# Patient Record
Sex: MALE | Race: WHITE | NOT HISPANIC OR LATINO | Employment: OTHER | URBAN - METROPOLITAN AREA
[De-identification: names, ages, dates, MRNs, and addresses within clinical notes are randomized per-mention and may not be internally consistent; named-entity substitution may affect disease eponyms.]

---

## 2018-03-20 LAB
ANION GAP SERPL CALCULATED.3IONS-SCNC: 11.2 MM/L
BUN SERPL-MCNC: 63 MG/DL (ref 7–25)
CALCIUM SERPL-MCNC: 9.2 MG/DL (ref 8.6–10.5)
CHLORIDE SERPL-SCNC: 97 MM/L (ref 98–107)
CO2 SERPL-SCNC: 32 MM/L (ref 21–31)
CREAT SERPL-MCNC: 2.51 MG/DL (ref 0.7–1.3)
EGFR (HISTORICAL): 25 GFR
EGFR AFRICAN AMERICAN (HISTORICAL): 30 GFR
GLUCOSE (HISTORICAL): 138 MG/DL (ref 65–99)
OSMOLALITY, SERUM (HISTORICAL): 292 MOSM (ref 262–291)
POTASSIUM SERPL-SCNC: 4.2 MM/L (ref 3.5–5.5)
SODIUM SERPL-SCNC: 136 MM/L (ref 134–143)

## 2018-03-21 LAB
ANION GAP SERPL CALCULATED.3IONS-SCNC: 13 MM/L
BUN SERPL-MCNC: 62 MG/DL (ref 7–25)
CALCIUM SERPL-MCNC: 8.6 MG/DL (ref 8.6–10.5)
CHLORIDE SERPL-SCNC: 102 MM/L (ref 98–107)
CO2 SERPL-SCNC: 27 MM/L (ref 21–31)
CREAT SERPL-MCNC: 2.17 MG/DL (ref 0.7–1.3)
EGFR (HISTORICAL): 29 GFR
EGFR AFRICAN AMERICAN (HISTORICAL): 35 GFR
GLUCOSE (HISTORICAL): 117 MG/DL (ref 65–99)
OSMOLALITY, SERUM (HISTORICAL): 294 MOSM (ref 262–291)
POTASSIUM SERPL-SCNC: 4 MM/L (ref 3.5–5.5)
SODIUM SERPL-SCNC: 138 MM/L (ref 134–143)

## 2018-03-22 LAB
ANION GAP SERPL CALCULATED.3IONS-SCNC: 10.2 MM/L
BASOPHILS # BLD AUTO: 0.1 X3/UL (ref 0–0.3)
BASOPHILS # BLD AUTO: 1.5 % (ref 0–2)
BUN SERPL-MCNC: 62 MG/DL (ref 7–25)
CALCIUM SERPL-MCNC: 8.6 MG/DL (ref 8.6–10.5)
CHLORIDE SERPL-SCNC: 103 MM/L (ref 98–107)
CO2 SERPL-SCNC: 29 MM/L (ref 21–31)
CREAT SERPL-MCNC: 2.06 MG/DL (ref 0.7–1.3)
DEPRECATED RDW RBC AUTO: 15.5 % (ref 11.5–14.5)
EGFR (HISTORICAL): 31 GFR
EGFR AFRICAN AMERICAN (HISTORICAL): 37 GFR
EOSINOPHIL # BLD AUTO: 0.1 X3/UL (ref 0–0.5)
EOSINOPHIL NFR BLD AUTO: 3 % (ref 0–5)
GLUCOSE (HISTORICAL): 100 MG/DL (ref 65–99)
HCT VFR BLD AUTO: 37.2 % (ref 42–52)
HGB BLD-MCNC: 12.6 G/DL (ref 14–18)
LYMPHOCYTES # BLD AUTO: 0.9 X3/UL (ref 1.2–4.2)
LYMPHOCYTES NFR BLD AUTO: 19.1 % (ref 20.5–51.1)
MCH RBC QN AUTO: 28.8 PG (ref 26–34)
MCHC RBC AUTO-ENTMCNC: 33.9 G/DL (ref 31–36)
MCV RBC AUTO: 84.9 FL (ref 81–99)
MONOCYTES # BLD AUTO: 0.5 X3/UL (ref 0–1)
MONOCYTES NFR BLD AUTO: 10.8 % (ref 1.7–12)
NEUTROPHILS # BLD AUTO: 3 X3/UL (ref 1.4–6.5)
NEUTS SEG NFR BLD AUTO: 65.6 % (ref 42.2–75.2)
OSMOLALITY, SERUM (HISTORICAL): 293 MOSM (ref 262–291)
PLATELET # BLD AUTO: 294 X3/UL (ref 130–400)
PMV BLD AUTO: 8.9 FL (ref 8.6–11.7)
POTASSIUM SERPL-SCNC: 4.2 MM/L (ref 3.5–5.5)
RBC # BLD AUTO: 4.38 X6/UL (ref 4.3–5.9)
SODIUM SERPL-SCNC: 138 MM/L (ref 134–143)
WBC # BLD AUTO: 4.5 X3/UL (ref 4.8–10.8)

## 2018-03-24 LAB
ANION GAP SERPL CALCULATED.3IONS-SCNC: 10.2 MM/L
BUN SERPL-MCNC: 60 MG/DL (ref 7–25)
CALCIUM SERPL-MCNC: 9 MG/DL (ref 8.6–10.5)
CHLORIDE SERPL-SCNC: 100 MM/L (ref 98–107)
CO2 SERPL-SCNC: 30 MM/L (ref 21–31)
CREAT SERPL-MCNC: 1.96 MG/DL (ref 0.7–1.3)
EGFR (HISTORICAL): 33 GFR
EGFR AFRICAN AMERICAN (HISTORICAL): 40 GFR
GLUCOSE (HISTORICAL): 77 MG/DL (ref 65–99)
OSMOLALITY, SERUM (HISTORICAL): 288 MOSM (ref 262–291)
POTASSIUM SERPL-SCNC: 4.2 MM/L (ref 3.5–5.5)
SODIUM SERPL-SCNC: 136 MM/L (ref 134–143)

## 2018-03-26 LAB
ANION GAP SERPL CALCULATED.3IONS-SCNC: 11.9 MM/L
BASOPHILS # BLD AUTO: 0.1 X3/UL (ref 0–0.3)
BASOPHILS # BLD AUTO: 1.2 % (ref 0–2)
BUN SERPL-MCNC: 52 MG/DL (ref 7–25)
CALCIUM SERPL-MCNC: 8.8 MG/DL (ref 8.6–10.5)
CHLORIDE SERPL-SCNC: 102 MM/L (ref 98–107)
CO2 SERPL-SCNC: 32 MM/L (ref 21–31)
CREAT SERPL-MCNC: 1.9 MG/DL (ref 0.7–1.3)
DEPRECATED RDW RBC AUTO: 15.8 % (ref 11.5–14.5)
EGFR (HISTORICAL): 34 GFR
EGFR AFRICAN AMERICAN (HISTORICAL): 41 GFR
EOSINOPHIL # BLD AUTO: 0.1 X3/UL (ref 0–0.5)
EOSINOPHIL NFR BLD AUTO: 3.5 % (ref 0–5)
GLUCOSE (HISTORICAL): 99 MG/DL (ref 65–99)
HCT VFR BLD AUTO: 38.7 % (ref 42–52)
HGB BLD-MCNC: 13 G/DL (ref 14–18)
LYMPHOCYTES # BLD AUTO: 1.1 X3/UL (ref 1.2–4.2)
LYMPHOCYTES NFR BLD AUTO: 25.8 % (ref 20.5–51.1)
MAGNESIUM SERPL-MCNC: 2 MG/DL (ref 1.9–2.7)
MCH RBC QN AUTO: 28.4 PG (ref 26–34)
MCHC RBC AUTO-ENTMCNC: 33.6 G/DL (ref 31–36)
MCV RBC AUTO: 84.6 FL (ref 81–99)
MONOCYTES # BLD AUTO: 0.5 X3/UL (ref 0–1)
MONOCYTES NFR BLD AUTO: 11.9 % (ref 1.7–12)
NEUTROPHILS # BLD AUTO: 2.4 X3/UL (ref 1.4–6.5)
NEUTS SEG NFR BLD AUTO: 57.6 % (ref 42.2–75.2)
OSMOLALITY, SERUM (HISTORICAL): 297 MOSM (ref 262–291)
PHOSPHATE SERPL-MCNC: 2.5 MG/DL (ref 3–5.5)
PLATELET # BLD AUTO: 257 X3/UL (ref 130–400)
PMV BLD AUTO: 9.1 FL (ref 8.6–11.7)
POTASSIUM SERPL-SCNC: 3.9 MM/L (ref 3.5–5.5)
RBC # BLD AUTO: 4.57 X6/UL (ref 4.3–5.9)
SODIUM SERPL-SCNC: 142 MM/L (ref 134–143)
WBC # BLD AUTO: 4.2 X3/UL (ref 4.8–10.8)

## 2018-03-27 LAB
ANION GAP SERPL CALCULATED.3IONS-SCNC: 10.9 MM/L
BASOPHILS # BLD AUTO: 0.1 X3/UL (ref 0–0.3)
BASOPHILS # BLD AUTO: 1.3 % (ref 0–2)
BUN SERPL-MCNC: 41 MG/DL (ref 7–25)
CALCIUM SERPL-MCNC: 8.6 MG/DL (ref 8.6–10.5)
CHLORIDE SERPL-SCNC: 104 MM/L (ref 98–107)
CO2 SERPL-SCNC: 30 MM/L (ref 21–31)
CREAT SERPL-MCNC: 1.77 MG/DL (ref 0.7–1.3)
DEPRECATED RDW RBC AUTO: 15.9 % (ref 11.5–14.5)
EGFR (HISTORICAL): 37 GFR
EGFR AFRICAN AMERICAN (HISTORICAL): 45 GFR
EOSINOPHIL # BLD AUTO: 0.2 X3/UL (ref 0–0.5)
EOSINOPHIL NFR BLD AUTO: 4.6 % (ref 0–5)
GLUCOSE (HISTORICAL): 98 MG/DL (ref 65–99)
HCT VFR BLD AUTO: 39.5 % (ref 42–52)
HGB BLD-MCNC: 12.8 G/DL (ref 14–18)
LYMPHOCYTES # BLD AUTO: 1.1 X3/UL (ref 1.2–4.2)
LYMPHOCYTES NFR BLD AUTO: 26.2 % (ref 20.5–51.1)
MCH RBC QN AUTO: 27.7 PG (ref 26–34)
MCHC RBC AUTO-ENTMCNC: 32.5 G/DL (ref 31–36)
MCV RBC AUTO: 85.1 FL (ref 81–99)
MONOCYTES # BLD AUTO: 0.5 X3/UL (ref 0–1)
MONOCYTES NFR BLD AUTO: 11.2 % (ref 1.7–12)
NEUTROPHILS # BLD AUTO: 2.5 X3/UL (ref 1.4–6.5)
NEUTS SEG NFR BLD AUTO: 56.7 % (ref 42.2–75.2)
OSMOLALITY, SERUM (HISTORICAL): 291 MOSM (ref 262–291)
PLATELET # BLD AUTO: 243 X3/UL (ref 130–400)
PMV BLD AUTO: 9.1 FL (ref 8.6–11.7)
POTASSIUM SERPL-SCNC: 3.9 MM/L (ref 3.5–5.5)
RBC # BLD AUTO: 4.64 X6/UL (ref 4.3–5.9)
SODIUM SERPL-SCNC: 141 MM/L (ref 134–143)
WBC # BLD AUTO: 4.4 X3/UL (ref 4.8–10.8)

## 2018-03-29 LAB
ALBUMIN SERPL BCP-MCNC: 2.9 G/DL (ref 3.5–5.7)
ALP SERPL-CCNC: 58 IU/L (ref 55–165)
ALT SERPL W P-5'-P-CCNC: 47 IU/L (ref 7–26)
ANION GAP SERPL CALCULATED.3IONS-SCNC: 12 MM/L
AST SERPL W P-5'-P-CCNC: 57 U/L (ref 8–27)
BASOPHILS # BLD AUTO: 0.1 X3/UL (ref 0–0.3)
BASOPHILS # BLD AUTO: 0.9 % (ref 0–2)
BILIRUB SERPL-MCNC: 0.8 MG/DL (ref 0.3–1)
BUN SERPL-MCNC: 40 MG/DL (ref 7–25)
CALCIUM SERPL-MCNC: 8.6 MG/DL (ref 8.6–10.5)
CHLORIDE SERPL-SCNC: 106 MM/L (ref 98–107)
CO2 SERPL-SCNC: 27 MM/L (ref 21–31)
CREAT SERPL-MCNC: 1.85 MG/DL (ref 0.7–1.3)
DEPRECATED RDW RBC AUTO: 16.3 % (ref 11.5–14.5)
EGFR (HISTORICAL): 35 GFR
EGFR AFRICAN AMERICAN (HISTORICAL): 42 GFR
EOSINOPHIL # BLD AUTO: 0.3 X3/UL (ref 0–0.5)
EOSINOPHIL NFR BLD AUTO: 4.8 % (ref 0–5)
GLUCOSE (HISTORICAL): 71 MG/DL (ref 65–99)
HCT VFR BLD AUTO: 39 % (ref 42–52)
HGB BLD-MCNC: 12.7 G/DL (ref 14–18)
LYMPHOCYTES # BLD AUTO: 1.5 X3/UL (ref 1.2–4.2)
LYMPHOCYTES NFR BLD AUTO: 25.7 % (ref 20.5–51.1)
MAGNESIUM SERPL-MCNC: 2 MG/DL (ref 1.9–2.7)
MCH RBC QN AUTO: 27.6 PG (ref 26–34)
MCHC RBC AUTO-ENTMCNC: 32.6 G/DL (ref 31–36)
MCV RBC AUTO: 84.7 FL (ref 81–99)
MONOCYTES # BLD AUTO: 0.6 X3/UL (ref 0–1)
MONOCYTES NFR BLD AUTO: 9.8 % (ref 1.7–12)
NEUTROPHILS # BLD AUTO: 3.3 X3/UL (ref 1.4–6.5)
NEUTS SEG NFR BLD AUTO: 58.8 % (ref 42.2–75.2)
OSMOLALITY, SERUM (HISTORICAL): 289 MOSM (ref 262–291)
PHOSPHATE SERPL-MCNC: 3 MG/DL (ref 3–5.5)
PLATELET # BLD AUTO: 217 X3/UL (ref 130–400)
PMV BLD AUTO: 9.2 FL (ref 8.6–11.7)
POTASSIUM SERPL-SCNC: 4 MM/L (ref 3.5–5.5)
RBC # BLD AUTO: 4.6 X6/UL (ref 4.3–5.9)
SODIUM SERPL-SCNC: 141 MM/L (ref 134–143)
TOTAL PROTEIN (HISTORICAL): 6 G/DL (ref 6.4–8.9)
WBC # BLD AUTO: 5.7 X3/UL (ref 4.8–10.8)

## 2018-04-01 LAB
ANION GAP SERPL CALCULATED.3IONS-SCNC: 14.6 MM/L
BASOPHILS # BLD AUTO: 0.1 X3/UL (ref 0–0.3)
BASOPHILS # BLD AUTO: 0.7 % (ref 0–2)
BNP SERPL-MCNC: 262 PG/ML (ref 1–100)
BUN SERPL-MCNC: 39 MG/DL (ref 7–25)
CALCIUM SERPL-MCNC: 8.6 MG/DL (ref 8.6–10.5)
CHLORIDE SERPL-SCNC: 109 MM/L (ref 98–107)
CO2 SERPL-SCNC: 23 MM/L (ref 21–31)
CREAT SERPL-MCNC: 1.61 MG/DL (ref 0.7–1.3)
DEPRECATED RDW RBC AUTO: 16.2 % (ref 11.5–14.5)
EGFR (HISTORICAL): 41 GFR
EGFR AFRICAN AMERICAN (HISTORICAL): 50 GFR
EOSINOPHIL # BLD AUTO: 0.1 X3/UL (ref 0–0.5)
EOSINOPHIL NFR BLD AUTO: 0.9 % (ref 0–5)
GLUCOSE (HISTORICAL): 87 MG/DL (ref 65–99)
HCT VFR BLD AUTO: 42.6 % (ref 42–52)
HGB BLD-MCNC: 13.8 G/DL (ref 14–18)
LYMPHOCYTES # BLD AUTO: 1.2 X3/UL (ref 1.2–4.2)
LYMPHOCYTES NFR BLD AUTO: 17.5 % (ref 20.5–51.1)
MCH RBC QN AUTO: 28.2 PG (ref 26–34)
MCHC RBC AUTO-ENTMCNC: 32.4 G/DL (ref 31–36)
MCV RBC AUTO: 87 FL (ref 81–99)
MONOCYTES # BLD AUTO: 0.4 X3/UL (ref 0–1)
MONOCYTES NFR BLD AUTO: 6.3 % (ref 1.7–12)
NEUTROPHILS # BLD AUTO: 5.1 X3/UL (ref 1.4–6.5)
NEUTS SEG NFR BLD AUTO: 74.6 % (ref 42.2–75.2)
OSMOLALITY, SERUM (HISTORICAL): 292 MOSM (ref 262–291)
PLATELET # BLD AUTO: 199 X3/UL (ref 130–400)
PMV BLD AUTO: 9.7 FL (ref 8.6–11.7)
POTASSIUM SERPL-SCNC: 4.6 MM/L (ref 3.5–5.5)
RBC # BLD AUTO: 4.89 X6/UL (ref 4.3–5.9)
SODIUM SERPL-SCNC: 142 MM/L (ref 134–143)
WBC # BLD AUTO: 6.9 X3/UL (ref 4.8–10.8)

## 2019-01-28 ENCOUNTER — APPOINTMENT (EMERGENCY)
Dept: RADIOLOGY | Facility: HOSPITAL | Age: 84
DRG: 689 | End: 2019-01-28
Payer: MEDICARE

## 2019-01-28 ENCOUNTER — HOSPITAL ENCOUNTER (INPATIENT)
Facility: HOSPITAL | Age: 84
LOS: 3 days | Discharge: NURSING, REHAB OR LONG-TERM CARE | DRG: 689 | End: 2019-01-31
Attending: EMERGENCY MEDICINE | Admitting: STUDENT IN AN ORGANIZED HEALTH CARE EDUCATION/TRAINING PROGRAM
Payer: MEDICARE

## 2019-01-28 ENCOUNTER — APPOINTMENT (EMERGENCY)
Dept: ULTRASOUND IMAGING | Facility: HOSPITAL | Age: 84
DRG: 689 | End: 2019-01-28
Payer: MEDICARE

## 2019-01-28 DIAGNOSIS — L03.019 PARONYCHIA OF FINGER: ICD-10-CM

## 2019-01-28 DIAGNOSIS — N48.89 PENILE SWELLING: ICD-10-CM

## 2019-01-28 DIAGNOSIS — L89.90 DECUBITUS ULCERS: ICD-10-CM

## 2019-01-28 DIAGNOSIS — I48.91 A-FIB (HCC): ICD-10-CM

## 2019-01-28 DIAGNOSIS — N18.9 CKD (CHRONIC KIDNEY DISEASE): ICD-10-CM

## 2019-01-28 DIAGNOSIS — L03.317 CELLULITIS OF BUTTOCK: ICD-10-CM

## 2019-01-28 DIAGNOSIS — E11.9 DM (DIABETES MELLITUS) (HCC): ICD-10-CM

## 2019-01-28 DIAGNOSIS — N39.0 UTI (URINARY TRACT INFECTION): Primary | ICD-10-CM

## 2019-01-28 DIAGNOSIS — J44.9 COPD WITHOUT EXACERBATION (HCC): ICD-10-CM

## 2019-01-28 DIAGNOSIS — E55.9 VITAMIN D DEFICIENCY: ICD-10-CM

## 2019-01-28 DIAGNOSIS — E78.5 HLD (HYPERLIPIDEMIA): ICD-10-CM

## 2019-01-28 PROBLEM — I50.9 CHF (CONGESTIVE HEART FAILURE) (HCC): Status: ACTIVE | Noted: 2019-01-28

## 2019-01-28 PROBLEM — L89.102 PRESSURE INJURY OF BACK, STAGE 2 (HCC): Status: ACTIVE | Noted: 2019-01-28

## 2019-01-28 PROBLEM — N43.3 HYDROCELE: Status: ACTIVE | Noted: 2019-01-28

## 2019-01-28 LAB
ALBUMIN SERPL BCP-MCNC: 2.8 G/DL (ref 3.5–5)
ALP SERPL-CCNC: 61 U/L (ref 46–116)
ALT SERPL W P-5'-P-CCNC: 24 U/L (ref 12–78)
ANION GAP SERPL CALCULATED.3IONS-SCNC: 8 MMOL/L (ref 4–13)
APTT PPP: 56 SECONDS (ref 26–38)
AST SERPL W P-5'-P-CCNC: 29 U/L (ref 5–45)
ATRIAL RATE: 67 BPM
ATRIAL RATE: 86 BPM
BACTERIA UR QL AUTO: ABNORMAL /HPF
BASOPHILS # BLD AUTO: 0.04 THOUSANDS/ΜL (ref 0–0.1)
BASOPHILS NFR BLD AUTO: 1 % (ref 0–1)
BILIRUB DIRECT SERPL-MCNC: 0.2 MG/DL (ref 0–0.2)
BILIRUB SERPL-MCNC: 0.4 MG/DL (ref 0.2–1)
BILIRUB UR QL STRIP: NEGATIVE
BUN SERPL-MCNC: 32 MG/DL (ref 5–25)
CALCIUM SERPL-MCNC: 8.6 MG/DL (ref 8.3–10.1)
CHLORIDE SERPL-SCNC: 109 MMOL/L (ref 100–108)
CLARITY UR: ABNORMAL
CO2 SERPL-SCNC: 28 MMOL/L (ref 21–32)
COLOR UR: YELLOW
CREAT SERPL-MCNC: 1.64 MG/DL (ref 0.6–1.3)
CRP SERPL HS-MCNC: 71.52 MG/L
EOSINOPHIL # BLD AUTO: 0.1 THOUSAND/ΜL (ref 0–0.61)
EOSINOPHIL NFR BLD AUTO: 2 % (ref 0–6)
ERYTHROCYTE [DISTWIDTH] IN BLOOD BY AUTOMATED COUNT: 14.7 % (ref 11.6–15.1)
ERYTHROCYTE [SEDIMENTATION RATE] IN BLOOD: 44 MM/HOUR (ref 0–10)
GFR SERPL CREATININE-BSD FRML MDRD: 38 ML/MIN/1.73SQ M
GLUCOSE SERPL-MCNC: 62 MG/DL (ref 65–140)
GLUCOSE SERPL-MCNC: 65 MG/DL (ref 65–140)
GLUCOSE UR STRIP-MCNC: ABNORMAL MG/DL
HCT VFR BLD AUTO: 41.6 % (ref 36.5–49.3)
HGB BLD-MCNC: 13.2 G/DL (ref 12–17)
HGB UR QL STRIP.AUTO: ABNORMAL
IMM GRANULOCYTES # BLD AUTO: 0.04 THOUSAND/UL (ref 0–0.2)
IMM GRANULOCYTES NFR BLD AUTO: 1 % (ref 0–2)
INR PPP: 2.13 (ref 0.86–1.17)
KETONES UR STRIP-MCNC: NEGATIVE MG/DL
LACTATE SERPL-SCNC: 1.2 MMOL/L (ref 0.5–2)
LEUKOCYTE ESTERASE UR QL STRIP: ABNORMAL
LYMPHOCYTES # BLD AUTO: 1.12 THOUSANDS/ΜL (ref 0.6–4.47)
LYMPHOCYTES NFR BLD AUTO: 18 % (ref 14–44)
MCH RBC QN AUTO: 29.1 PG (ref 26.8–34.3)
MCHC RBC AUTO-ENTMCNC: 31.7 G/DL (ref 31.4–37.4)
MCV RBC AUTO: 92 FL (ref 82–98)
MONOCYTES # BLD AUTO: 0.81 THOUSAND/ΜL (ref 0.17–1.22)
MONOCYTES NFR BLD AUTO: 13 % (ref 4–12)
NEUTROPHILS # BLD AUTO: 4.22 THOUSANDS/ΜL (ref 1.85–7.62)
NEUTS SEG NFR BLD AUTO: 65 % (ref 43–75)
NITRITE UR QL STRIP: POSITIVE
NON-SQ EPI CELLS URNS QL MICRO: ABNORMAL /HPF
NRBC BLD AUTO-RTO: 0 /100 WBCS
PH UR STRIP.AUTO: 7 [PH] (ref 4.5–8)
PLATELET # BLD AUTO: 273 THOUSANDS/UL (ref 149–390)
PMV BLD AUTO: 10.8 FL (ref 8.9–12.7)
POTASSIUM SERPL-SCNC: 4.9 MMOL/L (ref 3.5–5.3)
PR INTERVAL: 146 MS
PROT SERPL-MCNC: 6.9 G/DL (ref 6.4–8.2)
PROT UR STRIP-MCNC: ABNORMAL MG/DL
PROTHROMBIN TIME: 23.5 SECONDS (ref 11.8–14.2)
QRS AXIS: 65 DEGREES
QRS AXIS: 84 DEGREES
QRSD INTERVAL: 126 MS
QRSD INTERVAL: 86 MS
QT INTERVAL: 382 MS
QT INTERVAL: 400 MS
QTC INTERVAL: 457 MS
QTC INTERVAL: 470 MS
RBC # BLD AUTO: 4.54 MILLION/UL (ref 3.88–5.62)
RBC #/AREA URNS AUTO: ABNORMAL /HPF
SODIUM SERPL-SCNC: 145 MMOL/L (ref 136–145)
SP GR UR STRIP.AUTO: 1.02 (ref 1–1.03)
T WAVE AXIS: 102 DEGREES
T WAVE AXIS: 53 DEGREES
TROPONIN I SERPL-MCNC: <0.02 NG/ML
UROBILINOGEN UR QL STRIP.AUTO: 0.2 E.U./DL
VENTRICULAR RATE: 83 BPM
VENTRICULAR RATE: 86 BPM
WBC # BLD AUTO: 6.33 THOUSAND/UL (ref 4.31–10.16)
WBC #/AREA URNS AUTO: ABNORMAL /HPF

## 2019-01-28 PROCEDURE — 84484 ASSAY OF TROPONIN QUANT: CPT | Performed by: PHYSICIAN ASSISTANT

## 2019-01-28 PROCEDURE — 93005 ELECTROCARDIOGRAM TRACING: CPT

## 2019-01-28 PROCEDURE — 83605 ASSAY OF LACTIC ACID: CPT | Performed by: PHYSICIAN ASSISTANT

## 2019-01-28 PROCEDURE — 80076 HEPATIC FUNCTION PANEL: CPT | Performed by: PHYSICIAN ASSISTANT

## 2019-01-28 PROCEDURE — 96365 THER/PROPH/DIAG IV INF INIT: CPT

## 2019-01-28 PROCEDURE — 85610 PROTHROMBIN TIME: CPT | Performed by: PHYSICIAN ASSISTANT

## 2019-01-28 PROCEDURE — 87040 BLOOD CULTURE FOR BACTERIA: CPT | Performed by: PHYSICIAN ASSISTANT

## 2019-01-28 PROCEDURE — 80048 BASIC METABOLIC PNL TOTAL CA: CPT | Performed by: PHYSICIAN ASSISTANT

## 2019-01-28 PROCEDURE — 86141 C-REACTIVE PROTEIN HS: CPT | Performed by: PHYSICIAN ASSISTANT

## 2019-01-28 PROCEDURE — 81001 URINALYSIS AUTO W/SCOPE: CPT | Performed by: PHYSICIAN ASSISTANT

## 2019-01-28 PROCEDURE — 85652 RBC SED RATE AUTOMATED: CPT | Performed by: PHYSICIAN ASSISTANT

## 2019-01-28 PROCEDURE — 85730 THROMBOPLASTIN TIME PARTIAL: CPT | Performed by: PHYSICIAN ASSISTANT

## 2019-01-28 PROCEDURE — 82948 REAGENT STRIP/BLOOD GLUCOSE: CPT

## 2019-01-28 PROCEDURE — 1123F ACP DISCUSS/DSCN MKR DOCD: CPT | Performed by: STUDENT IN AN ORGANIZED HEALTH CARE EDUCATION/TRAINING PROGRAM

## 2019-01-28 PROCEDURE — 99285 EMERGENCY DEPT VISIT HI MDM: CPT

## 2019-01-28 PROCEDURE — 96367 TX/PROPH/DG ADDL SEQ IV INF: CPT

## 2019-01-28 PROCEDURE — 71046 X-RAY EXAM CHEST 2 VIEWS: CPT

## 2019-01-28 PROCEDURE — 85025 COMPLETE CBC W/AUTO DIFF WBC: CPT | Performed by: PHYSICIAN ASSISTANT

## 2019-01-28 PROCEDURE — 93010 ELECTROCARDIOGRAM REPORT: CPT | Performed by: INTERNAL MEDICINE

## 2019-01-28 PROCEDURE — 36415 COLL VENOUS BLD VENIPUNCTURE: CPT | Performed by: PHYSICIAN ASSISTANT

## 2019-01-28 PROCEDURE — 87086 URINE CULTURE/COLONY COUNT: CPT | Performed by: PHYSICIAN ASSISTANT

## 2019-01-28 PROCEDURE — 87186 SC STD MICRODIL/AGAR DIL: CPT | Performed by: PHYSICIAN ASSISTANT

## 2019-01-28 PROCEDURE — 76870 US EXAM SCROTUM: CPT

## 2019-01-28 RX ORDER — MIDODRINE HYDROCHLORIDE 5 MG/1
10 TABLET ORAL 3 TIMES DAILY
Status: DISCONTINUED | OUTPATIENT
Start: 2019-01-28 | End: 2019-01-28

## 2019-01-28 RX ORDER — ACETAMINOPHEN 325 MG/1
650 TABLET ORAL EVERY 6 HOURS PRN
Status: DISCONTINUED | OUTPATIENT
Start: 2019-01-28 | End: 2019-01-31 | Stop reason: HOSPADM

## 2019-01-28 RX ORDER — METHENAMINE HIPPURATE 1000 MG/1
1 TABLET ORAL 2 TIMES DAILY WITH MEALS
COMMUNITY
End: 2019-01-31 | Stop reason: HOSPADM

## 2019-01-28 RX ORDER — FENOFIBRATE 145 MG/1
145 TABLET, COATED ORAL DAILY
Status: DISCONTINUED | OUTPATIENT
Start: 2019-01-29 | End: 2019-01-29

## 2019-01-28 RX ORDER — MIDODRINE HYDROCHLORIDE 10 MG/1
10 TABLET ORAL 3 TIMES DAILY
COMMUNITY
End: 2019-01-31 | Stop reason: HOSPADM

## 2019-01-28 RX ADMIN — CEFTRIAXONE SODIUM 1000 MG: 10 INJECTION, POWDER, FOR SOLUTION INTRAVENOUS at 14:45

## 2019-01-28 RX ADMIN — VANCOMYCIN HYDROCHLORIDE 1500 MG: 1 INJECTION, POWDER, LYOPHILIZED, FOR SOLUTION INTRAVENOUS at 16:04

## 2019-01-28 NOTE — ED PROVIDER NOTES
History  Chief Complaint   Patient presents with    Wound Infection     multiple wounds, pt finished 2nd round of antibiotics last week     79 y/o male here for multiple complaints  Chief complaint is of multiple wounds  Daughter noticed swelling and redness of right index finger yesterday  Appears to be paronychia  He has pain in this area  Daughter also notes that he has multiple wounds on his lower extremities as well as his buttocks  Also with pain and swelling of his penis that has been present for about 3 days  He has been urinating per usual  He is being actively treated for UTI  Has been on 2 courses of abx including most recently cipro  No relief of dysuria and foul smelling urine  Denies fevers, chest pain, sob  He has fatigue  No vomiting  No abdominal pain and no back pain  Medical history of diabetes, COPD, HLD, CHF, afib           History provided by:  Patient   used: No    Penis Swelling   Presenting symptoms: penile pain and swelling    Presenting symptoms: no dysuria    Context: spontaneously    Context: not after injury, not after intercourse, not after urination, not during intercourse and not during urination    Relieved by:  Nothing  Worsened by:  Nothing  Ineffective treatments:  None tried  Associated symptoms: penile redness and penile swelling    Associated symptoms: no abdominal pain, no diarrhea, no fever, no flank pain, no genital itching, no genital lesions, no genital rash, no groin pain, no hematuria, no nausea, no priapism, no scrotal swelling, no urinary frequency, no urinary hesitation, no urinary incontinence, no urinary retention and no vomiting    Risk factors: no bladder surgery, no change in medication, no erectile dysfunction, no foreign body, no HIV, no kidney stones, does not have multiple sexual partners, no new sexual partner, no recent infection, not currently sexually active, no sickle cell disease, no STI exposure, no unprotected sex and no urinary catheter        Prior to Admission Medications   Prescriptions Last Dose Informant Patient Reported? Taking? Rivaroxaban (XARELTO PO)   Yes Yes   Sig: Take 15 mg by mouth   Sacubitril-Valsartan (ENTRESTO PO)   Yes Yes   Sig: Take 24-26 mg by mouth   choline fenofibrate (TRILIPIX) 135 MG capsule   Yes Yes   Sig: Take 135 mg by mouth daily   insulin aspart (NovoLOG) 100 units/mL injection   Yes Yes   Sig: Inject under the skin 3 (three) times a day before meals   insulin lispro (HumaLOG) 100 units/mL injection   Yes Yes   Sig: Inject 8-12 Units under the skin 3 (three) times a day before meals   methenamine hippurate (HIPREX) 1 g tablet   Yes Yes   Sig: Take 1 g by mouth 2 (two) times a day with meals   midodrine (PROAMATINE) 10 MG tablet   Yes Yes   Sig: Take 10 mg by mouth 3 (three) times a day      Facility-Administered Medications: None       Past Medical History:   Diagnosis Date    CHF [facility]congestive heart failure) ([facility])     COPD (chronic obstructive pulmonary disease) ([place] [address] )     Diabetes mellitus ([place] [address] )     Hyperlipidemia     Osteomyelitis ([place] [address] )        Past Surgical History:   Procedure Laterality Date    ABDOMINAL SURGERY      JOINT REPLACEMENT         History reviewed  No pertinent family history  I have reviewed and agree with the history as documented  Social History   Substance Use Topics    Smoking status: Current Every Day Smoker    Smokeless tobacco: Never Used    Alcohol use No        Review of Systems   Constitutional: Negative for activity change, appetite change, chills, diaphoresis, fatigue, fever and unexpected weight change  HENT: Negative for congestion, rhinorrhea, sinus pressure, sore throat and trouble swallowing  Eyes: Negative for photophobia and visual disturbance  Respiratory: Negative for apnea, cough, choking, chest tightness, shortness of breath, wheezing and stridor  Cardiovascular: Negative for chest pain, palpitations and leg swelling     Gastrointestinal: Negative for abdominal distention, abdominal pain, blood in stool, constipation, diarrhea, nausea and vomiting  Genitourinary: Positive for penile pain and penile swelling  Negative for bladder incontinence, decreased urine volume, difficulty urinating, dysuria, enuresis, flank pain, frequency, hematuria, hesitancy, scrotal swelling and urgency  Musculoskeletal: Negative for arthralgias, myalgias, neck pain and neck stiffness  Skin: Positive for rash and wound  Negative for color change and pallor  Allergic/Immunologic: Negative  Neurological: Negative for dizziness, tremors, syncope, weakness, light-headedness, numbness and headaches  Hematological: Negative  Psychiatric/Behavioral: Negative  All other systems reviewed and are negative  Physical Exam  Physical Exam   Constitutional: He is oriented to person, place, and time  He appears well-developed and well-nourished  Non-toxic appearance  He does not have a sickly appearance  He does not appear ill  No distress  HENT:   Head: Normocephalic and atraumatic  Eyes: Pupils are equal, round, and reactive to light  EOM and lids are normal    Neck: Normal range of motion  Neck supple  Cardiovascular: Normal rate, regular rhythm, S1 normal, S2 normal, normal heart sounds, intact distal pulses and normal pulses  Exam reveals no gallop, no distant heart sounds, no friction rub and no decreased pulses  No murmur heard  Pulses:       Radial pulses are 2+ on the right side, and 2+ on the left side  Pulmonary/Chest: Effort normal and breath sounds normal  No accessory muscle usage  No apnea, no tachypnea and no bradypnea  No respiratory distress  He has no decreased breath sounds  He has no wheezes  He has no rhonchi  He has no rales  Abdominal: Soft  Normal appearance  He exhibits no distension  There is no tenderness  There is no rigidity, no rebound and no guarding  Genitourinary: Testes normal  Penile erythema present   No phimosis, paraphimosis or penile tenderness  No discharge found  Musculoskeletal: Normal range of motion  He exhibits no edema, tenderness or deformity  Hands:  Neurological: He is alert and oriented to person, place, and time  No cranial nerve deficit  GCS eye subscore is 4  GCS verbal subscore is 5  GCS motor subscore is 6  Skin: Skin is warm, dry and intact  Rash noted  He is not diaphoretic  No erythema  No pallor  Psychiatric: His speech is normal    Nursing note and vitals reviewed  Vital Signs  ED Triage Vitals [01/28/19 1411]   Temperature Pulse Respirations Blood Pressure SpO2   98 °F (36 7 °C) 64 18 132/65 95 %      Temp src Heart Rate Source Patient Position - Orthostatic VS BP Location FiO2 (%)   -- -- -- -- --      Pain Score       8           Vitals:    01/28/19 1411   BP: 132/65   Pulse: 64       Visual Acuity      ED Medications  Medications   vancomycin (VANCOCIN) 1,500 mg in sodium chloride 0 9 % 250 mL IVPB (1,500 mg Intravenous New Bag 1/28/19 1604)   ceftriaxone (ROCEPHIN) 1 g/50 mL in dextrose IVPB (0 mg Intravenous Stopped 1/28/19 1515)       Diagnostic Studies  Results Reviewed     Procedure Component Value Units Date/Time    Urine Microscopic [[id number]]  (Abnormal) Collected:  01/28/19 1541    Lab Status:  Final result Specimen:  Urine from Urine, Clean Catch Updated:  01/28/19 1611     RBC, UA 4-10 (A) /hpf      WBC, UA 30-50 (A) /hpf      Epithelial Cells Occasional /hpf      Bacteria, UA Moderate (A) /hpf     Urine culture [[id number]] Collected:  01/28/19 1541    Lab Status:   In process Specimen:  Urine from Urine, Clean Catch Updated:  01/28/19 1611    Hepatic function panel [[id number]]  (Abnormal) Collected:  01/28/19 1437    Lab Status:  Final result Specimen:  Blood from Arm, Right Updated:  01/28/19 1608     Total Bilirubin 0 40 mg/dL      Bilirubin, Direct 0 20 mg/dL      Alkaline Phosphatase 61 U/L      AST 29 U/L      ALT 24 U/L      Total Protein 6 9 g/dL      Albumin 2 8 (L) g/dL     High sensitivity CRP [[id number]] Collected:  01/28/19 1437    Lab Status:  Final result Specimen:  Blood from Arm, Right Updated:  01/28/19 1608     CRP, High Sensitivity 71 52 mg/L     Narrative:               HsCRP Level       Relative Risk           <1 0 mg/L          Low           1 0 to 3 0 mg/L    Average           >3 0 mg/L          High      UA w Reflex to Microscopic w Reflex to Culture [[id number]]  (Abnormal) Collected:  01/28/19 1541    Lab Status:  Final result Specimen:  Urine from Urine, Clean Catch Updated:  01/28/19 1553     Color, UA Yellow     Clarity, UA Cloudy     Specific [place], UA 1 020     pH, UA 7 0     Leukocytes, UA Moderate (A)     Nitrite, UA Positive (A)     Protein, UA 30 (1+) (A) mg/dl      Glucose,  (1/4%) (A) mg/dl      Ketones, UA Negative mg/dl      Urobilinogen, UA 0 2 E U /dl      Bilirubin, UA Negative     Blood, UA Large (A)    Sedimentation rate, automated [[id number]]  (Abnormal) Collected:  01/28/19 1437    Lab Status:  Final result Specimen:  Blood from Arm, Right Updated:  01/28/19 1551     Sed Rate 44 (H) mm/hour     Protime-INR [[id number]]  (Abnormal) Collected:  01/28/19 1437    Lab Status:  Final result Specimen:  Blood from Arm, Right Updated:  01/28/19 1533     Protime 23 5 (H) seconds      INR 2 13 (H)    APTT [[id number]]  (Abnormal) Collected:  01/28/19 1437    Lab Status:  Final result Specimen:  Blood from Arm, Right Updated:  01/28/19 1533     PTT 56 (H) seconds     Lactic acid, plasma [[id number]]  (Normal) Collected:  01/28/19 1437    Lab Status:  Final result Specimen:  Blood from Arm, Right Updated:  01/28/19 1519     LACTIC ACID 1 2 mmol/L     Narrative:         Result may be elevated if tourniquet was used during collection      Troponin I [[id number]]  (Normal) Collected:  01/28/19 1437    Lab Status:  Final result Specimen:  Blood from Arm, Right Updated:  01/28/19 1515     Troponin I <0 02 ng/mL     Basic metabolic panel [[id number]]  (Abnormal) Collected:  01/28/19 1437    Lab Status:  Final result Specimen:  Blood from Arm, Right Updated:  01/28/19 1512     Sodium 145 mmol/L      Potassium 4 9 mmol/L      Chloride 109 (H) mmol/L      CO2 28 mmol/L      ANION GAP 8 mmol/L      BUN 32 (H) mg/dL      Creatinine 1 64 (H) mg/dL      Glucose 65 mg/dL      Calcium 8 6 mg/dL      eGFR 38 ml/min/1 73sq m     Narrative:         National Kidney Disease Education Program recommendations are as follows:  GFR calculation is accurate only with a steady state creatinine  Chronic Kidney disease less than 60 ml/min/1 73 sq  meters  Kidney failure less than 15 ml/min/1 73 sq  meters  CBC and differential [[id number]]  (Abnormal) Collected:  01/28/19 1437    Lab Status:  Final result Specimen:  Blood from Arm, Right Updated:  01/28/19 1446     WBC 6 33 Thousand/uL      RBC 4 54 Million/uL      Hemoglobin 13 2 g/dL      Hematocrit 41 6 %      MCV 92 fL      MCH 29 1 pg      MCHC 31 7 g/dL      RDW 14 7 %      MPV 10 8 fL      Platelets 556 Thousands/uL      nRBC 0 /100 WBCs      Neutrophils Relative 65 %      Immat GRANS % 1 %      Lymphocytes Relative 18 %      Monocytes Relative 13 (H) %      Eosinophils Relative 2 %      Basophils Relative 1 %      Neutrophils Absolute 4 22 Thousands/µL      Immature Grans Absolute 0 04 Thousand/uL      Lymphocytes Absolute 1 12 Thousands/µL      Monocytes Absolute 0 81 Thousand/µL      Eosinophils Absolute 0 10 Thousand/µL      Basophils Absolute 0 04 Thousands/µL     Blood culture #1 [[id number]] Collected:  01/28/19 1437    Lab Status: In process Specimen:  Blood from Hand, Right Updated:  01/28/19 1442    Blood culture #2 [[id number]] Collected:  01/28/19 1437    Lab Status:   In process Specimen:  Blood from Arm, Right Updated:  01/28/19 1442    Fingerstick Glucose (POCT) [[id number]]  (Abnormal) Collected:  01/28/19 1433    Lab Status:  Final result Updated:  01/28/19 1442     POC Glucose 62 (L) mg/dl US scrotum and testicles   Final Result by Barulio Banuelos MD (01/28 1570)       Small bilateral hydroceles  Workstation performed: [id number]         XR chest 2 views    (Results Pending)              Procedures  ECG 12 Lead Documentation  Date/Time: 1/28/2019 2:44 PM  Performed by: Akira Stevenson by: Neel Tello     Indications / Diagnosis:  Fatigue  ECG reviewed by me, the ED Provider: yes    Patient location:  ED  Previous ECG:     Previous ECG:  Unavailable    Comparison to cardiac monitor: Yes    Interpretation:     Interpretation: abnormal    Quality:     Tracing quality:  Limited by artifact  Rate:     ECG rate:  86    ECG rate assessment: normal    Rhythm:     Rhythm: atrial fibrillation    Ectopy:     Ectopy: none    QRS:     QRS axis:  Normal    QRS intervals:  Normal  Conduction:     Conduction: normal    ST segments:     ST segments:  Normal  T waves:     T waves: normal      Incision/Drainage  Date/Time: 1/28/2019 3:22 PM  Performed by: Akira Stevenson by: Neel Tello     Patient location:  ED  Other Assisting Provider: Yes (comment)    Consent:     Consent obtained:  Verbal    Consent given by:  Patient    Risks discussed:  Bleeding, damage to other organs, incomplete drainage, infection and pain  Universal protocol:     Patient identity confirmed:  Arm band, hospital-assigned identification number and verbally with patient  Location:     Indications for incision and drainage: paronychia  Size:  2cm    Location: right fourth finger  Anesthesia (see MAR for exact dosages): Anesthesia method:  None  Procedure details:     Complexity:  Simple    Incision types:  Stab incision    Scalpel blade:  11    Approach:  Open    Incision depth:  Skin    Drainage:  Purulent    Drainage amount:  Scant    Wound treatment:  Wound left open    Packing materials:  None  Post-procedure details:     Patient tolerance of procedure:   Tolerated well, no immediate complications           Phone Contacts  ED Phone Contact    ED Course  ED Course as of Jan 28 1646   Mon Jan 28, 2019   1522 I&D of paronychia  Scant discharge returned  HEART Risk Score      Most Recent Value   History  0 Filed at: 01/28/2019 1639   ECG  1 Filed at: 01/28/2019 1639   Age  2 Filed at: 01/28/2019 1639   Risk Factors  2 Filed at: 01/28/2019 1639   Troponin  0 Filed at: 01/28/2019 1639   Heart Score Risk Calculator   History  0 Filed at: 01/28/2019 1639   ECG  1 Filed at: 01/28/2019 1639   Age  2 Filed at: 01/28/2019 1639   Risk Factors  2 Filed at: 01/28/2019 1639   Troponin  0 Filed at: 01/28/2019 1639   HEART Score  5 Filed at: 01/28/2019 1639   HEART Score  5 Filed at: 01/28/2019 1639        Identification of Seniors at Risk      Most Recent Value   (ISAR) Identification of Seniors at Risk   Before the illness or injury that brought you to the Emergency, did you need someone to help you on a regular basis? 0 Filed at: 01/28/2019 1413   In the last 24 hours, have you needed more help than usual?     Have you been hospitalized for one or more nights during the past 6 months?    In general, do you see well?    In general, do you have serious problems with your memory?    Do you take more than three different medications every day?      ISAR Score  0 Filed at: 01/28/2019 1413                          MDM  Number of Diagnoses or Management Options  A-fib [facility]): new and requires workup  Cellulitis of buttock: new and requires workup  CKD (chronic kidney disease): new and requires workup  COPD without exacerbation ([facility] [address] ): new and requires workup  Decubitus ulcers: new and requires workup  HLD (hyperlipidemia): new and requires workup  Paronychia of finger: new and requires workup  Penile swelling: new and requires workup  UTI (urinary tract infection): new and requires workup  Diagnosis management comments: ddx includes but is not limited to abscess, cellulitis, phimosis, paraphimosis, UTI, pneumonia, metabolic abnormalities, decub ulcers, osteo, nec fasc  Plan: Labs  UA  XR chest  US scrotum/testicles r/o [place]'s gangrene although low suspicion for this  Amount and/or Complexity of Data Reviewed  Clinical lab tests: ordered and reviewed  Tests in the radiology section of CPT®: ordered and reviewed  Discuss the patient with other providers: yes ([facility]  )  Independent visualization of images, tracings, or specimens: yes    Risk of Complications, Morbidity, and/or Mortality  Presenting problems: moderate  Management options: low  General comments: 81 y/o male with multiple complaints  He was found to have nitrite positive urine with bacteria consistent with UTI  He has failed outpatient abx  Additionally he has paronychia on right fourth finger with surrounding cellulitis  This was drained at bedside without any immediate complications  Also found to have multiple stage I-II decub ulcers of buttocks with surrounding erythema and warmth consistent with cellulitis  Started on broad spectrum abx  Vanc for cellulitis  Rocephin for both cellulitis and UTI  Penile swelling could be cellulitic  US negative for acute abnormalities  Discussed with [facility] for admission and they are in agreement  Daughter informed of plan and is in agreement  Stable at time of admit       Patient Progress  Patient progress: stable    CritCare Time    Disposition  Final diagnoses:   UTI (urinary tract infection)   CKD (chronic kidney disease)   Decubitus ulcers - stage I-II   Paronychia of finger   Penile swelling   COPD without exacerbation (HCC)   HLD (hyperlipidemia)   Cellulitis of buttock   A-fib ([facility] [address] )     Time reflects when diagnosis was documented in both MDM as applicable and the Disposition within this note     Time User Action Codes Description Comment    1/28/2019  4:19 PM Michael PALOMO Add [N39 0] UTI (urinary tract infection)     1/28/2019  4:19 PM Michael PALOMO Add [N18 9] CKD (chronic kidney disease)     1/28/2019  4:19 PM Alonza Niharika L Add [L89 90] Decubitus ulcers     1/28/2019  4:19 PM Alonza Niharika L Modify [L89 90] Decubitus ulcers stage I-II    1/28/2019  4:19 PM Johann Mark L Add [L95 100] Paronychia of finger     1/28/2019  4:20 PM Jorge Alberto Rolon Add [N48 89] Penile swelling     1/28/2019  4:20 PM Alonza Niharika L Add [J44 9] COPD without exacerbation ([facility] [address] )     1/28/2019  4:20 PM Johann Mark L Add [E78 5] HLD (hyperlipidemia)     1/28/2019  4:21 PM Alsydney Niharika L Add [T04 130] Cellulitis of buttock     1/28/2019  4:25 PM Jorge Alberto Rolon Add [I48 91] [facility])       ED Disposition     ED Disposition Condition Comment    Admit  Case was discussed with Dr Roger Solorio and the patient's admission status was agreed to be Admission Status: inpatient status to the service of Dr Roger Solorio  Follow-up Information    None         Patient's Medications   Discharge Prescriptions    No medications on file     No discharge procedures on file      ED Provider  Electronically Signed by           Graciela Mota PA-C  01/28/19 9753

## 2019-01-29 ENCOUNTER — APPOINTMENT (INPATIENT)
Dept: ULTRASOUND IMAGING | Facility: HOSPITAL | Age: 84
DRG: 689 | End: 2019-01-29
Payer: MEDICARE

## 2019-01-29 LAB
25(OH)D3 SERPL-MCNC: 7.3 NG/ML (ref 30–100)
ALBUMIN SERPL BCP-MCNC: 2.3 G/DL (ref 3.5–5)
ALP SERPL-CCNC: 51 U/L (ref 46–116)
ALT SERPL W P-5'-P-CCNC: 16 U/L (ref 12–78)
ANION GAP SERPL CALCULATED.3IONS-SCNC: 7 MMOL/L (ref 4–13)
AST SERPL W P-5'-P-CCNC: 25 U/L (ref 5–45)
BASOPHILS # BLD AUTO: 0.03 THOUSANDS/ΜL (ref 0–0.1)
BASOPHILS NFR BLD AUTO: 1 % (ref 0–1)
BILIRUB SERPL-MCNC: 0.5 MG/DL (ref 0.2–1)
BUN SERPL-MCNC: 31 MG/DL (ref 5–25)
CALCIUM SERPL-MCNC: 8.2 MG/DL (ref 8.3–10.1)
CHLORIDE SERPL-SCNC: 109 MMOL/L (ref 100–108)
CO2 SERPL-SCNC: 28 MMOL/L (ref 21–32)
CREAT SERPL-MCNC: 1.61 MG/DL (ref 0.6–1.3)
CREAT UR-MCNC: 68.8 MG/DL
EOSINOPHIL # BLD AUTO: 0.09 THOUSAND/ΜL (ref 0–0.61)
EOSINOPHIL NFR BLD AUTO: 2 % (ref 0–6)
ERYTHROCYTE [DISTWIDTH] IN BLOOD BY AUTOMATED COUNT: 14.6 % (ref 11.6–15.1)
EST. AVERAGE GLUCOSE BLD GHB EST-MCNC: 186 MG/DL
GFR SERPL CREATININE-BSD FRML MDRD: 38 ML/MIN/1.73SQ M
GLUCOSE SERPL-MCNC: 200 MG/DL (ref 65–140)
GLUCOSE SERPL-MCNC: 203 MG/DL (ref 65–140)
GLUCOSE SERPL-MCNC: 208 MG/DL (ref 65–140)
GLUCOSE SERPL-MCNC: 262 MG/DL (ref 65–140)
GLUCOSE SERPL-MCNC: 306 MG/DL (ref 65–140)
HBA1C MFR BLD: 8.1 % (ref 4.2–6.3)
HCT VFR BLD AUTO: 38.1 % (ref 36.5–49.3)
HGB BLD-MCNC: 12.1 G/DL (ref 12–17)
IMM GRANULOCYTES # BLD AUTO: 0.04 THOUSAND/UL (ref 0–0.2)
IMM GRANULOCYTES NFR BLD AUTO: 1 % (ref 0–2)
LYMPHOCYTES # BLD AUTO: 0.96 THOUSANDS/ΜL (ref 0.6–4.47)
LYMPHOCYTES NFR BLD AUTO: 17 % (ref 14–44)
MCH RBC QN AUTO: 29.4 PG (ref 26.8–34.3)
MCHC RBC AUTO-ENTMCNC: 31.8 G/DL (ref 31.4–37.4)
MCV RBC AUTO: 93 FL (ref 82–98)
MONOCYTES # BLD AUTO: 0.64 THOUSAND/ΜL (ref 0.17–1.22)
MONOCYTES NFR BLD AUTO: 11 % (ref 4–12)
NEUTROPHILS # BLD AUTO: 3.96 THOUSANDS/ΜL (ref 1.85–7.62)
NEUTS SEG NFR BLD AUTO: 68 % (ref 43–75)
NRBC BLD AUTO-RTO: 0 /100 WBCS
PHOSPHATE SERPL-MCNC: 2.6 MG/DL (ref 2.3–4.1)
PLATELET # BLD AUTO: 260 THOUSANDS/UL (ref 149–390)
PMV BLD AUTO: 10.7 FL (ref 8.9–12.7)
POTASSIUM SERPL-SCNC: 4.9 MMOL/L (ref 3.5–5.3)
PROT SERPL-MCNC: 5.9 G/DL (ref 6.4–8.2)
PROT UR-MCNC: 83 MG/DL
PROT/CREAT UR: 1.21 MG/G{CREAT} (ref 0–0.1)
PTH-INTACT SERPL-MCNC: 77.3 PG/ML (ref 18.4–80.1)
RBC # BLD AUTO: 4.12 MILLION/UL (ref 3.88–5.62)
SODIUM SERPL-SCNC: 144 MMOL/L (ref 136–145)
WBC # BLD AUTO: 5.72 THOUSAND/UL (ref 4.31–10.16)

## 2019-01-29 PROCEDURE — 83036 HEMOGLOBIN GLYCOSYLATED A1C: CPT | Performed by: STUDENT IN AN ORGANIZED HEALTH CARE EDUCATION/TRAINING PROGRAM

## 2019-01-29 PROCEDURE — 82948 REAGENT STRIP/BLOOD GLUCOSE: CPT

## 2019-01-29 PROCEDURE — 80053 COMPREHEN METABOLIC PANEL: CPT | Performed by: STUDENT IN AN ORGANIZED HEALTH CARE EDUCATION/TRAINING PROGRAM

## 2019-01-29 PROCEDURE — 76770 US EXAM ABDO BACK WALL COMP: CPT

## 2019-01-29 PROCEDURE — 82306 VITAMIN D 25 HYDROXY: CPT | Performed by: INTERNAL MEDICINE

## 2019-01-29 PROCEDURE — G8978 MOBILITY CURRENT STATUS: HCPCS

## 2019-01-29 PROCEDURE — 82570 ASSAY OF URINE CREATININE: CPT | Performed by: INTERNAL MEDICINE

## 2019-01-29 PROCEDURE — G8979 MOBILITY GOAL STATUS: HCPCS

## 2019-01-29 PROCEDURE — 85025 COMPLETE CBC W/AUTO DIFF WBC: CPT | Performed by: STUDENT IN AN ORGANIZED HEALTH CARE EDUCATION/TRAINING PROGRAM

## 2019-01-29 PROCEDURE — 83970 ASSAY OF PARATHORMONE: CPT | Performed by: INTERNAL MEDICINE

## 2019-01-29 PROCEDURE — 97163 PT EVAL HIGH COMPLEX 45 MIN: CPT

## 2019-01-29 PROCEDURE — 99232 SBSQ HOSP IP/OBS MODERATE 35: CPT | Performed by: STUDENT IN AN ORGANIZED HEALTH CARE EDUCATION/TRAINING PROGRAM

## 2019-01-29 PROCEDURE — 84100 ASSAY OF PHOSPHORUS: CPT | Performed by: INTERNAL MEDICINE

## 2019-01-29 PROCEDURE — 99222 1ST HOSP IP/OBS MODERATE 55: CPT | Performed by: INTERNAL MEDICINE

## 2019-01-29 PROCEDURE — 84156 ASSAY OF PROTEIN URINE: CPT | Performed by: INTERNAL MEDICINE

## 2019-01-29 RX ORDER — FENOFIBRATE 48 MG/1
48 TABLET, COATED ORAL DAILY
Status: DISCONTINUED | OUTPATIENT
Start: 2019-01-30 | End: 2019-01-31 | Stop reason: HOSPADM

## 2019-01-29 RX ORDER — BACITRACIN, NEOMYCIN, POLYMYXIN B 400; 3.5; 5 [USP'U]/G; MG/G; [USP'U]/G
1 OINTMENT TOPICAL 2 TIMES DAILY
Status: DISCONTINUED | OUTPATIENT
Start: 2019-01-29 | End: 2019-01-31 | Stop reason: HOSPADM

## 2019-01-29 RX ORDER — INSULIN GLARGINE 100 [IU]/ML
7 INJECTION, SOLUTION SUBCUTANEOUS
Status: DISCONTINUED | OUTPATIENT
Start: 2019-01-29 | End: 2019-01-30

## 2019-01-29 RX ADMIN — BACITRACIN, NEOMYCIN, POLYMYXIN B 1 SMALL APPLICATION: 400; 3.5; 5 OINTMENT TOPICAL at 18:05

## 2019-01-29 RX ADMIN — INSULIN LISPRO 3 UNITS: 100 INJECTION, SOLUTION INTRAVENOUS; SUBCUTANEOUS at 12:09

## 2019-01-29 RX ADMIN — CEFTRIAXONE SODIUM 1000 MG: 10 INJECTION, POWDER, FOR SOLUTION INTRAVENOUS at 14:05

## 2019-01-29 RX ADMIN — RIVAROXABAN 15 MG: 15 TABLET, FILM COATED ORAL at 17:59

## 2019-01-29 RX ADMIN — INSULIN GLARGINE 7 UNITS: 100 INJECTION, SOLUTION SUBCUTANEOUS at 23:45

## 2019-01-29 RX ADMIN — FENOFIBRATE 145 MG: 145 TABLET, COATED ORAL at 09:55

## 2019-01-29 RX ADMIN — INSULIN LISPRO 2 UNITS: 100 INJECTION, SOLUTION INTRAVENOUS; SUBCUTANEOUS at 18:00

## 2019-01-29 RX ADMIN — INSULIN LISPRO 1 UNITS: 100 INJECTION, SOLUTION INTRAVENOUS; SUBCUTANEOUS at 09:55

## 2019-01-29 RX ADMIN — ACETAMINOPHEN 650 MG: 325 TABLET, FILM COATED ORAL at 00:06

## 2019-01-29 NOTE — PLAN OF CARE

## 2019-01-29 NOTE — CONSULTS
NEPHROLOGY CONSULTATION NOTE    Patient: Sierra Thornton               Sex: male          DOA: 1/28/2019  2:08 PM   YOB: 1933        Age:  80 y o         LOS:  LOS: 1 day     REFERRING PHYSICIAN: Dr Munoz Ser / CONSULTATION:  Chronic kidney disease stage 3    DATE OF CONSULTATION / SERVICE: 1/29/2019    ADMISSION DIAGNOSIS: UTI (urinary tract infection)     CHIEF COMPLAINT     Scrotal swelling, pressure wound in the back    HPI     This is a 55-year-old male with past medical history of DVT, CKD stage 3, hypertension, diabetes mellitus, impaired cognition who is brought in for the etiology listed above  Workup in the ER revealed presence of in tract infection which was likely causing acute on chronic worsening of mentation  Ultrasounds of scrotum revealed hydrocele  Currently patient has just finished his breakfast and offers no complaints  PAST MEDICAL HISTORY     Past Medical History:   Diagnosis Date    [facility]congest[facility] failure) [facility]     COPD (chronic obstructive pulmonary disease) ([facility] )     Diabetes mellitus ([facility] )     Hyperlipidemia     Osteomyelitis ([facility] )        PAST SURGICAL HISTORY     Past Surgical History:   Procedure Laterality Date    ABDOMINAL SURGERY      JOINT REPLACEMENT         ALLERGIES     Allergies   Allergen Reactions    Aspirin GI Intolerance       SOCIAL HISTORY     History   Alcohol Use No     History   Drug Use No     History   Smoking Status    Not on file   Smokeless Tobacco    Never Used       FAMILY HISTORY     History reviewed  No pertinent family history      CURRENT MEDICATIONS       Current Facility-Administered Medications:     acetaminophen (TYLENOL) tablet 650 mg, 650 mg, Oral, Q6H PRN, Luis Fernando Santacruz MD, 650 mg at 01/29/19 0006    ceftriaxone (ROCEPHIN) 1 g/50 mL in dextrose IVPB, 1,000 mg, Intravenous, Q24H, Janusz SANCHEZ MD    fenofibrate (TRICOR) tablet 145 mg, 145 mg, Oral, Daily, Mariela Rubio MD, 145 mg at 01/29/19 0955    insulin lispro (HumaLOG) 100 units/mL subcutaneous injection 1-5 Units, 1-5 Units, Subcutaneous, TID AC, 1 Units at 01/29/19 0955 **AND** Fingerstick Glucose (POCT), , , TID AC, Janusz SANCHEZ MD    rivaroxaban (XARELTO) tablet 15 mg, 15 mg, Oral, Daily With Dinner, Chula Veloz MD    REVIEW OF SYSTEMS     Review of Systems   Unable to perform ROS: Dementia         OBJECTIVE     Current Weight: Weight - Scale: 101 kg (223 lb)  Vitals:    01/29/19 0949   BP: 130/61   Pulse: 88   Resp:    Temp: 98 3 °F (36 8 °C)   SpO2: 97%     Body mass index is 35 99 kg/m²  Intake/Output Summary (Last 24 hours) at 01/29/19 1016  Last data filed at 01/29/19 0501   Gross per 24 hour   Intake           300 05 ml   Output              125 ml   Net           175 05 ml       PHYSICAL EXAMINATION     Physical Exam   Constitutional: He appears well-developed and well-nourished  HENT:   Head: Normocephalic and atraumatic  Eyes: Pupils are equal, round, and reactive to light  Neck: Neck supple  Cardiovascular: Normal rate, regular rhythm and normal heart sounds  Pulmonary/Chest: Effort normal    Abdominal: Soft  Bowel sounds are normal    Musculoskeletal: Normal range of motion  Neurological:   Alert and oriented to person and place only   Skin: Skin is warm and dry  Psychiatric: He has a normal mood and affect           LAB RESULTS          Results from last 7 days  Lab Units 01/29/19  0540 01/28/19  1437   WBC Thousand/uL 5 72 6 33   HEMOGLOBIN g/dL 12 1 13 2   HEMATOCRIT % 38 1 41 6   PLATELETS Thousands/uL 260 273   POTASSIUM mmol/L 4 9 4 9   CHLORIDE mmol/L 109* 109*   CO2 mmol/L 28 28   BUN mg/dL 31* 32*   CREATININE mg/dL 1 61* 1 64*   EGFR ml/min/1 73sq m 38 38   CALCIUM mg/dL 8 2* 8 6       I have personally reviewed the old medical records and patient's previously known baseline creatinine level is ~ 1 6    RADIOLOGY RESULTS     No results found for this or any previous visit  Results for orders placed during the hospital encounter of 01/28/19   XR chest 2 views    Narrative CHEST     INDICATION:   generalized weakness  COMPARISON:  None    EXAM PERFORMED/VIEWS:  XR CHEST PA & LATERAL      FINDINGS:    Mild cardiomegaly  Mild central pulmonary venous distention suggesting component of congestion  Limited inspiratory volume  The lungs are clear  No pneumothorax or pleural effusion  Osseous structures appear within normal limits for patient age  Impression No acute cardiopulmonary disease  Workstation performed: [id number]       No results found for this or any previous visit  No results found for this or any previous visit  No results found for this or any previous visit  No results found for this or any previous visit  PLAN / RECOMMENDATIONS      70-year-old male with past medical history of hypertension, CHF, chronic kidney disease stage 3, DVT, impaired cognitive dysfunction who presented to the ER with findings of a large testicles and pressure in the back  1  Urine tract infection:  Noted urinalysis has that was positive nitrite and leukocyte esterase  Patient was started on Vancomycin Ceftriaxone  Urine culture remains negative to date  Recommend using Vancomycin with caution in the patient with CKD  2  Chronic kidney disease stage 3:  Etiology likely age, CHF and diabetes mellitus  Current serum creatinine is 1 6 and at baseline  3  Bone mineral disorder:  Will check patient's parathyroid hormone intact, 25 hydroxy vitamin-D, calcium phosphorus levels  4  Proteinuria:  Urinalysis showed 1+ protein  Will check urine protein creatinine ratio  5  Anemia of chronic disease:  Hemoglobin is 12 and stable  Thank you for the consultation to participate in patient's care  I have personally discussed my plan with the referring physician       Riley Diaz MD    1/29/2019

## 2019-01-29 NOTE — ASSESSMENT & PLAN NOTE
Daughter also reports history of CHF  Currently not on any diuretics  Currently not in acute distress  Denies chest pain, dyspnea    Continue I&O  Salt and fluid restricted diet  Pending echo  Will consider Cardiology consult based on echo result

## 2019-01-29 NOTE — ASSESSMENT & PLAN NOTE
No fever, no leukocytosis noted  No signs of systemic infection present    Continue topical antibiotics  Wound care

## 2019-01-29 NOTE — PHYSICAL THERAPY NOTE
Physical Therapy Cancellation Note    PT order received  Chart review performed  At this time, PT evaluation cancelled secondary to patient unavailable - US at bedside  PT will follow and evaluate as appropriate      Miguel A Valenzuela, PT, DPT

## 2019-01-29 NOTE — SOCIAL WORK
Cm contacted patient daughter Chauncey Kraus also identifies as POA  Patient confused at this time unable to participate in interview  Cm informed POA and her  reside with the patient and assist with with care  Cm informed patient Uses a rolling walker to ambulate and has assistance wih ADL's  POA declined any current home physical therapy however reports a RN from [facility] provides wound care for his lower extremities  Patient PCP makes house visits and prescriptions are filled at [address] co payment barriers  Daughter denies patient has MH/SA and reports patient has str hx  Cm reviewed physical therapy recommendations for str  Cm informed she would like a referral sent to [facility] as choice as PVM as secondary  Referrals placed  Cm team will follow for needs  CM reviewed discharge planning process including the following: identifying help at home, patient preference for discharge planning needs, pharmacy preference, and availability of treatment team to discuss questions or concerns patient and/or family may have regarding understanding medications and recognizing signs and symptoms once discharged  CM also encouraged patient to follow up with all recommended appointments after discharge  Patient advised of importance for patient and family to participate in managing patients medical well being

## 2019-01-29 NOTE — ASSESSMENT & PLAN NOTE
Currently monitor shows AFib with rate controlled  On Pradaxa for anticoagulation  No signs of active bleeding noted  Continue Pradaxa

## 2019-01-29 NOTE — ASSESSMENT & PLAN NOTE
Daughter also reports history of CHF  Currently not on any diuretics    Continue I&O  Salt and fluid restricted diet  Follow-up echo  Will consider Cardiology consult based on echo result

## 2019-01-29 NOTE — ASSESSMENT & PLAN NOTE
Daughter reports that patient's mentation worsens every time he gets UTI  As per daughter patient does have difficulty with memory  at baseline  But able to identify and recognize family members  Currently oriented to himself only  Not in acute distress  Hemodynamically stable  UA indicative of UTI  No fever, no leukocytosis noted  Follow-up urine culture    Continue IV ceftriaxone for now

## 2019-01-29 NOTE — ASSESSMENT & PLAN NOTE
History of CKD stage 3  Baseline GFR around 35-40 and baseline creatinine around 1 6-1 9 range  Currently presents with creatinine 1 6  Patient has not seen nephrologist and last few years as per daughter    Follow-up Nephrology consult

## 2019-01-29 NOTE — ASSESSMENT & PLAN NOTE
Multiple stage I-II  sacral pressure wound noted  Daughter reports that patient gets visiting nurse to times a week  Appears that patient may need more help to take care of himself    Will order pressure mattress  Wound care consult  Continue supportive care  Follow-up PT james

## 2019-01-29 NOTE — ASSESSMENT & PLAN NOTE
No results found for: HGBA1C    Recent Labs      01/28/19   1433   POCGLU  62*       Blood Sugar Average: Last 72 hrs:  (P) 62     A1c noted 8 1  Daughter was not sure about insulin dose    Will start on Lantus 7 units tonight  Sliding scale coverage  Inpatient goal should be between 140-180  Continue to monitor

## 2019-01-29 NOTE — ASSESSMENT & PLAN NOTE
Currently rate controlled    On Pradaxa for anticoagulation  No signs of active bleeding noted  Continue Pradaxa

## 2019-01-29 NOTE — PLAN OF CARE
Problem: PHYSICAL THERAPY ADULT  Goal: Performs mobility at highest level of function for planned discharge setting  See evaluation for individualized goals  Treatment/Interventions: LE strengthening/ROM, Therapeutic exercise, Endurance training, Patient/family training, Equipment eval/education, Bed mobility, Continued evaluation, Spoke to nursing, Spoke to case management          See flowsheet documentation for full assessment, interventions and recommendations  Prognosis: Fair  Problem List: Decreased strength, Decreased endurance, Decreased mobility, Decreased cognition, Obesity, Pain, Decreased skin integrity, Impaired sensation  Assessment: Pt is 80 y o  male seen for PT evaluation s/p admit to [facility] on 1/28/2019 w/ UTI (urinary tract infection)  PT consulted to assess pt's functional mobility and d/c needs  Order placed for PT eval and tx, w/ up w/ A order  Performed at least 2 patient identifiers during session: Name and wristband  Comorbidities affecting pt's physical performance at time of assessment include: CHF, COPD, DM, hyperlipidemia, osteomyelitis  PTA, pt was independent w/ all functional mobility w/ RW, requiring A for ADLs and IADLs, ambulates household distances, has 6 MELIDA, lives w/ daughter in bi-level house and retired  Personal factors affecting pt at time of IE include: inaccessible home environment, lives in multi-level house, stairs to enter home, inability to ambulate household distances, inability to navigate level surfaces w/o external assistance, decreased cognition, positive fall history, limited insight into impairments, inability to perform IADLs, inability to perform ADLs and inability to live alone   Please find objective findings from PT assessment regarding body systems outlined above with impairments and limitations including weakness, decreased endurance, pain, decreased activity tolerance, impaired sensation, decreased functional mobility tolerance, fall risk, decreased skin integrity and decreased cognition  The following objective measures performed on IE also reveal limitations: Barthel Index: 20/100 and Modified [place]: 5 (severe disability)  Pt's clinical presentation is currently unstable/unpredictable seen in pt's presentation of unstable medical status requiring ongoing medical management/monitoring, pain impacting overall mobility status and need for input for mobility technique/safety w/ inconsistent retention of education received  Pt to benefit from continued PT tx to address deficits as defined above and maximize level of functional independent mobility and consistency  From PT/mobility standpoint, recommendation at time of d/c would be STR pending progress in order to facilitate return to PLOF  Barriers to Discharge: Inaccessible home environment  Barriers to Discharge Comments: 6 MELIDA; multi-level house  Recommendation: Short-term skilled PT     PT - OK to Discharge: Yes (when medically cleared; if to STR)    See flowsheet documentation for full assessment

## 2019-01-29 NOTE — ASSESSMENT & PLAN NOTE
Multiple stage I-II  sacral pressure wound noted    Will order pressure mattress  Wound care consult

## 2019-01-29 NOTE — ASSESSMENT & PLAN NOTE
Ultrasound shows small bilateral hydrocele  Patient denies fever, chills, pain in his scrotal area  No signs of systemic infection present  Nontender on exam   Recommended continuous outpatient monitoring

## 2019-01-29 NOTE — PROGRESS NOTES
Progress Note - Ryland Gallardo 1933, 80 y o  male MRN: [id number]    Unit/Bed#: -01 Encounter: [id number]    Primary Care Provider: Cy Lopez DO   Date and time admitted to hospital: 1/28/2019  2:08 PM        * UTI (urinary tract infection)   Assessment & Plan    Daughter reports that patient's mentation worsens every time he gets UTI  As per daughter patient does have difficulty with memory  at baseline  But able to identify and recognize family members  Currently oriented to himself only  Not in acute distress  Hemodynamically stable  UA indicative of UTI  No fever, no leukocytosis noted  Follow-up urine culture  Will continue IV ceftriaxone to finish 3 days of antibiotics since urine grossly positive for UTI  DM [facility]diabetes mellitus) ([facility] )   Assessment & Plan    No results found for: HGBA1C    Recent Labs      01/28/19   1433   POCGLU  62*       Blood Sugar Average: Last 72 hrs:  (P) 62     A1c noted 8 1  Daughter was not sure about insulin dose  Will start on Lantus 7 units tonight  Sliding scale coverage  Inpatient goal should be between 140-180  Continue to monitor       CHF (congestive heart failure) [facility])   Assessment & Plan    Daughter also reports history of CHF  Currently not on any diuretics  Currently not in acute distress  Denies chest pain, dyspnea  Continue I&O  Salt and fluid restricted diet  Pending echo  Will consider Cardiology consult based on echo result     A-fib [facility])   Assessment & Plan    Currently rate controlled  On Pradaxa for anticoagulation  No signs of active bleeding noted  Continue Pradaxa     HLD (hyperlipidemia)   Assessment & Plan    Continue home medication  Follow-up lipid profile     Pressure injury of back, stage 2   Assessment & Plan    Multiple stage I-II  sacral pressure wound noted  Daughter reports that patient gets visiting nurse to times a week  Appears that patient may need more help to take care of himself    Will order pressure mattress  Wound care consult  Continue supportive care  Follow-up PT eval       CKD (chronic kidney disease)   Assessment & Plan    History of CKD stage 3  Baseline GFR around 35-40 and baseline creatinine around 1 6-1 9 range  Currently presents with creatinine 1 6  Continue monitor renal function while inpatient  Patient has not seen nephrologist and last few years as per daughter  Nephrology consult appreciated  Paronychia of finger   Assessment & Plan    No fever, no leukocytosis noted  No signs of systemic infection present  Continue topical antibiotics  Wound care     Hydrocele   Assessment & Plan    Ultrasound shows small bilateral hydrocele  Patient denies fever, chills, pain in his scrotal area  No signs of systemic infection present  Nontender on exam   Recommended continuous outpatient monitoring  VTE Pharmacologic Prophylaxis:   Pharmacologic: Rivaroxaban (Xarelto)  Mechanical VTE Prophylaxis in Place: Yes    Patient Centered Rounds: I have performed bedside rounds with nursing staff today  Discussions with Specialists or Other Care Team Provider:  Nephrology recommendation noted    Education and Discussions with Family / Patient:  Discussed with family over the phone  Answered all questions appropriate  Time Spent for Care: 30 minutes  More than 50% of total time spent on counseling and coordination of care as described above  Current Length of Stay: 1 day(s)    Current Patient Status: Inpatient   Certification Statement: The patient will continue to require additional inpatient hospital stay due to Pending wound care consult, PT eval pending  Discharge Plan:  As per above    Code Status: Level 1 - Full Code      Subjective:   Not in acute distress  Awake, alert, oriented to person, and place  Cooperative during exam   Denies any new complaints  No other events reported      Objective:     Vitals:   Temp (24hrs), Av 3 °F (36 8 °C), Min:97 8 °F (36 6 °C), Max:99 °F (37 2 °C)    Temp:  [97 8 °F (36 6 °C)-99 °F (37 2 °C)] 97 8 °F (36 6 °C)  HR:  [79-89] 79  Resp:  [15-20] 18  BP: (122-141)/(56-63) 141/63  SpO2:  [90 %-97 %] 96 %  Body mass index is 35 99 kg/m²  Input and Output Summary (last 24 hours): Intake/Output Summary (Last 24 hours) at 01/29/19 1630  Last data filed at 01/29/19 1321   Gross per 24 hour   Intake           550 05 ml   Output              125 ml   Net           425 05 ml       Physical Exam:     Physical Exam   Constitutional: No distress  HENT:   Head: Normocephalic and atraumatic  Eyes: Pupils are equal, round, and reactive to light  Neck: Normal range of motion  No JVD present  Cardiovascular: Normal rate  Pulmonary/Chest: Effort normal  No respiratory distress  He has no wheezes  Abdominal: Soft  Bowel sounds are normal  He exhibits no distension  There is no tenderness  Musculoskeletal: Normal range of motion  He exhibits edema (Bilateral lower extremity edema noted  )  Neurological:   Awake, alert, oriented to place and person    Cooperative during exam        Additional Data:     Labs:      Results from last 7 days  Lab Units 01/29/19  0540   WBC Thousand/uL 5 72   HEMOGLOBIN g/dL 12 1   HEMATOCRIT % 38 1   PLATELETS Thousands/uL 260   NEUTROS PCT % 68   LYMPHS PCT % 17   MONOS PCT % 11   EOS PCT % 2       Results from last 7 days  Lab Units 01/29/19  0540   SODIUM mmol/L 144   POTASSIUM mmol/L 4 9   CHLORIDE mmol/L 109*   CO2 mmol/L 28   BUN mg/dL 31*   CREATININE mg/dL 1 61*   ANION GAP mmol/L 7   CALCIUM mg/dL 8 2*   ALBUMIN g/dL 2 3*   TOTAL BILIRUBIN mg/dL 0 50   ALK PHOS U/L 51   ALT U/L 16   AST U/L 25   GLUCOSE RANDOM mg/dL 203*       Results from last 7 days  Lab Units 01/28/19  1437   INR  2 13*       Results from last 7 days  Lab Units 01/29/19  1142 01/29/19  0535 01/28/19  1433   POC GLUCOSE mg/dl 306* 200* 62*       Results from last 7 days  Lab Units 01/29/19  0540   HEMOGLOBIN A1C % 8 1*       Results from last 7 days  Lab Units 01/28/19  1437   LACTIC ACID mmol/L 1 2           * I Have Reviewed All Lab Data Listed Above  * Additional Pertinent Lab Tests Reviewed: All Labs Within Last 24 Hours Reviewed      Recent Cultures (last 7 days):           Last 24 Hours Medication List:     Current Facility-Administered Medications:  acetaminophen 650 mg Oral Q6H PRN Al Santiago MD    cefTRIAXone 1,000 mg Intravenous Q24H Janusz SANCHEZ MD Last Rate: 1,000 mg (01/29/19 1405)   [START ON 1/30/2019] fenofibrate 48 mg Oral Daily Gonzalez Sahni MD    insulin glargine 7 Units Subcutaneous HS Janusz SANCHEZ MD    insulin lispro 1-5 Units Subcutaneous TID AC Janusz SANCHEZ MD    rivaroxaban 15 mg Oral Daily With Catie House MD         Today, Patient Was Seen By: Lazaro Houser MD    ** Please Note: Dictation voice to text software may have been used in the creation of this document   **

## 2019-01-29 NOTE — PHYSICIAN ADVISOR
Current patient class: Inpatient  The patient is currently on Hospital Day: 2 at [address]      The patient was admitted to the hospital at [phone] on 1/28/19 for the following diagnosis:  Paronychia of finger [G00 892]  Cellulitis of buttock [J64 625]  Penile swelling [N48 89]  UTI (urinary tract infection) [N39 0]  A-fib (HCC) [I48 91]  HLD (hyperlipidemia) [E78 5]  Decubitus ulcers [L89 90]  CKD (chronic kidney disease) [Z34 0]  Complicated wound infection [T14  8XXA, L08 9]  COPD without exacerbation ([facility] [address] ) [J44 9]       There is documentation in the medical record of an expected length of stay of at least 2 midnights  The patient is therefore expected to satisfy the 2 midnight benchmark and given the 2 midnight presumption is appropriate for INPATIENT ADMISSION  Given this expectation of a satisfying stay, CMS instructs us that the patient is most often appropriate for inpatient admission under part A provided medical necessity is documented in the chart  After review of the relevant documentation, labs, vital signs and test results, the patient is appropriate for INPATIENT ADMISSION  Admission to the hospital as an inpatient is a complex decision making process which requires the practitioner to consider the patients presenting complaint, history and physical examination and all relevant testing  With this in mind, in this case, the patient was deemed appropriate for INPATIENT ADMISSION  After review of the documentation and testing available at the time of the admission I concur with this clinical determination of medical necessity  Rationale is as follows: The patient is a 80 yrs old Male who presented to the ED at 1/28/2019  2:08 PM with a chief complaint of Wound Infection (multiple wounds, pt finished 2nd round of antibiotics last week)  Despite being on outpatient antibiotics, patient has continued dysuria and foul smelling urine    He is also noted by visiting home nursing to have change in his baseline mental status with confusion and was oriented only to self  In addition, several skin wounds were discovered over buttocks and active infection noted of finger with warmth and redness  Additional discovery of warmth and redness over penis and enlarged tender scrotum  Although, pt was afebrile and with normal wbc, he has failed 2 courses of outpatient antibiotics and has active infection of skin and +UA suggestive of infection  Altered mental status and concern for safety and ability to care for oneself has been discussed and is being addressed  Pt with 8 of 10 pain  Receiving IV vancomycin and ceftriaxone      The patients vitals on arrival were ED Triage Vitals   Temperature Pulse Respirations Blood Pressure SpO2   01/28/19 1411 01/28/19 1411 01/28/19 1411 01/28/19 1411 01/28/19 1411   98 °F (36 7 °C) 64 18 132/65 95 %      Temp Source Heart Rate Source Patient Position - Orthostatic VS BP Location FiO2 (%)   01/28/19 1832 01/28/19 1832 01/28/19 1832 01/28/19 1832 --   Oral Monitor Lying Left arm       Pain Score       01/28/19 1411       8           Past Medical History:   Diagnosis Date    [facility]congest[facility] failure) ([facility])     COPD (chronic obstructive pulmonary disease) ([facility])     Diabetes mellitus ([facility] [address] )     Hyperlipidemia     Osteomyelitis ([facility])      Past Surgical History:   Procedure Laterality Date    ABDOMINAL SURGERY      JOINT REPLACEMENT             Consults have been placed to:   IP CONSULT TO NEPHROLOGY  IP CONSULT TO WOUND CARE    Vitals:    01/29/19 0949 01/29/19 0950 01/29/19 0951 01/29/19 1500   BP: 130/61   141/63   BP Location:    Left arm   Pulse: 88   79   Resp:    18   Temp: 98 3 °F (36 8 °C)   97 8 °F (36 6 °C)   TempSrc:    Oral   SpO2: 97%   96%   Weight:  101 kg (223 lb)     Height:  5' 6" (1 676 m) 5' 6" (1 676 m)        Most recent labs:    Recent Labs      01/28/19   1437  01/29/19   0540   WBC  6 33  5 72   HGB  13 2  12 1   HCT  41 6 38 1   PLT  273  260   K  4 9  4 9   CALCIUM  8 6  8 2*   BUN  32*  31*   CREATININE  1 64*  1 61*   INR  2 13*   --    TROPONINI  <0 02   --    AST  29  25   ALT  24  16   ALKPHOS  61  51       Scheduled Meds:  Current Facility-Administered Medications:  acetaminophen 650 mg Oral Q6H PRN Chidi Valerio MD    cefTRIAXone 1,000 mg Intravenous Q24H Janusz SANCHEZ MD Last Rate: 1,000 mg (01/29/19 1405)   [START ON 1/30/2019] fenofibrate 48 mg Oral Daily Enma Vasquez MD    insulin lispro 1-5 Units Subcutaneous TID AC Janusz SANCHEZ MD    rivaroxaban 15 mg Oral Daily With [employer] Lasha SANCHEZ MD      Continuous Infusions:   PRN Meds:   acetaminophen    Surgical procedures (if appropriate):

## 2019-01-29 NOTE — UTILIZATION REVIEW
Initial Clinical Review    Admission: Date/Time/Statement: 1/28/19 @ 1621   Orders Placed This Encounter   Procedures    Inpatient Admission (expected length of stay for this patient Order details is greater than two midnights)     Standing Status:   Standing     Number of Occurrences:   1     Order Specific Question:   Admitting Physician     Answer:   Opal Rahman [id number]     Order Specific Question:   Level of Care     Answer:   Med Surg [16]     Order Specific Question:   Estimated length of stay     Answer:   More than 2 Midnights     Order Specific Question:   Certification     Answer:   I certify that inpatient services are medically necessary for this patient for a duration of greater than two midnights  See H&P and MD Progress Notes for additional information about the patient's course of treatment  ED: Date/Time/Mode of Arrival:   ED Arrival Information     Expected Arrival Acuity Means of Arrival Escorted By Service Admission Type    - 1/28/2019 14:06 Urgent Ambulance [facility] General Medicine Urgent    Arrival Complaint    PAIN        Chief Complaint:   Chief Complaint   Patient presents with    Wound Infection     multiple wounds, pt finished 2nd round of antibiotics last week     History of Illness: Shirin Shetty is a 80 y o  male  who presents with multiple complaints  Patient is a poor historian due to cognitive decline  History obtained from daughter over the phone  Daughter reports that patient has memory issues at baseline but able to recognize family members  Daughter reports that patient gets visiting nurse 2 times a week  Today nurse saw patient having swelling in his scrotum as well as wound in his back that prompted him to be sent to ER     On my encounter he is awake, alert, oriented to person only       ED Vital Signs:   ED Triage Vitals   Temperature Pulse Respirations Blood Pressure SpO2   01/28/19 1411 01/28/19 1411 01/28/19 1411 01/28/19 1411 01/28/19 1411   98 °F (36 7 °C) 64 18 132/65 95 %      Temp Source Heart Rate Source Patient Position - Orthostatic VS BP Location FiO2 (%)   01/28/19 1832 01/28/19 1832 01/28/19 1832 01/28/19 1832 --   Oral Monitor Lying Left arm       Pain Score       01/28/19 1411       8        Wt Readings from Last 1 Encounters:   01/29/19 101 kg (223 lb)     Vital Signs (abnormal):  wnl     Pertinent Labs/Diagnostic Test Results:   Color, UA Yellow     Clarity, UA Cloudy    Specific [place], UA 1 020 1 003 - 1 030     pH, UA 7 0 4 5 - 8 0     Leukocytes, UA Moderate (A) Negative     Nitrite, UA Positive (A) Negative     Protein, UA 30 (1+) (A) Negative mg/dl     Glucose,  (1/4%) (A) Negative mg/dl     Ketones, UA Negative Negative mg/dl     Urobilinogen, UA 0 2 0 2, 1 0 E U /dl E U /dl     Bilirubin, UA Negative Negative     Blood, UA Large (A) Negative    Alb  2 8, sed rate 44, pt inr  23 5  2 13, ptt 56, cl   109, BUN creat   32 1 64  CXR -wnl   US scrotum -       Small bilateral hydroceles         EKG-    Atrial fibrillation with a competing junctional pacemaker        ED Treatment:   Medication Administration from 01/28/2019 1406 to 01/29/2019 0153       Date/Time Order Dose Route Action Action by Comments     01/28/2019 1541 vancomycin (VANCOCIN) 1,250 mg in sodium chloride 0 9 % 250 mL IVPB   Intravenous Canceled Entry Kaia Rivas RN      01/28/2019 1515 ceftriaxone (ROCEPHIN) 1 g/50 mL in dextrose IVPB 0 mg Intravenous Stopped Kaia Rivas RN      01/28/2019 1445 ceftriaxone (ROCEPHIN) 1 g/50 mL in dextrose IVPB 1,000 mg Intravenous New Bag Marbella Foote RN      01/28/2019 1734 vancomycin (VANCOCIN) 1,500 mg in sodium chloride 0 9 % 250 mL IVPB 0 mg/kg Intravenous Stopped Kaia Rivas RN      01/28/2019 1604 vancomycin (VANCOCIN) 1,500 mg in sodium chloride 0 9 % 250 mL IVPB 1,500 mg Intravenous New Bag Kaia Rivas RN      01/28/2019 2151 midodrine (PROAMATINE) tablet 10 mg   Oral Canceled Entry Delisa Redmond RN      01/29/2019 0006 acetaminophen (TYLENOL) tablet 650 mg 650 mg Oral Given Christa Ren RN         Past Medical/Surgical History: Active Ambulatory Problems     Diagnosis Date Noted    COPD without exacerbation ([place] [address] ) 01/28/2019    Cellulitis of buttock 01/28/2019       Past Medical History:   Diagnosis Date    [facility]     COPD (chronic obstructive pulmonary disease) ([place] [address] )     Diabetes mellitus ([place] [address] )     Hyperlipidemia     Osteomyelitis ([place] [address] )      Admitting Diagnosis: Paronychia of finger [L03 019]  Cellulitis of buttock [L03 317]  Penile swelling [N48 89]  UTI (urinary tract infection) [N39 0]  A-fib (HCC) [I48 91]  HLD (hyperlipidemia) [E78 5]  Decubitus ulcers [L89 90]  CKD (chronic kidney disease) [J23 1]  Complicated wound infection [T14  8XXA, L08 9]  COPD without exacerbation ([place] [address] ) [J44 9]  Age/Sex: 80 y o  male  Assessment/Plan:        * UTI (urinary tract infection)   Assessment & Plan     Daughter reports that patient's mentation worsens every time he gets UTI  As per daughter patient does have difficulty with memory  at baseline  But able to identify and recognize family members  Currently oriented to himself only  Not in acute distress  Hemodynamically stable  UA indicative of UTI  No fever, no leukocytosis noted  Follow-up urine culture  Continue IV ceftriaxone for now    DM (diabetes mellitus) ([place] [address] )   Assessment & Plan     No results found for: HGBA1C         Recent Labs      01/28/19   1433   POCGLU  62*         Blood Sugar Average: Last 72 hrs:  (P) 62      Follow-up A1c  Patient is on insulin at home  Needs to call pharmacy tomorrow to confirm the dose  Sliding scale coverage    CHF (congestive heart failure) [facility])   Assessment & Plan     Daughter also reports history of CHF  Currently not on any diuretics    Continue I&O  Salt and fluid restricted diet  Follow-up echo  Will consider Cardiology consult based on echo result    A-fib ([place] [address] ) Assessment & Plan     Currently monitor shows AFib with rate controlled  On Pradaxa for anticoagulation  No signs of active bleeding noted  Continue Pradaxa    HLD (hyperlipidemia)   Assessment & Plan     Continue home medication  Follow-up lipid profile    Pressure injury of back, stage 2   Assessment & Plan     Multiple stage I-II  sacral pressure wound noted  Daughter reports that patient gets visiting nurse to times a week  Appears that patient may need more help to take care of himself  Will order pressure mattress  Wound care consult  Follow-up PT eval    CKD (chronic kidney disease)   Assessment & Plan     History of CKD stage 3  Baseline GFR around 35-40 and baseline creatinine around 1 6-1 9 range  Currently presents with creatinine 1 6  Patient has not seen nephrologist and last few years as per daughter  Follow-up Nephrology consult    Paronychia of finger   Assessment & Plan     No fever, no leukocytosis noted  No signs of systemic infection present  Continue topical antibiotics  Wound care    Hydrocele   Assessment & Plan     Ultrasound shows small bilateral hydrocele  Patient denies fever, chills, pain in his scrotal area  No signs of systemic infection present  Nontender on exam   Recommended continuous outpatient monitoring        Anticipated Length of Stay:  Patient will be admitted on an Inpatient basis with an anticipated length of stay of   2 midnights     Justification for Hospital Stay:  UTI/multiple pressure wounds/hydrocele     Admission Orders:  Scheduled Meds:   Current Facility-Administered Medications:  acetaminophen 650 mg Oral Q6H PRN    cefTRIAXone 1,000 mg Intravenous Q24H    [START ON 1/30/2019] fenofibrate 48 mg Oral Daily    insulin lispro 1-5 Units Subcutaneous TID [facility]      Nephrology consult   Cardiac diet 1500 ml fluid restriction   Fingerstick ac and hs   I&O   Wound care consult   OT PT eval   Up w/ assist   Reg diet  1/29 prot /creat ratio ua, phos, vi D , pth , hgb a1c , cbc , cmp   Cl  109, BUN creat  31 1 61, gluc 203    Nephrology consult  1/29   1  Urine tract infection:  Noted urinalysis has that was positive nitrite and leukocyte esterase  Patient was started on Vancomycin Ceftriaxone  Urine culture remains negative to date  Recommend using Vancomycin with caution in the patient with CKD    2  Chronic kidney disease stage 3:  Etiology likely age, CHF and diabetes mellitus  Current serum creatinine is 1 6 and at baseline    3  Bone mineral disorder:  Will check patient's parathyroid hormone intact, 25 hydroxy vitamin-D, calcium phosphorus levels    4  Proteinuria:  Urinalysis showed 1+ protein  Will check urine protein creatinine ratio    5  Anemia of chronic disease:  Hemoglobin is 12 and stable

## 2019-01-29 NOTE — H&P
H&P- Allison Monroe 1933, 80 y o  male MRN: [id number]    Unit/Bed#: FT 03 Encounter: [id number]    Primary Care Provider: Mayte Lamas DO   Date and time admitted to hospital: 1/28/2019  2:08 PM        * UTI (urinary tract infection)   Assessment & Plan    Daughter reports that patient's mentation worsens every time he gets UTI  As per daughter patient does have difficulty with memory  at baseline  But able to identify and recognize family members  Currently oriented to himself only  Not in acute distress  Hemodynamically stable  UA indicative of UTI  No fever, no leukocytosis noted  Follow-up urine culture  Continue IV ceftriaxone for now       [facility]   Assessment & Plan    No results found for: HGBA1C    Recent Labs      01/28/19   1433   POCGLU  62*       Blood Sugar Average: Last 72 hrs:  (P) 62     Follow-up A1c  Patient is on insulin at home  Needs to call pharmacy tomorrow to confirm the dose  Sliding scale coverage         CHF (congestive heart failure) [facility])   Assessment & Plan    Daughter also reports history of CHF  Currently not on any diuretics  Continue I&O  Salt and fluid restricted diet  Follow-up echo  Will consider Cardiology consult based on echo result     A-fib [facility])   Assessment & Plan    Currently monitor shows AFib with rate controlled  On Pradaxa for anticoagulation  No signs of active bleeding noted  Continue Pradaxa     HLD (hyperlipidemia)   Assessment & Plan    Continue home medication  Follow-up lipid profile     Pressure injury of back, stage 2   Assessment & Plan    Multiple stage I-II  sacral pressure wound noted  Daughter reports that patient gets visiting nurse to times a week  Appears that patient may need more help to take care of himself    Will order pressure mattress  Wound care consult  Follow-up PT kipal       CKD (chronic kidney disease)   Assessment & Plan    History of CKD stage 3  Baseline GFR around 35-40 and baseline creatinine around 1 6-1 9 range  Currently presents with creatinine 1 6  Patient has not seen nephrologist and last few years as per daughter  Follow-up Nephrology consult     Paronychia of finger   Assessment & Plan    No fever, no leukocytosis noted  No signs of systemic infection present  Continue topical antibiotics  Wound care     Hydrocele   Assessment & Plan    Ultrasound shows small bilateral hydrocele  Patient denies fever, chills, pain in his scrotal area  No signs of systemic infection present  Nontender on exam   Recommended continuous outpatient monitoring  VTE Prophylaxis: Dabigatran (Pradaxa)  / reason for no mechanical VTE prophylaxis Lower extremity swelling   Code Status:  Level 1 full code  POLST: POLST form is not discussed and not completed at this time  Discussion with family:  Discussed with daughter over the phone  Anticipated Length of Stay:  Patient will be admitted on an Inpatient basis with an anticipated length of stay of   2 midnights  Justification for Hospital Stay:  UTI/multiple pressure wounds/hydrocele    Total Time for Visit, including Counseling / Coordination of Care: 1 hour  Greater than 50% of this total time spent on direct patient counseling and coordination of care  Chief Complaint:   UTI, pressure wounds, hydrocele    History of Present Illness:    Ryland Gallardo is a 80 y o  male past medical history of AFib, DVT, CHF, CKD, cognitive decline,   who presents with multiple complaints  Patient is a poor historian due to cognitive decline  History obtained from daughter over the phone  Daughter reports that patient has memory issues at baseline but able to recognize family members  Daughter reports that patient gets visiting nurse 2 times a week  Today nurse saw patient having swelling in his scrotum as well as wound in his back that prompted him to be sent to ER  Currently patient not in acute distress    On my encounter he is awake, alert, oriented to person only  Denies chest pain, dyspnea, fever, chills, abdominal pain, nausea, vomiting, diarrhea, dysuria  Denies any new complaints  No other events reported  Review of Systems:    Review of Systems   Constitutional: Negative for fever  HENT: Negative for congestion  Eyes: Negative for photophobia  Respiratory: Negative for cough, chest tightness and shortness of breath  Cardiovascular: Negative for chest pain  Gastrointestinal: Negative for abdominal pain  Genitourinary: Negative for dysuria  Neurological: Negative for dizziness  Psychiatric/Behavioral: Negative for agitation  Past Medical and Surgical History:     Past Medical History:   Diagnosis Date    CHF (congestive heart failure) ([place] [address] )     COPD (chronic obstructive pulmonary disease) ([place] [address] )     Diabetes mellitus ([place] [address] )     Hyperlipidemia     Osteomyelitis (HCC)        Past Surgical History:   Procedure Laterality Date    ABDOMINAL SURGERY      JOINT REPLACEMENT         Meds/Allergies:    Prior to Admission medications    Medication Sig Start Date End Date Taking? Authorizing Provider   choline fenofibrate (TRILIPIX) 135 MG capsule Take 135 mg by mouth daily   Yes Historical Provider, MD   insulin aspart (NovoLOG) 100 units/mL injection Inject under the skin 3 (three) times a day before meals   Yes Historical Provider, MD   insulin lispro (HumaLOG) 100 units/mL injection Inject 8-12 Units under the skin 3 (three) times a day before meals   Yes Historical Provider, MD   methenamine hippurate (HIPREX) 1 g tablet Take 1 g by mouth 2 (two) times a day with meals   Yes Historical Provider, MD   midodrine (PROAMATINE) 10 MG tablet Take 10 mg by mouth 3 (three) times a day   Yes Historical Provider, MD   Rivaroxaban (XARELTO PO) Take 15 mg by mouth   Yes Historical Provider, MD   Sacubitril-Valsartan (ENTRESTO PO) Take 24-26 mg by mouth   Yes Historical Provider, MD         Allergies:    Allergies   Allergen Reactions    Aspirin GI Intolerance       Social History:     Marital Status:      Patient Pre-hospital Living Situation:  With daughter  Substance Use History:   History   Alcohol Use No     History   Smoking Status    Not on file   Smokeless Tobacco    Never Used     History   Drug Use No       Family History:    non-contributory    Physical Exam:     Vitals:   Blood Pressure: 127/56 (01/28/19 1832)  Pulse: 81 (01/28/19 1832)  Temperature: 98 1 °F (36 7 °C) (01/28/19 1832)  Temp Source: Oral (01/28/19 1832)  Respirations: 20 (01/28/19 1832)  Height: 5' 6" (167 6 cm) (01/28/19 1411)  Weight - Scale: 95 3 kg (210 lb) (01/28/19 1411)  SpO2: 95 % (01/28/19 1832)    Physical Exam   Constitutional: No distress  HENT:   Head: Normocephalic  Eyes: Pupils are equal, round, and reactive to light  Neck: Normal range of motion  No JVD present  Cardiovascular:   Monitor shows AFib with rate controlled   Pulmonary/Chest: Effort normal and breath sounds normal  No respiratory distress  He has no wheezes  Abdominal: Soft  Bowel sounds are normal  He exhibits no distension  There is no tenderness  Musculoskeletal: Normal range of motion  Bilateral lower extremity dressing noted   Neurological:   Patient is awake, alert, oriented to person only  Poor historian  Cooperative during exam          Additional Data:     Lab Results: I have personally reviewed pertinent reports          Results from last 7 days  Lab Units 01/28/19  1437   WBC Thousand/uL 6 33   HEMOGLOBIN g/dL 13 2   HEMATOCRIT % 41 6   PLATELETS Thousands/uL 273   NEUTROS PCT % 65   LYMPHS PCT % 18   MONOS PCT % 13*   EOS PCT % 2       Results from last 7 days  Lab Units 01/28/19  1437   SODIUM mmol/L 145   POTASSIUM mmol/L 4 9   CHLORIDE mmol/L 109*   CO2 mmol/L 28   BUN mg/dL 32*   CREATININE mg/dL 1 64*   ANION GAP mmol/L 8   CALCIUM mg/dL 8 6   ALBUMIN g/dL 2 8*   TOTAL BILIRUBIN mg/dL 0 40   ALK PHOS U/L 61   ALT U/L 24   AST U/L 29 GLUCOSE RANDOM mg/dL 65       Results from last 7 days  Lab Units 01/28/19  1437   INR  2 13*       Results from last 7 days  Lab Units 01/28/19  1433   POC GLUCOSE mg/dl 62*           Results from last 7 days  Lab Units 01/28/19  1437   LACTIC ACID mmol/L 1 2       Imaging: I have personally reviewed pertinent reports  XR chest 2 views   Final Result by Jessica Otero MD (01/28 1725)      No acute cardiopulmonary disease  Workstation performed: [id number]         US scrotum and testicles   Final Result by Malissa Thomas MD (01/28 1534)       Small bilateral hydroceles  Workstation performed: [id number]             EKG, Pathology, and Other Studies Reviewed on Admission:   EKG:  AFib with controlled heart rate  Allscripts / Epic Records Reviewed: Yes     ** Please Note: This note has been constructed using a voice recognition system   **

## 2019-01-29 NOTE — ASSESSMENT & PLAN NOTE
Daughter reports that patient's mentation worsens every time he gets UTI  As per daughter patient does have difficulty with memory  at baseline  But able to identify and recognize family members  Currently oriented to himself only  Not in acute distress  Hemodynamically stable  UA indicative of UTI  No fever, no leukocytosis noted  Follow-up urine culture  Will continue IV ceftriaxone to finish 3 days of antibiotics since urine grossly positive for UTI

## 2019-01-29 NOTE — PHYSICAL THERAPY NOTE
Physical Therapy Evaluation     Patient's Name: Liana Doyle    Admitting Diagnosis  Paronychia of finger [A38 611]  Cellulitis of buttock [L55 072]  Penile swelling [N48 89]  UTI (urinary tract infection) [N39 0]  A-fib ([address] ) [I48 91]  HLD (hyperlipidemia) [E78 5]  Decubitus ulcers [L89 90]  CKD (chronic kidney disease) [E00 5]  Complicated wound infection [T14  8XXA, L08 9]  COPD without exacerbation ([address] ) [J44 9]    Problem List  Patient Active Problem List   Diagnosis    Hydrocele    Paronychia of finger    CKD (chronic kidney disease)    UTI (urinary tract infection)    Pressure injury of back, stage 2    HLD (hyperlipidemia)    COPD without exacerbation ([address] )    Cellulitis of buttock    A-fib ([address] )    CHF (congestive heart failure) ([address] )    DM (diabetes mellitus) ([address] )       Past Medical History  Past Medical History:   Diagnosis Date    CHF (congestive heart failure) ([address] )     COPD (chronic obstructive pulmonary disease) ([address] )     Diabetes mellitus ([address] )     Hyperlipidemia     Osteomyelitis ([address] )        Past Surgical History  Past Surgical History:   Procedure Laterality Date    ABDOMINAL SURGERY      JOINT REPLACEMENT          01/29/19 3027   Note Type   Note type Eval only   Pain Assessment   Pain Assessment 0-10   Pain Score No Pain  (Pt reporting pain at "0" at rest)   Pain Type Chronic pain   Pain Location Leg;Knee   Pain Orientation Bilateral   Pain Descriptors Dull;Aching   Pain Frequency Constant/continuous   Pain Onset Ongoing   Hospital Pain Intervention(s) Repositioned   Diversional Activities Television   Multiple Pain Sites Yes   Pain Rating: FLACC (Rest) - Face 0   Pain Rating: FLACC (Rest) - Legs 0   Pain Rating: FLACC (Rest) - Activity 0   Pain Rating: FLACC (Rest) - Cry 1   Pain Rating: FLACC (Rest) - Consolability 0   Score: FLACC (Rest) 1   Pain Rating: FLACC (Activity) - Face 2   Pain Rating: FLACC (Activity) - Legs 0   Pain Rating: FLACC (Activity) - Activity 0   Pain Rating: FLACC (Activity) - Cry 1   Pain Rating: FLACC (Activity) - Consolability 1   Score: FLACC (Activity) 4   Pain 2   Trammell-Pitts FACES Pain Rating 2 4   Pain Type 2 Acute pain   Pain Location 2 Buttocks   Pain Frequency 2 Intermittent   Pain Onset 2 Unable to tell   Pain Intervention(s) 2 Repositioned   Home Living   Type of Home House  (bi-level)   Home Layout Multi-level; Able to live on main level with bedroom/bathroom; Performs ADLs on one level;Stairs to enter with rails  (6 MELIDA)   Bathroom Shower/Tub Tub/shower unit   Bathroom Toilet Standard   Bathroom Equipment Other (Comment)  (no DME)   [facility]   Additional Comments Patient unreliable/poor historian   Prior Function   Level of [place] Independent with ADLs and functional mobility; Needs assistance with IADLs  (Needs assistance with ADLs)   Lives With Daughter   Receives Help From Family   ADL Assistance Needs assistance   IADLs Needs assistance   Falls in the last 6 months 1 to 4   Vocational Retired   Restrictions/Precautions   Wells [place] Bearing Precautions Per Order No   Braces or Orthoses Other (Comment)  (none per patient)   Other Precautions Fall Risk;Pain;Bed Alarm;Cognitive   General   Additional Pertinent History HPI: 77-year-old male patient presents to [address] [place] [address] for evaluation of multiple wounds/wound infection   Family/Caregiver Present No   Cognition   Overall Cognitive Status Impaired   Arousal/Participation Cooperative   Orientation Level Oriented to person;Oriented to place; Disoriented to time;Disoriented to situation   Memory Decreased short term memory;Decreased recall of recent events;Decreased recall of precautions   Following Commands Follows one step commands with increased time or repetition   Comments Pt agreeable to participate in skilled PT evaluation   RUE Assessment   RUE Assessment WFL  (based on functional assessment)   LUE Assessment   LUE Assessment WFL  (based on functional assessment)   RLE Assessment   RLE Assessment WFL  (grossly assessed with functional mobility: at least 3+/5)   LLE Assessment   LLE Assessment WFL  (grossly assessed with functional mobility: at least 3+/5)   Coordination   Movements are Fluid and Coordinated 1   Sensation X  (chronic "pins and needles" in right hand per patient)   Light Touch   RLE Light Touch Grossly intact   LLE Light Touch Grossly intact   Bed Mobility   Rolling R 3  Moderate assistance  (LOG ROLL TECHNIQUE)   Additional items Assist x 1; Increased time required;Verbal cues;LE management; Bedrails   Supine to Sit Unable to assess   Additional Comments Pt deferred further functional mobility assessment; limited by pain    Transfers   Sit to Stand Unable to assess   Ambulation/Elevation   Gait pattern Not tested; Not appropriate   Balance   Static Sitting (Unable to assess)   Endurance Deficit   Endurance Deficit Yes   Endurance Deficit Description pain, general deconditioning, fatigue   Activity Tolerance   Activity Tolerance Patient limited by pain   Medical Staff Made [id number] [address],  made aware of therapy recommendations/session outcomes   Nurse Meghan Mcnally RN verbalized patient appropriate for PT evaluation; made aware of session outcomes; post-session: all needs within reach and bed alarm engaged   Assessment   Prognosis Fair   Problem List Decreased strength;Decreased endurance;Decreased mobility; Decreased cognition;Obesity;Pain;Decreased skin integrity; Impaired sensation   Assessment Pt is 80 y o  male seen for PT evaluation s/p admit to [facility] on 1/28/2019 w/ UTI (urinary tract infection)  PT consulted to assess pt's functional mobility and d/c needs  Order placed for PT eval and tx, w/ up w/ A order  Performed at least 2 patient identifiers during session: Name and wristband   Comorbidities affecting pt's physical performance at time of assessment include: CHF, COPD, DM, hyperlipidemia, osteomyelitis  PTA, pt was independent w/ all functional mobility w/ RW, requiring A for ADLs and IADLs, ambulates household distances, has 6 MELIDA, lives w/ daughter in bi-level house and retired  Personal factors affecting pt at time of IE include: inaccessible home environment, lives in multi-level house, stairs to enter home, inability to ambulate household distances, inability to navigate level surfaces w/o external assistance, decreased cognition, positive fall history, limited insight into impairments, inability to perform IADLs, inability to perform ADLs and inability to live alone  Please find objective findings from PT assessment regarding body systems outlined above with impairments and limitations including weakness, decreased endurance, pain, decreased activity tolerance, impaired sensation, decreased functional mobility tolerance, fall risk, decreased skin integrity and decreased cognition  The following objective measures performed on IE also reveal limitations: Barthel Index: 20/100 and Modified [place]man: 5 (severe disability)  Pt's clinical presentation is currently unstable/unpredictable seen in pt's presentation of unstable medical status requiring ongoing medical management/monitoring, pain impacting overall mobility status and need for input for mobility technique/safety w/ inconsistent retention of education received  Pt to benefit from continued PT tx to address deficits as defined above and maximize level of functional independent mobility and consistency  From PT/mobility standpoint, recommendation at time of d/c would be STR pending progress in order to facilitate return to PLOF     Barriers to Discharge Inaccessible home environment   Barriers to Discharge Comments 6 MELIDA; multi-level house   Goals   Patient Goals none stated; patient presents with impaired cognition   STG Expiration Date 02/08/19   Short Term Goal #1 In 7-10 days: Increase bilateral LE strength 1/2 grade to facilitate independent mobility, Perform all bed mobility tasks with SBA to decrease caregiver burden and PT to see and establish goals for functional transfers, ambulation and elevations when appropriate   Treatment Day 0   Plan   Treatment/Interventions LE strengthening/ROM; Therapeutic exercise; Endurance training;Patient/family training;Equipment eval/education; Bed mobility;Continued evaluation;Spoke to nursing;Spoke to case management   PT Frequency Other (Comment)  (3-5x/wk)   Recommendation   Recommendation Short-term skilled PT   PT - OK to Discharge Yes  (when medically cleared; if to STR)   Modified [place] Scale   Modified [place] Scale 5   Barthel Index   Feeding 5   Bathing 0   Grooming Score 0   Dressing Score 0   Bladder Score 5   Bowels Score 5   Toilet Use Score 5   Transfers (Bed/Chair) Score 0   Mobility (Level Surface) Score 0   Stairs Score 0   Barthel Index Score 20         Catalino Fajardo, PT, DPT

## 2019-01-29 NOTE — PLAN OF CARE

## 2019-01-29 NOTE — ASSESSMENT & PLAN NOTE
History of CKD stage 3  Baseline GFR around 35-40 and baseline creatinine around 1 6-1 9 range  Currently presents with creatinine 1 6  Continue monitor renal function while inpatient  Patient has not seen nephrologist and last few years as per daughter  Nephrology consult appreciated

## 2019-01-30 ENCOUNTER — APPOINTMENT (INPATIENT)
Dept: NON INVASIVE DIAGNOSTICS | Facility: HOSPITAL | Age: 84
DRG: 689 | End: 2019-01-30
Payer: MEDICARE

## 2019-01-30 PROBLEM — I73.9 PVD (PERIPHERAL VASCULAR DISEASE) (HCC): Status: ACTIVE | Noted: 2019-01-30

## 2019-01-30 PROBLEM — E55.9 VITAMIN D DEFICIENCY: Status: ACTIVE | Noted: 2019-01-30

## 2019-01-30 LAB
BACTERIA UR CULT: ABNORMAL
BACTERIA UR CULT: ABNORMAL
GLUCOSE SERPL-MCNC: 175 MG/DL (ref 65–140)
GLUCOSE SERPL-MCNC: 191 MG/DL (ref 65–140)
GLUCOSE SERPL-MCNC: 193 MG/DL (ref 65–140)
GLUCOSE SERPL-MCNC: 236 MG/DL (ref 65–140)

## 2019-01-30 PROCEDURE — 93306 TTE W/DOPPLER COMPLETE: CPT | Performed by: INTERNAL MEDICINE

## 2019-01-30 PROCEDURE — 82948 REAGENT STRIP/BLOOD GLUCOSE: CPT

## 2019-01-30 PROCEDURE — 93306 TTE W/DOPPLER COMPLETE: CPT

## 2019-01-30 PROCEDURE — 99232 SBSQ HOSP IP/OBS MODERATE 35: CPT | Performed by: INTERNAL MEDICINE

## 2019-01-30 RX ORDER — ERGOCALCIFEROL 1.25 MG/1
50000 CAPSULE ORAL WEEKLY
Status: DISCONTINUED | OUTPATIENT
Start: 2019-01-30 | End: 2019-01-31 | Stop reason: HOSPADM

## 2019-01-30 RX ORDER — INSULIN GLARGINE 100 [IU]/ML
10 INJECTION, SOLUTION SUBCUTANEOUS
Status: DISCONTINUED | OUTPATIENT
Start: 2019-01-30 | End: 2019-01-30

## 2019-01-30 RX ORDER — INSULIN GLARGINE 100 [IU]/ML
9 INJECTION, SOLUTION SUBCUTANEOUS
Status: DISCONTINUED | OUTPATIENT
Start: 2019-01-30 | End: 2019-01-31 | Stop reason: HOSPADM

## 2019-01-30 RX ADMIN — CEFTRIAXONE SODIUM 1000 MG: 10 INJECTION, POWDER, FOR SOLUTION INTRAVENOUS at 15:29

## 2019-01-30 RX ADMIN — INSULIN GLARGINE 9 UNITS: 100 INJECTION, SOLUTION SUBCUTANEOUS at 23:14

## 2019-01-30 RX ADMIN — RIVAROXABAN 15 MG: 15 TABLET, FILM COATED ORAL at 18:35

## 2019-01-30 RX ADMIN — INSULIN LISPRO 1 UNITS: 100 INJECTION, SOLUTION INTRAVENOUS; SUBCUTANEOUS at 09:31

## 2019-01-30 RX ADMIN — FENOFIBRATE 48 MG: 48 TABLET, COATED ORAL at 09:30

## 2019-01-30 RX ADMIN — ERGOCALCIFEROL 50000 UNITS: 1.25 CAPSULE ORAL at 15:24

## 2019-01-30 RX ADMIN — BACITRACIN, NEOMYCIN, POLYMYXIN B 1 SMALL APPLICATION: 400; 3.5; 5 OINTMENT TOPICAL at 09:30

## 2019-01-30 RX ADMIN — INSULIN LISPRO 1 UNITS: 100 INJECTION, SOLUTION INTRAVENOUS; SUBCUTANEOUS at 23:16

## 2019-01-30 RX ADMIN — ERGOCALCIFEROL 50000 UNITS: 1.25 CAPSULE ORAL at 18:35

## 2019-01-30 RX ADMIN — BACITRACIN, NEOMYCIN, POLYMYXIN B 1 SMALL APPLICATION: 400; 3.5; 5 OINTMENT TOPICAL at 18:36

## 2019-01-30 NOTE — SOCIAL WORK
Pt ready for discharge today  Acute Rehab at [facility], [facility] unable to accept pt  Additional clinical sent to [facility] as requested  Awaiting decsion if they can accept pt  CM spoke with pt daughter Jone Telles regarding same  She does not feel pt is a safe discharge home at this time due to his confusion and difficulty with ambulation  She continues to feel he needs STR prior to coming home   CM to follow up with PVM in AM

## 2019-01-30 NOTE — PROGRESS NOTES
NEPHROLOGY PROGRESS NOTE    Patient: Prateke Stratton               Sex: male          DOA: 1/28/2019  2:08 PM   YOB: 1933        Age:  80 y o         LOS:  LOS: 2 days   1/30/2019    REASON FOR THE CONSULTATION:      Chronic kidney disease stage 3    SUBJECTIVE     Patient is lying down and offers no complaints  CURRENT MEDICATIONS       Current Facility-Administered Medications:     acetaminophen (TYLENOL) tablet 650 mg, 650 mg, Oral, Q6H PRN, Lucas Thapa MD, 650 mg at 01/29/19 0006    ceftriaxone (ROCEPHIN) 1 g/50 mL in dextrose IVPB, 1,000 mg, Intravenous, Q24H, Janusz SANCHEZ MD, Last Rate: 100 mL/hr at 01/29/19 1405, 1,000 mg at 01/29/19 1405    ergocalciferol (VITAMIN D2) capsule 50,000 Units, 50,000 Units, Oral, Weekly, Dorian Solorio MD    fenofibrate (TRICOR) tablet 48 mg, 48 mg, Oral, Daily, Dorian Solorio MD, 48 mg at 01/30/19 0930    insulin glargine (LANTUS) subcutaneous injection 7 Units 0 07 mL, 7 Units, Subcutaneous, HS, Janusz SANCHEZ MD, 7 Units at 01/29/19 2345    insulin lispro (HumaLOG) 100 units/mL subcutaneous injection 1-5 Units, 1-5 Units, Subcutaneous, TID AC, 1 Units at 01/30/19 0931 **AND** Fingerstick Glucose (POCT), , , TID AC, Janusz SANCHEZ MD    neomycin-bacitracin-polymyxin b (NEOSPORIN) ointment 1 small application, 1 small application, Topical, BID, Janusz SANCHEZ MD, 1 small application at 56/89/78 0930    rivaroxaban (XARELTO) tablet 15 mg, 15 mg, Oral, Daily With Marli SANCHEZ MD, 15 mg at 01/29/19 1735    REVIEW OF SYSTEMS     Review of Systems   Constitutional: Negative  HENT: Negative  Eyes: Negative  Respiratory: Negative  Cardiovascular: Negative  Gastrointestinal: Negative  Endocrine: Negative  Genitourinary: Negative  Musculoskeletal: Negative  Skin: Negative  Allergic/Immunologic: Negative  Neurological: Negative  Hematological: Negative      All other systems reviewed and are negative  OBJECTIVE     Current Weight: Weight - Scale: 101 kg (223 lb)  Vitals:    01/30/19 0700   BP: 133/61   Pulse: 86   Resp: 18   Temp: 98 4 °F (36 9 °C)   SpO2: 93%     Body mass index is 35 99 kg/m²  Intake/Output Summary (Last 24 hours) at 01/30/19 1159  Last data filed at 01/30/19 0700   Gross per 24 hour   Intake              480 ml   Output              300 ml   Net              180 ml       PHYSICAL EXAMINATION     Physical Exam   Constitutional: He appears well-developed and well-nourished  HENT:   Head: Normocephalic and atraumatic  Eyes: Pupils are equal, round, and reactive to light  Neck: Neck supple  Cardiovascular: Normal rate, regular rhythm and normal heart sounds  Pulmonary/Chest: Effort normal    Abdominal: Soft  Bowel sounds are normal    Musculoskeletal: Normal range of motion  Neurological: He is alert  Skin: Skin is warm and dry  Psychiatric: He has a normal mood and affect  LAB RESULTS       Results from last 7 days  Lab Units 01/29/19  1056 01/29/19  0540 01/28/19  1437   WBC Thousand/uL  --  5 72 6 33   HEMOGLOBIN g/dL  --  12 1 13 2   HEMATOCRIT %  --  38 1 41 6   PLATELETS Thousands/uL  --  260 273   POTASSIUM mmol/L  --  4 9 4 9   CHLORIDE mmol/L  --  109* 109*   CO2 mmol/L  --  28 28   BUN mg/dL  --  31* 32*   CREATININE mg/dL  --  1 61* 1 64*   EGFR ml/min/1 73sq m  --  38 38   CALCIUM mg/dL  --  8 2* 8 6   PHOSPHORUS mg/dL 2 6  --   --            RADIOLOGY RESULTS      No results found for this or any previous visit  Results for orders placed during the hospital encounter of 01/28/19   XR chest 2 views    Narrative CHEST     INDICATION:   generalized weakness  COMPARISON:  None    EXAM PERFORMED/VIEWS:  XR CHEST PA & LATERAL      FINDINGS:    Mild cardiomegaly  Mild central pulmonary venous distention suggesting component of congestion  Limited inspiratory volume  The lungs are clear  No pneumothorax or pleural effusion      Osseous structures appear within normal limits for patient age  Impression No acute cardiopulmonary disease  Workstation performed: [id number]         ASSESSMENT/PLAN     40-year-old male with past medical history of chronic kidney disease stage 3, DVT, diabetes mellitus, hypertension who presented with acute on chronic altered level of consciousness and found to have urinary tract infection  1  Chronic kidney disease stage 3:  Etiology likely age, hypertension, diabetes mellitus  Baseline serum creatinine is 1 6 and at baseline  Renal ultrasound is unremarkable  2  Chronic kidney disease bone mineral disorder:  25 hydroxy vitamin-D levels were extremely low as 7 3  Patient was started on ergocalciferol 10481 units 1 tablet every 7 days  Normal calcium and phosphorus levels noted  3  Anemia of chronic disease:  Hemoglobin is 12 and on target  4  Proteinuria:  Noted 1 2 g of protein though in the setting of urinary tract infection  Patient will require a quantification of proteinuria once UTI is treated  5  Urinary tract infection:  Urinary tension is ruled out  Bladder debris was noted on renal ultrasound  Defer treatment of UTI with the hospitalist team     Patient can follow-up in CKD Clinic in 1 month            Maricruz Thakur MD  Nephrology  1/30/2019

## 2019-01-30 NOTE — ASSESSMENT & PLAN NOTE
Ultrasound shows small bilateral hydrocele  Patient denies fever, chills, pain in his scrotal area  No signs of systemic infection present    Swelling has improved

## 2019-01-30 NOTE — PROGRESS NOTES
Progress Note - Curz Leisure 1933, 80 y o  male MRN: [id number]    Unit/Bed#: -01 Encounter: [id number]    Primary Care Provider: Wilmer Mcclellan DO   Date and time admitted to hospital: 1/28/2019  2:08 PM        * UTI (urinary tract infection)   Assessment & Plan    UA grossly indicative of UTI  Remained afebrile, no leukocytosis noted  Patient denies any new symptoms  Had completed 3 days of antibiotic treatment  Will monitor off antibiotics for now  PVD (peripheral vascular disease) ([facility] )   Assessment & Plan    Bilateral lower extremity noted with chronic changes associated with peripheral vascular disease  Continue tighter control of diabetes, hyperlipidemia and hypertension  Recommended close monitoring with primary care provider as well as Cardiology as outpatient  Vitamin D deficiency   Assessment & Plan    Severe vitamin-D deficiency  Level noted 7  Started on high-dose weekly supplement  Close monitoring recommended outpatient     DM (diabetes mellitus) [facility])   Assessment & Plan    Lab Results   Component Value Date    HGBA1C 8 1 (H) 01/29/2019       Recent Labs      01/29/19   1651  01/29/19   2131  01/30/19   0629  01/30/19   1207   POCGLU  262*  208*  191*  236*       Blood Sugar Average: Last 72 hrs:  (P) 209 [id number]     A1c noted 8 1  Daughter was not sure about insulin dose  Lantus 9 units  Meal coverage 3 units  Sliding scale coverage  Inpatient goal should be between 140-180  Continue to monitor       CHF (congestive heart failure) [facility])   Assessment & Plan    Daughter also reports history of CHF  Echo on this visit shows EF of 60%  Likely diastolic dysfunction  Currently not in acute distress  Denies chest pain, dyspnea  Currently not on any diuretics  Appears euvolemic  Salt and fluid restricted diet  Recommended close follow-up with Cardiology as outpatient  A-fib [facility])   Assessment & Plan    Currently rate controlled w/o medication    On Pradaxa for anticoagulation  No signs of active bleeding noted  Continue Pradaxa     HLD (hyperlipidemia)   Assessment & Plan    Continue home medication       Pressure injury of back, stage 2   Assessment & Plan    Multiple stage I-II  sacral pressure wound noted  Daughter reports that patient gets visiting nurse to times a week  Appears that patient may need more help to take care of himself  PT recommends short-term rehab  Discussed with patient as well as daughter over the phone  Agreeable for short-term rehab  Wound consult recommendations are as follows  1  Cleanse b/l buttocks, sacrum, and ischium with soap and water, pat dry, and apply calazime TID and PRN   2  Apply skin nourishing cream the entire skin daily for moisture  3  Turn and reposition patient every  2 hours   4  Elevate heels off of bed with pillows to offload pressure   5  Soft care cushion to chair when OOB, if able  6  Apply hydraguard to b/l heels BID and PRN for prevention and protection  7  Cleanse b/l lower extremities with soap and water, pat dry, apply skin nourishing cream  If scabbing falls off, can utilize small bordered foam dressings  Change every other day and PRN for soilage/dislodgement  8  Antibiotic ointment ordered by primary team for finger to R hand            CKD (chronic kidney disease)   Assessment & Plan    History of CKD stage 3  Baseline GFR around 35-40 and baseline creatinine around 1 6-1 9 range  Currently presents with creatinine 1 6  Nephrology recommendation appreciated  Recommended close follow-up with Nephrology as outpatient  Paronychia of finger   Assessment & Plan    No fever, no leukocytosis noted  No signs of systemic infection present  Improving on topical antibiotics     Hydrocele   Assessment & Plan    Ultrasound shows small bilateral hydrocele  Patient denies fever, chills, pain in his scrotal area  No signs of systemic infection present    Swelling has improved             VTE Pharmacologic Prophylaxis:   Pharmacologic: Rivaroxaban (Xarelto)  Mechanical VTE Prophylaxis in Place: No    Patient Centered Rounds: I have performed bedside rounds with nursing staff today  Discussions with Specialists or Other Care Team Provider:  Nephrology recommendation noted    Education and Discussions with Family / Patient:  Discussed with patient  Discussed with daughter over the phone  Answered all questions appropriate  Time Spent for Care: 20 minutes  More than 50% of total time spent on counseling and coordination of care as described above  Current Length of Stay: 2 day(s)    Current Patient Status: Inpatient   Certification Statement: The patient will continue to require additional inpatient hospital stay due to Pending rehab placement  Discharge Plan:  Pending rehab placement  Code Status: Level 1 - Full Code      Subjective:   Not in acute distress  Stable at his baseline  Oriented to person and place  Denies any new complaints  No other events reported  Objective:     Vitals:   Temp (24hrs), Av 2 °F (36 8 °C), Min:98 °F (36 7 °C), Max:98 4 °F (36 9 °C)    Temp:  [98 °F (36 7 °C)-98 4 °F (36 9 °C)] 98 4 °F (36 9 °C)  HR:  [86-93] 86  Resp:  [18] 18  BP: (114-133)/(59-61) 133/61  SpO2:  [93 %-94 %] 93 %  Body mass index is 35 99 kg/m²  Input and Output Summary (last 24 hours): Intake/Output Summary (Last 24 hours) at 19 1532  Last data filed at 19 1500   Gross per 24 hour   Intake              780 ml   Output              400 ml   Net              380 ml       Physical Exam:     Physical Exam   Constitutional: No distress  HENT:   Head: Normocephalic and atraumatic  Eyes: Pupils are equal, round, and reactive to light  Neck: Normal range of motion  No JVD present  Cardiovascular: Normal rate  Pulmonary/Chest: Effort normal  No respiratory distress  He has no wheezes  Abdominal: Soft  Bowel sounds are normal  He exhibits no distension  There is no tenderness  Musculoskeletal: Normal range of motion  He exhibits edema (Edema improved)  Neurological:   Awake, alert, oriented to place and person  Cooperative during exam        Additional Data:     Labs:      Results from last 7 days  Lab Units 01/29/19  0540   WBC Thousand/uL 5 72   HEMOGLOBIN g/dL 12 1   HEMATOCRIT % 38 1   PLATELETS Thousands/uL 260   NEUTROS PCT % 68   LYMPHS PCT % 17   MONOS PCT % 11   EOS PCT % 2       Results from last 7 days  Lab Units 01/29/19  0540   SODIUM mmol/L 144   POTASSIUM mmol/L 4 9   CHLORIDE mmol/L 109*   CO2 mmol/L 28   BUN mg/dL 31*   CREATININE mg/dL 1 61*   ANION GAP mmol/L 7   CALCIUM mg/dL 8 2*   ALBUMIN g/dL 2 3*   TOTAL BILIRUBIN mg/dL 0 50   ALK PHOS U/L 51   ALT U/L 16   AST U/L 25   GLUCOSE RANDOM mg/dL 203*       Results from last 7 days  Lab Units 01/28/19  1437   INR  2 13*       Results from last 7 days  Lab Units 01/30/19  1207 01/30/19  0629 01/29/19  2131 01/29/19  1651 01/29/19  1142 01/29/19  0535 01/28/19  1433   POC GLUCOSE mg/dl 236* 191* 208* 262* 306* 200* 62*       Results from last 7 days  Lab Units 01/29/19  0540   HEMOGLOBIN A1C % 8 1*       Results from last 7 days  Lab Units 01/28/19  1437   LACTIC ACID mmol/L 1 2           * I Have Reviewed All Lab Data Listed Above  * Additional Pertinent Lab Tests Reviewed: All Labs Within Last 24 Hours Reviewed      Recent Cultures (last 7 days):       Results from last 7 days  Lab Units 01/28/19  1541 01/28/19  1437   BLOOD CULTURE   --  No Growth at 24 hrs  No Growth at 24 hrs  URINE CULTURE  Culture results to follow    --        Last 24 Hours Medication List:     Current Facility-Administered Medications:  acetaminophen 650 mg Oral Q6H PRN Carleen Meza MD    cefTRIAXone 1,000 mg Intravenous Q24H Teodoro Adams SANCHEZ MD Last Rate: 1,000 mg (01/30/19 1529)   ergocalciferol 50,000 Units Oral Weekly Parish Velasquez MD    fenofibrate 48 mg Oral Daily Parish Velasquez MD    insulin glargine 9 Units Subcutaneous HS Janusz SANCHEZ MD    insulin lispro 3 Units Subcutaneous TID With Meals Janusz SANCHEZ MD    neomycin-bacitracin-polymyxin b 1 small application Topical BID Janusz SANCHEZ MD    rivaroxaban 15 mg Oral Daily With Lennox Cumming, MD         Today, Patient Was Seen By: Heriberto Trent MD    ** Please Note: Dictation voice to text software may have been used in the creation of this document   **

## 2019-01-30 NOTE — CONSULTS
Progress Note - Wound   Krzysztof Blair 80 y o  male MRN: [id number]  Unit/Bed#: -01 Encounter: [id number]      Assessment:  Wound care consulted for assessment of pressure injuries noted on admission  Patient seen in bed, cooperative with assessment  p-500 mattress ordered by primary team  Patient is incontinent, stanislav-care rendered at time of assessment  Assist of 1-2 with turning  Dependent for care  Nutrition is following along  Family at bedside  B/l heels are intact with blanchable redness and dry skin, no wounds noted  Recommend offloading and hydraguard cream      Paronychia to R hand ring finger  Decreased redness and swelling per family  No open wound  B/l lower extremities with evidence of poor hygiene, scaling dry skin, and scabbing noted  No open aspects at time of assessment  Recommend proper skin care / hygiene and moisturizing the skin + can use foam dressing if scabbing falls off  Family reports that his regular recliner chair that assists him with standing has been broken and he has not been moving very much as he is in a different chair  L upper buttock, L distal buttock + including the ischium (measured together), R buttock, and R ischium with stage 2 pressure injuries  likely mixed etiology of pressure and moisture  All POA  Wound beds are partial thickness, 100% pink tissue  Edges fragile and attached  No maceration  Stanislav-wounds are intact with blanchable hyperpigmentation and scarring  Mid sacral slit is intact with no redness or maceration  Due to location of wounds + incontinence will recommend calazime cream  Foam dressings were soiled at time of assessment  Educated patient and family on the importance of frequent offloading of pressure via turning, repositioning and weight-shifting  Verbalized understanding of plan of care  No induration, fluctuance, redness, or purulence noted to the above noted wounds  Patient tolerated well  Primary nurse aware of plan of care  See flow sheets for more detailed assessment findings  Will follow along  Plan: 1  Cleanse b/l buttocks, sacrum, and ischium with soap and water, pat dry, and apply calazime TID and PRN   2  Apply skin nourishing cream the entire skin daily for moisture  3  Turn and reposition patient every  2 hours   4  Elevate heels off of bed with pillows to offload pressure   5  Soft care cushion to chair when OOB, if able  6  Apply hydraguard to b/l heels BID and PRN for prevention and protection  7  Cleanse b/l lower extremities with soap and water, pat dry, apply skin nourishing cream  If scabbing falls off, can utilize small bordered foam dressings  Change every other day and PRN for soilage/dislodgement  8  Antibiotic ointment ordered by primary team for finger to R hand  9  Wound care will follow along with patient weekly, please call with questions and concerns    Objective:    Vitals: Blood pressure 133/61, pulse 86, temperature 98 4 °F (36 9 °C), temperature source Oral, resp  rate 18, height 5' 6" (1 676 m), weight 101 kg (223 lb), SpO2 93 %  ,Body mass index is 35 99 kg/m²  Pressure Ulcer 01/29/19 Buttocks Right stage 2  (Active)   Staging Stage II 1/30/2019 11:00 AM   Wound Description Pink 1/30/2019 11:00 AM   Analisa-wound Assessment Dry; Intact;Fragile; Hyperpigmented 1/30/2019 11:00 AM   Wound Length (cm) 1 cm 1/30/2019 11:00 AM   Wound Width (cm) 1 cm 1/30/2019 11:00 AM   Wound Depth (cm) 0 1 1/30/2019 11:00 AM   Calculated Wound Area (cm^2) 1 cm^2 1/30/2019 11:00 AM   Calculated Wound Volume (cm^3) 0 1 cm^3 1/30/2019 11:00 AM   Tunneling 0 cm 1/30/2019 11:00 AM   Undermining 0 1/30/2019 11:00 AM   Drainage Amount Scant 1/30/2019 11:00 AM   Drainage Description Serosanguineous 1/30/2019 11:00 AM   Treatment Cleansed;Elevated with pillow(s); Offload;Softcare cushion;Turn & reposition 1/30/2019 11:00 AM   Dressing Protective barrier 1/30/2019 11:00 AM   Dressing Changed New dressing applied 1/29/2019 8:00 PM   Wound packed? No 1/30/2019 11:00 AM   Packing- # removed 0 1/30/2019 11:00 AM   Packing- # inserted 0 1/30/2019 11:00 AM   Patient Tolerance Tolerated well 1/30/2019 11:00 AM   Dressing Status Open to air 1/30/2019 11:00 AM       Pressure Ulcer 01/29/19 Buttocks Left;Upper stage 2  (Active)   Staging Stage II 1/30/2019 11:00 AM   Wound Description Pink 1/30/2019 11:00 AM   Analisa-wound Assessment Dry; Intact; Hyperpigmented;Fragile 1/30/2019 11:00 AM   Wound Length (cm) 2 cm 1/30/2019 11:00 AM   Wound Width (cm) 2 cm 1/30/2019 11:00 AM   Wound Depth (cm) 0 1 1/30/2019 11:00 AM   Calculated Wound Area (cm^2) 4 cm^2 1/30/2019 11:00 AM   Calculated Wound Volume (cm^3) 0 4 cm^3 1/30/2019 11:00 AM   Tunneling 0 cm 1/30/2019 11:00 AM   Undermining 0 1/30/2019 11:00 AM   Drainage Amount Scant 1/30/2019 11:00 AM   Drainage Description Serosanguineous 1/30/2019 11:00 AM   Treatment Cleansed;Elevated with pillow(s); Offload;Softcare cushion;Turn & reposition 1/30/2019 11:00 AM   Dressing Protective barrier 1/30/2019 11:00 AM   Wound packed? No 1/30/2019 11:00 AM   Packing- # removed 0 1/30/2019 11:00 AM   Packing- # inserted 0 1/30/2019 11:00 AM   Patient Tolerance Tolerated well 1/30/2019 11:00 AM   Dressing Status Open to air 1/30/2019 11:00 AM       Pressure Ulcer 01/29/19 Buttocks Left; Lower including ischium - Stage 2  (Active)   Staging Stage II 1/30/2019 11:00 AM   Wound Description Pink 1/30/2019 11:00 AM   Analisa-wound Assessment Dry; Intact;Fragile; Hyperpigmented 1/30/2019 11:00 AM   Wound Length (cm) 6 cm 1/30/2019 11:00 AM   Wound Width (cm) 2 cm 1/30/2019 11:00 AM   Wound Depth (cm) 0 1 1/30/2019 11:00 AM   Calculated Wound Area (cm^2) 12 cm^2 1/30/2019 11:00 AM   Calculated Wound Volume (cm^3) 1 2 cm^3 1/30/2019 11:00 AM   Tunneling 0 cm 1/30/2019 11:00 AM   Undermining 0 1/30/2019 11:00 AM   Drainage Amount Scant 1/30/2019 11:00 AM   Drainage Description Serosanguineous 1/30/2019 11:00 AM   Treatment Elevated with pillow(s); Cleansed; Offload;Softcare cushion;Turn & reposition 1/30/2019 11:00 AM   Dressing Protective barrier 1/30/2019 11:00 AM   Wound packed? No 1/30/2019 11:00 AM   Packing- # removed 0 1/30/2019 11:00 AM   Packing- # inserted 0 1/30/2019 11:00 AM   Patient Tolerance Tolerated well 1/30/2019 11:00 AM   Dressing Status Open to air 1/30/2019 11:00 AM       Pressure Ulcer 01/29/19 Ischium Right stage 2  (Active)   Staging Stage II 1/30/2019 11:00 AM   Wound Description Pink 1/30/2019 11:00 AM   Analisa-wound Assessment Dry; Intact;Fragile; Hyperpigmented 1/30/2019 11:00 AM   Wound Length (cm) 0 2 cm 1/30/2019 11:00 AM   Wound Width (cm) 1 cm 1/30/2019 11:00 AM   Wound Depth (cm) 0 1 1/30/2019 11:00 AM   Calculated Wound Area (cm^2) 0 2 cm^2 1/30/2019 11:00 AM   Calculated Wound Volume (cm^3) 0 02 cm^3 1/30/2019 11:00 AM   Tunneling 0 cm 1/30/2019 11:00 AM   Undermining 0 1/30/2019 11:00 AM   Drainage Amount Scant 1/30/2019 11:00 AM   Drainage Description Serosanguineous 1/30/2019 11:00 AM   Treatment Cleansed;Elevated with pillow(s); Offload;Softcare cushion;Turn & reposition 1/30/2019 11:00 AM   Dressing Protective barrier 1/30/2019 11:00 AM   Wound packed? No 1/30/2019 11:00 AM   Packing- # removed 0 1/30/2019 11:00 AM   Packing- # inserted 0 1/30/2019 11:00 AM   Patient Tolerance Tolerated well 1/30/2019 11:00 AM   Dressing Status Open to air 1/30/2019 11:00 AM           Recommendations written as orders  AVS updated    Ellwood Merlin RN BSN CWON

## 2019-01-30 NOTE — ASSESSMENT & PLAN NOTE
Multiple stage I-II  sacral pressure wound noted  Daughter reports that patient gets visiting nurse to times a week  Appears that patient may need more help to take care of himself  PT recommends short-term rehab  Discussed with patient as well as daughter over the phone  Agreeable for short-term rehab  Wound consult recommendations are as follows    1  Cleanse b/l buttocks, sacrum, and ischium with soap and water, pat dry, and apply calazime TID and PRN   2  Apply skin nourishing cream the entire skin daily for moisture  3  Turn and reposition patient every  2 hours   4  Elevate heels off of bed with pillows to offload pressure   5  Soft care cushion to chair when OOB, if able  6  Apply hydraguard to b/l heels BID and PRN for prevention and protection  7  Cleanse b/l lower extremities with soap and water, pat dry, apply skin nourishing cream  If scabbing falls off, can utilize small bordered foam dressings  Change every other day and PRN for soilage/dislodgement     8  Antibiotic ointment ordered by primary team for finger to R hand

## 2019-01-30 NOTE — ASSESSMENT & PLAN NOTE
Lab Results   Component Value Date    HGBA1C 8 1 (H) 01/29/2019       Recent Labs      01/29/19   1651  01/29/19   2131  01/30/19   0629  01/30/19   1207   POCGLU  262*  208*  191*  236*       Blood Sugar Average: Last 72 hrs:  (P) 209 [id number]     A1c noted 8 1  Daughter was not sure about insulin dose    Lantus 9 units  Meal coverage 3 units  Sliding scale coverage  Inpatient goal should be between 140-180  Continue to monitor

## 2019-01-30 NOTE — ASSESSMENT & PLAN NOTE
Currently rate controlled w/o medication    On Pradaxa for anticoagulation  No signs of active bleeding noted  Continue Pradaxa

## 2019-01-30 NOTE — ASSESSMENT & PLAN NOTE
Bilateral lower extremity noted with chronic changes associated with peripheral vascular disease  Continue tighter control of diabetes, hyperlipidemia and hypertension  Recommended close monitoring with primary care provider as well as Cardiology as outpatient

## 2019-01-30 NOTE — ASSESSMENT & PLAN NOTE
Severe vitamin-D deficiency  Level noted 7  Started on high-dose weekly supplement  Close monitoring recommended outpatient

## 2019-01-30 NOTE — ASSESSMENT & PLAN NOTE
No fever, no leukocytosis noted  No signs of systemic infection present    Improving on topical antibiotics

## 2019-01-30 NOTE — ASSESSMENT & PLAN NOTE
UA grossly indicative of UTI  Remained afebrile, no leukocytosis noted  Patient denies any new symptoms  Had completed 3 days of antibiotic treatment  Will monitor off antibiotics for now

## 2019-01-30 NOTE — DISCHARGE INSTR - OTHER ORDERS
1 Cleanse b/l buttocks, sacrum, and ischium with soap and water, pat dry, and apply calazime TID and PRN   2  Apply skin nourishing cream the entire skin daily for moisture  3  Turn and reposition patient every  2 hours   4  Elevate heels off of bed with pillows to offload pressure   5  Soft care cushion to chair when OOB, if able  6  Apply hydraguard to b/l heels BID and PRN for prevention and protection  7  Cleanse b/l lower extremities with soap and water, pat dry, apply skin nourishing cream  If scabbing falls off, can utilize small bordered foam dressings  Change every other day and PRN for soilage/dislodgement  8  Antibiotic ointment ordered by primary team for finger to R hand

## 2019-01-30 NOTE — ASSESSMENT & PLAN NOTE
Daughter also reports history of CHF  Echo on this visit shows EF of 60%  Likely diastolic dysfunction  Currently not in acute distress  Denies chest pain, dyspnea  Currently not on any diuretics  Appears euvolemic  Salt and fluid restricted diet  Recommended close follow-up with Cardiology as outpatient

## 2019-01-30 NOTE — ASSESSMENT & PLAN NOTE
History of CKD stage 3  Baseline GFR around 35-40 and baseline creatinine around 1 6-1 9 range  Currently presents with creatinine 1 6  Nephrology recommendation appreciated  Recommended close follow-up with Nephrology as outpatient

## 2019-01-31 VITALS
BODY MASS INDEX: 35.84 KG/M2 | DIASTOLIC BLOOD PRESSURE: 63 MMHG | WEIGHT: 223 LBS | HEART RATE: 81 BPM | RESPIRATION RATE: 20 BRPM | TEMPERATURE: 97.7 F | HEIGHT: 66 IN | OXYGEN SATURATION: 97 % | SYSTOLIC BLOOD PRESSURE: 138 MMHG

## 2019-01-31 PROBLEM — G92.8 TOXIC METABOLIC ENCEPHALOPATHY: Status: ACTIVE | Noted: 2019-01-31

## 2019-01-31 LAB
ANION GAP SERPL CALCULATED.3IONS-SCNC: 6 MMOL/L (ref 4–13)
BUN SERPL-MCNC: 29 MG/DL (ref 5–25)
CALCIUM SERPL-MCNC: 8.5 MG/DL (ref 8.3–10.1)
CHLORIDE SERPL-SCNC: 105 MMOL/L (ref 100–108)
CO2 SERPL-SCNC: 28 MMOL/L (ref 21–32)
CREAT SERPL-MCNC: 1.55 MG/DL (ref 0.6–1.3)
GFR SERPL CREATININE-BSD FRML MDRD: 40 ML/MIN/1.73SQ M
GLUCOSE SERPL-MCNC: 119 MG/DL (ref 65–140)
GLUCOSE SERPL-MCNC: 127 MG/DL (ref 65–140)
GLUCOSE SERPL-MCNC: 233 MG/DL (ref 65–140)
POTASSIUM SERPL-SCNC: 4.5 MMOL/L (ref 3.5–5.3)
SODIUM SERPL-SCNC: 139 MMOL/L (ref 136–145)

## 2019-01-31 PROCEDURE — 99239 HOSP IP/OBS DSCHRG MGMT >30: CPT | Performed by: STUDENT IN AN ORGANIZED HEALTH CARE EDUCATION/TRAINING PROGRAM

## 2019-01-31 PROCEDURE — 80048 BASIC METABOLIC PNL TOTAL CA: CPT | Performed by: STUDENT IN AN ORGANIZED HEALTH CARE EDUCATION/TRAINING PROGRAM

## 2019-01-31 PROCEDURE — 82948 REAGENT STRIP/BLOOD GLUCOSE: CPT

## 2019-01-31 RX ORDER — INSULIN GLARGINE 100 [IU]/ML
8 INJECTION, SOLUTION SUBCUTANEOUS
Qty: 1 UNITS | Refills: 0
Start: 2019-01-31

## 2019-01-31 RX ORDER — ERGOCALCIFEROL 1.25 MG/1
50000 CAPSULE ORAL WEEKLY
Qty: 8 CAPSULE | Refills: 0
Start: 2019-02-06

## 2019-01-31 RX ADMIN — INSULIN LISPRO 3 UNITS: 100 INJECTION, SOLUTION INTRAVENOUS; SUBCUTANEOUS at 09:54

## 2019-01-31 RX ADMIN — BACITRACIN, NEOMYCIN, POLYMYXIN B 1 SMALL APPLICATION: 400; 3.5; 5 OINTMENT TOPICAL at 09:53

## 2019-01-31 RX ADMIN — FENOFIBRATE 48 MG: 48 TABLET, COATED ORAL at 09:53

## 2019-01-31 RX ADMIN — INSULIN LISPRO 3 UNITS: 100 INJECTION, SOLUTION INTRAVENOUS; SUBCUTANEOUS at 12:24

## 2019-01-31 RX ADMIN — INSULIN LISPRO 3 UNITS: 100 INJECTION, SOLUTION INTRAVENOUS; SUBCUTANEOUS at 12:25

## 2019-01-31 NOTE — SOCIAL WORK
[facility] able to accept patient, [facility] will assist with transport time  Cm contacted patient daughter Kesha Silverman who is agreeable to str and aware [facility] is able to provide a bed in a 4 bedded room  Cm reviewed patient medicare rights, no objections to patient discharge  Daughter reports no MH/SA hx or concerns, pasrr is negative  [facility] able to transport him at 1400 this day, facility, treatment team and family aware

## 2019-01-31 NOTE — PROGRESS NOTES
Wound care completed as ordered  Warm wet wash clothes applied to legs and feet to assist with cleaning area  Lotion applied afterwards as instructed by wound care nurse

## 2019-01-31 NOTE — ASSESSMENT & PLAN NOTE
Ultrasound shows small bilateral hydrocele  Patient denies fever, chills, pain in his scrotal area  No signs of systemic infection present  Swelling has resolved  Recommended outpatient follow-up with Urology if needed

## 2019-01-31 NOTE — DISCHARGE SUMMARY
Discharge- Sierra Thornton 1933, 80 y o  male MRN: [id number]    Unit/Bed#: -01 Encounter: [id number]    Primary Care Provider: Kevin Vasquez DO   Date and time admitted to hospital: 1/28/2019  2:08 PM        * UTI (urinary tract infection)   Assessment & Plan    UA grossly indicative of UTI  Urine culture positive for E coli ESBL likely colonization since patient has been afebrile, without leukocytosis or any other signs of infection  Patient denies any new complaints  Had completed 3 days of antibiotic treatment  Currently stable off antibiotics  Awaiting placement to rehab     Toxic metabolic encephalopathy   Assessment & Plan    Toxic metabolic encephalopathy present on admission in the settings of UTI  Daughter states that every time patient gets UTI effects his mentation requiring IV antibiotics, further neuro checks as well as he has labs monitoring  Currently patient is awake, alert, oriented to place and person and cooperative during exam   Appears Stable at his baseline  Hemodynamically stable to discharge to short-term rehab  PVD (peripheral vascular disease) ([facility] [address] )   Assessment & Plan    Bilateral lower extremity noted with chronic changes associated with peripheral vascular disease  Continue tighter control of diabetes, hyperlipidemia and hypertension  Recommended close monitoring with primary care provider as well as Cardiology as outpatient  Vitamin D deficiency   Assessment & Plan    Severe vitamin-D deficiency  Level noted 7  Started on high-dose weekly supplement  Close monitoring recommended outpatient     DM (diabetes mellitus) [facility])   Assessment & Plan    Lab Results   Component Value Date    HGBA1C 8 1 (H) 01/29/2019       Recent Labs      01/30/19   1207  01/30/19   1615  01/30/19   2109  01/31/19   0514   POCGLU  236*  193*  175*  127       Blood Sugar Average: Last 72 hrs:  (P) 196     A1c noted 8 1  Daughter was not sure about insulin dose    Lantus 8 units  Meal coverage 3 units  Continue to monitor       CHF (congestive heart failure) [facility])   Assessment & Plan    Daughter also reports history of CHF  Echo on this visit shows EF of 60%  Likely diastolic dysfunction  Currently not in acute distress  Denies chest pain, dyspnea  Currently not on any diuretics  Appears euvolemic  Salt and fluid restricted diet  Recommended close follow-up with Cardiology as outpatient  A-fib [facility])   Assessment & Plan    Currently rate controlled w/o medication  On Pradaxa for anticoagulation  No signs of active bleeding noted  Continue Pradaxa     HLD (hyperlipidemia)   Assessment & Plan    Continue home medication       Pressure injury of back, stage 2   Assessment & Plan    Multiple stage I-II  sacral pressure wound noted  Daughter reports that patient gets visiting nurse to times a week  Appears that patient may need more help to take care of himself  PT recommends short-term rehab  Discussed with patient as well as daughter over the phone  Agreeable for short-term rehab  Wound consult recommendations are as follows  1  Cleanse b/l buttocks, sacrum, and ischium with soap and water, pat dry, and apply calazime TID and PRN   2  Apply skin nourishing cream the entire skin daily for moisture  3  Turn and reposition patient every  2 hours   4  Elevate heels off of bed with pillows to offload pressure   5  Soft care cushion to chair when OOB, if able  6  Apply hydraguard to b/l heels BID and PRN for prevention and protection  7  Cleanse b/l lower extremities with soap and water, pat dry, apply skin nourishing cream  If scabbing falls off, can utilize small bordered foam dressings  Change every other day and PRN for soilage/dislodgement     8  Antibiotic ointment ordered by primary team for finger to R hand            CKD (chronic kidney disease)   Assessment & Plan    History of CKD stage 3  Baseline GFR around 35-40 and baseline creatinine around 1 6-1 9 range  Currently presents with creatinine 1 6  Nephrology recommendation appreciated  Recommended close follow-up with Nephrology as outpatient  Paronychia of finger   Assessment & Plan    No fever, no leukocytosis noted  No signs of systemic infection present  Improving on topical antibiotics     Hydrocele   Assessment & Plan    Ultrasound shows small bilateral hydrocele  Patient denies fever, chills, pain in his scrotal area  No signs of systemic infection present  Swelling has resolved  Recommended outpatient follow-up with Urology if needed  Discharging Physician / Practitioner: Rodrick Mcallister MD  PCP: Cy Lopez DO  Admission Date:   Admission Orders     Ordered        01/28/19 1621  Inpatient Admission (expected length of stay for this patient Order details is greater than two midnights)  Once             Discharge Date: 01/31/19    Resolved Problems  Date Reviewed: 1/31/2019    None          Consultations During Hospital Stay:  · Wound care, Nephrology      Complications:  None    Reason for Admission:  Toxic metabolic encephalopathy secondary to UTI , hydrocele, pressure wounds    Hospital Course:     Ryland Gallardo is a 80 y o  male past medical history of AFib, DVT, CHF, CKD, cognitive decline patient who originally presented to the hospital on 1/28/2019 due to toxic metabolic encephalopathy secondary to UTI as per daughter  Patient is a poor historian therefore history was obtained from daughter  Daughter reports that every time patient gets urinary tract infection a effects his mentation  Daughter reports that patient gets visiting nurse 2 times a week  On the day of the presentation patient was also noted to have small hydrocele that subsequently improved  On presentation Also noted to have stage II pressure wounds  Patient was seen by wound care and recommendation noted as above    Urine culture shows E coli ESBL likely contaminant since patient had remained afebrile as well as without leukocytosis and no other signs of infection  Currently awake, alert, oriented to person and place  Appears to be stable at his baseline  Patient was seen by PT recommended short-term rehab  Daughter agreeable  Recommended outpatient dementia workup  Hemodynamically stable to discharge to short-term rehab  No other events reported  Refer to above-mentioned individual problem based plan as well as earlier notes for further clarification  Please see above list of diagnoses and related plan for additional information  Condition at Discharge: fair     Discharge Day Visit / Exam:     Subjective:  Not in acute distress  No new complaints  Stable at his baseline  No other events reported  Vitals: Blood Pressure: 134/64 (01/31/19 0700)  Pulse: 84 (01/31/19 0700)  Temperature: 97 5 °F (36 4 °C) (01/31/19 0700)  Temp Source: Oral (01/31/19 0700)  Respirations: 20 (01/31/19 0700)  Height: 5' 6" (167 6 cm) (01/29/19 0951)  Weight - Scale: 101 kg (223 lb) (01/29/19 0950)  SpO2: 94 % (01/31/19 0700)  Exam:   Physical Exam   Constitutional: No distress  HENT:   Head: Normocephalic and atraumatic  Eyes: Pupils are equal, round, and reactive to light  EOM are normal    Neck: Normal range of motion  No JVD present  Cardiovascular: Normal rate  Pulmonary/Chest: Effort normal and breath sounds normal  No respiratory distress  Abdominal: Soft  Bowel sounds are normal  He exhibits no distension  Musculoskeletal: Normal range of motion  Neurological:   Awake, alert, oriented to place and person  Cooperative during exam      Discussion with Family:  With daughter over the phone  Answered all questions appropriately  Discharge instructions/Information to patient and family:   See after visit summary for information provided to patient and family        Provisions for Follow-Up Care:  See after visit summary for information related to follow-up care and any pertinent home health orders  Disposition:     Acute Rehab at [address] [place] [address] to [address] [facility]:   · Not Applicable to this Patient - Not Applicable to this Patient    Planned Readmission:  None     Discharge Statement:  I spent 35 minutes discharging the patient  This time was spent on the day of discharge  I had direct contact with the patient on the day of discharge  Greater than 50% of the total time was spent examining patient, answering all patient questions, arranging and discussing plan of care with patient as well as directly providing post-discharge instructions  Additional time then spent on discharge activities  Discharge Medications:  See after visit summary for reconciled discharge medications provided to patient and family        ** Please Note: This note has been constructed using a voice recognition system **

## 2019-01-31 NOTE — ASSESSMENT & PLAN NOTE
Toxic metabolic encephalopathy present on admission in the settings of UTI  Daughter states that every time patient gets UTI effects his mentation requiring IV antibiotics, further neuro checks as well as he has labs monitoring    Currently patient is awake, alert, oriented to place and person and cooperative during exam   Improved after treatment  Stable at his baseline

## 2019-01-31 NOTE — ASSESSMENT & PLAN NOTE
UA grossly indicative of UTI  Urine culture positive for E coli ESBL likely colonization since patient has been afebrile, without leukocytosis or any other signs of infection  Patient denies any new complaints  Had completed 3 days of antibiotic treatment  Currently stable off antibiotics    Awaiting placement to rehab

## 2019-01-31 NOTE — PLAN OF CARE
Problem: DISCHARGE PLANNING - CARE MANAGEMENT  Goal: Discharge to post-acute care or home with appropriate resources  INTERVENTIONS:  - Conduct assessment to determine patient/family and health care team treatment goals, and need for post-acute services based on payer coverage, community resources, and patient preferences, and barriers to discharge  - Address psychosocial, clinical, and financial barriers to discharge as identified in assessment in conjunction with the patient/family and health care team  - Arrange appropriate level of post-acute services according to patient's   needs and preference and payer coverage in collaboration with the physician and health care team  - Communicate with and update the patient/family, physician, and health care team regarding progress on the discharge plan  - Arrange appropriate transportation to post-acute venues   Outcome: Adequate for Discharge  Patient discharging to [facility] this day at 1400  imm letter reviewed   PASRR is negative  Family, treatment team and facility aware

## 2019-01-31 NOTE — NURSING NOTE
Patient made staff aware that he would be leaving the hospital at 1pm with or without approval  [facility] made appropriate arrangements and referrals for outpatient follow up and adjustments with current medications  Patient's IV access removed  Patient escorted via wheelchair accompanied by PCA and significant other

## 2019-01-31 NOTE — ASSESSMENT & PLAN NOTE
Lab Results   Component Value Date    HGBA1C 8 1 (H) 01/29/2019       Recent Labs      01/30/19   1207  01/30/19   1615  01/30/19   2109  01/31/19   0514   POCGLU  236*  193*  175*  127       Blood Sugar Average: Last 72 hrs:  (P) 196     A1c noted 8 1  Daughter was not sure about insulin dose    Lantus 9 units  Meal coverage 3 units  Sliding scale coverage  Inpatient goal should be between 140-180  Continue to monitor

## 2019-02-02 LAB
BACTERIA BLD CULT: NORMAL
BACTERIA BLD CULT: NORMAL

## 2019-02-06 ENCOUNTER — HOSPITAL ENCOUNTER (EMERGENCY)
Facility: HOSPITAL | Age: 84
Discharge: HOME | End: 2019-02-07
Attending: EMERGENCY MEDICINE
Payer: MEDICARE

## 2019-02-06 ENCOUNTER — APPOINTMENT (EMERGENCY)
Dept: CT IMAGING | Facility: HOSPITAL | Age: 84
End: 2019-02-06
Payer: MEDICARE

## 2019-02-06 DIAGNOSIS — S09.90XA HEAD INJURY: ICD-10-CM

## 2019-02-06 DIAGNOSIS — Z79.01 ANTICOAGULATION ADEQUATE: ICD-10-CM
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
## 2019-02-06 DIAGNOSIS — M25.561 BILATERAL KNEE PAIN: ICD-10-CM

## 2019-02-06 DIAGNOSIS — M25.562 BILATERAL KNEE PAIN: ICD-10-CM

## 2019-02-06 PROCEDURE — 70450 CT HEAD/BRAIN W/O DYE: CPT

## 2019-02-06 PROCEDURE — 72125 CT NECK SPINE W/O DYE: CPT

## 2019-02-06 PROCEDURE — 99284 EMERGENCY DEPT VISIT MOD MDM: CPT

## 2019-02-07 ENCOUNTER — APPOINTMENT (EMERGENCY)
Dept: RADIOLOGY | Facility: HOSPITAL | Age: 84
End: 2019-02-07
Payer: MEDICARE

## 2019-02-07 VITALS
HEART RATE: 70 BPM | TEMPERATURE: 98.1 F | OXYGEN SATURATION: 97 % | BODY MASS INDEX: 35.51 KG/M2 | WEIGHT: 220.02 LBS | DIASTOLIC BLOOD PRESSURE: 58 MMHG | RESPIRATION RATE: 20 BRPM | SYSTOLIC BLOOD PRESSURE: 132 MMHG

## 2019-02-07 PROCEDURE — 73564 X-RAY EXAM KNEE 4 OR MORE: CPT

## 2019-02-07 NOTE — ED NOTES
Michaeline Galeazzi from [facility] called to find out the status of the patient  I advised her that patient was scheduled for D/C and that we were awaiting transportation  She would like a call at [phone]       Arik Rule  02/07/19 5924

## 2019-02-07 NOTE — ED PROVIDER NOTES
Trauma Evaluation  Ying Camp 80 y o  male MRN: [id number]  Unit/Bed#: ED 09/ED 09 Encounter: [id number]    Assessment/Plan   Trauma Alert: Trauma Acuity: Trauma Evaluation  Model of Arrival: Trauma Mode of Arrival: ALS via    Trauma Team: Current Providers  Attending Provider: Elda Zuniga MD  Registered Nurse: Jakob Garcia RN  Registered Nurse: Tiffanie Suh RN  Registered Nurse: Nathalia Sin RN  Consultants: None    Trauma Active Problems: Fall    Trauma Plan: CT imaging of head due to anticoagulation and CT imaging of the neck due to patient's age and mechanism  X-ray imaging of knees; reassessment    Chief Complaint:   Chief Complaint   Patient presents with    Fall     Fall with head injury on blood thinners, no LOC       History of Present Illness   HPI:  Ying Camp is a 80 y o  male who presents with witnessed fall at his skilled nursing facility  Patient recalls falling but does not know the precise timing associated with it, he does know that this did not occur immediately prior to arrival   According to the [facility] nursing facility, patient fell from his wheelchair at 1630 hr onto the floor while transferring to his bed  There was no reported loss of consciousness and patient was not immediately brought to the emergency room for further evaluation and treatment  There is no history of any preceding chest pain, dyspnea, headache, or other symptoms  Subsequent evaluation noted patient was on anticoagulation so staff contacted EMS to bring him to the emergency room for evaluation and treatment  On arrival, patient has no complaints other than stating his knees are chronically painful  He denies striking his knees today but the reliability of the patient's history is questionable  He does answer directed questions appropriately and recalls the events generally leading to him arriving in the emergency room    Mechanism:       Fall from wheelchair      Fall   Associated symptoms: no abdominal pain, no back pain, no chest pain, no headaches, no nausea, no neck pain and no vomiting      Review of Systems   Constitutional: Negative for chills, fatigue and fever  Eyes: Negative for visual disturbance  Respiratory: Negative for chest tightness and shortness of breath  Cardiovascular: Negative for chest pain  Gastrointestinal: Negative for abdominal pain, nausea and vomiting  Genitourinary: Negative for flank pain and hematuria  Musculoskeletal: Positive for arthralgias (knee pain)  Negative for back pain and neck pain  Skin: Negative for wound  Neurological: Negative for dizziness, weakness and headaches  Psychiatric/Behavioral: Negative for confusion  Historical Information     Immunizations: There is no immunization history on file for this patient  Past Medical History:   Diagnosis Date    CHF (congestive heart failure) (HCC)     COPD (chronic obstructive pulmonary disease) (HCC)     Diabetes mellitus (HCC)     Hyperlipidemia     Osteomyelitis (HCC)      No family history on file  Past Surgical History:   Procedure Laterality Date    ABDOMINAL SURGERY      JOINT REPLACEMENT      b/l hips       Social History     Social History    Marital status:      Spouse name: N/A    Number of children: N/A    Years of education: N/A     Social History Main Topics    Smoking status: Current Every Day Smoker    Smokeless tobacco: Never Used    Alcohol use No    Drug use: No    Sexual activity: Not on file     Other Topics Concern    Not on file     Social History Narrative    No narrative on file       Family History: non-contributory    Meds/Allergies   Prior to Admission Medications   Prescriptions Last Dose Informant Patient Reported? Taking?    Rivaroxaban (XARELTO PO)   Yes No   Sig: Take 15 mg by mouth   Sacubitril-Valsartan (ENTRESTO PO)   Yes No   Sig: Take 24-26 mg by mouth   choline fenofibrate (TRILIPIX) 135 MG capsule   Yes No   Sig: Take 135 mg by mouth daily   ergocalciferol (VITAMIN D2) 50,000 units   No No   Sig: Take 1 capsule (50,000 Units total) by mouth once a week   insulin glargine (LANTUS) 100 units/mL subcutaneous injection   No No   Sig: Inject 8 Units under the skin daily at bedtime   insulin lispro (HumaLOG) 100 units/mL injection   Yes No   Sig: Inject 8-12 Units under the skin 3 (three) times a day before meals   neomycin-polymyxin-hydrocortisone (CORTISPORIN) 3 5-57659-2 5 cream   No No   Sig: Apply topically 2 (two) times a day Apply to right 4th digit for paronychia      Facility-Administered Medications: None       Allergies   Allergen Reactions    Aspirin GI Intolerance       PHYSICAL EXAM    PE limited by: nothing    Objective   Vitals:   First set: Temperature: 98 1 °F (36 7 °C) (02/06/19 2333)  Pulse: 59 (02/06/19 2333)  Respirations: 20 (02/06/19 2333)  Blood Pressure: 156/71 (02/06/19 2333)  SpO2: 96 % (02/06/19 2333)    Primary Survey:   (A) Airway: intact  (B) Breathing: bilateral breath sounds  (C) Circulation: Pulses:   radial  4/4  (D) Disabliity:  GCS Total:  15  (E) Expose:  Completed    Secondary Survey: (Click on Physical Exam tab above)  Physical Exam   Constitutional: He appears well-developed and well-nourished  No distress  HENT:   Head: Normocephalic and atraumatic  Mouth/Throat: Oropharynx is clear and moist    No signs of basilar skull fracture  No nasal septal hematoma  Patient without dentition  Eyes: EOM are normal    No hyphema  Neck: Normal range of motion  Neck supple  No midline tenderness  Cardiovascular: Normal rate and regular rhythm  Pulmonary/Chest: Effort normal and breath sounds normal    Abdominal: Soft  Bowel sounds are normal  He exhibits no distension  There is no tenderness  Musculoskeletal: He exhibits tenderness  Bilateral leg length appears equal  Negative knee effusion, ecchymosis, edema, deformity, or defect  No signs of trauma, no lacerations or abrasions    Signs of venous stasis on the lower legs bilaterally as seen in picture  Normal passive range of motion of the legs  Tenderness over the anterior aspects of the knee, mainly around the patella  No tenderness in the posterior aspect  1+ DP pulses bilaterally that are symmetric with sensory in all dermatomes and normal motor flexion and dorsiflexion of the great toe  Neurological: He is alert  He displays normal reflexes  No cranial nerve deficit or sensory deficit  He exhibits normal muscle tone  Coordination normal    No focal tenderness  Normal and equal  strength bilaterally  Skin: Skin is warm and dry  Rash noted  He is not diaphoretic  Signs of venous stasis bilaterally  See picture  Psychiatric: He has a normal mood and affect  Vitals reviewed  Invasive Devices          No matching active lines, drains, or airways          Lab Results:   Results Reviewed     None                 Imaging Studies:   XR knee 4+ vw right injury   ED Interpretation by Jian Montenegro MD (02/07 0110)   Chronic degenerative changes, no acute fracture      XR knee 4+ vw left injury   ED Interpretation by Jian Montenegro MD (02/07 0110)   Chronic degenerative changes  TRAUMA - CT spine cervical wo contrast   Final Result by Mehreen Noel MD (02/07 0016)      No acute cervical spine fracture or traumatic malalignment  Workstation performed: [id number]         TRAUMA - CT head wo contrast   Final Result by Mehreen Noel MD (02/07 0012)      No acute intracranial abnormality  Workstation performed: [id number]             Other Studies: N/A    Code Status: Prior  Advance Directive and Living Will:      Power of :    POLST:      Procedures  Procedures       Phone Contacts  ED Phone Contact     ED Course  ED Course as of Feb 07 0458   Thu Feb 07, 2019   0109 Patient reassessed  Patient's x-ray imaging demonstrates numerous chronic findings, no clear acute changes  Patient nonambulatory at baseline  Patient now states his knee pain is chronic and unchanged today  Considering this, low likelihood of acute findings  Patient continues to be asymptomatic other than complaining of the knee pain  Discussed follow-up and return precautions in detail with the patient  Patient to follow up primary care for reassessment of fall prevention  MDM    Disposition  Final diagnoses:   Fall   Head injury   Anticoagulation adequate   Bilateral knee pain     Time reflects when diagnosis was documented in both MDM as applicable and the Disposition within this note     Time User Action Codes Description Comment    2/7/2019 12:36 AM Jonne Lipoma Add [C60  XXXA] Fall     2/7/2019 12:36 AM Jonne Lipoma Add [C66 12KG] Head injury     2/7/2019 12:36 AM Jonne Lipoma Add [Z79 01] Anticoagulation adequate     2/7/2019 12:36 AM Jonne Lipoma Add [C41 696,  M25 562] Bilateral knee pain       ED Disposition     ED Disposition Condition Date/Time Comment    Discharge  Thu Feb 7, 2019  1:09 AM Trista Umana discharge to home/self care  Condition at discharge: Stable        Follow-up Information     Follow up With Specialties Details Why Contact Info Additional [id number] Count includes the [facility], DO General Practice Schedule an appointment as soon as possible for a visit in 2 days Follow up and reassessment  Discussion of fall prevention  [address]  [facility] [address] Emergency Department Emergency Medicine Go to If symptoms worsen [address] [facility] [address]  [address] [place] [address] ED, [address] [place], [place], [address]        Patient's Medications   Discharge Prescriptions    No medications on file     No discharge procedures on file        ED Provider  Electronically Signed by         John Giles MD  02/07/19 1568

## 2019-02-07 NOTE — DISCHARGE INSTRUCTIONS
Head Injury   WHAT YOU NEED TO KNOW:   A head injury is most often caused by a blow to the head  This may occur from a fall, bicycle injury, sports injury, being struck in the head, or a motor vehicle accident  DISCHARGE INSTRUCTIONS:   Call 911 or have someone else call for any of the following:   · You cannot be woken  · You have a seizure  · You stop responding to others or you faint  · You have blurry or double vision  · Your speech becomes slurred or confused  · You have arm or leg weakness, loss of feeling, or new problems with coordination  · Your pupils are larger than usual or one pupil is a different size than the other  · You have blood or clear fluid coming out of your ears or nose  Return to the emergency department if:   · You have repeated or forceful vomiting  · You feel confused  · Your headache gets worse or becomes severe  · You or someone caring for you notices that you are harder to wake than usual   Contact your healthcare provider if:   · Your symptoms last longer than 6 weeks after the injury  · You have questions or concerns about your condition or care  Medicines:   · Acetaminophen  decreases pain  Acetaminophen is available without a doctor's order  Ask how much to take and how often to take it  Follow directions  Acetaminophen can cause liver damage if not taken correctly  · Take your medicine as directed  Contact your healthcare provider if you think your medicine is not helping or if you have side effects  Tell him or her if you are allergic to any medicine  Keep a list of the medicines, vitamins, and herbs you take  Include the amounts, and when and why you take them  Bring the list or the pill bottles to follow-up visits  Carry your medicine list with you in case of an emergency  Self-care:   · Rest  or do quiet activities for 24 to 48 hours  Limit your time watching TV, using the computer, or doing tasks that require a lot of thinking  Slowly return to your normal activities as directed  Do not play sports or do activities that may cause you to get hit in the head  Ask your healthcare provider when you can return to sports  · Apply ice  on your head for 15 to 20 minutes every hour or as directed  Use an ice pack, or put crushed ice in a plastic bag  Cover it with a towel before you apply it to your skin  Ice helps prevent tissue damage and decreases swelling and pain  · Have someone stay with you for 24 hours  or as directed  This person can monitor you for complications and call [phone]  When you are awake the person should ask you a few questions to see if you are thinking clearly  An example would be to ask your name or your address  Prevent another head injury:   · Wear a helmet that fits properly  Do this when you play sports, or ride a bike, scooter, or skateboard  Helmets help decrease your risk of a serious head injury  Talk to your healthcare provider about other ways you can protect yourself if you play sports  · Wear your seat belt every time you are in a car  This helps to decrease your risk for a head injury if you are in a car accident  Follow up with your healthcare provider as directed:  Write down your questions so you remember to ask them during your visits  © 2017 [address] Information is for End User's use only and may not be sold, redistributed or otherwise used for commercial purposes  All illustrations and images included in CareNotes® are the copyrighted property of [employer] M [employer]  or [place]  The above information is an  only  It is not intended as medical advice for individual conditions or treatments  Talk to your doctor, nurse or pharmacist before following any medical regimen to see if it is safe and effective for you  Knee Pain   WHAT YOU NEED TO KNOW:   Knee pain may start suddenly, or it may be a long-term problem   You may have pain on the side, front, or back of your knee  You may have knee stiffness and swelling  You may hear popping sounds or feel like your knee is giving way or locking up as you walk  You may feel pain when you sit, stand, walk, or climb up and down stairs  Knee pain can be caused by conditions such as obesity, inflammation, or strains or tears in ligaments or tendons  DISCHARGE INSTRUCTIONS:   Follow up with your healthcare provider within 24 hours or as directed: You may need follow-up treatments, such as steroid injections to decrease pain  Write down your questions so you remember to ask them during your visits  Self-care:   · Rest  your knee so it can heal  Limit activities that increase your pain  · Ice  can help reduce swelling  Wrap ice in a towel and put it on your knee for as long and as often as directed  · Compression  with a brace or bandage can help reduce swelling  Use a brace or bandage only as directed  · Elevation  helps decrease pain and swelling  Elevate your knee while you are sitting or lying down  Prop your leg on pillows to keep your knee above the level of your heart  Medicines:   · NSAIDs  help decrease swelling and pain or fever  This medicine is available with or without a doctor's order  NSAIDs can cause stomach bleeding or kidney problems in certain people  If you take blood thinner medicine, always ask your healthcare provider if NSAIDs are safe for you  Always read the medicine label and follow directions  · Acetaminophen  decreases pain and fever  It is available without a doctor's order  Ask how much to take and when to take it  Follow directions  Acetaminophen can cause liver damage if not taken correctly  · Take your medicine as directed  Contact your healthcare provider if you think your medicine is not helping or if you have side effects  Tell him or her if you are allergic to any medicine  Keep a list of the medicines, vitamins, and herbs you take   Include the amounts, and when and why you take them  Bring the list or the pill bottles to follow-up visits  Carry your medicine list with you in case of an emergency  Exercise as directed: You may need to see a physical therapist or do recommended exercises to improve movement and decrease your pain  You may be directed to walk, swim, or ride a bike  Follow your exercise plan exactly as directed to avoid further injury  Contact your healthcare provider if:   · You have questions or concerns about your condition or care  Return to the emergency department if:   · Your pain is worse, even after treatment  · You cannot bend or straighten your leg completely  · The swelling around your knee does not go down even with treatment  · Your knee is painful and hot to the touch  © 2017 [address]  Information is for End User's use only and may not be sold, redistributed or otherwise used for commercial purposes  All illustrations and images included in CareNotes® are the copyrighted property of [employer] M [employer]  or [place]  The above information is an  only  It is not intended as medical advice for individual conditions or treatments  Talk to your doctor, nurse or pharmacist before following any medical regimen to see if it is safe and effective for you

## 2020-06-11 ENCOUNTER — APPOINTMENT (EMERGENCY)
Dept: CT IMAGING | Facility: HOSPITAL | Age: 85
DRG: 689 | End: 2020-06-11
Payer: MEDICARE

## 2020-06-11 ENCOUNTER — APPOINTMENT (EMERGENCY)
Dept: RADIOLOGY | Facility: HOSPITAL | Age: 85
DRG: 689 | End: 2020-06-11
Payer: MEDICARE

## 2020-06-11 ENCOUNTER — HOSPITAL ENCOUNTER (INPATIENT)
Facility: HOSPITAL | Age: 85
LOS: 4 days | Discharge: NURSING, REHAB OR LONG-TERM CARE | DRG: 689 | End: 2020-06-16
Attending: EMERGENCY MEDICINE | Admitting: FAMILY MEDICINE
Payer: MEDICARE

## 2020-06-11 DIAGNOSIS — R26.2 AMBULATORY DYSFUNCTION: ICD-10-CM

## 2020-06-11 DIAGNOSIS — N39.0 URINARY TRACT INFECTION: Primary | ICD-10-CM

## 2020-06-11 DIAGNOSIS — R53.1 GENERALIZED WEAKNESS: ICD-10-CM

## 2020-06-11 PROBLEM — Z79.4 TYPE 2 DIABETES MELLITUS, WITH LONG-TERM CURRENT USE OF INSULIN (HCC): Status: ACTIVE | Noted: 2020-06-11

## 2020-06-11 PROBLEM — N30.00 ACUTE CYSTITIS: Status: ACTIVE | Noted: 2020-06-11

## 2020-06-11 PROBLEM — E11.9 TYPE 2 DIABETES MELLITUS, WITH LONG-TERM CURRENT USE OF INSULIN (HCC): Status: ACTIVE | Noted: 2020-06-11

## 2020-06-11 LAB
ALBUMIN SERPL BCP-MCNC: 2.9 G/DL (ref 3.5–5)
ALP SERPL-CCNC: 106 U/L (ref 46–116)
ALT SERPL W P-5'-P-CCNC: 19 U/L (ref 12–78)
ANION GAP SERPL CALCULATED.3IONS-SCNC: 8 MMOL/L (ref 4–13)
APTT PPP: 40 SECONDS (ref 23–37)
AST SERPL W P-5'-P-CCNC: 27 U/L (ref 5–45)
BACTERIA UR QL AUTO: ABNORMAL /HPF
BASOPHILS # BLD AUTO: 0.04 THOUSANDS/ΜL (ref 0–0.1)
BASOPHILS NFR BLD AUTO: 1 % (ref 0–1)
BILIRUB SERPL-MCNC: 0.9 MG/DL (ref 0.2–1)
BILIRUB UR QL STRIP: NEGATIVE
BUN SERPL-MCNC: 32 MG/DL (ref 5–25)
CALCIUM SERPL-MCNC: 8.9 MG/DL (ref 8.3–10.1)
CHLORIDE SERPL-SCNC: 104 MMOL/L (ref 100–108)
CLARITY UR: ABNORMAL
CO2 SERPL-SCNC: 28 MMOL/L (ref 21–32)
COLOR UR: YELLOW
CREAT SERPL-MCNC: 1.8 MG/DL (ref 0.6–1.3)
EOSINOPHIL # BLD AUTO: 0.15 THOUSAND/ΜL (ref 0–0.61)
EOSINOPHIL NFR BLD AUTO: 3 % (ref 0–6)
ERYTHROCYTE [DISTWIDTH] IN BLOOD BY AUTOMATED COUNT: 15.5 % (ref 11.6–15.1)
GFR SERPL CREATININE-BSD FRML MDRD: 33 ML/MIN/1.73SQ M
GLUCOSE SERPL-MCNC: 174 MG/DL (ref 65–140)
GLUCOSE UR STRIP-MCNC: NEGATIVE MG/DL
HCT VFR BLD AUTO: 43.5 % (ref 36.5–49.3)
HGB BLD-MCNC: 13.6 G/DL (ref 12–17)
HGB UR QL STRIP.AUTO: ABNORMAL
IMM GRANULOCYTES # BLD AUTO: 0.02 THOUSAND/UL (ref 0–0.2)
IMM GRANULOCYTES NFR BLD AUTO: 0 % (ref 0–2)
INR PPP: 1.23 (ref 0.84–1.19)
KETONES UR STRIP-MCNC: NEGATIVE MG/DL
LACTATE SERPL-SCNC: 1.7 MMOL/L (ref 0.5–2)
LEUKOCYTE ESTERASE UR QL STRIP: ABNORMAL
LYMPHOCYTES # BLD AUTO: 1.1 THOUSANDS/ΜL (ref 0.6–4.47)
LYMPHOCYTES NFR BLD AUTO: 23 % (ref 14–44)
MCH RBC QN AUTO: 27.9 PG (ref 26.8–34.3)
MCHC RBC AUTO-ENTMCNC: 31.3 G/DL (ref 31.4–37.4)
MCV RBC AUTO: 89 FL (ref 82–98)
MONOCYTES # BLD AUTO: 0.4 THOUSAND/ΜL (ref 0.17–1.22)
MONOCYTES NFR BLD AUTO: 8 % (ref 4–12)
NEUTROPHILS # BLD AUTO: 3.17 THOUSANDS/ΜL (ref 1.85–7.62)
NEUTS SEG NFR BLD AUTO: 65 % (ref 43–75)
NITRITE UR QL STRIP: NEGATIVE
NON-SQ EPI CELLS URNS QL MICRO: ABNORMAL /HPF
NRBC BLD AUTO-RTO: 0 /100 WBCS
PH UR STRIP.AUTO: 5.5 [PH]
PLATELET # BLD AUTO: 211 THOUSANDS/UL (ref 149–390)
PMV BLD AUTO: 10.5 FL (ref 8.9–12.7)
POTASSIUM SERPL-SCNC: 4 MMOL/L (ref 3.5–5.3)
PROT SERPL-MCNC: 7.5 G/DL (ref 6.4–8.2)
PROT UR STRIP-MCNC: ABNORMAL MG/DL
PROTHROMBIN TIME: 15.5 SECONDS (ref 11.6–14.5)
RBC # BLD AUTO: 4.88 MILLION/UL (ref 3.88–5.62)
RBC #/AREA URNS AUTO: ABNORMAL /HPF
SODIUM SERPL-SCNC: 140 MMOL/L (ref 136–145)
SP GR UR STRIP.AUTO: >=1.03 (ref 1–1.03)
TROPONIN I SERPL-MCNC: <0.02 NG/ML
TSH SERPL DL<=0.05 MIU/L-ACNC: 2.01 UIU/ML (ref 0.36–3.74)
UROBILINOGEN UR QL STRIP.AUTO: 0.2 E.U./DL
WBC # BLD AUTO: 4.88 THOUSAND/UL (ref 4.31–10.16)
WBC #/AREA URNS AUTO: ABNORMAL /HPF

## 2020-06-11 PROCEDURE — 87040 BLOOD CULTURE FOR BACTERIA: CPT | Performed by: PHYSICIAN ASSISTANT

## 2020-06-11 PROCEDURE — 70450 CT HEAD/BRAIN W/O DYE: CPT

## 2020-06-11 PROCEDURE — 85025 COMPLETE CBC W/AUTO DIFF WBC: CPT | Performed by: PHYSICIAN ASSISTANT

## 2020-06-11 PROCEDURE — 87186 SC STD MICRODIL/AGAR DIL: CPT | Performed by: PHYSICIAN ASSISTANT

## 2020-06-11 PROCEDURE — 99285 EMERGENCY DEPT VISIT HI MDM: CPT | Performed by: PHYSICIAN ASSISTANT

## 2020-06-11 PROCEDURE — 87086 URINE CULTURE/COLONY COUNT: CPT | Performed by: PHYSICIAN ASSISTANT

## 2020-06-11 PROCEDURE — 83605 ASSAY OF LACTIC ACID: CPT | Performed by: PHYSICIAN ASSISTANT

## 2020-06-11 PROCEDURE — 99285 EMERGENCY DEPT VISIT HI MDM: CPT

## 2020-06-11 PROCEDURE — 99220 PR INITIAL OBSERVATION CARE/DAY 70 MINUTES: CPT | Performed by: PHYSICIAN ASSISTANT

## 2020-06-11 PROCEDURE — 87077 CULTURE AEROBIC IDENTIFY: CPT | Performed by: PHYSICIAN ASSISTANT

## 2020-06-11 PROCEDURE — 36415 COLL VENOUS BLD VENIPUNCTURE: CPT | Performed by: PHYSICIAN ASSISTANT

## 2020-06-11 PROCEDURE — 84484 ASSAY OF TROPONIN QUANT: CPT | Performed by: PHYSICIAN ASSISTANT

## 2020-06-11 PROCEDURE — 93005 ELECTROCARDIOGRAM TRACING: CPT

## 2020-06-11 PROCEDURE — 82948 REAGENT STRIP/BLOOD GLUCOSE: CPT

## 2020-06-11 PROCEDURE — 87147 CULTURE TYPE IMMUNOLOGIC: CPT | Performed by: PHYSICIAN ASSISTANT

## 2020-06-11 PROCEDURE — 96360 HYDRATION IV INFUSION INIT: CPT

## 2020-06-11 PROCEDURE — 85610 PROTHROMBIN TIME: CPT | Performed by: PHYSICIAN ASSISTANT

## 2020-06-11 PROCEDURE — 81001 URINALYSIS AUTO W/SCOPE: CPT | Performed by: PHYSICIAN ASSISTANT

## 2020-06-11 PROCEDURE — 71046 X-RAY EXAM CHEST 2 VIEWS: CPT

## 2020-06-11 PROCEDURE — 84443 ASSAY THYROID STIM HORMONE: CPT | Performed by: PHYSICIAN ASSISTANT

## 2020-06-11 PROCEDURE — 80053 COMPREHEN METABOLIC PANEL: CPT | Performed by: PHYSICIAN ASSISTANT

## 2020-06-11 PROCEDURE — 85730 THROMBOPLASTIN TIME PARTIAL: CPT | Performed by: PHYSICIAN ASSISTANT

## 2020-06-11 RX ORDER — FENOFIBRATE 145 MG/1
145 TABLET, COATED ORAL DAILY
Status: DISCONTINUED | OUTPATIENT
Start: 2020-06-12 | End: 2020-06-16 | Stop reason: HOSPADM

## 2020-06-11 RX ORDER — INSULIN GLARGINE 100 [IU]/ML
8 INJECTION, SOLUTION SUBCUTANEOUS
Status: DISCONTINUED | OUTPATIENT
Start: 2020-06-11 | End: 2020-06-16 | Stop reason: HOSPADM

## 2020-06-11 RX ORDER — NICOTINE 21 MG/24HR
1 PATCH, TRANSDERMAL 24 HOURS TRANSDERMAL DAILY
Status: DISCONTINUED | OUTPATIENT
Start: 2020-06-12 | End: 2020-06-16 | Stop reason: HOSPADM

## 2020-06-11 RX ORDER — ONDANSETRON 2 MG/ML
4 INJECTION INTRAMUSCULAR; INTRAVENOUS EVERY 6 HOURS PRN
Status: DISCONTINUED | OUTPATIENT
Start: 2020-06-11 | End: 2020-06-16 | Stop reason: HOSPADM

## 2020-06-11 RX ORDER — MAGNESIUM HYDROXIDE/ALUMINUM HYDROXICE/SIMETHICONE 120; 1200; 1200 MG/30ML; MG/30ML; MG/30ML
30 SUSPENSION ORAL EVERY 6 HOURS PRN
Status: DISCONTINUED | OUTPATIENT
Start: 2020-06-11 | End: 2020-06-16 | Stop reason: HOSPADM

## 2020-06-11 RX ORDER — SODIUM CHLORIDE, SODIUM GLUCONATE, SODIUM ACETATE, POTASSIUM CHLORIDE, MAGNESIUM CHLORIDE, SODIUM PHOSPHATE, DIBASIC, AND POTASSIUM PHOSPHATE .53; .5; .37; .037; .03; .012; .00082 G/100ML; G/100ML; G/100ML; G/100ML; G/100ML; G/100ML; G/100ML
75 INJECTION, SOLUTION INTRAVENOUS CONTINUOUS
Status: DISPENSED | OUTPATIENT
Start: 2020-06-11 | End: 2020-06-12

## 2020-06-11 RX ORDER — ACETAMINOPHEN 325 MG/1
975 TABLET ORAL EVERY 6 HOURS PRN
Status: DISCONTINUED | OUTPATIENT
Start: 2020-06-11 | End: 2020-06-16 | Stop reason: HOSPADM

## 2020-06-11 RX ADMIN — INSULIN GLARGINE 8 UNITS: 100 INJECTION, SOLUTION SUBCUTANEOUS at 23:27

## 2020-06-11 RX ADMIN — SODIUM CHLORIDE 500 ML: 0.9 INJECTION, SOLUTION INTRAVENOUS at 20:46

## 2020-06-11 RX ADMIN — PIPERACILLIN AND TAZOBACTAM 3.38 G: 3; .375 INJECTION, POWDER, LYOPHILIZED, FOR SOLUTION INTRAVENOUS at 21:23

## 2020-06-11 RX ADMIN — SODIUM CHLORIDE, SODIUM GLUCONATE, SODIUM ACETATE, POTASSIUM CHLORIDE, MAGNESIUM CHLORIDE, SODIUM PHOSPHATE, DIBASIC, AND POTASSIUM PHOSPHATE 75 ML/HR: .53; .5; .37; .037; .03; .012; .00082 INJECTION, SOLUTION INTRAVENOUS at 23:40

## 2020-06-12 PROBLEM — G93.41 METABOLIC ENCEPHALOPATHY: Status: ACTIVE | Noted: 2020-06-11

## 2020-06-12 PROBLEM — I50.32 CHRONIC DIASTOLIC CONGESTIVE HEART FAILURE (HCC): Chronic | Status: ACTIVE | Noted: 2019-01-28

## 2020-06-12 PROBLEM — N18.30 STAGE 3 CHRONIC KIDNEY DISEASE (HCC): Status: ACTIVE | Noted: 2019-01-28

## 2020-06-12 LAB
ANION GAP SERPL CALCULATED.3IONS-SCNC: 9 MMOL/L (ref 4–13)
ATRIAL RATE: 227 BPM
BASOPHILS # BLD AUTO: 0.05 THOUSANDS/ΜL (ref 0–0.1)
BASOPHILS NFR BLD AUTO: 1 % (ref 0–1)
BUN SERPL-MCNC: 29 MG/DL (ref 5–25)
CALCIUM SERPL-MCNC: 8 MG/DL (ref 8.3–10.1)
CHLORIDE SERPL-SCNC: 108 MMOL/L (ref 100–108)
CHOLEST SERPL-MCNC: 168 MG/DL (ref 50–200)
CO2 SERPL-SCNC: 25 MMOL/L (ref 21–32)
CREAT SERPL-MCNC: 1.69 MG/DL (ref 0.6–1.3)
EOSINOPHIL # BLD AUTO: 0.23 THOUSAND/ΜL (ref 0–0.61)
EOSINOPHIL NFR BLD AUTO: 4 % (ref 0–6)
ERYTHROCYTE [DISTWIDTH] IN BLOOD BY AUTOMATED COUNT: 15.4 % (ref 11.6–15.1)
EST. AVERAGE GLUCOSE BLD GHB EST-MCNC: 154 MG/DL
GFR SERPL CREATININE-BSD FRML MDRD: 36 ML/MIN/1.73SQ M
GLUCOSE P FAST SERPL-MCNC: 149 MG/DL (ref 65–99)
GLUCOSE SERPL-MCNC: 149 MG/DL (ref 65–140)
GLUCOSE SERPL-MCNC: 154 MG/DL (ref 65–140)
GLUCOSE SERPL-MCNC: 176 MG/DL (ref 65–140)
GLUCOSE SERPL-MCNC: 179 MG/DL (ref 65–140)
GLUCOSE SERPL-MCNC: 192 MG/DL (ref 65–140)
GLUCOSE SERPL-MCNC: 238 MG/DL (ref 65–140)
HBA1C MFR BLD: 7 %
HCT VFR BLD AUTO: 37.7 % (ref 36.5–49.3)
HDLC SERPL-MCNC: 31 MG/DL
HGB BLD-MCNC: 11.9 G/DL (ref 12–17)
IMM GRANULOCYTES # BLD AUTO: 0.02 THOUSAND/UL (ref 0–0.2)
IMM GRANULOCYTES NFR BLD AUTO: 0 % (ref 0–2)
LDLC SERPL CALC-MCNC: 116 MG/DL (ref 0–100)
LYMPHOCYTES # BLD AUTO: 1.51 THOUSANDS/ΜL (ref 0.6–4.47)
LYMPHOCYTES NFR BLD AUTO: 25 % (ref 14–44)
MAGNESIUM SERPL-MCNC: 1.8 MG/DL (ref 1.6–2.6)
MCH RBC QN AUTO: 28.5 PG (ref 26.8–34.3)
MCHC RBC AUTO-ENTMCNC: 31.6 G/DL (ref 31.4–37.4)
MCV RBC AUTO: 90 FL (ref 82–98)
MONOCYTES # BLD AUTO: 0.56 THOUSAND/ΜL (ref 0.17–1.22)
MONOCYTES NFR BLD AUTO: 9 % (ref 4–12)
NEUTROPHILS # BLD AUTO: 3.68 THOUSANDS/ΜL (ref 1.85–7.62)
NEUTS SEG NFR BLD AUTO: 61 % (ref 43–75)
NONHDLC SERPL-MCNC: 137 MG/DL
NRBC BLD AUTO-RTO: 0 /100 WBCS
PLATELET # BLD AUTO: 196 THOUSANDS/UL (ref 149–390)
PMV BLD AUTO: 10.6 FL (ref 8.9–12.7)
POTASSIUM SERPL-SCNC: 4 MMOL/L (ref 3.5–5.3)
QRS AXIS: 63 DEGREES
QRSD INTERVAL: 74 MS
QT INTERVAL: 424 MS
QTC INTERVAL: 448 MS
RBC # BLD AUTO: 4.18 MILLION/UL (ref 3.88–5.62)
SODIUM SERPL-SCNC: 142 MMOL/L (ref 136–145)
T WAVE AXIS: 46 DEGREES
TRIGL SERPL-MCNC: 103 MG/DL
VENTRICULAR RATE: 67 BPM
WBC # BLD AUTO: 6.05 THOUSAND/UL (ref 4.31–10.16)

## 2020-06-12 PROCEDURE — 82948 REAGENT STRIP/BLOOD GLUCOSE: CPT

## 2020-06-12 PROCEDURE — 93010 ELECTROCARDIOGRAM REPORT: CPT | Performed by: INTERNAL MEDICINE

## 2020-06-12 PROCEDURE — 80048 BASIC METABOLIC PNL TOTAL CA: CPT | Performed by: PHYSICIAN ASSISTANT

## 2020-06-12 PROCEDURE — 97163 PT EVAL HIGH COMPLEX 45 MIN: CPT

## 2020-06-12 PROCEDURE — 99232 SBSQ HOSP IP/OBS MODERATE 35: CPT | Performed by: PHYSICIAN ASSISTANT

## 2020-06-12 PROCEDURE — 97167 OT EVAL HIGH COMPLEX 60 MIN: CPT

## 2020-06-12 PROCEDURE — 83036 HEMOGLOBIN GLYCOSYLATED A1C: CPT | Performed by: PHYSICIAN ASSISTANT

## 2020-06-12 PROCEDURE — 83735 ASSAY OF MAGNESIUM: CPT | Performed by: PHYSICIAN ASSISTANT

## 2020-06-12 PROCEDURE — 85025 COMPLETE CBC W/AUTO DIFF WBC: CPT | Performed by: PHYSICIAN ASSISTANT

## 2020-06-12 PROCEDURE — 80061 LIPID PANEL: CPT | Performed by: PHYSICIAN ASSISTANT

## 2020-06-12 RX ADMIN — FENOFIBRATE 145 MG: 145 TABLET, FILM COATED ORAL at 09:14

## 2020-06-12 RX ADMIN — PIPERACILLIN AND TAZOBACTAM 3.38 G: 3; .375 INJECTION, POWDER, LYOPHILIZED, FOR SOLUTION INTRAVENOUS at 16:42

## 2020-06-12 RX ADMIN — INSULIN LISPRO 1 UNITS: 100 INJECTION, SOLUTION INTRAVENOUS; SUBCUTANEOUS at 16:00

## 2020-06-12 RX ADMIN — PIPERACILLIN AND TAZOBACTAM 3.38 G: 3; .375 INJECTION, POWDER, LYOPHILIZED, FOR SOLUTION INTRAVENOUS at 09:15

## 2020-06-12 RX ADMIN — INSULIN LISPRO 1 UNITS: 100 INJECTION, SOLUTION INTRAVENOUS; SUBCUTANEOUS at 22:14

## 2020-06-12 RX ADMIN — RIVAROXABAN 15 MG: 15 TABLET, FILM COATED ORAL at 09:14

## 2020-06-12 RX ADMIN — PIPERACILLIN AND TAZOBACTAM 3.38 G: 3; .375 INJECTION, POWDER, LYOPHILIZED, FOR SOLUTION INTRAVENOUS at 03:14

## 2020-06-12 RX ADMIN — INSULIN LISPRO 3 UNITS: 100 INJECTION, SOLUTION INTRAVENOUS; SUBCUTANEOUS at 13:00

## 2020-06-12 RX ADMIN — PIPERACILLIN AND TAZOBACTAM 3.38 G: 3; .375 INJECTION, POWDER, LYOPHILIZED, FOR SOLUTION INTRAVENOUS at 22:16

## 2020-06-12 RX ADMIN — INSULIN LISPRO 1 UNITS: 100 INJECTION, SOLUTION INTRAVENOUS; SUBCUTANEOUS at 08:05

## 2020-06-12 RX ADMIN — INSULIN GLARGINE 8 UNITS: 100 INJECTION, SOLUTION SUBCUTANEOUS at 22:14

## 2020-06-13 PROBLEM — I50.32 CHRONIC DIASTOLIC CONGESTIVE HEART FAILURE (HCC): Status: ACTIVE | Noted: 2019-01-28

## 2020-06-13 LAB
ANION GAP SERPL CALCULATED.3IONS-SCNC: 9 MMOL/L (ref 4–13)
BASOPHILS # BLD AUTO: 0.04 THOUSANDS/ΜL (ref 0–0.1)
BASOPHILS NFR BLD AUTO: 1 % (ref 0–1)
BUN SERPL-MCNC: 19 MG/DL (ref 5–25)
CALCIUM SERPL-MCNC: 8.4 MG/DL (ref 8.3–10.1)
CHLORIDE SERPL-SCNC: 106 MMOL/L (ref 100–108)
CO2 SERPL-SCNC: 27 MMOL/L (ref 21–32)
CREAT SERPL-MCNC: 1.52 MG/DL (ref 0.6–1.3)
EOSINOPHIL # BLD AUTO: 0.18 THOUSAND/ΜL (ref 0–0.61)
EOSINOPHIL NFR BLD AUTO: 3 % (ref 0–6)
ERYTHROCYTE [DISTWIDTH] IN BLOOD BY AUTOMATED COUNT: 15.3 % (ref 11.6–15.1)
GFR SERPL CREATININE-BSD FRML MDRD: 41 ML/MIN/1.73SQ M
GLUCOSE SERPL-MCNC: 108 MG/DL (ref 65–140)
GLUCOSE SERPL-MCNC: 121 MG/DL (ref 65–140)
GLUCOSE SERPL-MCNC: 150 MG/DL (ref 65–140)
GLUCOSE SERPL-MCNC: 197 MG/DL (ref 65–140)
GLUCOSE SERPL-MCNC: 221 MG/DL (ref 65–140)
HCT VFR BLD AUTO: 42.5 % (ref 36.5–49.3)
HGB BLD-MCNC: 13.4 G/DL (ref 12–17)
IMM GRANULOCYTES # BLD AUTO: 0.03 THOUSAND/UL (ref 0–0.2)
IMM GRANULOCYTES NFR BLD AUTO: 1 % (ref 0–2)
LYMPHOCYTES # BLD AUTO: 1.54 THOUSANDS/ΜL (ref 0.6–4.47)
LYMPHOCYTES NFR BLD AUTO: 28 % (ref 14–44)
MCH RBC QN AUTO: 28.5 PG (ref 26.8–34.3)
MCHC RBC AUTO-ENTMCNC: 31.5 G/DL (ref 31.4–37.4)
MCV RBC AUTO: 90 FL (ref 82–98)
MONOCYTES # BLD AUTO: 0.47 THOUSAND/ΜL (ref 0.17–1.22)
MONOCYTES NFR BLD AUTO: 9 % (ref 4–12)
NEUTROPHILS # BLD AUTO: 3.17 THOUSANDS/ΜL (ref 1.85–7.62)
NEUTS SEG NFR BLD AUTO: 58 % (ref 43–75)
NRBC BLD AUTO-RTO: 0 /100 WBCS
PLATELET # BLD AUTO: 200 THOUSANDS/UL (ref 149–390)
PMV BLD AUTO: 10.7 FL (ref 8.9–12.7)
POTASSIUM SERPL-SCNC: 4.2 MMOL/L (ref 3.5–5.3)
RBC # BLD AUTO: 4.71 MILLION/UL (ref 3.88–5.62)
SODIUM SERPL-SCNC: 142 MMOL/L (ref 136–145)
WBC # BLD AUTO: 5.43 THOUSAND/UL (ref 4.31–10.16)

## 2020-06-13 PROCEDURE — 85025 COMPLETE CBC W/AUTO DIFF WBC: CPT | Performed by: PHYSICIAN ASSISTANT

## 2020-06-13 PROCEDURE — 99232 SBSQ HOSP IP/OBS MODERATE 35: CPT | Performed by: NURSE PRACTITIONER

## 2020-06-13 PROCEDURE — 80048 BASIC METABOLIC PNL TOTAL CA: CPT | Performed by: PHYSICIAN ASSISTANT

## 2020-06-13 PROCEDURE — 82948 REAGENT STRIP/BLOOD GLUCOSE: CPT

## 2020-06-13 RX ORDER — HYDRALAZINE HYDROCHLORIDE 20 MG/ML
5 INJECTION INTRAMUSCULAR; INTRAVENOUS EVERY 4 HOURS PRN
Status: DISCONTINUED | OUTPATIENT
Start: 2020-06-13 | End: 2020-06-16 | Stop reason: HOSPADM

## 2020-06-13 RX ADMIN — INSULIN LISPRO 2 UNITS: 100 INJECTION, SOLUTION INTRAVENOUS; SUBCUTANEOUS at 11:40

## 2020-06-13 RX ADMIN — PIPERACILLIN AND TAZOBACTAM 3.38 G: 3; .375 INJECTION, POWDER, LYOPHILIZED, FOR SOLUTION INTRAVENOUS at 09:36

## 2020-06-13 RX ADMIN — NICOTINE 1 PATCH: 21 PATCH TRANSDERMAL at 08:33

## 2020-06-13 RX ADMIN — FENOFIBRATE 145 MG: 145 TABLET, FILM COATED ORAL at 08:33

## 2020-06-13 RX ADMIN — PIPERACILLIN AND TAZOBACTAM 3.38 G: 3; .375 INJECTION, POWDER, LYOPHILIZED, FOR SOLUTION INTRAVENOUS at 15:25

## 2020-06-13 RX ADMIN — RIVAROXABAN 15 MG: 15 TABLET, FILM COATED ORAL at 08:32

## 2020-06-13 RX ADMIN — HYDRALAZINE HYDROCHLORIDE 5 MG: 20 INJECTION INTRAMUSCULAR; INTRAVENOUS at 08:33

## 2020-06-13 RX ADMIN — SACUBITRIL AND VALSARTAN 1 TABLET: 24; 26 TABLET, FILM COATED ORAL at 17:05

## 2020-06-13 RX ADMIN — PIPERACILLIN AND TAZOBACTAM 3.38 G: 3; .375 INJECTION, POWDER, LYOPHILIZED, FOR SOLUTION INTRAVENOUS at 21:22

## 2020-06-13 RX ADMIN — INSULIN LISPRO 2 UNITS: 100 INJECTION, SOLUTION INTRAVENOUS; SUBCUTANEOUS at 16:42

## 2020-06-13 RX ADMIN — SACUBITRIL AND VALSARTAN 1 TABLET: 24; 26 TABLET, FILM COATED ORAL at 11:40

## 2020-06-13 RX ADMIN — PIPERACILLIN AND TAZOBACTAM 3.38 G: 3; .375 INJECTION, POWDER, LYOPHILIZED, FOR SOLUTION INTRAVENOUS at 04:09

## 2020-06-13 RX ADMIN — INSULIN GLARGINE 8 UNITS: 100 INJECTION, SOLUTION SUBCUTANEOUS at 21:22

## 2020-06-14 LAB
BACTERIA BLD CULT: ABNORMAL
BACTERIA UR CULT: ABNORMAL
GLUCOSE SERPL-MCNC: 132 MG/DL (ref 65–140)
GLUCOSE SERPL-MCNC: 143 MG/DL (ref 65–140)
GLUCOSE SERPL-MCNC: 186 MG/DL (ref 65–140)
GLUCOSE SERPL-MCNC: 221 MG/DL (ref 65–140)
GRAM STN SPEC: ABNORMAL

## 2020-06-14 PROCEDURE — 82948 REAGENT STRIP/BLOOD GLUCOSE: CPT

## 2020-06-14 PROCEDURE — 99232 SBSQ HOSP IP/OBS MODERATE 35: CPT | Performed by: NURSE PRACTITIONER

## 2020-06-14 RX ADMIN — RIVAROXABAN 15 MG: 15 TABLET, FILM COATED ORAL at 09:12

## 2020-06-14 RX ADMIN — NICOTINE 1 PATCH: 21 PATCH TRANSDERMAL at 09:13

## 2020-06-14 RX ADMIN — PIPERACILLIN AND TAZOBACTAM 3.38 G: 3; .375 INJECTION, POWDER, LYOPHILIZED, FOR SOLUTION INTRAVENOUS at 16:49

## 2020-06-14 RX ADMIN — INSULIN LISPRO 2 UNITS: 100 INJECTION, SOLUTION INTRAVENOUS; SUBCUTANEOUS at 11:39

## 2020-06-14 RX ADMIN — PIPERACILLIN AND TAZOBACTAM 3.38 G: 3; .375 INJECTION, POWDER, LYOPHILIZED, FOR SOLUTION INTRAVENOUS at 03:46

## 2020-06-14 RX ADMIN — SACUBITRIL AND VALSARTAN 1 TABLET: 24; 26 TABLET, FILM COATED ORAL at 09:12

## 2020-06-14 RX ADMIN — PIPERACILLIN AND TAZOBACTAM 3.38 G: 3; .375 INJECTION, POWDER, LYOPHILIZED, FOR SOLUTION INTRAVENOUS at 09:13

## 2020-06-14 RX ADMIN — SACUBITRIL AND VALSARTAN 1 TABLET: 24; 26 TABLET, FILM COATED ORAL at 18:43

## 2020-06-14 RX ADMIN — INSULIN LISPRO 1 UNITS: 100 INJECTION, SOLUTION INTRAVENOUS; SUBCUTANEOUS at 21:28

## 2020-06-14 RX ADMIN — FENOFIBRATE 145 MG: 145 TABLET, FILM COATED ORAL at 09:12

## 2020-06-14 RX ADMIN — INSULIN GLARGINE 8 UNITS: 100 INJECTION, SOLUTION SUBCUTANEOUS at 21:28

## 2020-06-14 RX ADMIN — PIPERACILLIN AND TAZOBACTAM 3.38 G: 3; .375 INJECTION, POWDER, LYOPHILIZED, FOR SOLUTION INTRAVENOUS at 21:28

## 2020-06-15 ENCOUNTER — APPOINTMENT (INPATIENT)
Dept: MRI IMAGING | Facility: HOSPITAL | Age: 85
DRG: 689 | End: 2020-06-15
Payer: MEDICARE

## 2020-06-15 LAB
ANION GAP SERPL CALCULATED.3IONS-SCNC: 9 MMOL/L (ref 4–13)
BUN SERPL-MCNC: 18 MG/DL (ref 5–25)
CALCIUM SERPL-MCNC: 8.8 MG/DL (ref 8.3–10.1)
CHLORIDE SERPL-SCNC: 106 MMOL/L (ref 100–108)
CO2 SERPL-SCNC: 25 MMOL/L (ref 21–32)
CREAT SERPL-MCNC: 1.43 MG/DL (ref 0.6–1.3)
ERYTHROCYTE [DISTWIDTH] IN BLOOD BY AUTOMATED COUNT: 15.6 % (ref 11.6–15.1)
GFR SERPL CREATININE-BSD FRML MDRD: 44 ML/MIN/1.73SQ M
GLUCOSE SERPL-MCNC: 122 MG/DL (ref 65–140)
GLUCOSE SERPL-MCNC: 130 MG/DL (ref 65–140)
GLUCOSE SERPL-MCNC: 139 MG/DL (ref 65–140)
GLUCOSE SERPL-MCNC: 241 MG/DL (ref 65–140)
HCT VFR BLD AUTO: 50.3 % (ref 36.5–49.3)
HGB BLD-MCNC: 15.5 G/DL (ref 12–17)
MCH RBC QN AUTO: 27.7 PG (ref 26.8–34.3)
MCHC RBC AUTO-ENTMCNC: 30.8 G/DL (ref 31.4–37.4)
MCV RBC AUTO: 90 FL (ref 82–98)
PLATELET # BLD AUTO: 203 THOUSANDS/UL (ref 149–390)
PMV BLD AUTO: 10.8 FL (ref 8.9–12.7)
POTASSIUM SERPL-SCNC: 3.9 MMOL/L (ref 3.5–5.3)
RBC # BLD AUTO: 5.59 MILLION/UL (ref 3.88–5.62)
SARS-COV-2 RNA RESP QL NAA+PROBE: NEGATIVE
SODIUM SERPL-SCNC: 140 MMOL/L (ref 136–145)
WBC # BLD AUTO: 6.09 THOUSAND/UL (ref 4.31–10.16)

## 2020-06-15 PROCEDURE — 82948 REAGENT STRIP/BLOOD GLUCOSE: CPT

## 2020-06-15 PROCEDURE — 87635 SARS-COV-2 COVID-19 AMP PRB: CPT | Performed by: NURSE PRACTITIONER

## 2020-06-15 PROCEDURE — 99233 SBSQ HOSP IP/OBS HIGH 50: CPT | Performed by: NURSE PRACTITIONER

## 2020-06-15 PROCEDURE — 70551 MRI BRAIN STEM W/O DYE: CPT

## 2020-06-15 PROCEDURE — 85027 COMPLETE CBC AUTOMATED: CPT | Performed by: NURSE PRACTITIONER

## 2020-06-15 PROCEDURE — 80048 BASIC METABOLIC PNL TOTAL CA: CPT | Performed by: NURSE PRACTITIONER

## 2020-06-15 PROCEDURE — 0T9B70Z DRAINAGE OF BLADDER WITH DRAINAGE DEVICE, VIA NATURAL OR ARTIFICIAL OPENING: ICD-10-PCS | Performed by: FAMILY MEDICINE

## 2020-06-15 RX ADMIN — FENOFIBRATE 145 MG: 145 TABLET, FILM COATED ORAL at 08:37

## 2020-06-15 RX ADMIN — RIVAROXABAN 15 MG: 15 TABLET, FILM COATED ORAL at 08:37

## 2020-06-15 RX ADMIN — SACUBITRIL AND VALSARTAN 1 TABLET: 24; 26 TABLET, FILM COATED ORAL at 08:44

## 2020-06-15 RX ADMIN — INSULIN LISPRO 3 UNITS: 100 INJECTION, SOLUTION INTRAVENOUS; SUBCUTANEOUS at 22:31

## 2020-06-15 RX ADMIN — INSULIN GLARGINE 8 UNITS: 100 INJECTION, SOLUTION SUBCUTANEOUS at 22:33

## 2020-06-15 RX ADMIN — PIPERACILLIN AND TAZOBACTAM 3.38 G: 3; .375 INJECTION, POWDER, LYOPHILIZED, FOR SOLUTION INTRAVENOUS at 04:28

## 2020-06-15 RX ADMIN — PIPERACILLIN AND TAZOBACTAM 3.38 G: 3; .375 INJECTION, POWDER, LYOPHILIZED, FOR SOLUTION INTRAVENOUS at 08:43

## 2020-06-15 RX ADMIN — SACUBITRIL AND VALSARTAN 1 TABLET: 24; 26 TABLET, FILM COATED ORAL at 17:54

## 2020-06-15 RX ADMIN — NICOTINE 1 PATCH: 21 PATCH TRANSDERMAL at 08:44

## 2020-06-15 RX ADMIN — CEFTRIAXONE SODIUM 1000 MG: 10 INJECTION, POWDER, FOR SOLUTION INTRAVENOUS at 17:54

## 2020-06-16 ENCOUNTER — NURSING HOME VISIT (OUTPATIENT)
Dept: FAMILY MEDICINE CLINIC | Facility: CLINIC | Age: 85
End: 2020-06-16
Payer: MEDICARE

## 2020-06-16 VITALS
WEIGHT: 180 LBS | TEMPERATURE: 97.3 F | OXYGEN SATURATION: 96 % | HEART RATE: 65 BPM | BODY MASS INDEX: 29.05 KG/M2 | RESPIRATION RATE: 16 BRPM

## 2020-06-16 VITALS
WEIGHT: 190.7 LBS | BODY MASS INDEX: 30.65 KG/M2 | TEMPERATURE: 97.2 F | OXYGEN SATURATION: 96 % | DIASTOLIC BLOOD PRESSURE: 70 MMHG | HEART RATE: 66 BPM | HEIGHT: 66 IN | SYSTOLIC BLOOD PRESSURE: 132 MMHG | RESPIRATION RATE: 17 BRPM

## 2020-06-16 DIAGNOSIS — N18.30 STAGE 3 CHRONIC KIDNEY DISEASE (HCC): ICD-10-CM

## 2020-06-16 DIAGNOSIS — E55.9 VITAMIN D DEFICIENCY: ICD-10-CM

## 2020-06-16 DIAGNOSIS — G93.41 METABOLIC ENCEPHALOPATHY: ICD-10-CM

## 2020-06-16 DIAGNOSIS — E78.5 HYPERLIPIDEMIA, UNSPECIFIED HYPERLIPIDEMIA TYPE: ICD-10-CM

## 2020-06-16 DIAGNOSIS — E11.69 TYPE 2 DIABETES MELLITUS WITH OTHER SPECIFIED COMPLICATION, WITH LONG-TERM CURRENT USE OF INSULIN (HCC): ICD-10-CM

## 2020-06-16 DIAGNOSIS — R26.2 AMBULATORY DYSFUNCTION: ICD-10-CM

## 2020-06-16 DIAGNOSIS — I50.32 CHRONIC DIASTOLIC CONGESTIVE HEART FAILURE (HCC): ICD-10-CM

## 2020-06-16 DIAGNOSIS — I48.19 PERSISTENT ATRIAL FIBRILLATION (HCC): ICD-10-CM

## 2020-06-16 DIAGNOSIS — N30.00 ACUTE CYSTITIS WITHOUT HEMATURIA: Primary | ICD-10-CM

## 2020-06-16 DIAGNOSIS — Z79.4 TYPE 2 DIABETES MELLITUS WITH OTHER SPECIFIED COMPLICATION, WITH LONG-TERM CURRENT USE OF INSULIN (HCC): ICD-10-CM

## 2020-06-16 DIAGNOSIS — J44.9 COPD WITHOUT EXACERBATION (HCC): ICD-10-CM

## 2020-06-16 LAB
BACTERIA BLD CULT: NORMAL
GLUCOSE SERPL-MCNC: 114 MG/DL (ref 65–140)
GLUCOSE SERPL-MCNC: 212 MG/DL (ref 65–140)

## 2020-06-16 PROCEDURE — 99306 1ST NF CARE HIGH MDM 50: CPT | Performed by: INTERNAL MEDICINE

## 2020-06-16 PROCEDURE — 82948 REAGENT STRIP/BLOOD GLUCOSE: CPT

## 2020-06-16 PROCEDURE — 99239 HOSP IP/OBS DSCHRG MGMT >30: CPT | Performed by: NURSE PRACTITIONER

## 2020-06-16 RX ORDER — CEFDINIR 300 MG/1
300 CAPSULE ORAL EVERY 12 HOURS SCHEDULED
Refills: 0
Start: 2020-06-16 | End: 2020-06-19

## 2020-06-16 RX ADMIN — RIVAROXABAN 15 MG: 15 TABLET, FILM COATED ORAL at 08:29

## 2020-06-16 RX ADMIN — FENOFIBRATE 145 MG: 145 TABLET, FILM COATED ORAL at 08:29

## 2020-06-16 RX ADMIN — INSULIN LISPRO 2 UNITS: 100 INJECTION, SOLUTION INTRAVENOUS; SUBCUTANEOUS at 11:50

## 2020-06-16 RX ADMIN — SACUBITRIL AND VALSARTAN 1 TABLET: 24; 26 TABLET, FILM COATED ORAL at 08:30

## 2020-06-16 RX ADMIN — NICOTINE 1 PATCH: 21 PATCH TRANSDERMAL at 08:30

## 2020-06-22 ENCOUNTER — NURSING HOME VISIT (OUTPATIENT)
Dept: FAMILY MEDICINE CLINIC | Facility: CLINIC | Age: 85
End: 2020-06-22
Payer: MEDICARE

## 2020-06-22 VITALS
TEMPERATURE: 98 F | WEIGHT: 186 LBS | DIASTOLIC BLOOD PRESSURE: 78 MMHG | RESPIRATION RATE: 20 BRPM | SYSTOLIC BLOOD PRESSURE: 122 MMHG | BODY MASS INDEX: 30.02 KG/M2 | HEART RATE: 78 BPM | OXYGEN SATURATION: 96 %

## 2020-06-22 DIAGNOSIS — E55.9 VITAMIN D DEFICIENCY: ICD-10-CM

## 2020-06-22 DIAGNOSIS — L85.3 DRY SKIN DERMATITIS: ICD-10-CM

## 2020-06-22 DIAGNOSIS — N30.00 ACUTE CYSTITIS WITHOUT HEMATURIA: ICD-10-CM

## 2020-06-22 DIAGNOSIS — I50.32 CHRONIC DIASTOLIC CONGESTIVE HEART FAILURE (HCC): ICD-10-CM

## 2020-06-22 DIAGNOSIS — J44.9 COPD WITHOUT EXACERBATION (HCC): ICD-10-CM

## 2020-06-22 DIAGNOSIS — I48.19 PERSISTENT ATRIAL FIBRILLATION (HCC): ICD-10-CM

## 2020-06-22 DIAGNOSIS — R26.2 AMBULATORY DYSFUNCTION: ICD-10-CM

## 2020-06-22 DIAGNOSIS — Z79.4 TYPE 2 DIABETES MELLITUS WITH OTHER SPECIFIED COMPLICATION, WITH LONG-TERM CURRENT USE OF INSULIN (HCC): Primary | ICD-10-CM

## 2020-06-22 DIAGNOSIS — E11.69 TYPE 2 DIABETES MELLITUS WITH OTHER SPECIFIED COMPLICATION, WITH LONG-TERM CURRENT USE OF INSULIN (HCC): Primary | ICD-10-CM

## 2020-06-22 PROCEDURE — 99308 SBSQ NF CARE LOW MDM 20: CPT | Performed by: NURSE PRACTITIONER

## 2020-06-23 PROBLEM — L85.3 DRY SKIN DERMATITIS: Status: ACTIVE | Noted: 2020-06-23

## 2020-06-26 ENCOUNTER — NURSING HOME VISIT (OUTPATIENT)
Dept: FAMILY MEDICINE CLINIC | Facility: CLINIC | Age: 85
End: 2020-06-26
Payer: MEDICARE

## 2020-06-26 VITALS
BODY MASS INDEX: 30.28 KG/M2 | TEMPERATURE: 98 F | DIASTOLIC BLOOD PRESSURE: 72 MMHG | OXYGEN SATURATION: 96 % | RESPIRATION RATE: 18 BRPM | HEART RATE: 68 BPM | SYSTOLIC BLOOD PRESSURE: 118 MMHG | WEIGHT: 187.6 LBS

## 2020-06-26 DIAGNOSIS — R26.2 AMBULATORY DYSFUNCTION: ICD-10-CM

## 2020-06-26 DIAGNOSIS — I48.19 PERSISTENT ATRIAL FIBRILLATION (HCC): ICD-10-CM

## 2020-06-26 DIAGNOSIS — E78.5 HYPERLIPIDEMIA, UNSPECIFIED HYPERLIPIDEMIA TYPE: ICD-10-CM

## 2020-06-26 DIAGNOSIS — E55.9 VITAMIN D DEFICIENCY: ICD-10-CM

## 2020-06-26 DIAGNOSIS — I73.9 PVD (PERIPHERAL VASCULAR DISEASE) (HCC): ICD-10-CM

## 2020-06-26 DIAGNOSIS — N18.30 TYPE 2 DIABETES MELLITUS WITH STAGE 3 CHRONIC KIDNEY DISEASE, WITH LONG-TERM CURRENT USE OF INSULIN (HCC): Primary | ICD-10-CM

## 2020-06-26 DIAGNOSIS — N18.30 STAGE 3 CHRONIC KIDNEY DISEASE (HCC): ICD-10-CM

## 2020-06-26 DIAGNOSIS — N39.0 URINARY TRACT INFECTION WITHOUT HEMATURIA, SITE UNSPECIFIED: ICD-10-CM

## 2020-06-26 DIAGNOSIS — I50.32 CHRONIC DIASTOLIC CONGESTIVE HEART FAILURE (HCC): ICD-10-CM

## 2020-06-26 DIAGNOSIS — R33.9 URINARY RETENTION: ICD-10-CM

## 2020-06-26 DIAGNOSIS — Z79.4 TYPE 2 DIABETES MELLITUS WITH STAGE 3 CHRONIC KIDNEY DISEASE, WITH LONG-TERM CURRENT USE OF INSULIN (HCC): Primary | ICD-10-CM

## 2020-06-26 DIAGNOSIS — J44.9 COPD WITHOUT EXACERBATION (HCC): ICD-10-CM

## 2020-06-26 DIAGNOSIS — E11.22 TYPE 2 DIABETES MELLITUS WITH STAGE 3 CHRONIC KIDNEY DISEASE, WITH LONG-TERM CURRENT USE OF INSULIN (HCC): Primary | ICD-10-CM

## 2020-06-26 PROCEDURE — 99309 SBSQ NF CARE MODERATE MDM 30: CPT | Performed by: INTERNAL MEDICINE

## 2020-06-30 ENCOUNTER — NURSING HOME VISIT (OUTPATIENT)
Dept: FAMILY MEDICINE CLINIC | Facility: CLINIC | Age: 85
End: 2020-06-30
Payer: MEDICARE

## 2020-06-30 DIAGNOSIS — E78.5 HYPERLIPIDEMIA, UNSPECIFIED HYPERLIPIDEMIA TYPE: ICD-10-CM

## 2020-06-30 DIAGNOSIS — R26.2 AMBULATORY DYSFUNCTION: ICD-10-CM

## 2020-06-30 DIAGNOSIS — J44.9 COPD WITHOUT EXACERBATION (HCC): ICD-10-CM

## 2020-06-30 DIAGNOSIS — L03.317 CELLULITIS OF BUTTOCK: ICD-10-CM

## 2020-06-30 DIAGNOSIS — I48.19 PERSISTENT ATRIAL FIBRILLATION (HCC): ICD-10-CM

## 2020-06-30 DIAGNOSIS — I73.9 PVD (PERIPHERAL VASCULAR DISEASE) (HCC): ICD-10-CM

## 2020-06-30 DIAGNOSIS — Z79.4 TYPE 2 DIABETES MELLITUS WITH OTHER SPECIFIED COMPLICATION, WITH LONG-TERM CURRENT USE OF INSULIN (HCC): Primary | ICD-10-CM

## 2020-06-30 DIAGNOSIS — E11.69 TYPE 2 DIABETES MELLITUS WITH OTHER SPECIFIED COMPLICATION, WITH LONG-TERM CURRENT USE OF INSULIN (HCC): Primary | ICD-10-CM

## 2020-06-30 DIAGNOSIS — E55.9 VITAMIN D DEFICIENCY: ICD-10-CM

## 2020-06-30 DIAGNOSIS — I50.32 CHRONIC DIASTOLIC CONGESTIVE HEART FAILURE (HCC): ICD-10-CM

## 2020-06-30 PROCEDURE — 99309 SBSQ NF CARE MODERATE MDM 30: CPT | Performed by: NURSE PRACTITIONER

## 2020-07-01 VITALS
BODY MASS INDEX: 30.28 KG/M2 | SYSTOLIC BLOOD PRESSURE: 116 MMHG | TEMPERATURE: 97.9 F | OXYGEN SATURATION: 95 % | DIASTOLIC BLOOD PRESSURE: 58 MMHG | HEART RATE: 70 BPM | WEIGHT: 187.6 LBS | RESPIRATION RATE: 16 BRPM

## 2020-07-01 PROBLEM — M79.10 MYALGIA: Status: ACTIVE | Noted: 2020-07-01

## 2020-07-01 NOTE — ASSESSMENT & PLAN NOTE
Lab Results   Component Value Date    HGBA1C 7 0 (H) 06/12/2020     - Blood sugars have been reviewed and are have been stable  Mostly under 200  - Blood sugar this morning was 142 at 5:46 a m   - Patient remains on a carbohydrate consistent diet  - He is on Lantus 8 units at bedtime, insulin 8 units b i d , and sliding scale

## 2020-07-01 NOTE — ASSESSMENT & PLAN NOTE
Wt Readings from Last 3 Encounters:   06/30/20 85 1 kg (187 lb 9 6 oz)   06/26/20 85 1 kg (187 lb 9 6 oz)   06/22/20 84 4 kg (186 lb)     - Clinically compensated  - No shortness of breath with exertion and no edema   - Patient remains on Entresto 24-26 mg daily

## 2020-07-01 NOTE — ASSESSMENT & PLAN NOTE
- Stable  - Currently on no inhalers or other medications    - No bronchospasms reported  - On examination no acute respiratory distress

## 2020-07-01 NOTE — ASSESSMENT & PLAN NOTE
- Last lipid panel completed on 06/12/2020  HDL 31, , triglyceride 103, total cholesterol 168  - Patient remains on fenofibrate 135 mg capsules daily

## 2020-07-01 NOTE — ASSESSMENT & PLAN NOTE
- Nurse reports patient complained of bilateral lower extremity pain earlier in a m  Lajean Cassette Patient was given Tylenol but not relieved  - On assessment patient reports no pain  - Chronic discoloration of lower extremities

## 2020-07-01 NOTE — ASSESSMENT & PLAN NOTE
Lab Results   Component Value Date    HGBA1C 7 0 (H) 06/12/2020   Blood sugars reviewed and have been mostly under 200 today blood sugar was 142 at 5:46 a m  Marylu Dunn

## 2020-07-01 NOTE — ASSESSMENT & PLAN NOTE
- Continue tylenol 650 mg every 6 hours p r n   - Consult with physiatrist   - Will continue to monitor

## 2020-07-07 ENCOUNTER — NURSING HOME VISIT (OUTPATIENT)
Dept: FAMILY MEDICINE CLINIC | Facility: CLINIC | Age: 85
End: 2020-07-07
Payer: MEDICARE

## 2020-07-07 ENCOUNTER — TRANSCRIBE ORDERS (OUTPATIENT)
Dept: ADMINISTRATIVE | Facility: HOSPITAL | Age: 85
End: 2020-07-07

## 2020-07-07 DIAGNOSIS — E11.69 TYPE 2 DIABETES MELLITUS WITH OTHER SPECIFIED COMPLICATION, WITH LONG-TERM CURRENT USE OF INSULIN (HCC): ICD-10-CM

## 2020-07-07 DIAGNOSIS — R47.02 DYSPHASIA: Primary | ICD-10-CM

## 2020-07-07 DIAGNOSIS — G62.9 NEUROPATHY: ICD-10-CM

## 2020-07-07 DIAGNOSIS — L85.3 DRY SKIN DERMATITIS: ICD-10-CM

## 2020-07-07 DIAGNOSIS — N18.30 STAGE 3 CHRONIC KIDNEY DISEASE (HCC): ICD-10-CM

## 2020-07-07 DIAGNOSIS — J44.9 COPD WITHOUT EXACERBATION (HCC): ICD-10-CM

## 2020-07-07 DIAGNOSIS — G93.41 METABOLIC ENCEPHALOPATHY: ICD-10-CM

## 2020-07-07 DIAGNOSIS — I48.19 PERSISTENT ATRIAL FIBRILLATION (HCC): ICD-10-CM

## 2020-07-07 DIAGNOSIS — R47.02 DYSPHASIA: ICD-10-CM

## 2020-07-07 DIAGNOSIS — E55.9 VITAMIN D DEFICIENCY: ICD-10-CM

## 2020-07-07 DIAGNOSIS — E78.5 HYPERLIPIDEMIA, UNSPECIFIED HYPERLIPIDEMIA TYPE: ICD-10-CM

## 2020-07-07 DIAGNOSIS — R26.2 AMBULATORY DYSFUNCTION: ICD-10-CM

## 2020-07-07 DIAGNOSIS — Z79.4 TYPE 2 DIABETES MELLITUS WITH OTHER SPECIFIED COMPLICATION, WITH LONG-TERM CURRENT USE OF INSULIN (HCC): ICD-10-CM

## 2020-07-07 DIAGNOSIS — I50.32 CHRONIC DIASTOLIC CONGESTIVE HEART FAILURE (HCC): Primary | ICD-10-CM

## 2020-07-07 PROCEDURE — 99308 SBSQ NF CARE LOW MDM 20: CPT | Performed by: NURSE PRACTITIONER

## 2020-07-08 VITALS
TEMPERATURE: 97.5 F | HEART RATE: 80 BPM | SYSTOLIC BLOOD PRESSURE: 126 MMHG | DIASTOLIC BLOOD PRESSURE: 70 MMHG | BODY MASS INDEX: 30.28 KG/M2 | RESPIRATION RATE: 16 BRPM | WEIGHT: 187.6 LBS | OXYGEN SATURATION: 97 %

## 2020-07-08 PROBLEM — R47.9 SPEECH ABNORMALITY: Status: ACTIVE | Noted: 2020-07-08

## 2020-07-08 PROBLEM — G62.9 NEUROPATHY: Status: ACTIVE | Noted: 2020-07-08

## 2020-07-08 NOTE — ASSESSMENT & PLAN NOTE
Wt Readings from Last 3 Encounters:   07/07/20 85 1 kg (187 lb 9 6 oz)   06/30/20 85 1 kg (187 lb 9 6 oz)   06/26/20 85 1 kg (187 lb 9 6 oz)         Clinically compensated  Weights have remained stable with no increased edema  No shortness of breath reported  Continue Entresto 24-26 daily

## 2020-07-08 NOTE — PROGRESS NOTES
[facility] [address]  [address] [facility] [address] [phone]   Fax: [phone]     PROGRESS NOTE      Patient Location     [address] and [address]    Reason For Visit     Follow-up visit-DM, CHF, peripheral neuropathy, AFib, COPD, ambulatory dysfunction  Patients care was coordinated with nursing facility staff  Recent vitals, labs and updated medications were reviewed on [facility] system of facility  Past Medical, surgical, social, medication and allergy history and patients previous records reviewed  1  Chronic diastolic congestive heart failure [facility])  Assessment & Plan:  Wt Readings from Last 3 Encounters:   07/07/20 85 1 kg (187 lb 9 6 oz)   06/30/20 85 1 kg (187 lb 9 6 oz)   06/26/20 85 1 kg (187 lb 9 6 oz)         Clinically compensated  Weights have remained stable with no increased edema  No shortness of breath reported  Continue Entresto 24-26 daily  2  Type 2 diabetes mellitus with other specified complication, with long-term current use of insulin ([facility])  Assessment & Plan:    Lab Results   Component Value Date    HGBA1C 7 0 (H) 06/12/2020   Blood sugars for the most part have been stable and mostly below 200  Blood sugar this morning was 133 at 617 in the morning  Continue Lantus 8 units at bedtime  Continue insulin sliding scale  3  Ambulatory dysfunction  Assessment & Plan:  Patient continues with PT/OT  4  COPD without exacerbation ([address] )  Assessment & Plan:  Stable  No bronchospasms reported by staff or patient  No medications or therapies at this time  5  Hyperlipidemia, unspecified hyperlipidemia type  Assessment & Plan:  Llipid panel on 06/12/2020-  , HDL 31, triglyceride 103, total cholesterol 168  Continue fenofibrate 135 mg daily  6  Neuropathy  Assessment & Plan:  No complaints of pain today      Patient remains on gabapentin 100 mg at bedtime for neuropathic pain of lower extremities  7  Dysphasia  Assessment & Plan:  Continue speech therapy  Fluoroscopic swallow study scheduled for 07/16/2020       8  Stage 3 chronic kidney disease ([facility] [address] )  Assessment & Plan:  Stable  Labs from 06/30/2020 - , K 4, BUN 29, creatinine 1 46       9  Persistent atrial fibrillation (HCC)  Assessment & Plan:  Heart rate stable  Patient remains on Xarelto 50 mg daily  10  Dry skin dermatitis  Assessment & Plan:  Bilateral lower extremities appear dry  Continue Lac-Hydrin 12% lotion b i d  11  Metabolic encephalopathy  Assessment & Plan:  Continue PT OT and speech therapy  12  Vitamin D deficiency  Assessment & Plan:  Patient remains on ergocalciferol 1 25 mg weekly  HPI      Mr Lenard Espino  is an 55-year-old male who is being seen for follow-up visit today  He was receiving therapy at the time of visit and appears to be doing okay  Speech is sometimes hard to understand  He continues to get speech therapy  He also has a scheduled for video fluroscopic swallowing study on 07/16/2020  Appetite is reported to be fair  On examination patient is alert and cooperative  He smiles and appears to be in a pleasant mood and appears to be in no acute distress  No fever, chills, shortness of breath, chest pain, cough, abdominal pain, nausea, vomiting, have been reported  Review of Systems   Constitutional: Positive for fatigue  Negative for chills, fever and unexpected weight change  HENT: Positive for trouble swallowing  Negative for nosebleeds and rhinorrhea  Eyes: Negative for discharge and redness  Respiratory: Negative for cough, shortness of breath, wheezing and stridor  Cardiovascular: Negative for chest pain and leg swelling  Gastrointestinal: Negative for abdominal distention, abdominal pain, diarrhea and vomiting  Genitourinary: Negative for hematuria  Musculoskeletal: Positive for gait problem  Negative for arthralgias and back pain  Skin: Negative for pallor  Neurological: Positive for speech difficulty and weakness (Generalized)  Negative for tremors, seizures and syncope  Psychiatric/Behavioral: Positive for confusion  Negative for agitation and behavioral problems  Past Medical History:   Diagnosis Date    CHF (congestive heart failure) (HCC)     COPD (chronic obstructive pulmonary disease) (HCC)     Diabetes mellitus ([facility]     Hyperlipidemia     Osteomyelitis (HCC)        Past Surgical History:   Procedure Laterality Date    ABDOMINAL SURGERY      JOINT REPLACEMENT      b/l hips       Social History     Social History     Tobacco Use   Smoking Status Current Every Day Smoker    Packs/day: 1 00   Smokeless Tobacco Never Used       Social History     Substance and Sexual Activity   Alcohol Use Not Currently    Comment: occ beer          Family History   Family history unknown: Yes        Allergies   Allergen Reactions    Aspirin GI Intolerance       Medications       Current Outpatient Medications:     choline fenofibrate (TRILIPIX) 135 MG capsule, Take 135 mg by mouth daily, Disp: , Rfl:     ergocalciferol (VITAMIN D2) 50,000 units, Take 1 capsule (50,000 Units total) by mouth once a week, Disp: 8 capsule, Rfl: 0    insulin glargine (LANTUS) 100 units/mL subcutaneous injection, Inject 8 Units under the skin daily at bedtime, Disp: 1 Units, Rfl: 0    insulin lispro (HumaLOG) 100 units/mL injection, Inject 8-12 Units under the skin 3 (three) times a day before meals, Disp: , Rfl:     Rivaroxaban (XARELTO PO), Take 15 mg by mouth, Disp: , Rfl:     Sacubitril-Valsartan (ENTRESTO PO), Take 24-26 mg by mouth, Disp: , Rfl:     Updated list was reviewed in [facility] system of facility  Vitals:    07/07/20 0618   BP: 126/70   Pulse: 80   Resp: 16   Temp: 97 5 °F (36 4 °C)   SpO2: 97%       Physical Exam   Constitutional: He appears well-nourished  No distress  HENT:   Head: Normocephalic and atraumatic     Eyes: Right eye exhibits no discharge  Left eye exhibits no discharge  No scleral icterus  Pulmonary/Chest: Effort normal  No stridor  No respiratory distress  He has no wheezes  He has no rales  Abdominal: Soft  He exhibits no distension  There is no tenderness  There is no guarding  Musculoskeletal: He exhibits no edema or deformity  Neurological: He is alert  Skin: Skin is warm and dry  No rash (Chronic bilateral lower extremity discoloration) noted  He is not diaphoretic  Psychiatric: He has a normal mood and affect  Diagnostic Data     Labs from 06/30/2020- , K 4, BUN 29, creatinine 1 46  Additional Notes     Care coordination with staff

## 2020-07-08 NOTE — ASSESSMENT & PLAN NOTE
Llipid panel on 06/12/2020-  , HDL 31, triglyceride 103, total cholesterol 168  Continue fenofibrate 135 mg daily

## 2020-07-08 NOTE — ASSESSMENT & PLAN NOTE
No reports of bilateral lower extremity pain today  Patient is receiving gabapentin for neuropathic pain

## 2020-07-08 NOTE — ASSESSMENT & PLAN NOTE
Lab Results   Component Value Date    HGBA1C 7 0 (H) 06/12/2020   Blood sugars for the most part have been stable and mostly below 200  Blood sugar this morning was 133 at 617 in the morning  Continue Lantus 8 units at bedtime  Continue insulin sliding scale

## 2020-07-14 ENCOUNTER — NURSING HOME VISIT (OUTPATIENT)
Dept: FAMILY MEDICINE CLINIC | Facility: CLINIC | Age: 85
End: 2020-07-14
Payer: MEDICARE

## 2020-07-14 VITALS
DIASTOLIC BLOOD PRESSURE: 64 MMHG | HEART RATE: 78 BPM | TEMPERATURE: 98.6 F | WEIGHT: 187.6 LBS | SYSTOLIC BLOOD PRESSURE: 118 MMHG | OXYGEN SATURATION: 95 % | BODY MASS INDEX: 30.28 KG/M2 | RESPIRATION RATE: 18 BRPM

## 2020-07-14 DIAGNOSIS — N18.30 STAGE 3 CHRONIC KIDNEY DISEASE (HCC): ICD-10-CM

## 2020-07-14 DIAGNOSIS — E78.5 HYPERLIPIDEMIA, UNSPECIFIED HYPERLIPIDEMIA TYPE: ICD-10-CM

## 2020-07-14 DIAGNOSIS — I48.19 PERSISTENT ATRIAL FIBRILLATION (HCC): ICD-10-CM

## 2020-07-14 DIAGNOSIS — N18.30 TYPE 2 DIABETES MELLITUS WITH STAGE 3 CHRONIC KIDNEY DISEASE, WITH LONG-TERM CURRENT USE OF INSULIN (HCC): ICD-10-CM

## 2020-07-14 DIAGNOSIS — R26.2 AMBULATORY DYSFUNCTION: ICD-10-CM

## 2020-07-14 DIAGNOSIS — E55.9 VITAMIN D DEFICIENCY: ICD-10-CM

## 2020-07-14 DIAGNOSIS — R82.79 POSITIVE URINE CULTURE: Primary | ICD-10-CM

## 2020-07-14 DIAGNOSIS — Z79.4 TYPE 2 DIABETES MELLITUS WITH STAGE 3 CHRONIC KIDNEY DISEASE, WITH LONG-TERM CURRENT USE OF INSULIN (HCC): ICD-10-CM

## 2020-07-14 DIAGNOSIS — N48.1 BALANITIS: ICD-10-CM

## 2020-07-14 DIAGNOSIS — R33.9 URINARY RETENTION: ICD-10-CM

## 2020-07-14 DIAGNOSIS — E11.22 TYPE 2 DIABETES MELLITUS WITH STAGE 3 CHRONIC KIDNEY DISEASE, WITH LONG-TERM CURRENT USE OF INSULIN (HCC): ICD-10-CM

## 2020-07-14 DIAGNOSIS — J44.9 COPD WITHOUT EXACERBATION (HCC): ICD-10-CM

## 2020-07-14 DIAGNOSIS — N39.0 URINARY TRACT INFECTION WITHOUT HEMATURIA, SITE UNSPECIFIED: ICD-10-CM

## 2020-07-14 PROCEDURE — 99309 SBSQ NF CARE MODERATE MDM 30: CPT | Performed by: INTERNAL MEDICINE

## 2020-07-15 NOTE — ASSESSMENT & PLAN NOTE
Lab Results   Component Value Date    HGBA1C 7 0 (H) 06/12/2020   Blood sugars have been running on the lower side during last few days with lowest reading of 87 yesterday  Patient remains on Lantus 8 units q h s   And lispro 4 units with lunch and 8 units with morning and supper  Considering  low blood sugars will decrease lispro to 4 units with breakfast and supper, to be held if patient eats less than 50% of the meal   Discontinue lispro with lunch as pre supper readings are noted to be on the lower side, 87 yesterday

## 2020-07-15 NOTE — ASSESSMENT & PLAN NOTE
Wt Readings from Last 3 Encounters:   07/14/20 85 1 kg (187 lb 9 6 oz)   07/07/20 85 1 kg (187 lb 9 6 oz)   06/30/20 85 1 kg (187 lb 9 6 oz)     Stable at present    No signs of CHF

## 2020-07-15 NOTE — ASSESSMENT & PLAN NOTE
Creatinine was 1 5 today, close to baseline  Will continue to monitor  Encourage fluids    Avoid hypotension and nephrotoxins

## 2020-07-15 NOTE — PROGRESS NOTES
Progress Note    Patient location:  [address] [place] [address]    Reason for visit:  Positive urine culture, Penile swelling and erythema, increased lethargy    Patients care was coordinated with nursing facility staff  Recent vitals, labs and updated medications were reviewed on [facility]  Past Medical, surgical, social, medication and allergy history and patients previous records reviewed  Problem List Items Addressed This Visit        Endocrine    Type 2 diabetes mellitus, with long-term current use of insulin ([facility] )       Lab Results   Component Value Date    HGBA1C 7 0 (H) 06/12/2020   Blood sugars have been running on the lower side during last few days with lowest reading of 87 yesterday  Patient remains on Lantus 8 units q h s  And lispro 4 units with lunch and 8 units with morning and supper  Considering decreased p o  Intake and low blood sugars will decrease lispro to 4 units with breakfast and supper, to be held if patient eats less than 50% of the meal   Discontinue lispro with lunch as pre supper readings are noted to be on the lower side, 87 yesterday            Respiratory    COPD without exacerbation (HCC)     Stable            Cardiovascular and Mediastinum    A-fib ([facility] [address] )     Heart rate remains stable  Continue Xarelto 15 mg daily            Genitourinary    Stage 3 chronic kidney disease (HCC)     Creatinine was 1 5 today, close to baseline  Will continue to monitor  Encourage fluids  Avoid hypotension and nephrotoxins         UTI (urinary tract infection)     Patient was treated for E coli UTI last month  Recent urine cultures revealed growth of ESBL    Will hold of antibiotic therapy unless patient becomes overtly symptomatic         Urinary retention     Patient has an indwelling Baxter catheter in place         Balanitis     Patient was noted to have increased erythema and swelling of penile tip by the nurse 2 days ago, swelling appears to be slightly better today   Will prescribe clotrimazole cream   Recent penile cultures revealed growth of MRSA  Clinically does not appear to have overt cellulitis at this time  Will monitor response on clotrimazole and evaluate need for antibiotic therapy            Other    HLD (hyperlipidemia)     Continue fenofibrate         Vitamin D deficiency     Continue vitamin-D supplement         Ambulatory dysfunction     Continue PT OT         Positive urine culture - Primary     Recent urine culture revealed growth of ESBL  [facility] Patient remains afebrile  Recent WBC count was borderline high at 11 8  Discussed with staff  Patient remains weak, somewhat lethargic , dependent on ADLs 6/6  Clinically nontoxic at present  Will hold of antimicrobial therapy considering risk of assistance in view of ESBL  Nursing staff was advised to adhere to contact precautions             HPI:     Patient is being seen for a follow-up visit today  He was noted to be quite fatigue and lethargic during last 1-2 days  Patient remains confused, unable to provide any meaningful history  Patient continues to exhibit some steady gait with impaired balance and weakness requiring continued physical therapy  Baxter catheter remains in place for urinary retention  Patient had urine culture done few days ago which revealed growth of ESBL  There have been no reports of any fever or chills and hematuria  Patient has been trying to pull the catheter out  He was noted to have penile redness and swelling by the nursing staff 2 days ago  Urethral culture revealed growth of MRSA  No drainage was reported  Blood sugars have been running on the lower sides  Patient had an hypoglycemic spell with blood sugar of 87 yesterday after lunch  Appetite is fair  Patient is needing help with ADLs 6/6  He remains incontinent of bowel  Review of Systems   Constitutional: Positive for fatigue  Negative for chills and fever     HENT: Negative for nosebleeds and rhinorrhea  Eyes: Negative for discharge and redness  Respiratory: Negative for cough, shortness of breath, wheezing and stridor  Cardiovascular: Negative for chest pain and leg swelling  Gastrointestinal: Negative for abdominal distention, abdominal pain, diarrhea and vomiting  Genitourinary: Negative for hematuria  Musculoskeletal: Positive for gait problem  Negative for arthralgias and back pain  Skin: Negative for pallor  Neurological: Positive for weakness (Generalized)  Negative for tremors, seizures and syncope  Psychiatric/Behavioral: Positive for confusion  Negative for agitation and behavioral problems  Past Medical History:   Past Medical History:   Diagnosis Date    Ambulatory dysfunction     Atrial fibrillation (HCC)     CHF (congestive heart failure) (HCC)     Chronic kidney disease     COPD (chronic obstructive pulmonary disease) (HCC)     Diabetes mellitus (HCC)     Hyperlipidemia     Metabolic encephalopathy     Osteomyelitis (HCC)          Past Surgical History:   Past Surgical History:   Procedure Laterality Date    ABDOMINAL SURGERY      JOINT REPLACEMENT      b/l hips           Social History:     Social History     Tobacco Use   Smoking Status Current Every Day Smoker    Packs/day: 1 00   Smokeless Tobacco Never Used       Social History     Substance and Sexual Activity   Alcohol Use Not Currently    Comment: occ beer           Family History:   Family History   Family history unknown: Yes          Allergies:    Allergies   Allergen Reactions    Aspirin GI Intolerance           Medications:   Current Outpatient Medications:     choline fenofibrate (TRILIPIX) 135 MG capsule, Take 135 mg by mouth daily, Disp: , Rfl:     ergocalciferol (VITAMIN D2) 50,000 units, Take 1 capsule (50,000 Units total) by mouth once a week, Disp: 8 capsule, Rfl: 0    insulin glargine (LANTUS) 100 units/mL subcutaneous injection, Inject 8 Units under the skin daily at bedtime, Disp: 1 Units, Rfl: 0    insulin lispro (HumaLOG) 100 units/mL injection, Inject 8-12 Units under the skin 3 (three) times a day before meals, Disp: , Rfl:     Rivaroxaban (XARELTO PO), Take 15 mg by mouth, Disp: , Rfl:     Sacubitril-Valsartan (ENTRESTO PO), Take 24-26 mg by mouth, Disp: , Rfl:     Updated list was reviewed in [facility] of facility  Vitals:    07/14/20 1834   BP: 118/64   Pulse: 78   Resp: 18   Temp: 98 6 °F (37 °C)   SpO2: 95%         Physical Exam   Constitutional: He appears well-developed  No distress  Patient is awake the appears to be somewhat lethargic   Eyes: Conjunctivae are normal  Right eye exhibits no discharge  Left eye exhibits no discharge  No scleral icterus  Cardiovascular: Normal rate, regular rhythm and normal heart sounds  Pulmonary/Chest: No stridor  He has no wheezes  Abdominal: Soft  He exhibits no distension  There is no tenderness  There is no guarding  Genitourinary:   Genitourinary Comments: Mild penile swelling with slight erythema noted  No overt tenderness  Baxter catheter remains in place for urinary retention   Musculoskeletal: He exhibits no edema  Neurological: He is alert  No cranial nerve deficit  Skin: He is not diaphoretic  No pallor  Chronic skin changes involving bilateral lower extremity   Psychiatric: He has a normal mood and affect  His behavior is normal        Diagnostic Data   labs from today were reviewed  Sodium 141, potassium 4 3, BUN 37, creatinine 1 5, WBC count 11 8, hemoglobin 13, platelet count 471, recent urine cultures revealed growth of ESBL , penile culture revealed growth of MRSA      Additional Notes:     Monitor closely for any fever or signs of UTI  Hold of antimicrobial therapy at this time  Repeat labs on 07/17    Follow WBC count  Encourage fluids  Observe response on clotrimazole cream

## 2020-07-15 NOTE — ASSESSMENT & PLAN NOTE
Patient was treated for E coli UTI last month  Recent urine cultures revealed growth of ESBL    Will hold of antibiotic therapy unless patient becomes overtly symptomatic

## 2020-07-15 NOTE — ASSESSMENT & PLAN NOTE
Patient was noted to have increased erythema and swelling of penile tip by the nurse 2 days ago, swelling appears to be slightly better today  Will prescribe clotrimazole cream   Recent penile cultures revealed growth of MRSA  Clinically does not appear to have overt cellulitis at this time    Will monitor response on clotrimazole and evaluate need for antibiotic therapy

## 2020-07-15 NOTE — ASSESSMENT & PLAN NOTE
Recent urine culture revealed growth of ESBL  Leticia Varma Patient remains afebrile  Recent WBC count was borderline high at 11 8  Discussed with staff  Patient remains weak, somewhat lethargic , dependent on ADLs 6/6  Clinically nontoxic at present    Will hold of antimicrobial therapy considering risk of assistance in view of ESBL  Nursing staff was advised to adhere to contact precautions

## 2020-07-16 ENCOUNTER — HOSPITAL ENCOUNTER (OUTPATIENT)
Dept: RADIOLOGY | Facility: HOSPITAL | Age: 85
Discharge: HOME | End: 2020-07-16
Attending: INTERNAL MEDICINE
Payer: COMMERCIAL

## 2020-07-16 DIAGNOSIS — R47.02 DYSPHASIA: ICD-10-CM

## 2020-07-16 PROCEDURE — 92611 MOTION FLUOROSCOPY/SWALLOW: CPT

## 2020-07-16 PROCEDURE — 74230 X-RAY XM SWLNG FUNCJ C+: CPT

## 2020-07-16 NOTE — PROCEDURES
Video Swallow Study      Patient Name: Trista CALLEN Date: 7/16/2020        Past Medical History  Past Medical History:   Diagnosis Date    Ambulatory dysfunction     Atrial fibrillation (HCC)     CHF (congestive heart failure) (HCC)     Chronic kidney disease     [facility]) [facility] [facility]     Diabetes mellitus ([facility] )     Hyperlipidemia     Metabolic encephalopathy     Osteomyelitis ([facility] )         Past Surgical History  Past Surgical History:   Procedure Laterality Date    ABDOMINAL SURGERY      JOINT REPLACEMENT      b/l hips         General Information:   79 yo male referred to [facility] for a VBS by Dr Phoebe Vizcaino for dysphagia  Pt completed a recent hospital stay at [facility] and was d/c on 6/16/20 to [facility]  Pt referred for VBS due to suspected moderate oropharyngeal given throat clearing and inconsistent coughing/choking w/ po intake per pt's current speech therapist at rehab  Pt is currently on mechanical soft/thin liquid diet  Cognition: able to inconsistently follow basic commands w/ repetition/model     Speech/Swallow Mech: Oral motor movements appeared ; Dentition was edentulous; Respiratory Status: RA;  Current diet: mechanical soft/thin liquids  Prior VBS none     Pt was seen in radiology for a Video Barium Swallow Study, seated in the upright position and viewed laterally with the following consistencies: puree, soft solid, hard solid, HTL, NTL, and thin via straw  1/2 barium pill whole in puree       Results are as follows:     **Images are available for review on PACS          Oral Stage:  Adequate retrieval  Functional manipulation and transfer of puree  Functional mastication and bolus formation of soft solid w/ spill to valleculae prior to transfer  Disorganized and incomplete mastication of hard solid w/ large piece spilling to pyriform sinuses w/ retropulsion back to oral cavity   Piecemeal transfer of hard solid  Decreased oral control of liquids w/ posterior spill to pyriform sinuses w/ thin>NTL  Pharyngeal Stage:  Delayed swallow initiation, triggered w/ bolus head in pyriform sinuses  Delayed but complete epiglottic inversion  Reduced laryngeal vestibule closure  Adequate hyolaryngeal elevation and excursion  Adequate BOT retraction and pharyngeal constriction  Early closure of UES resulting in mild residue at pyriform sinuses  Penetration before the swallow w/ silent aspiration during with thin liquids  Unable to eliminate w/ use of attempted strategies (I e  Small sips, bolus hold, chin tuck)  Inconsistent minimal penetration w/ NTL  Able to eliminate w/ use of small sip+ 3 sec bolus hold strategy  No aspiration w/ NTL  Esophageal Stage:  Briefly screened using 1/2 barium pill whole in puree  Barium pill emptied into stomach w/o incidence  Mild stasis in medial esophagus w/ puree  Assessment Summary:  Pt presents w/ moderate oropharyngeal dysphagia lyla by disorganized/reduced mastication of solid, decreased oral control of solid/liquids, delayed swallow initiation, and early closure of UES  There was silent aspiration of thin liquids and inconsistent mild penetration w/ NTL- able to eliminate w/ use of small sip+bolus hold                  Recommendations:  -Dysphagia level 2/mechanical soft and Nectar thick liquids   -meds whole in puree  -Aspiration precautions  -Recommend to [facility] speech therapy services for dysphagia tx

## 2020-07-30 ENCOUNTER — APPOINTMENT (EMERGENCY)
Dept: RADIOLOGY | Facility: HOSPITAL | Age: 85
DRG: 253 | End: 2020-07-30
Payer: MEDICARE

## 2020-07-30 ENCOUNTER — HOSPITAL ENCOUNTER (INPATIENT)
Facility: HOSPITAL | Age: 85
LOS: 19 days | Discharge: HOSPICE | DRG: 253 | End: 2020-08-18
Attending: EMERGENCY MEDICINE | Admitting: INTERNAL MEDICINE
Payer: MEDICARE

## 2020-07-30 DIAGNOSIS — M79.10 MYALGIA: ICD-10-CM

## 2020-07-30 DIAGNOSIS — R33.9 URINARY RETENTION: ICD-10-CM

## 2020-07-30 DIAGNOSIS — L89.899: ICD-10-CM

## 2020-07-30 DIAGNOSIS — J44.9 COPD WITHOUT EXACERBATION (HCC): ICD-10-CM

## 2020-07-30 DIAGNOSIS — E11.69 TYPE 2 DIABETES MELLITUS WITH OTHER SPECIFIED COMPLICATION, WITH LONG-TERM CURRENT USE OF INSULIN (HCC): ICD-10-CM

## 2020-07-30 DIAGNOSIS — Z79.4 TYPE 2 DIABETES MELLITUS WITH OTHER SPECIFIED COMPLICATION, WITH LONG-TERM CURRENT USE OF INSULIN (HCC): ICD-10-CM

## 2020-07-30 DIAGNOSIS — N39.0 UTI (URINARY TRACT INFECTION): Primary | ICD-10-CM

## 2020-07-30 DIAGNOSIS — N18.30 STAGE 3 CHRONIC KIDNEY DISEASE (HCC): ICD-10-CM

## 2020-07-30 DIAGNOSIS — I73.9 PVD (PERIPHERAL VASCULAR DISEASE) (HCC): ICD-10-CM

## 2020-07-30 DIAGNOSIS — I70.269 ATHEROSCLEROSIS OF ARTERY OF EXTREMITY WITH GANGRENE (HCC): ICD-10-CM

## 2020-07-30 DIAGNOSIS — I48.11 LONGSTANDING PERSISTENT ATRIAL FIBRILLATION (HCC): ICD-10-CM

## 2020-07-30 LAB
ALBUMIN SERPL BCP-MCNC: 2.3 G/DL (ref 3.5–5)
ALP SERPL-CCNC: 87 U/L (ref 46–116)
ALT SERPL W P-5'-P-CCNC: 42 U/L (ref 12–78)
ANION GAP SERPL CALCULATED.3IONS-SCNC: 7 MMOL/L (ref 4–13)
APTT PPP: 72 SECONDS (ref 23–37)
AST SERPL W P-5'-P-CCNC: 85 U/L (ref 5–45)
ATRIAL RATE: 60 BPM
BACTERIA UR QL AUTO: ABNORMAL /HPF
BASE EX.OXY STD BLDV CALC-SCNC: 48 % (ref 60–80)
BASE EXCESS BLDV CALC-SCNC: 1.7 MMOL/L
BASOPHILS # BLD AUTO: 0.04 THOUSANDS/ΜL (ref 0–0.1)
BASOPHILS NFR BLD AUTO: 0 % (ref 0–1)
BILIRUB SERPL-MCNC: 0.9 MG/DL (ref 0.2–1)
BILIRUB UR QL STRIP: NEGATIVE
BUN SERPL-MCNC: 60 MG/DL (ref 5–25)
CALCIUM SERPL-MCNC: 8.8 MG/DL (ref 8.3–10.1)
CHLORIDE SERPL-SCNC: 105 MMOL/L (ref 100–108)
CLARITY UR: CLEAR
CO2 SERPL-SCNC: 28 MMOL/L (ref 21–32)
COLOR UR: YELLOW
CREAT SERPL-MCNC: 1.82 MG/DL (ref 0.6–1.3)
EOSINOPHIL # BLD AUTO: 0.03 THOUSAND/ΜL (ref 0–0.61)
EOSINOPHIL NFR BLD AUTO: 0 % (ref 0–6)
ERYTHROCYTE [DISTWIDTH] IN BLOOD BY AUTOMATED COUNT: 15.9 % (ref 11.6–15.1)
GFR SERPL CREATININE-BSD FRML MDRD: 33 ML/MIN/1.73SQ M
GLUCOSE SERPL-MCNC: 164 MG/DL (ref 65–140)
GLUCOSE SERPL-MCNC: 165 MG/DL (ref 65–140)
GLUCOSE UR STRIP-MCNC: NEGATIVE MG/DL
HCO3 BLDV-SCNC: 28.3 MMOL/L (ref 24–30)
HCT VFR BLD AUTO: 38.6 % (ref 36.5–49.3)
HGB BLD-MCNC: 12.3 G/DL (ref 12–17)
HGB UR QL STRIP.AUTO: ABNORMAL
IMM GRANULOCYTES # BLD AUTO: 0.07 THOUSAND/UL (ref 0–0.2)
IMM GRANULOCYTES NFR BLD AUTO: 1 % (ref 0–2)
INR PPP: 2.46 (ref 0.84–1.19)
KETONES UR STRIP-MCNC: NEGATIVE MG/DL
LACTATE SERPL-SCNC: 1.2 MMOL/L (ref 0.5–2)
LEUKOCYTE ESTERASE UR QL STRIP: ABNORMAL
LIPASE SERPL-CCNC: 79 U/L (ref 73–393)
LYMPHOCYTES # BLD AUTO: 0.88 THOUSANDS/ΜL (ref 0.6–4.47)
LYMPHOCYTES NFR BLD AUTO: 8 % (ref 14–44)
MAGNESIUM SERPL-MCNC: 2.4 MG/DL (ref 1.6–2.6)
MCH RBC QN AUTO: 28.3 PG (ref 26.8–34.3)
MCHC RBC AUTO-ENTMCNC: 31.9 G/DL (ref 31.4–37.4)
MCV RBC AUTO: 89 FL (ref 82–98)
MONOCYTES # BLD AUTO: 0.75 THOUSAND/ΜL (ref 0.17–1.22)
MONOCYTES NFR BLD AUTO: 7 % (ref 4–12)
NEUTROPHILS # BLD AUTO: 9.12 THOUSANDS/ΜL (ref 1.85–7.62)
NEUTS SEG NFR BLD AUTO: 84 % (ref 43–75)
NITRITE UR QL STRIP: POSITIVE
NON-SQ EPI CELLS URNS QL MICRO: ABNORMAL /HPF
NRBC BLD AUTO-RTO: 0 /100 WBCS
NT-PROBNP SERPL-MCNC: 4924 PG/ML
O2 CT BLDV-SCNC: 8.9 ML/DL
PCO2 BLDV: 53 MM HG (ref 42–50)
PH BLDV: 7.35 [PH] (ref 7.3–7.4)
PH UR STRIP.AUTO: 5.5 [PH]
PLATELET # BLD AUTO: 375 THOUSANDS/UL (ref 149–390)
PMV BLD AUTO: 10.4 FL (ref 8.9–12.7)
PO2 BLDV: 28.3 MM HG (ref 35–45)
POTASSIUM SERPL-SCNC: 4.5 MMOL/L (ref 3.5–5.3)
PROT SERPL-MCNC: 7.3 G/DL (ref 6.4–8.2)
PROT UR STRIP-MCNC: ABNORMAL MG/DL
PROTHROMBIN TIME: 25.5 SECONDS (ref 11.6–14.5)
QRS AXIS: 65 DEGREES
QRSD INTERVAL: 74 MS
QT INTERVAL: 412 MS
QTC INTERVAL: 428 MS
RBC # BLD AUTO: 4.34 MILLION/UL (ref 3.88–5.62)
RBC #/AREA URNS AUTO: ABNORMAL /HPF
SODIUM SERPL-SCNC: 140 MMOL/L (ref 136–145)
SP GR UR STRIP.AUTO: 1.02 (ref 1–1.03)
T WAVE AXIS: 51 DEGREES
TROPONIN I SERPL-MCNC: 0.04 NG/ML
UROBILINOGEN UR QL STRIP.AUTO: 1 E.U./DL
VENTRICULAR RATE: 65 BPM
WBC # BLD AUTO: 10.89 THOUSAND/UL (ref 4.31–10.16)
WBC #/AREA URNS AUTO: ABNORMAL /HPF

## 2020-07-30 PROCEDURE — 96361 HYDRATE IV INFUSION ADD-ON: CPT

## 2020-07-30 PROCEDURE — 93005 ELECTROCARDIOGRAM TRACING: CPT

## 2020-07-30 PROCEDURE — 87077 CULTURE AEROBIC IDENTIFY: CPT | Performed by: EMERGENCY MEDICINE

## 2020-07-30 PROCEDURE — 83880 ASSAY OF NATRIURETIC PEPTIDE: CPT | Performed by: EMERGENCY MEDICINE

## 2020-07-30 PROCEDURE — 82948 REAGENT STRIP/BLOOD GLUCOSE: CPT

## 2020-07-30 PROCEDURE — 87181 SC STD AGAR DILUTION PER AGT: CPT | Performed by: EMERGENCY MEDICINE

## 2020-07-30 PROCEDURE — 83735 ASSAY OF MAGNESIUM: CPT | Performed by: EMERGENCY MEDICINE

## 2020-07-30 PROCEDURE — 36415 COLL VENOUS BLD VENIPUNCTURE: CPT | Performed by: EMERGENCY MEDICINE

## 2020-07-30 PROCEDURE — 84145 PROCALCITONIN (PCT): CPT | Performed by: EMERGENCY MEDICINE

## 2020-07-30 PROCEDURE — 99223 1ST HOSP IP/OBS HIGH 75: CPT | Performed by: INTERNAL MEDICINE

## 2020-07-30 PROCEDURE — 99285 EMERGENCY DEPT VISIT HI MDM: CPT | Performed by: EMERGENCY MEDICINE

## 2020-07-30 PROCEDURE — 83690 ASSAY OF LIPASE: CPT | Performed by: EMERGENCY MEDICINE

## 2020-07-30 PROCEDURE — 84484 ASSAY OF TROPONIN QUANT: CPT | Performed by: EMERGENCY MEDICINE

## 2020-07-30 PROCEDURE — 80053 COMPREHEN METABOLIC PANEL: CPT | Performed by: EMERGENCY MEDICINE

## 2020-07-30 PROCEDURE — 96360 HYDRATION IV INFUSION INIT: CPT

## 2020-07-30 PROCEDURE — 83605 ASSAY OF LACTIC ACID: CPT | Performed by: EMERGENCY MEDICINE

## 2020-07-30 PROCEDURE — 82805 BLOOD GASES W/O2 SATURATION: CPT | Performed by: EMERGENCY MEDICINE

## 2020-07-30 PROCEDURE — 87086 URINE CULTURE/COLONY COUNT: CPT | Performed by: EMERGENCY MEDICINE

## 2020-07-30 PROCEDURE — 71045 X-RAY EXAM CHEST 1 VIEW: CPT

## 2020-07-30 PROCEDURE — 99285 EMERGENCY DEPT VISIT HI MDM: CPT

## 2020-07-30 PROCEDURE — 85610 PROTHROMBIN TIME: CPT | Performed by: EMERGENCY MEDICINE

## 2020-07-30 PROCEDURE — 85025 COMPLETE CBC W/AUTO DIFF WBC: CPT | Performed by: EMERGENCY MEDICINE

## 2020-07-30 PROCEDURE — 87040 BLOOD CULTURE FOR BACTERIA: CPT | Performed by: EMERGENCY MEDICINE

## 2020-07-30 PROCEDURE — 93010 ELECTROCARDIOGRAM REPORT: CPT | Performed by: INTERNAL MEDICINE

## 2020-07-30 PROCEDURE — 85730 THROMBOPLASTIN TIME PARTIAL: CPT | Performed by: EMERGENCY MEDICINE

## 2020-07-30 PROCEDURE — 1124F ACP DISCUSS-NO DSCNMKR DOCD: CPT | Performed by: PHYSICIAN ASSISTANT

## 2020-07-30 PROCEDURE — 87186 SC STD MICRODIL/AGAR DIL: CPT | Performed by: EMERGENCY MEDICINE

## 2020-07-30 PROCEDURE — 81001 URINALYSIS AUTO W/SCOPE: CPT | Performed by: EMERGENCY MEDICINE

## 2020-07-30 RX ORDER — ACETAMINOPHEN 325 MG/1
650 TABLET ORAL EVERY 6 HOURS PRN
Status: DISCONTINUED | OUTPATIENT
Start: 2020-07-30 | End: 2020-08-18 | Stop reason: HOSPADM

## 2020-07-30 RX ADMIN — INSULIN LISPRO 1 UNITS: 100 INJECTION, SOLUTION INTRAVENOUS; SUBCUTANEOUS at 22:49

## 2020-07-30 RX ADMIN — SODIUM CHLORIDE 500 ML: 0.9 INJECTION, SOLUTION INTRAVENOUS at 19:16

## 2020-07-30 RX ADMIN — CEFEPIME HYDROCHLORIDE 1000 MG: 1 INJECTION, POWDER, FOR SOLUTION INTRAMUSCULAR; INTRAVENOUS at 22:10

## 2020-07-30 NOTE — ED PROVIDER NOTES
History  Chief Complaint   Patient presents with    Possible UTI     As per EMS, patient discharged from skilled nursing home Tuesday, "not feeling well since discharged" possible uti, has left foot ulcer     Foot Ulcer     80year old male patient, recently admitted to the facility for medical issues and then discharged to rehab comes back in pus coming from his Baxter catheter  Patient has nonhealing ulcers of both his feet  The patient's doctor made a house call today and sent the patient here for evaluation  Currently patient is lying in bed, he has no complaints, he does have open ulcers from his feet of which are malodorous, and purulent discharge from his Baxter  The patient with a full septic evaluation done and will be admitted to the hospital       History provided by:  Patient   used: No    Medical Problem   Severity:  Mild  Onset quality:  Gradual  Timing:  Constant  Progression:  Worsening  Chronicity:  New  Associated symptoms: no abdominal pain, no cough, no ear pain, no headaches, no loss of consciousness and no wheezing        Prior to Admission Medications   Prescriptions Last Dose Informant Patient Reported? Taking?    Rivaroxaban (XARELTO PO)   Yes No   Sig: Take 15 mg by mouth   Sacubitril-Valsartan (ENTRESTO PO)   Yes No   Sig: Take 24-26 mg by mouth   choline fenofibrate (TRILIPIX) 135 MG capsule   Yes No   Sig: Take 135 mg by mouth daily   ergocalciferol (VITAMIN D2) 50,000 units   No No   Sig: Take 1 capsule (50,000 Units total) by mouth once a week   insulin glargine (LANTUS) 100 units/mL subcutaneous injection   No No   Sig: Inject 8 Units under the skin daily at bedtime   insulin lispro (HumaLOG) 100 units/mL injection   Yes No   Sig: Inject 8-12 Units under the skin 3 (three) times a day before meals      Facility-Administered Medications: None       Past Medical History:   Diagnosis Date    Ambulatory dysfunction     Atrial fibrillation (HCC)     CHF (congestive heart failure) (HCC)     Chronic kidney disease     COPD (chronic obstructive pulmonary disease) (HCC)     Diabetes mellitus (HCC)     Hyperlipidemia     Metabolic encephalopathy     Osteomyelitis (HCC)        Past Surgical History:   Procedure Laterality Date    ABDOMINAL SURGERY      JOINT REPLACEMENT      b/l hips       Family History   Family history unknown: Yes     I have reviewed and agree with the history as documented  E-Cigarette/Vaping    E-Cigarette Use Never User      E-Cigarette/Vaping Substances    Nicotine Yes     THC No     CBD No     Flavoring No     Other No     Unknown No      Social History     Tobacco Use    Smoking status: Current Every Day Smoker     Packs/day: 1 00    Smokeless tobacco: Never Used   Substance Use Topics    Alcohol use: Not Currently     Comment: occ beer    Drug use: No       Review of Systems   HENT: Negative for ear pain  Respiratory: Negative for cough and wheezing  Gastrointestinal: Negative for abdominal pain  Neurological: Negative for loss of consciousness and headaches  All other systems reviewed and are negative  Physical Exam  Physical Exam   Constitutional: He is oriented to person, place, and time  He appears well-developed and well-nourished  He appears listless  No distress  HENT:   Head: Normocephalic and atraumatic  Right Ear: External ear normal    Left Ear: External ear normal    Eyes: Conjunctivae and EOM are normal  Right eye exhibits no discharge  Left eye exhibits no discharge  No scleral icterus  Neck: Normal range of motion  Neck supple  No JVD present  No tracheal deviation present  No thyromegaly present  Cardiovascular: Normal rate and regular rhythm  Pulmonary/Chest: Effort normal and breath sounds normal  No stridor  No respiratory distress  He has no wheezes  He has no rales  Abdominal: Soft  Bowel sounds are normal  He exhibits no distension  There is no tenderness     Musculoskeletal: Normal range of motion  He exhibits no edema, tenderness or deformity  Neurological: He is oriented to person, place, and time  He appears listless  He displays atrophy  No cranial nerve deficit  Coordination normal    Skin: Skin is warm and dry  Rash noted  He is not diaphoretic  There is pallor  Psychiatric: He has a normal mood and affect  His behavior is normal    Nursing note and vitals reviewed  Vital Signs  ED Triage Vitals [07/30/20 1729]   Temperature Pulse Respirations Blood Pressure SpO2   (!) 97 3 °F (36 3 °C) 57 17 110/66 97 %      Temp Source Heart Rate Source Patient Position - Orthostatic VS BP Location FiO2 (%)   Oral Monitor -- Right arm --      Pain Score       --           Vitals:    07/30/20 1729 07/30/20 1900 07/30/20 2100   BP: 110/66 163/66 148/68   Pulse: 57 65 81         Visual Acuity      ED Medications  Medications   cefepime (MAXIPIME) 1,000 mg in dextrose 5 % 50 mL IVPB (1,000 mg Intravenous New Bag 7/30/20 2210)   nicotine (NICODERM CQ) 7 mg/24hr TD 24 hr patch 1 patch (has no administration in time range)   acetaminophen (TYLENOL) tablet 650 mg (has no administration in time range)   insulin lispro (HumaLOG) 100 units/mL subcutaneous injection 1-6 Units (has no administration in time range)   insulin lispro (HumaLOG) 100 units/mL subcutaneous injection 1-5 Units (has no administration in time range)   sodium chloride 0 9 % bolus 500 mL (0 mL Intravenous Stopped 7/30/20 2055)       Diagnostic Studies  Results Reviewed     Procedure Component Value Units Date/Time    Blood culture [[id number]] Collected:  07/30/20 2146    Lab Status:   In process Specimen:  Blood from Hand, Left Updated:  07/30/20 2149    Urine Microscopic [[id number]]  (Abnormal) Collected:  07/30/20 2101    Lab Status:  Final result Specimen:  Urine, Indwelling Baxter Catheter Updated:  07/30/20 2123     RBC, UA 1-2 /hpf      WBC, UA 20-30 /hpf      Epithelial Cells Occasional /hpf      Bacteria, UA Occasional /hpf     Urine culture [[id number]] Collected:  07/30/20 2101    Lab Status: In process Specimen:  Urine, Indwelling Baxter Catheter Updated:  07/30/20 2117    Urinalysis with culture and sensitivity reflex [[id number]]  (Abnormal) Collected:  07/30/20 2101    Lab Status:  Final result Specimen:  Urine, Indwelling Baxter Catheter Updated:  07/30/20 2111     Color, UA Yellow     Clarity, UA Clear     Specific Gravity, UA 1 025     pH, UA 5 5     Leukocytes, UA Moderate     Nitrite, UA Positive     Protein,  (2+) mg/dl      Glucose, UA Negative mg/dl      Ketones, UA Negative mg/dl      Urobilinogen, UA 1 0 E U /dl      Bilirubin, UA Negative     Blood, UA Large    B-type natriuretic peptide [[id number]]  (Abnormal) Collected:  07/30/20 1902    Lab Status:  Final result Specimen:  Blood from Arm, Left Updated:  07/30/20 1953     NT-proBNP 4,924 pg/mL     Lipase [[id number]]  (Normal) Collected:  07/30/20 1902    Lab Status:  Final result Specimen:  Blood from Arm, Left Updated:  07/30/20 1953     Lipase 79 u/L     Magnesium [[id number]]  (Normal) Collected:  07/30/20 1902    Lab Status:  Final result Specimen:  Blood from Arm, Left Updated:  07/30/20 1953     Magnesium 2 4 mg/dL     Lactate Blood [[id number]]  (Normal) Collected:  07/30/20 1902    Lab Status:  Final result Specimen:  Blood from Arm, Left Updated:  07/30/20 1949     LACTIC ACID 1 2 mmol/L     Narrative:       Result may be elevated if tourniquet was used during collection      Comprehensive metabolic panel [[id number]]  (Abnormal) Collected:  07/30/20 1902    Lab Status:  Final result Specimen:  Blood from Arm, Left Updated:  07/30/20 1947     Sodium 140 mmol/L      Potassium 4 5 mmol/L      Chloride 105 mmol/L      CO2 28 mmol/L      ANION GAP 7 mmol/L      BUN 60 mg/dL      Creatinine 1 82 mg/dL      Glucose 165 mg/dL      Calcium 8 8 mg/dL      AST 85 U/L      ALT 42 U/L      Alkaline Phosphatase 87 U/L      Total Protein 7 3 g/dL      Albumin 2 3 g/dL Total Bilirubin 0 90 mg/dL      eGFR 33 ml/min/1 73sq m     Narrative:       [place] guidelines for Chronic Kidney Disease (CKD):     Stage 1 with normal or high GFR (GFR > 90 mL/min/1 73 square meters)    Stage 2 Mild CKD (GFR = 60-89 mL/min/1 73 square meters)    Stage 3A Moderate CKD (GFR = 45-59 mL/min/1 73 square meters)    Stage 3B Moderate CKD (GFR = 30-44 mL/min/1 73 square meters)    Stage 4 Severe CKD (GFR = 15-29 mL/min/1 73 square meters)    Stage 5 End Stage CKD (GFR <15 mL/min/1 73 square meters)  Note: GFR calculation is accurate only with a steady state creatinine    Blood culture [[id number]] Collected:  07/30/20 1939    Lab Status:   In process Specimen:  Blood from Hand, Right Updated:  07/30/20 1943    Troponin I [[id number]]  (Normal) Collected:  07/30/20 1902    Lab Status:  Final result Specimen:  Blood from Arm, Left Updated:  07/1933     Troponin I 0 04 ng/mL     Protime-INR [[id number]]  (Abnormal) Collected:  07/30/20 1902    Lab Status:  Final result Specimen:  Blood from Arm, Left Updated:  07/30/20 1928     Protime 25 5 seconds      INR 2 46    APTT [[id number]]  (Abnormal) Collected:  07/30/20 1902    Lab Status:  Final result Specimen:  Blood from Arm, Left Updated:  07/30/20 1928     PTT 72 seconds     Blood gas, venous [[id number]]  (Abnormal) Collected:  07/30/20 1902    Lab Status:  Final result Specimen:  Blood from Arm, Left Updated:  07/30/20 1916     pH, Natalio 7 346     pCO2, Natalio 53 0 mm Hg      pO2, Natalio 28 3 mm Hg      HCO3, Natalio 28 3 mmol/L      Base Excess, Natalio 1 7 mmol/L      O2 Content, Natalio 8 9 ml/dL      O2 HGB, VENOUS 48 0 %     CBC and differential [[id number]]  (Abnormal) Collected:  07/30/20 1902    Lab Status:  Final result Specimen:  Blood from Arm, Left Updated:  07/30/20 1914     WBC 10 89 Thousand/uL      RBC 4 34 Million/uL      Hemoglobin 12 3 g/dL      Hematocrit 38 6 %      MCV 89 fL      MCH 28 3 pg      MCHC 31 9 g/dL RDW 15 9 %      MPV 10 4 fL      Platelets 886 Thousands/uL      nRBC 0 /100 WBCs      Neutrophils Relative 84 %      Immat GRANS % 1 %      Lymphocytes Relative 8 %      Monocytes Relative 7 %      Eosinophils Relative 0 %      Basophils Relative 0 %      Neutrophils Absolute 9 12 Thousands/µL      Immature Grans Absolute 0 07 Thousand/uL      Lymphocytes Absolute 0 88 Thousands/µL      Monocytes Absolute 0 75 Thousand/µL      Eosinophils Absolute 0 03 Thousand/µL      Basophils Absolute 0 04 Thousands/µL     Procalcitonin [[id number]] Collected:  07/30/20 1902    Lab Status: In process Specimen:  Blood from Arm, Left Updated:  07/30/20 1911                 XR chest 1 view portable    (Results Pending)              Procedures  Procedures         ED Course       US AUDIT      Most Recent Value   Initial Alcohol Screen: US AUDIT-C    1  How often do you have a drink containing alcohol?  0 Filed at: 07/30/2020 1731   Audit-C Score  0 Filed at: 07/30/2020 1731              Identification of Seniors at Risk      Most Recent Value   (ISAR) Identification of Seniors at Risk   Before the illness or injury that brought you to the Emergency, did you need someone to help you on a regular basis? 0 Filed at: 07/30/2020 1731   In the last 24 hours, have you needed more help than usual?     Have you been hospitalized for one or more nights during the past 6 months?    In general, do you see well?    In general, do you have serious problems with your memory?    Do you take more than three different medications every day?    ISAR Score  0 Filed at: 07/30/2020 1731          JOEY/DAST-10      Most Recent Value   How many times in the past year have you    Used an illegal drug or used a prescription medication for non-medical reasons?   Never Filed at: 07/30/2020 1731                                MDM  Number of Diagnoses or Management Options  UTI (urinary tract infection): new and requires workup     Amount and/or Complexity of Data Reviewed  Clinical lab tests: ordered and reviewed  Tests in the radiology section of CPT®: ordered and reviewed  Decide to obtain previous medical records or to obtain history from someone other than the patient: yes  Review and summarize past medical records: yes    Patient Progress  Patient progress: stable        Disposition  Final diagnoses:   UTI (urinary tract infection)     Time reflects when diagnosis was documented in both MDM as applicable and the Disposition within this note     Time User Action Codes Description Comment    7/30/2020  9:44 PM Mariola Gaytan Add [N39 0] UTI (urinary tract infection)       ED Disposition     ED Disposition Condition Date/Time Comment    Admit Stable Thu Jul 30, 2020  9:43 PM Case was discussed with Yandel Rodriguez and the patient's admission status was agreed to be Admission Status: inpatient status to the service of Dr Georgi Solomon          Follow-up Information    None         Patient's Medications   Discharge Prescriptions    No medications on file     No discharge procedures on file      PDMP Review     None          ED Provider  Electronically Signed by           Luisa Freedman DO  07/30/20 1282

## 2020-07-31 ENCOUNTER — APPOINTMENT (INPATIENT)
Dept: ULTRASOUND IMAGING | Facility: HOSPITAL | Age: 85
DRG: 253 | End: 2020-07-31
Payer: MEDICARE

## 2020-07-31 ENCOUNTER — APPOINTMENT (INPATIENT)
Dept: RADIOLOGY | Facility: HOSPITAL | Age: 85
DRG: 253 | End: 2020-07-31
Payer: MEDICARE

## 2020-07-31 PROBLEM — R82.90 ABNORMAL URINALYSIS: Status: ACTIVE | Noted: 2019-01-28

## 2020-07-31 PROBLEM — L89.509 PRESSURE ULCER OF ANKLE: Status: ACTIVE | Noted: 2020-07-31

## 2020-07-31 LAB
ANION GAP SERPL CALCULATED.3IONS-SCNC: 9 MMOL/L (ref 4–13)
BUN SERPL-MCNC: 50 MG/DL (ref 5–25)
CALCIUM SERPL-MCNC: 8.7 MG/DL (ref 8.3–10.1)
CHLORIDE SERPL-SCNC: 106 MMOL/L (ref 100–108)
CO2 SERPL-SCNC: 27 MMOL/L (ref 21–32)
CREAT SERPL-MCNC: 1.56 MG/DL (ref 0.6–1.3)
ERYTHROCYTE [DISTWIDTH] IN BLOOD BY AUTOMATED COUNT: 15.8 % (ref 11.6–15.1)
GFR SERPL CREATININE-BSD FRML MDRD: 40 ML/MIN/1.73SQ M
GLUCOSE SERPL-MCNC: 139 MG/DL (ref 65–140)
GLUCOSE SERPL-MCNC: 139 MG/DL (ref 65–140)
GLUCOSE SERPL-MCNC: 143 MG/DL (ref 65–140)
GLUCOSE SERPL-MCNC: 210 MG/DL (ref 65–140)
GLUCOSE SERPL-MCNC: 247 MG/DL (ref 65–140)
HCT VFR BLD AUTO: 40.1 % (ref 36.5–49.3)
HGB BLD-MCNC: 12.5 G/DL (ref 12–17)
MCH RBC QN AUTO: 27.9 PG (ref 26.8–34.3)
MCHC RBC AUTO-ENTMCNC: 31.2 G/DL (ref 31.4–37.4)
MCV RBC AUTO: 90 FL (ref 82–98)
PLATELET # BLD AUTO: 387 THOUSANDS/UL (ref 149–390)
PMV BLD AUTO: 10.2 FL (ref 8.9–12.7)
POTASSIUM SERPL-SCNC: 4.1 MMOL/L (ref 3.5–5.3)
PROCALCITONIN SERPL-MCNC: 0.23 NG/ML
RBC # BLD AUTO: 4.48 MILLION/UL (ref 3.88–5.62)
SODIUM SERPL-SCNC: 142 MMOL/L (ref 136–145)
WBC # BLD AUTO: 10.84 THOUSAND/UL (ref 4.31–10.16)

## 2020-07-31 PROCEDURE — 73630 X-RAY EXAM OF FOOT: CPT

## 2020-07-31 PROCEDURE — 99222 1ST HOSP IP/OBS MODERATE 55: CPT | Performed by: INTERNAL MEDICINE

## 2020-07-31 PROCEDURE — 85027 COMPLETE CBC AUTOMATED: CPT | Performed by: INTERNAL MEDICINE

## 2020-07-31 PROCEDURE — 99232 SBSQ HOSP IP/OBS MODERATE 35: CPT | Performed by: STUDENT IN AN ORGANIZED HEALTH CARE EDUCATION/TRAINING PROGRAM

## 2020-07-31 PROCEDURE — 80048 BASIC METABOLIC PNL TOTAL CA: CPT | Performed by: INTERNAL MEDICINE

## 2020-07-31 PROCEDURE — 93925 LOWER EXTREMITY STUDY: CPT

## 2020-07-31 PROCEDURE — 93923 UPR/LXTR ART STDY 3+ LVLS: CPT

## 2020-07-31 PROCEDURE — 82948 REAGENT STRIP/BLOOD GLUCOSE: CPT

## 2020-07-31 RX ORDER — INSULIN GLARGINE 100 [IU]/ML
8 INJECTION, SOLUTION SUBCUTANEOUS
Status: DISCONTINUED | OUTPATIENT
Start: 2020-07-31 | End: 2020-08-11

## 2020-07-31 RX ADMIN — SACUBITRIL AND VALSARTAN 1 TABLET: 24; 26 TABLET, FILM COATED ORAL at 10:24

## 2020-07-31 RX ADMIN — INSULIN LISPRO 8 UNITS: 100 INJECTION, SOLUTION INTRAVENOUS; SUBCUTANEOUS at 10:23

## 2020-07-31 RX ADMIN — INSULIN GLARGINE 8 UNITS: 100 INJECTION, SOLUTION SUBCUTANEOUS at 21:52

## 2020-07-31 RX ADMIN — CEFEPIME HYDROCHLORIDE 1000 MG: 1 INJECTION, POWDER, FOR SOLUTION INTRAMUSCULAR; INTRAVENOUS at 10:33

## 2020-07-31 RX ADMIN — RIVAROXABAN 15 MG: 15 TABLET, FILM COATED ORAL at 09:30

## 2020-07-31 RX ADMIN — INSULIN LISPRO 8 UNITS: 100 INJECTION, SOLUTION INTRAVENOUS; SUBCUTANEOUS at 17:57

## 2020-07-31 RX ADMIN — INSULIN LISPRO 2 UNITS: 100 INJECTION, SOLUTION INTRAVENOUS; SUBCUTANEOUS at 22:42

## 2020-07-31 RX ADMIN — INSULIN LISPRO 8 UNITS: 100 INJECTION, SOLUTION INTRAVENOUS; SUBCUTANEOUS at 13:58

## 2020-07-31 RX ADMIN — SACUBITRIL AND VALSARTAN 1 TABLET: 24; 26 TABLET, FILM COATED ORAL at 17:58

## 2020-07-31 RX ADMIN — INSULIN LISPRO 3 UNITS: 100 INJECTION, SOLUTION INTRAVENOUS; SUBCUTANEOUS at 13:58

## 2020-07-31 NOTE — CONSULTS
Consultation - Infectious Disease   Prasad Richter 80 y o  male MRN: [id number]  Unit/Bed#: -01 Encounter: [id number]      IMPRESSION & RECOMMENDATIONS:   Impression/Recommendations: This is a 80 y o  male, with multiple medical problems including chronic Baxter catheterization, presented to ER yesterday with drainage around catheter  He was started antibiotic for presumptive UTI  1  Purulent drainage around Baxter catheter  This appears to have resolved  Patient has no other signs and symptoms of UTI  He has no fever  WBC only slightly above normal range  Given presence of drainage, Baxter catheter need to be exchanged  Otherwise, patient does not need antibiotic  Discontinue antibiotic and observe  Recommend Baxter catheter exchange  Follow-up on pending blood cultures  Can follow up on urine culture but this will most likely have grown due to colonization in patient with chronic Baxter catheter  No antibiotic is needed  2  CKD  Creatinine baseline  Discussed with patient in detail regarding the above plan  Discussed with Dr David Michele from [facility] service  Thank you for this consultation  We will follow along with you  HISTORY OF PRESENT ILLNESS:  Reason for Consult:  Suspected UTI     HPI: Prasad Richter is a 80 y o  male, with multiple medical problems including chronic Baxter catheterization, was sent to the ER yesterday with drainage from this Baxter catheter  Draining was apparently described as purulent and malodorous  On presentation, patient did not have fever but had mild leukocytosis  He was admitted and started on IV cefepime  We are asked to evaluate the patient  At present, patient is comfortable  He has no penile pain  No abdominal flank pain  No fever or chills  Patient has Baxter catheter in place chronically  It is unclear when his catheter was last exchanged  REVIEW OF SYSTEMS:  A complete system-based review was done    Except for what is noted in HPI above, ROS of systems is otherwise negative  PAST MEDICAL HISTORY:  Past Medical History:   Diagnosis Date    Ambulatory dysfunction     Atrial fibrillation (HCC)     CHF (congestive heart failure) (HCC)     Chronic kidney disease     COPD (chronic obstructive pulmonary disease) (HCC)     Diabetes mellitus (HCC)     Hyperlipidemia     Metabolic encephalopathy     Osteomyelitis ([address] )      Past Surgical History:   Procedure Laterality Date    ABDOMINAL SURGERY      JOINT REPLACEMENT      b/l hips     Problem list reviewed  FAMILY HISTORY:  Non-contributory    SOCIAL HISTORY:  Social History     Substance and Sexual Activity   Alcohol Use Not Currently    Comment: occ beer     Social History     Substance and Sexual Activity   Drug Use No     Social History     Tobacco Use   Smoking Status Current Every Day Smoker    Packs/day: 1 00   Smokeless Tobacco Never Used       ALLERGIES:  Allergies   Allergen Reactions    Aspirin GI Intolerance       MEDICATIONS:  All current active medications have been reviewed  Patient is currently on IV cefepime  PHYSICAL EXAM:  Vitals:  Temp:  [97 3 °F (36 3 °C)-98 °F (36 7 °C)] 97 8 °F (36 6 °C)  HR:  [57-81] 65  Resp:  [17-18] 18  BP: (110-163)/(61-68) 111/61  SpO2:  [94 %-97 %] 94 %  Temp (24hrs), Av 7 °F (36 5 °C), Min:97 3 °F (36 3 °C), Max:98 °F (36 7 °C)  Current: Temperature: 97 8 °F (36 6 °C)     Physical Exam:  General:  Well-nourished, well-developed, chronically but not acutely ill appearing, in no acute distress  Awake, alert and oriented x 3  Forgetful but not confused    Eyes:  Conjunctive clear with no hemorrhages or effusions  Oropharynx:  No ulcers, no lesions, pharynx benign, no tonsillitis  Neck:  Supple, no lymphadenopathy, no mass, nontender  Lungs:  Expansion symmetric, no rales, no wheezing, no accessory muscle use  Cardiac:  Regular rate and rhythm, normal S1, normal S2, no murmurs  Abdomen:  Soft, nondistended, non-tender, no HSM  Extremities:  No edema, no erythema, nontender  No ulcers  Skin:  No rashes, no ulcers  Baxter:  No drainage of present  No erythema/warmth  No tenderness  Neurological:  Moves all four extremities spontaneously, sensation grossly intact    LABS, IMAGING, & OTHER STUDIES:  Lab Results:  I have personally reviewed pertinent labs  Results from last 7 days   Lab Units 07/31/20  0541 07/30/20  1902   POTASSIUM mmol/L 4 1 4 5   CHLORIDE mmol/L 106 105   CO2 mmol/L 27 28   BUN mg/dL 50* 60*   CREATININE mg/dL 1 56* 1 82*   EGFR ml/min/1 73sq m 40 33   CALCIUM mg/dL 8 7 8 8   AST U/L  --  85*   ALT U/L  --  42   ALK PHOS U/L  --  87     Results from last 7 days   Lab Units 07/31/20  0541 07/30/20  1902   WBC Thousand/uL 10 84* 10 89*   HEMOGLOBIN g/dL 12 5 12 3   PLATELETS Thousands/uL 387 375     Results from last 7 days   Lab Units 07/30/20  2146 07/30/20  1939   BLOOD CULTURE  Received in Microbiology Lab  Culture in Progress  Received in Microbiology Lab  Culture in Progress  Imaging Studies:   I have personally reviewed pertinent imaging study reports and images in PACS  CXR reviewed personally  No infiltrates or consolidations  EKG, Pathology, and Other Studies:   I have personally reviewed pertinent reports

## 2020-07-31 NOTE — ASSESSMENT & PLAN NOTE
Clinical Images in media section  Wound care evaluated wounds on posterior Right ankle  Foul smell with concern for infection  Will consult podiatry for further evaluation

## 2020-07-31 NOTE — CONSULTS
Consult Note - Wound   Ronny Concepcion 80 y o  male MRN: [id number]  Unit/Bed#: -01 Encounter: [id number]      History and Present Illness:    80year old with afib, stage 3 chronic kidney disease, UTI, type II DM, CHF  Presented with fatigue and discharge from [facility]  Per patient lives at home with sister  Was recently discharge from rehab on Tuesday  Wound care consulted for b/l heel, buttock and back wounds  Patient 2-3 person assist with turns  Per patient good appetite  Primary RN noted patient coughing while eating breakfast, possible speech evaluation  Mccoy in place  Incontinent of stool  Heel noted to be on bed when assessing patient  Sanguinous drainage noted on sheets near left heel  Assessment Findings:     1  POA unstageable left heel PI  This wound could possibly evolve into a stage 3 or 4  Patient in extreme pain when assessing wound  LLE + edema, dry flaky skin, warm  Slight odor and LLE warm  Sanguinous drainage noted on sheets from left heel wound  + s/s of infection, attending josé contacted with concerns  Asked to consult podiatry or general surgery for debridement  100% eschar, edges red, easily bleeding  Unable to visualize wound base  2  POA DTI right heel PI  This wound could possibly evolve into a stage 3 or 4 PI  Deep/light purple  Not blanchable  No drainage noted  Surrounding tissue dry and flaky  No pain noted when assessing this wound  No s/s of infection  3  POA medial back DTI PI  This wound could possibly evolve into a stage 3 or 4 PI  Deep/light purple  Not blanchable  No drainage noted  Surrounding tissue dry and intact  No pain noted when assessing this wound  No s/s of infection  4   POA right buttock DTI PI  This wound could possibly evolve into a stage 3 or 4 PI  Deep/light purple  Not blanchable  No drainage noted  Surrounding tissue dry and intact  No pain noted when assessing this wound  No s/s of infection  5   POA left buttock DTI PI   This wound could possibly evolve into a stage 3 or 4 PI  Deep/light purple  Not blanchable  No drainage noted  Surrounding tissue dry and intact  No pain noted when assessing this wound  No s/s of infection  6  POA sacrum DTI PI  This wound could possibly evolve into a stage 3 or 4 PI  Deep/light purple  Not blanchable  No drainage noted  Surrounding tissue dry and intact  No pain noted when assessing this wound  No s/s of infection  7  POA Left lateral ankle DTI PI  This wound could possibly evolve into a stage 3 or 4 PI  Deep/light purple  Not blanchable  No drainage noted  Surrounding tissue dry and intact  No pain noted when assessing this wound  No s/s of infection  Per Dr Amy Lamb will assess patient for infection and aware of s/s of infection            Skin care Plan:  1-Wash buttocks and sacrum with soap and water  Apply w/Allevyn foam covering deep purple areas, amy with t, change q3d and PRN, check skin q-shift  2-Turn/reposition q2h or when medically stable for pressure re-distribution on skin   3- Cleanse right heel with soap and waster, apply Allevyn foam to right heel, amy w/T, peel foam check skin integrity q-shift  Change q3d   4- Cleanse left heel with NSS  Apply silvasorb gel BID and PRN, cover with Allevyn foam dressing  Amy with T  Change with foam dressing with silvasorb application and PRN  Monitor for worsen signs and symptoms of infection  5  Follow up with Podiatry or general surgery for debridement  6- Left lateral ankle, cleanse soap and water  Apply w/Allevyn foam covering deep purple areas, amy with t, change q3d and PRN, check skin q-shift  7- Order offloading heel protector boots, elevated heels while in bed  8- order P 500 bed   9- Moisturize skin daily with skin nourishing cream  10 -Ehob cushion in chair when out of bed  11 -Hydraguard to bilateral legs BID and PRN  12 Wash back with soap and water   Apply w/Allevyn foam covering deep purple areas, amy with t, change q3d and PRN, check skin q-shift  13- wound care to follow weekly  Please call if concerns prior  Wound 07/31/20 Pressure Injury Heel Left (Active)   Wound Image   7/31/2020  9:31 AM   Wound Description Drainage 7/31/2020  9:31 AM   Staging Unstagable 7/31/2020  9:31 AM   Analisa-wound Assessment Hyperpigmented;Erythema;Fragile;Edema; Swelling 7/31/2020  9:31 AM   Wound Length (cm) 6 cm 7/31/2020  9:29 AM   Wound Width (cm) 6 cm 7/31/2020  9:29 AM   Wound Depth (cm) 0 7/31/2020  9:29 AM   Calculated Wound Area (cm^2) 36 cm^2 7/31/2020  9:29 AM   Calculated Wound Volume (cm^3) 0 cm^3 7/31/2020  9:29 AM   Tunneling 0 cm 7/31/2020  9:31 AM   Undermining 0 7/31/2020  9:31 AM   Closure Open to air 7/31/2020  5:00 AM   Drainage Amount Moderate 7/31/2020  9:31 AM   Drainage Description Sanguineous; Foul smelling 7/31/2020  9:31 AM   Dressing Foam, Silicon (eg  Allevyn, etc); Silvasorb gel 7/31/2020  9:31 AM   Dressing Changed New 7/31/2020  9:31 AM   Patient Tolerance Tolerated poorly 7/31/2020  9:31 AM   Dressing Status Clean;Dry; Intact 7/31/2020  9:31 AM       Wound 07/31/20 Pressure Injury Heel Right (Active)   Wound Image   7/31/2020  9:31 AM   Wound Description Light purple 7/31/2020  9:31 AM   Staging Deep Tissue Injury 7/31/2020  9:31 AM   Analisa-wound Assessment Edema;Fragile 7/31/2020  9:31 AM   Wound Length (cm) 0 8 cm 7/31/2020  9:31 AM   Wound Width (cm) 1 2 cm 7/31/2020  9:31 AM   Wound Depth (cm) 0 7/31/2020  9:31 AM   Calculated Wound Area (cm^2) 0 96 cm^2 7/31/2020  9:31 AM   Calculated Wound Volume (cm^3) 0 cm^3 7/31/2020  9:31 AM   Tunneling 0 cm 7/31/2020  9:31 AM   Undermining 0 7/31/2020  9:31 AM   Drainage Amount None 7/31/2020  9:31 AM   Dressing Foam, Silicon (eg  Allevyn, etc) 7/31/2020  9:31 AM   Dressing Changed New 7/31/2020  9:31 AM   Patient Tolerance Tolerated poorly 7/31/2020  9:31 AM   Dressing Status Clean;Dry; Intact 7/31/2020  9:31 AM       Wound 07/31/20 Pressure Injury Buttocks Right (Active) Wound Description Light purple 7/31/2020  9:34 AM   Staging Deep Tissue Injury 7/31/2020  9:34 AM   Analisa-wound Assessment Fragile; Intact 7/31/2020  9:34 AM   Wound Length (cm) 1 5 cm 7/31/2020  9:34 AM   Wound Width (cm) 1 5 cm 7/31/2020  9:34 AM   Wound Depth (cm) 0 7/31/2020  9:34 AM   Calculated Wound Area (cm^2) 2 25 cm^2 7/31/2020  9:34 AM   Calculated Wound Volume (cm^3) 0 cm^3 7/31/2020  9:34 AM   Tunneling 0 cm 7/31/2020  9:34 AM   Undermining 0 7/31/2020  9:34 AM   Drainage Description Serous 7/31/2020  5:02 AM   Treatments Cleansed;Site care 7/31/2020  9:34 AM   Dressing Foam, Silicon (eg  Allevyn, etc) 7/31/2020  9:34 AM   Dressing Changed New 7/31/2020  9:34 AM   Patient Tolerance Tolerated well 7/31/2020  9:34 AM   Dressing Status Clean;Dry; Intact 7/31/2020  9:34 AM       Wound 07/31/20 Pressure Injury Back Medial (Active)   Wound Image   7/31/2020  9:34 AM   Wound Description Light purple 7/31/2020  9:34 AM   Staging Deep Tissue Injury 7/31/2020  9:34 AM   Analisa-wound Assessment Clean;Dry; Intact 7/31/2020  9:34 AM   Wound Length (cm) 0 5 cm 7/31/2020  9:34 AM   Wound Width (cm) 1 cm 7/31/2020  9:34 AM   Wound Depth (cm) 0 7/31/2020  9:34 AM   Calculated Wound Area (cm^2) 0 5 cm^2 7/31/2020  9:34 AM   Calculated Wound Volume (cm^3) 0 cm^3 7/31/2020  9:34 AM   Tunneling 0 cm 7/31/2020  9:34 AM   Undermining 0 7/31/2020  9:34 AM   Drainage Amount None 7/31/2020  9:34 AM   Treatments Cleansed 7/31/2020  5:03 AM   Dressing Foam, Silicon (eg  Allevyn, etc) 7/31/2020  9:34 AM   Dressing Changed New 7/31/2020  5:03 AM   Patient Tolerance Tolerated well 7/31/2020  9:34 AM   Dressing Status Clean;Dry; Intact 7/31/2020  9:34 AM       Wound 07/31/20 Pressure Injury Ankle Left;Lateral (Active)   Wound Image   7/31/2020  9:34 AM   Wound Description Light purple 7/31/2020  9:34 AM   Staging Deep Tissue Injury 7/31/2020  9:34 AM   Analisa-wound Assessment Hyperpigmented;Fragile;Edema; Swelling;Yellow-brown 7/31/2020  9:34 AM   Wound Length (cm) 1 cm 7/31/2020  9:34 AM   Wound Width (cm) 1 cm 7/31/2020  9:34 AM   Wound Depth (cm) 0 7/31/2020  9:34 AM   Calculated Wound Area (cm^2) 1 cm^2 7/31/2020  9:34 AM   Calculated Wound Volume (cm^3) 0 cm^3 7/31/2020  9:34 AM   Tunneling 0 cm 7/31/2020  9:34 AM   Undermining 0 7/31/2020  9:34 AM   Drainage Amount None 7/31/2020  9:34 AM   Dressing Foam, Silicon (eg  Allevyn, etc) 7/31/2020  9:34 AM   Dressing Changed Changed 7/31/2020  9:34 AM   Patient Tolerance Tolerated poorly 7/31/2020  9:34 AM   Dressing Status Clean;Dry; Intact 7/31/2020  9:34 AM       Wound 07/31/20 Pressure Injury Back Lower; Left (Active)   Wound Description Light purple 7/31/2020  9:34 AM   Staging Deep Tissue Injury 7/31/2020  9:34 AM   Analisa-wound Assessment Fragile; Intact 7/31/2020  9:34 AM   Wound Length (cm) 2 cm 7/31/2020  9:34 AM   Wound Width (cm) 2 cm 7/31/2020  9:34 AM   Wound Depth (cm) 0 7/31/2020  9:34 AM   Calculated Wound Area (cm^2) 4 cm^2 7/31/2020  9:34 AM   Calculated Wound Volume (cm^3) 0 cm^3 7/31/2020  9:34 AM   Tunneling 0 cm 7/31/2020  9:34 AM   Undermining 0 7/31/2020  9:34 AM   Drainage Amount None 7/31/2020  9:34 AM   Dressing Foam, Silicon (eg  Allevyn, etc) 7/31/2020  9:34 AM   Dressing Changed Changed 7/31/2020  9:34 AM   Patient Tolerance Tolerated well 7/31/2020  9:34 AM   Dressing Status Clean;Dry; Intact 7/31/2020  9:34 AM       Wound 07/31/20 Pressure Injury Sacrum Mid (Active)   Wound Description Light purple 7/31/2020  9:34 AM   Staging Deep Tissue Injury 7/31/2020  9:34 AM   Analisa-wound Assessment Fragile; Intact 7/31/2020  9:34 AM   Wound Length (cm) 2 cm 7/31/2020  9:34 AM   Wound Width (cm) 1 cm 7/31/2020  9:34 AM   Wound Depth (cm) 0 7/31/2020  9:34 AM   Calculated Wound Area (cm^2) 2 cm^2 7/31/2020  9:34 AM   Calculated Wound Volume (cm^3) 0 cm^3 7/31/2020  9:34 AM   Tunneling 0 cm 7/31/2020  9:34 AM   Undermining 0 7/31/2020  9:34 AM   Drainage Amount None 7/31/2020  9:34 AM Treatments Cleansed 7/31/2020  9:34 AM   Dressing Foam, Silicon (eg  Allevyn, etc) 7/31/2020  9:34 AM   Patient Tolerance Tolerated well 7/31/2020  9:34 AM   Dressing Status Clean;Dry; Intact 7/31/2020  9:34 AM

## 2020-07-31 NOTE — PLAN OF CARE
Problem: Prexisting or High Potential for Compromised Skin Integrity  Goal: Skin integrity is maintained or improved  Description  INTERVENTIONS:  - Identify patients at risk for skin breakdown  - Assess and monitor skin integrity  - Assess and monitor nutrition and hydration status  - Monitor labs   - Assess for incontinence   - Turn and reposition patient  - Assist with mobility/ambulation  - Relieve pressure over bony prominences  - Avoid friction and shearing  - Provide appropriate hygiene as needed including keeping skin clean and dry  - Evaluate need for skin moisturizer/barrier cream  - Collaborate with interdisciplinary team   - Patient/family teaching  - Consider wound care consult   Outcome: Progressing     Problem: Potential for Falls  Goal: Patient will remain free of falls  Description  INTERVENTIONS:  - Assess patient frequently for physical needs  -  Identify cognitive and physical deficits and behaviors that affect risk of falls    -  [place] fall precautions as indicated by assessment   - Educate patient/family on patient safety including physical limitations  - Instruct patient to call for assistance with activity based on assessment  - Modify environment to reduce risk of injury  - Consider OT/PT consult to assist with strengthening/mobility  Outcome: Progressing     Problem: PAIN - ADULT  Goal: Verbalizes/displays adequate comfort level or baseline comfort level  Description  Interventions:  - Encourage patient to monitor pain and request assistance  - Assess pain using appropriate pain scale  - Administer analgesics based on type and severity of pain and evaluate response  - Implement non-pharmacological measures as appropriate and evaluate response  - Consider cultural and social influences on pain and pain management  - Notify physician/advanced practitioner if interventions unsuccessful or patient reports new pain  Outcome: Progressing     Problem: INFECTION - ADULT  Goal: Absence or prevention of progression during hospitalization  Description  INTERVENTIONS:  - Assess and monitor for signs and symptoms of infection  - Monitor lab/diagnostic results  - Monitor all insertion sites, i e  indwelling lines, tubes, and drains  - Monitor endotracheal if appropriate and nasal secretions for changes in amount and color  - [place] appropriate cooling/warming therapies per order  - Administer medications as ordered  - Instruct and encourage patient and family to use good hand hygiene technique  - Identify and instruct in appropriate isolation precautions for identified infection/condition  Outcome: Progressing  Goal: Absence of fever/infection during neutropenic period  Description  INTERVENTIONS:  - Monitor WBC    Outcome: Progressing     Problem: SAFETY ADULT  Goal: Patient will remain free of falls  Description  INTERVENTIONS:  - Assess patient frequently for physical needs  -  Identify cognitive and physical deficits and behaviors that affect risk of falls    -  [place] fall precautions as indicated by assessment   - Educate patient/family on patient safety including physical limitations  - Instruct patient to call for assistance with activity based on assessment  - Modify environment to reduce risk of injury  - Consider OT/PT consult to assist with strengthening/mobility  Outcome: Progressing  Goal: Maintain or return to baseline ADL function  Description  INTERVENTIONS:  -  Assess patient's ability to carry out ADLs; assess patient's baseline for ADL function and identify physical deficits which impact ability to perform ADLs (bathing, care of mouth/teeth, toileting, grooming, dressing, etc )  - Assess/evaluate cause of self-care deficits   - Assess range of motion  - Assess patient's mobility; develop plan if impaired  - Assess patient's need for assistive devices and provide as appropriate  - Encourage maximum independence but intervene and supervise when necessary  - Involve family in performance of ADLs  - Assess for home care needs following discharge   - Consider OT consult to assist with ADL evaluation and planning for discharge  - Provide patient education as appropriate  Outcome: Progressing  Goal: Maintain or return mobility status to optimal level  Description  INTERVENTIONS:  - Assess patient's baseline mobility status (ambulation, transfers, stairs, etc )    - Identify cognitive and physical deficits and behaviors that affect mobility  - Identify mobility aids required to assist with transfers and/or ambulation (gait belt, sit-to-stand, lift, walker, cane, etc )  - [place] fall precautions as indicated by assessment  - Record patient progress and toleration of activity level on Mobility SBAR; progress patient to next Phase/Stage  - Instruct patient to call for assistance with activity based on assessment  - Consider rehabilitation consult to assist with strengthening/weightbearing, etc   Outcome: Progressing     Problem: DISCHARGE PLANNING  Goal: Discharge to home or other facility with appropriate resources  Description  INTERVENTIONS:  - Identify barriers to discharge w/patient and caregiver  - Arrange for needed discharge resources and transportation as appropriate  - Identify discharge learning needs (meds, wound care, etc )  - Arrange for interpretive services to assist at discharge as needed  - Refer to Case Management Department for coordinating discharge planning if the patient needs post-hospital services based on physician/advanced practitioner order or complex needs related to functional status, cognitive ability, or social support system  Outcome: Progressing     Problem: Knowledge Deficit  Goal: Patient/family/caregiver demonstrates understanding of disease process, treatment plan, medications, and discharge instructions  Description  Complete learning assessment and assess knowledge base    Interventions:  - Provide teaching at level of understanding  - Provide teaching via preferred learning methods  Outcome: Progressing     Problem: GENITOURINARY - ADULT  Goal: Maintains or returns to baseline urinary function  Description  INTERVENTIONS:  - Assess urinary function  - Encourage oral fluids to ensure adequate hydration if ordered  - Administer IV fluids as ordered to ensure adequate hydration  - Administer ordered medications as needed  - Offer frequent toileting  - Follow urinary retention protocol if ordered  Outcome: Progressing  Goal: Absence of urinary retention  Description  INTERVENTIONS:  - Assess patients ability to void and empty bladder  - Monitor I/O  - Bladder scan as needed  - Discuss with physician/AP medications to alleviate retention as needed  - Discuss catheterization for long term situations as appropriate  Outcome: Progressing  Goal: Urinary catheter remains patent  Description  INTERVENTIONS:  - Assess patency of urinary catheter  - If patient has a chronic mccoy, consider changing catheter if non-functioning  - Follow guidelines for intermittent irrigation of non-functioning urinary catheter  Outcome: Progressing     Problem: SKIN/TISSUE INTEGRITY - ADULT  Goal: Skin integrity remains intact  Description  INTERVENTIONS  - Identify patients at risk for skin breakdown  - Assess and monitor skin integrity  - Assess and monitor nutrition and hydration status  - Monitor labs (i e  albumin)  - Assess for incontinence   - Turn and reposition patient  - Assist with mobility/ambulation  - Relieve pressure over bony prominences  - Avoid friction and shearing  - Provide appropriate hygiene as needed including keeping skin clean and dry  - Evaluate need for skin moisturizer/barrier cream  - Collaborate with interdisciplinary team (i e  Nutrition, Rehabilitation, etc )   - Patient/family teaching  Outcome: Progressing  Goal: Incision(s), wounds(s) or drain site(s) healing without S/S of infection  Description  INTERVENTIONS  - Assess and document risk factors for skin impairment   - Assess and document dressing, incision, wound bed, drain sites and surrounding tissue  - Consider nutrition services referral as needed  - Oral mucous membranes remain intact  - Provide patient/ family education  Outcome: Progressing  Goal: Oral mucous membranes remain intact  Description  INTERVENTIONS  - Assess oral mucosa and hygiene practices  - Implement preventative oral hygiene regimen  - Implement oral medicated treatments as ordered  - Initiate Nutrition services referral as needed  Outcome: Progressing

## 2020-07-31 NOTE — H&P
H&P- Gregg Waterman 1933, 80 y o  male MRN: [id number]    Unit/Bed#: ED 27 Encounter: [id number]    Primary Care Provider: Jesica Richardson MD   Date and time admitted to hospital: 7/30/2020  5:23 PM        Chronic diastolic congestive heart failure ([facility] [address] )  Assessment & Plan  Wt Readings from Last 3 Encounters:   07/30/20 80 kg (176 lb 5 9 oz)   07/14/20 85 1 kg (187 lb 9 6 oz)   07/07/20 85 1 kg (187 lb 9 6 oz)       Continue home Entresto  Currently euvolemic      A-fib ([facility] [address] )  Assessment & Plan  Continue home Xarelto    Stage 3 chronic kidney disease ([facility] )  Assessment & Plan  Baseline creatinine 1 4-1 8  Currently at baseline    * UTI (urinary tract infection)  Assessment & Plan  Presented with foul smelling purulence discharge from Baxter catheter  Patient has a history of UTIs  Presented with leukocytosis  Afebrile  Received 1 dose of IV cefepime in the emergency department  UA showed leukocytes and nitrates  Urine culture pending  Blood cultures pending  Continue Zosyn for now  ID consult pending          VTE Prophylaxis: Rivaroxaban (Xarelto)  / sequential compression device   Code Status: Full Code  POLST: POLST form is not discussed and not completed at this time  Discussion with family: Patient    Anticipated Length of Stay:  Patient will be admitted on an Inpatient basis with an anticipated length of stay of  > 2 midnights  Justification for Hospital Stay: UTI    Total Time for Visit, including Counseling / Coordination of Care: 1 hour  Greater than 50% of this total time spent on direct patient counseling and coordination of care  Chief Complaint:   Discharge from Baxter    History of Present Illness:    Gregg Waterman is a 80 y o  male past medical history significant of congestive heart failure, atrial fibrillation, type 2 diabetes who initially presented due to fatigue and discharge from his Baxter  Patient was discharged from skilled nursing home on Tuesday approximately couple days ago    He noted discharge  Which is malodorous and purulence     Denies abdominal pain, fevers, chills, chest pain, shortness of breath, palpitations, cough, or any other symptoms at this time  Review of Systems:    Review of Systems   Constitutional: Positive for fatigue  Negative for appetite change, chills, diaphoresis, fever and unexpected weight change  HENT: Negative for congestion, rhinorrhea and sore throat  Eyes: Negative for photophobia and visual disturbance  Respiratory: Negative for cough, shortness of breath and wheezing  Cardiovascular: Negative for chest pain, palpitations and leg swelling  Gastrointestinal: Negative for abdominal pain, anal bleeding, blood in stool, constipation, diarrhea, nausea and vomiting  Genitourinary: Negative for decreased urine volume, difficulty urinating, dysuria, flank pain, frequency, hematuria and urgency  Musculoskeletal: Negative for arthralgias, back pain, joint swelling and myalgias  Skin: Negative for color change and rash  Neurological: Negative for dizziness, seizures, facial asymmetry, speech difficulty, numbness and headaches  Psychiatric/Behavioral: Negative for agitation, confusion and decreased concentration  The patient is not nervous/anxious  Past Medical and Surgical History:     Past Medical History:   Diagnosis Date    Ambulatory dysfunction     Atrial fibrillation (HCC)     CHF (congestive heart failure) (HCC)     Chronic kidney disease     COPD (chronic obstructive pulmonary disease) (HCC)     Diabetes mellitus (HCC)     Hyperlipidemia     Metabolic encephalopathy     Osteomyelitis ([facility] [address] )        Past Surgical History:   Procedure Laterality Date    ABDOMINAL SURGERY      JOINT REPLACEMENT      b/l hips       Meds/Allergies:    Prior to Admission medications    Medication Sig Start Date End Date Taking?  Authorizing Provider   choline fenofibrate (TRILIPIX) 135 MG capsule Take 135 mg by mouth daily    Historical Provider, MD   ergocalciferol (VITAMIN D2) 50,000 units Take 1 capsule (50,000 Units total) by mouth once a week 2/6/19   Rolando Murry MD   insulin glargine (LANTUS) 100 units/mL subcutaneous injection Inject 8 Units under the skin daily at bedtime 1/31/19   Jess SANCHEZ MD   insulin lispro (HumaLOG) 100 units/mL injection Inject 8-12 Units under the skin 3 (three) times a day before meals    Historical Provider, MD   Rivaroxaban (XARELTO PO) Take 15 mg by mouth    Historical Provider, MD   Sacubitril-Valsartan (ENTRESTO PO) Take 24-26 mg by mouth    Historical Provider, MD     I have reviewed home medications with patient personally  Allergies: Allergies   Allergen Reactions    Aspirin GI Intolerance       Social History:     Marital Status:    Occupation: Retired  Patient Pre-hospital Living Situation: Home  Patient Pre-hospital Level of Mobility: Independent  Patient Pre-hospital Diet Restrictions: Diabetic  Substance Use History:   Social History     Substance and Sexual Activity   Alcohol Use Not Currently    Comment: occ beer     Social History     Tobacco Use   Smoking Status Current Every Day Smoker    Packs/day: 1 00   Smokeless Tobacco Never Used     Social History     Substance and Sexual Activity   Drug Use No       Family History:    Family History   Family history unknown: Yes       Physical Exam:     Vitals:   Blood Pressure: 148/68 (07/30/20 2100)  Pulse: 81 (07/30/20 2100)  Temperature: (!) 97 3 °F (36 3 °C) (07/30/20 1729)  Temp Source: Oral (07/30/20 1729)  Respirations: 17 (07/30/20 2100)  Height: 5' 6" (167 6 cm) (07/30/20 1729)  Weight - Scale: 80 kg (176 lb 5 9 oz) (07/30/20 1729)  SpO2: 97 % (07/30/20 2100)    Physical Exam   Constitutional: He is oriented to person, place, and time  He appears well-developed and well-nourished  No distress  HENT:   Head: Normocephalic and atraumatic  Nose: Nose normal    Mouth/Throat: Oropharynx is clear and moist  No oropharyngeal exudate  Eyes: Pupils are equal, round, and reactive to light  EOM are normal  Right eye exhibits no discharge  Left eye exhibits no discharge  No scleral icterus  Neck: Normal range of motion  Neck supple  No JVD present  Cardiovascular: Normal rate, regular rhythm, normal heart sounds and intact distal pulses  Exam reveals no gallop and no friction rub  No murmur heard  Pulmonary/Chest: Effort normal and breath sounds normal  No respiratory distress  He has no wheezes  He has no rales  He exhibits no tenderness  Abdominal: Soft  Bowel sounds are normal  He exhibits no distension and no mass  There is no tenderness  There is no rebound and no guarding  Musculoskeletal: Normal range of motion  He exhibits no edema, tenderness or deformity  Lymphadenopathy:     He has no cervical adenopathy  Neurological: He is alert and oriented to person, place, and time  He has normal reflexes  Skin: Skin is warm and dry  No rash noted  He is not diaphoretic  No erythema  No pallor  Psychiatric: He has a normal mood and affect  His behavior is normal            Additional Data:     Lab Results: I have personally reviewed pertinent reports  Results from last 7 days   Lab Units 07/30/20 1902   WBC Thousand/uL 10 89*   HEMOGLOBIN g/dL 12 3   HEMATOCRIT % 38 6   PLATELETS Thousands/uL 375   NEUTROS PCT % 84*   LYMPHS PCT % 8*   MONOS PCT % 7   EOS PCT % 0     Results from last 7 days   Lab Units 07/30/20 1902   SODIUM mmol/L 140   POTASSIUM mmol/L 4 5   CHLORIDE mmol/L 105   CO2 mmol/L 28   BUN mg/dL 60*   CREATININE mg/dL 1 82*   ANION GAP mmol/L 7   CALCIUM mg/dL 8 8   ALBUMIN g/dL 2 3*   TOTAL BILIRUBIN mg/dL 0 90   ALK PHOS U/L 87   ALT U/L 42   AST U/L 85*   GLUCOSE RANDOM mg/dL 165*     Results from last 7 days   Lab Units 07/30/20 1902   INR  2 46*             Results from last 7 days   Lab Units 07/30/20 1902   LACTIC ACID mmol/L 1 2       Imaging: I have personally reviewed pertinent reports        XR chest 1 view portable    (Results Pending)       EKG, Pathology, and Other Studies Reviewed on Admission:   · EKG: Reviewed    Allscripts / Epic Records Reviewed: Yes     ** Please Note: This note has been constructed using a voice recognition system   **

## 2020-07-31 NOTE — ASSESSMENT & PLAN NOTE
+ UA, urine culture pending  Did receive dose of Antibiotics in ED, but seen by infectious disease  No current signs of infection  Antibiotics discontinued   Follow up blood and urine culture

## 2020-07-31 NOTE — PROGRESS NOTES
Progress Note - Pratima Castro 1933, 80 y o  male MRN: [id number]    Unit/Bed#: -01 Encounter: [id number]    Primary Care Provider: Bob Hammond MD   Date and time admitted to hospital: 7/30/2020  5:23 PM        Pressure ulcer of ankle  Assessment & Plan  Clinical Images in media section  Wound care evaluated wounds on posterior Right ankle  Foul smell with concern for infection  Will consult podiatry for further evaluation    Chronic diastolic congestive heart failure ([facility] [address] )  Assessment & Plan  Wt Readings from Last 3 Encounters:   07/30/20 80 kg (176 lb 5 9 oz)   07/14/20 85 1 kg (187 lb 9 6 oz)   07/07/20 85 1 kg (187 lb 9 6 oz)     Continue home Entresto  Currently euvolemic    A-fib ([facility] [address] )  Assessment & Plan  Continue home Xarelto    Stage 3 chronic kidney disease ([facility] [address] )  Assessment & Plan  Baseline creatinine 1 4-1 8  Currently at baseline    * Abnormal urinalysis  Assessment & Plan  + UA, urine culture pending  Did receive dose of Antibiotics in ED, but seen by infectious disease  No current signs of infection  Antibiotics discontinued   Follow up blood and urine culture       VTE Pharmacologic Prophylaxis:   Pharmacologic: Rivaroxaban (Xarelto)  Mechanical VTE Prophylaxis in Place: Yes    Patient Centered Rounds: I have performed bedside rounds with nursing staff today  Discussions with Specialists or Other Care Team Provider: Infectious disease    Education and Discussions with Family / Patient: Meliton Dos Santos     Time Spent for Care: 30 minutes  More than 50% of total time spent on counseling and coordination of care as described above  Current Length of Stay: 1 day(s)    Current Patient Status: Inpatient   Certification Statement: The patient will continue to require additional inpatient hospital stay due to Infectious work up    Discharge Plan: To be determined    Code Status: Level 1 - Full Code      Subjective:   Patient has no complaints       Objective:     Vitals:   Temp (24hrs), Av 7 °F (36 5 °C), Min:97 3 °F (36 3 °C), Max:98 °F (36 7 °C)    Temp:  [97 3 °F (36 3 °C)-98 °F (36 7 °C)] 97 8 °F (36 6 °C)  HR:  [57-81] 65  Resp:  [17-18] 18  BP: (110-163)/(61-68) 111/61  SpO2:  [94 %-97 %] 94 %  Body mass index is 28 47 kg/m²  Input and Output Summary (last 24 hours): Intake/Output Summary (Last 24 hours) at 2020 1448  Last data filed at 2020 0439  Gross per 24 hour   Intake    Output 350 ml   Net -350 ml       Physical Exam:     Physical Exam   Constitutional: No distress  Cardiovascular:   Irregularly Irregular    Pulmonary/Chest: Effort normal and breath sounds normal    Abdominal: Soft  Bowel sounds are normal    Genitourinary:   Genitourinary Comments: Baxter intact   Neurological: He is alert  Skin: Skin is warm and dry  Psychiatric: He has a normal mood and affect  His behavior is normal          Additional Data:     Labs:    Results from last 7 days   Lab Units 20  0541 20  1902   WBC Thousand/uL 10 84* 10 89*   HEMOGLOBIN g/dL 12 5 12 3   HEMATOCRIT % 40 1 38 6   PLATELETS Thousands/uL 387 375   NEUTROS PCT %  --  84*   LYMPHS PCT %  --  8*   MONOS PCT %  --  7   EOS PCT %  --  0     Results from last 7 days   Lab Units 20  0541 20  1902   SODIUM mmol/L 142 140   POTASSIUM mmol/L 4 1 4 5   CHLORIDE mmol/L 106 105   CO2 mmol/L 27 28   BUN mg/dL 50* 60*   CREATININE mg/dL 1 56* 1 82*   ANION GAP mmol/L 9 7   CALCIUM mg/dL 8 7 8 8   ALBUMIN g/dL  --  2 3*   TOTAL BILIRUBIN mg/dL  --  0 90   ALK PHOS U/L  --  87   ALT U/L  --  42   AST U/L  --  85*   GLUCOSE RANDOM mg/dL 143* 165*     Results from last 7 days   Lab Units 20  1902   INR  2 46*     Results from last 7 days   Lab Units 20  1133 20  0614 20  2248   POC GLUCOSE mg/dl 247* 139 164*         Results from last 7 days   Lab Units 20  1902   LACTIC ACID mmol/L 1 2   PROCALCITONIN ng/ml 0 23           * I Have Reviewed All Lab Data Listed Above    * Additional Pertinent Lab Tests Reviewed: [address] Admission Reviewed    Imaging:    Imaging Reports Reviewed Today Include: NA  Imaging Personally Reviewed by Myself Includes:  NA    Recent Cultures (last 7 days):     Results from last 7 days   Lab Units 07/30/20 2146 07/30/20  1939   BLOOD CULTURE  Received in Microbiology Lab  Culture in Progress  Received in Microbiology Lab  Culture in Progress  Last 24 Hours Medication List:     Current Facility-Administered Medications:  acetaminophen 650 mg Oral Q6H PRN Ren Medina MD   insulin glargine 8 Units Subcutaneous HS Ren Medina MD   insulin lispro 1-5 Units Subcutaneous HS Ren Medina MD   insulin lispro 1-6 Units Subcutaneous TID AC Ren Medina MD   insulin lispro 8 Units Subcutaneous TID With Nargis Sanchez MD   nicotine 1 patch Transdermal Daily Ren Medina MD   rivaroxaban 15 mg Oral Daily With China Jang MD   sacubitril-valsartan 1 tablet Oral BID Myron Marie MD        Today, Patient Was Seen By: KIRAN Cid      ** Please Note: Dictation voice to text software may have been used in the creation of this document   **

## 2020-07-31 NOTE — ED NOTES
Order for second lactic acid discontinued, first lactic acid within normal limits     Vidya Lindsey RN  07/30/20 2014

## 2020-07-31 NOTE — ASSESSMENT & PLAN NOTE
Wt Readings from Last 3 Encounters:   07/30/20 80 kg (176 lb 5 9 oz)   07/14/20 85 1 kg (187 lb 9 6 oz)   07/07/20 85 1 kg (187 lb 9 6 oz)     Continue home Entresto  Currently euvolemic

## 2020-07-31 NOTE — DISCHARGE INSTR - OTHER ORDERS
1  Apply b/l prevalon boots to patient, remove for skin care and skin assessments  2  Apply skin nourishing cream the entire skin daily for moisture  3  Turn and reposition patient every  2 hours   4  Elevate heels off of bed with pillows to offload pressure   5  Apply EHOB waffle cushion to chair when OOB, if able  6  Continue with p-500 mattress  7  Apply Allevyn foam to R upper back bony promience, amy w/P, peel foam check skin integrity q-shift  Change q5d   8  Cleanse L heel and L lateral ankle wounds with NSS and pat dry  Apply adaptic (oil emulsion) to the L heel wound and top with silver alginate  Apply 3m no sting skin prep to the DTI to the L lateral ankle  Cover the areas with ABD pad and secure the dressings with kerlex wrap  Change daily and PRN for soilage/dislodgement  Offload heel at all times  9  Cleanse b/l buttocks and sacrum with soap and water, pat dry, apply foam dressing (ensure makes good coverage with the wounds)  Peel back and assess the skin every shift  Amy dressing with T  Change every other day and PRN for soilage/dislodgement  If patient soils dressing too often (greater than once per shift) please d/c and use hydraguard cream TID and PRN  10  Cleanse right heel and R posterior ankle/distal leg wounds with NSS and pay dry  Apply maxorb silver to the open wound of the R heel and cover with Allevyn foam dressing  Apply 3m no sting skin prep to the R posterior ankle/distal leg wound  Ensure the wound to the R posterior ankle/distal leg is covered with foam - can use small square bordered Allevyn foam dressing  Amy w/T, peel foam check skin integrity q-shift  Change every day and PRN for soilage/dislodgement  Offload heel at all times

## 2020-07-31 NOTE — ED NOTES
1  CC: discharged Tuesday from skilled facility c/o bilateral lower leg ulcer, UTI from mccoy   2  OS: alert and oriented x 4, hard of hearing  3  Admission R/T injury: no  4  Abnormal labs/vitals, focused assessment: bnp 4924, bun 60, creatinine 1 82, wbc 10 89, U/A shows uti  5  Medications/drips: Cefepime 1000 mg in Dextrose 5 % given 2210  6  Last time narcotics given: 0  7  IV lines/drains/etc: 20 gauge on left AC, 18 F mccoy   8  Isolation status: standard  9  Skin: bilateral lower legs dry, scaly, swelling, redness  10  Ambulation status: assist x 4  11   Phone number: [phone] [facility], RN  07/30/20 4559

## 2020-07-31 NOTE — ASSESSMENT & PLAN NOTE
Presented with foul smelling purulence discharge from Baxter catheter  Patient has a history of UTIs  Presented with leukocytosis    Afebrile  Received 1 dose of IV cefepime in the emergency department  UA showed leukocytes and nitrates  Urine culture pending  Blood cultures pending  Continue Zosyn for now  ID consult pending

## 2020-08-01 LAB
ALBUMIN SERPL BCP-MCNC: 2.3 G/DL (ref 3.5–5)
ALP SERPL-CCNC: 90 U/L (ref 46–116)
ALT SERPL W P-5'-P-CCNC: 46 U/L (ref 12–78)
ANION GAP SERPL CALCULATED.3IONS-SCNC: 6 MMOL/L (ref 4–13)
AST SERPL W P-5'-P-CCNC: 66 U/L (ref 5–45)
BASOPHILS # BLD AUTO: 0.03 THOUSANDS/ΜL (ref 0–0.1)
BASOPHILS NFR BLD AUTO: 0 % (ref 0–1)
BILIRUB SERPL-MCNC: 0.6 MG/DL (ref 0.2–1)
BUN SERPL-MCNC: 47 MG/DL (ref 5–25)
CALCIUM SERPL-MCNC: 8.8 MG/DL (ref 8.3–10.1)
CHLORIDE SERPL-SCNC: 106 MMOL/L (ref 100–108)
CO2 SERPL-SCNC: 30 MMOL/L (ref 21–32)
CREAT SERPL-MCNC: 1.6 MG/DL (ref 0.6–1.3)
EOSINOPHIL # BLD AUTO: 0.12 THOUSAND/ΜL (ref 0–0.61)
EOSINOPHIL NFR BLD AUTO: 1 % (ref 0–6)
ERYTHROCYTE [DISTWIDTH] IN BLOOD BY AUTOMATED COUNT: 15.7 % (ref 11.6–15.1)
GFR SERPL CREATININE-BSD FRML MDRD: 38 ML/MIN/1.73SQ M
GLUCOSE SERPL-MCNC: 151 MG/DL (ref 65–140)
GLUCOSE SERPL-MCNC: 160 MG/DL (ref 65–140)
GLUCOSE SERPL-MCNC: 165 MG/DL (ref 65–140)
GLUCOSE SERPL-MCNC: 218 MG/DL (ref 65–140)
HCT VFR BLD AUTO: 39.2 % (ref 36.5–49.3)
HGB BLD-MCNC: 12 G/DL (ref 12–17)
IMM GRANULOCYTES # BLD AUTO: 0.06 THOUSAND/UL (ref 0–0.2)
IMM GRANULOCYTES NFR BLD AUTO: 1 % (ref 0–2)
LYMPHOCYTES # BLD AUTO: 1.23 THOUSANDS/ΜL (ref 0.6–4.47)
LYMPHOCYTES NFR BLD AUTO: 15 % (ref 14–44)
MAGNESIUM SERPL-MCNC: 2.1 MG/DL (ref 1.6–2.6)
MCH RBC QN AUTO: 27.5 PG (ref 26.8–34.3)
MCHC RBC AUTO-ENTMCNC: 30.6 G/DL (ref 31.4–37.4)
MCV RBC AUTO: 90 FL (ref 82–98)
MONOCYTES # BLD AUTO: 0.61 THOUSAND/ΜL (ref 0.17–1.22)
MONOCYTES NFR BLD AUTO: 7 % (ref 4–12)
NEUTROPHILS # BLD AUTO: 6.45 THOUSANDS/ΜL (ref 1.85–7.62)
NEUTS SEG NFR BLD AUTO: 76 % (ref 43–75)
NRBC BLD AUTO-RTO: 0 /100 WBCS
PLATELET # BLD AUTO: 366 THOUSANDS/UL (ref 149–390)
PMV BLD AUTO: 10.3 FL (ref 8.9–12.7)
POTASSIUM SERPL-SCNC: 4.2 MMOL/L (ref 3.5–5.3)
PROT SERPL-MCNC: 7.4 G/DL (ref 6.4–8.2)
RBC # BLD AUTO: 4.37 MILLION/UL (ref 3.88–5.62)
SODIUM SERPL-SCNC: 142 MMOL/L (ref 136–145)
WBC # BLD AUTO: 8.5 THOUSAND/UL (ref 4.31–10.16)

## 2020-08-01 PROCEDURE — 99232 SBSQ HOSP IP/OBS MODERATE 35: CPT | Performed by: STUDENT IN AN ORGANIZED HEALTH CARE EDUCATION/TRAINING PROGRAM

## 2020-08-01 PROCEDURE — 83735 ASSAY OF MAGNESIUM: CPT | Performed by: STUDENT IN AN ORGANIZED HEALTH CARE EDUCATION/TRAINING PROGRAM

## 2020-08-01 PROCEDURE — 93922 UPR/L XTREMITY ART 2 LEVELS: CPT | Performed by: SURGERY

## 2020-08-01 PROCEDURE — 82948 REAGENT STRIP/BLOOD GLUCOSE: CPT

## 2020-08-01 PROCEDURE — 85025 COMPLETE CBC W/AUTO DIFF WBC: CPT | Performed by: STUDENT IN AN ORGANIZED HEALTH CARE EDUCATION/TRAINING PROGRAM

## 2020-08-01 PROCEDURE — 80053 COMPREHEN METABOLIC PANEL: CPT | Performed by: STUDENT IN AN ORGANIZED HEALTH CARE EDUCATION/TRAINING PROGRAM

## 2020-08-01 PROCEDURE — 93925 LOWER EXTREMITY STUDY: CPT | Performed by: SURGERY

## 2020-08-01 RX ADMIN — INSULIN LISPRO 1 UNITS: 100 INJECTION, SOLUTION INTRAVENOUS; SUBCUTANEOUS at 21:22

## 2020-08-01 RX ADMIN — SACUBITRIL AND VALSARTAN 1 TABLET: 24; 26 TABLET, FILM COATED ORAL at 17:25

## 2020-08-01 RX ADMIN — INSULIN LISPRO 2 UNITS: 100 INJECTION, SOLUTION INTRAVENOUS; SUBCUTANEOUS at 13:04

## 2020-08-01 RX ADMIN — INSULIN LISPRO 1 UNITS: 100 INJECTION, SOLUTION INTRAVENOUS; SUBCUTANEOUS at 08:52

## 2020-08-01 RX ADMIN — INSULIN GLARGINE 8 UNITS: 100 INJECTION, SOLUTION SUBCUTANEOUS at 21:21

## 2020-08-01 RX ADMIN — INSULIN LISPRO 1 UNITS: 100 INJECTION, SOLUTION INTRAVENOUS; SUBCUTANEOUS at 17:23

## 2020-08-01 RX ADMIN — INSULIN LISPRO 8 UNITS: 100 INJECTION, SOLUTION INTRAVENOUS; SUBCUTANEOUS at 13:03

## 2020-08-01 RX ADMIN — INSULIN LISPRO 8 UNITS: 100 INJECTION, SOLUTION INTRAVENOUS; SUBCUTANEOUS at 17:24

## 2020-08-01 RX ADMIN — SACUBITRIL AND VALSARTAN 1 TABLET: 24; 26 TABLET, FILM COATED ORAL at 08:37

## 2020-08-01 RX ADMIN — RIVAROXABAN 15 MG: 15 TABLET, FILM COATED ORAL at 08:37

## 2020-08-01 RX ADMIN — INSULIN LISPRO 8 UNITS: 100 INJECTION, SOLUTION INTRAVENOUS; SUBCUTANEOUS at 08:51

## 2020-08-01 NOTE — PROGRESS NOTES
Progress Note - Sierra Thornton 1933, 80 y o  male MRN: [id number]    Unit/Bed#: -01 Encounter: [id number]    Primary Care Provider: Laurie Resendiz MD   Date and time admitted to hospital: 2020  5:23 PM        Pressure ulcer of ankle  Assessment & Plan  Clinical Images in media section  Wound care evaluated wounds on posterior Right ankle  Foul smell with concern for infection  Will consult podiatry for further evaluation    Chronic diastolic congestive heart failure ([facility] [address] )  Assessment & Plan  Wt Readings from Last 3 Encounters:   20 80 kg (176 lb 5 9 oz)   20 85 1 kg (187 lb 9 6 oz)   20 85 1 kg (187 lb 9 6 oz)     Continue home Entresto  Currently euvolemic    A-fib ([facility] [address] )  Assessment & Plan  Continue home Xarelto    Stage 3 chronic kidney disease ([facility] [address] )  Assessment & Plan  Baseline creatinine 1 4-1 8  Currently at baseline    * Abnormal urinalysis  Assessment & Plan  + UA, urine culture pending  Did receive dose of Antibiotics in ED, but seen by infectious disease  No current signs of infection  Antibiotics discontinued   Follow up blood and urine culture       VTE Pharmacologic Prophylaxis:   Pharmacologic: Rivaroxaban (Xarelto)  Mechanical VTE Prophylaxis in Place: Yes    Patient Centered Rounds: I have performed bedside rounds with nursing staff today  Education and Discussions with Family / Patient: Suad Felix- Daughter over phone    Time Spent for Care: 30 minutes  More than 50% of total time spent on counseling and coordination of care as described above      Current Length of Stay: 2 day(s)    Current Patient Status: Inpatient   Certification Statement: The patient will continue to require additional inpatient hospital stay due to Podiatry evaluation    Discharge Plan: to be determined     Code Status: Level 1 - Full Code      Subjective:   Patient feels better today    Objective:     Vitals:   Temp (24hrs), Av 2 °F (36 8 °C), Min:97 4 °F (36 3 °C), Max:99 °F (37 2 °C)    Temp:  [97 4 °F (36 3 °C)-99 °F (37 2 °C)] 97 7 °F (36 5 °C)  HR:  [68-71] 71  Resp:  [12-18] 18  BP: (106-130)/(52-98) 130/98  SpO2:  [94 %-97 %] 97 %  Body mass index is 28 47 kg/m²  Input and Output Summary (last 24 hours): Intake/Output Summary (Last 24 hours) at 8/1/2020 1350  Last data filed at 8/1/2020 0700  Gross per 24 hour   Intake 250 ml   Output 1470 ml   Net -1220 ml       Physical Exam:     Constitutional: No distress  Cardiovascular:   Irregularly Irregular    Pulmonary/Chest: Effort normal and breath sounds normal    Abdominal: Soft  Bowel sounds are normal    Genitourinary:   Genitourinary Comments: Baxter intact   Neurological: He is alert  Skin: Skin is warm and dry  Psychiatric: He has a normal mood and affect  His behavior is normal      Additional Data:     Labs:    Results from last 7 days   Lab Units 08/01/20  0852   WBC Thousand/uL 8 50   HEMOGLOBIN g/dL 12 0   HEMATOCRIT % 39 2   PLATELETS Thousands/uL 366   NEUTROS PCT % 76*   LYMPHS PCT % 15   MONOS PCT % 7   EOS PCT % 1     Results from last 7 days   Lab Units 08/01/20  0852   SODIUM mmol/L 142   POTASSIUM mmol/L 4 2   CHLORIDE mmol/L 106   CO2 mmol/L 30   BUN mg/dL 47*   CREATININE mg/dL 1 60*   ANION GAP mmol/L 6   CALCIUM mg/dL 8 8   ALBUMIN g/dL 2 3*   TOTAL BILIRUBIN mg/dL 0 60   ALK PHOS U/L 90   ALT U/L 46   AST U/L 66*   GLUCOSE RANDOM mg/dL 151*     Results from last 7 days   Lab Units 07/30/20  1902   INR  2 46*     Results from last 7 days   Lab Units 08/01/20  1136 08/01/20  0657 07/31/20  2048 07/31/20  1724 07/31/20  1133 07/31/20  0614 07/30/20  2248   POC GLUCOSE mg/dl 218* 160* 210* 139 247* 139 164*         Results from last 7 days   Lab Units 07/30/20  1902   LACTIC ACID mmol/L 1 2   PROCALCITONIN ng/ml 0 23           * I Have Reviewed All Lab Data Listed Above  * Additional Pertinent Lab Tests Reviewed:  [facility] Admission Reviewed    Imaging:    Imaging Reports Reviewed Today Include: Vascular study lower extremity  Imaging Personally Reviewed by Myself Includes:  See above    Recent Cultures (last 7 days):     Results from last 7 days   Lab Units 07/30/20  2146 07/30/20  1939   BLOOD CULTURE  No Growth at 24 hrs  No Growth at 24 hrs  Last 24 Hours Medication List:     Current Facility-Administered Medications:  acetaminophen 650 mg Oral Q6H PRN Ren Medina MD   insulin glargine 8 Units Subcutaneous HS Ren Medina MD   insulin lispro 1-5 Units Subcutaneous HS Ren Medina MD   insulin lispro 1-6 Units Subcutaneous TID AC Ren Medina MD   insulin lispro 8 Units Subcutaneous TID With Tristian Nelson MD   nicotine 1 patch Transdermal Daily Ren Medina MD   rivaroxaban 15 mg Oral Daily With Yolis Bardales MD   sacubitril-valsartan 1 tablet Oral BID Cris Hamilton MD        Today, Patient Was Seen By: KIRAN Brown      ** Please Note: Dictation voice to text software may have been used in the creation of this document   **

## 2020-08-01 NOTE — SOCIAL WORK
This CM phoned and spoke to dtr-anderson who confirmed the do not want patient to go back to previous STR  Suad Felix is requesting [facility] or [facility] acute  CM notified patient may not qualify for acute and if she would provide addition SNF preferences, Suad Felix said no she only wants to provide these two    only cm sent referrals

## 2020-08-01 NOTE — SOCIAL WORK
CM resent referral as requested to [facility]  Await callback/Ecin message w/ admission determination

## 2020-08-02 PROBLEM — I70.269 ATHEROSCLEROSIS OF ARTERY OF EXTREMITY WITH GANGRENE (HCC): Status: ACTIVE | Noted: 2020-08-02

## 2020-08-02 LAB
ALBUMIN SERPL BCP-MCNC: 2.1 G/DL (ref 3.5–5)
ALP SERPL-CCNC: 80 U/L (ref 46–116)
ALT SERPL W P-5'-P-CCNC: 35 U/L (ref 12–78)
ANION GAP SERPL CALCULATED.3IONS-SCNC: 5 MMOL/L (ref 4–13)
AST SERPL W P-5'-P-CCNC: 49 U/L (ref 5–45)
BASOPHILS # BLD AUTO: 0.03 THOUSANDS/ΜL (ref 0–0.1)
BASOPHILS NFR BLD AUTO: 0 % (ref 0–1)
BILIRUB SERPL-MCNC: 0.6 MG/DL (ref 0.2–1)
BUN SERPL-MCNC: 41 MG/DL (ref 5–25)
CALCIUM SERPL-MCNC: 8.5 MG/DL (ref 8.3–10.1)
CHLORIDE SERPL-SCNC: 107 MMOL/L (ref 100–108)
CO2 SERPL-SCNC: 29 MMOL/L (ref 21–32)
CREAT SERPL-MCNC: 1.4 MG/DL (ref 0.6–1.3)
EOSINOPHIL # BLD AUTO: 0.17 THOUSAND/ΜL (ref 0–0.61)
EOSINOPHIL NFR BLD AUTO: 2 % (ref 0–6)
ERYTHROCYTE [DISTWIDTH] IN BLOOD BY AUTOMATED COUNT: 15.6 % (ref 11.6–15.1)
GFR SERPL CREATININE-BSD FRML MDRD: 45 ML/MIN/1.73SQ M
GLUCOSE SERPL-MCNC: 115 MG/DL (ref 65–140)
GLUCOSE SERPL-MCNC: 279 MG/DL (ref 65–140)
GLUCOSE SERPL-MCNC: 280 MG/DL (ref 65–140)
GLUCOSE SERPL-MCNC: 75 MG/DL (ref 65–140)
GLUCOSE SERPL-MCNC: 81 MG/DL (ref 65–140)
GLUCOSE SERPL-MCNC: 97 MG/DL (ref 65–140)
HCT VFR BLD AUTO: 37.5 % (ref 36.5–49.3)
HGB BLD-MCNC: 11.9 G/DL (ref 12–17)
IMM GRANULOCYTES # BLD AUTO: 0.06 THOUSAND/UL (ref 0–0.2)
IMM GRANULOCYTES NFR BLD AUTO: 1 % (ref 0–2)
LYMPHOCYTES # BLD AUTO: 1.34 THOUSANDS/ΜL (ref 0.6–4.47)
LYMPHOCYTES NFR BLD AUTO: 16 % (ref 14–44)
MAGNESIUM SERPL-MCNC: 2 MG/DL (ref 1.6–2.6)
MCH RBC QN AUTO: 28.1 PG (ref 26.8–34.3)
MCHC RBC AUTO-ENTMCNC: 31.7 G/DL (ref 31.4–37.4)
MCV RBC AUTO: 89 FL (ref 82–98)
MONOCYTES # BLD AUTO: 0.68 THOUSAND/ΜL (ref 0.17–1.22)
MONOCYTES NFR BLD AUTO: 8 % (ref 4–12)
NEUTROPHILS # BLD AUTO: 6.38 THOUSANDS/ΜL (ref 1.85–7.62)
NEUTS SEG NFR BLD AUTO: 73 % (ref 43–75)
NRBC BLD AUTO-RTO: 0 /100 WBCS
PLATELET # BLD AUTO: 338 THOUSANDS/UL (ref 149–390)
PMV BLD AUTO: 10.3 FL (ref 8.9–12.7)
POTASSIUM SERPL-SCNC: 4.2 MMOL/L (ref 3.5–5.3)
PROT SERPL-MCNC: 6.7 G/DL (ref 6.4–8.2)
RBC # BLD AUTO: 4.23 MILLION/UL (ref 3.88–5.62)
SODIUM SERPL-SCNC: 141 MMOL/L (ref 136–145)
WBC # BLD AUTO: 8.66 THOUSAND/UL (ref 4.31–10.16)

## 2020-08-02 PROCEDURE — 99232 SBSQ HOSP IP/OBS MODERATE 35: CPT | Performed by: STUDENT IN AN ORGANIZED HEALTH CARE EDUCATION/TRAINING PROGRAM

## 2020-08-02 PROCEDURE — 80053 COMPREHEN METABOLIC PANEL: CPT | Performed by: STUDENT IN AN ORGANIZED HEALTH CARE EDUCATION/TRAINING PROGRAM

## 2020-08-02 PROCEDURE — 83735 ASSAY OF MAGNESIUM: CPT | Performed by: STUDENT IN AN ORGANIZED HEALTH CARE EDUCATION/TRAINING PROGRAM

## 2020-08-02 PROCEDURE — 85025 COMPLETE CBC W/AUTO DIFF WBC: CPT | Performed by: STUDENT IN AN ORGANIZED HEALTH CARE EDUCATION/TRAINING PROGRAM

## 2020-08-02 PROCEDURE — NC001 PR NO CHARGE: Performed by: NURSE PRACTITIONER

## 2020-08-02 PROCEDURE — 82948 REAGENT STRIP/BLOOD GLUCOSE: CPT

## 2020-08-02 PROCEDURE — 99223 1ST HOSP IP/OBS HIGH 75: CPT | Performed by: SURGERY

## 2020-08-02 RX ORDER — HYDROMORPHONE HCL/PF 1 MG/ML
0.5 SYRINGE (ML) INJECTION EVERY 4 HOURS PRN
Status: DISCONTINUED | OUTPATIENT
Start: 2020-08-02 | End: 2020-08-18 | Stop reason: HOSPADM

## 2020-08-02 RX ORDER — OXYCODONE HYDROCHLORIDE AND ACETAMINOPHEN 5; 325 MG/1; MG/1
1 TABLET ORAL EVERY 4 HOURS PRN
Status: DISCONTINUED | OUTPATIENT
Start: 2020-08-02 | End: 2020-08-18 | Stop reason: HOSPADM

## 2020-08-02 RX ADMIN — ACETAMINOPHEN 650 MG: 325 TABLET, FILM COATED ORAL at 11:14

## 2020-08-02 RX ADMIN — RIVAROXABAN 15 MG: 15 TABLET, FILM COATED ORAL at 08:13

## 2020-08-02 RX ADMIN — INSULIN GLARGINE 8 UNITS: 100 INJECTION, SOLUTION SUBCUTANEOUS at 22:06

## 2020-08-02 RX ADMIN — INSULIN LISPRO 8 UNITS: 100 INJECTION, SOLUTION INTRAVENOUS; SUBCUTANEOUS at 17:25

## 2020-08-02 RX ADMIN — INSULIN LISPRO 8 UNITS: 100 INJECTION, SOLUTION INTRAVENOUS; SUBCUTANEOUS at 13:38

## 2020-08-02 RX ADMIN — INSULIN LISPRO 4 UNITS: 100 INJECTION, SOLUTION INTRAVENOUS; SUBCUTANEOUS at 13:37

## 2020-08-02 RX ADMIN — INSULIN LISPRO 4 UNITS: 100 INJECTION, SOLUTION INTRAVENOUS; SUBCUTANEOUS at 17:24

## 2020-08-02 RX ADMIN — SACUBITRIL AND VALSARTAN 1 TABLET: 24; 26 TABLET, FILM COATED ORAL at 08:13

## 2020-08-02 NOTE — ASSESSMENT & PLAN NOTE
Wt Readings from Last 3 Encounters:   07/30/20 80 kg (176 lb 5 9 oz)   07/14/20 85 1 kg (187 lb 9 6 oz)   07/07/20 85 1 kg (187 lb 9 6 oz)     · Continue home Entresto  · Currently euvolemic

## 2020-08-02 NOTE — CONSULTS
Consultation - Leatha Infante 80 y o  male MRN: [id number]    Unit/Bed#: -01 Encounter: [id number]      Assessment/Plan     Assessment:  1) Severe PAD  2) Pressure ulcer of the left heel  3) Wound infection left heel with probable osteomyelitis    Plan:  1) With patient's significant PAD, he will not benefit from any surgery from Podiatric standpoint, patient will need Vascular Surgery evaluation, consult is placed  2) Patient will likely need a higher amputation  3) Continue local care  4) Podiatry to sign off at this point, please reconsult if needed    History of Present Illness     HPI: Leatha Infante is a 80y o  year old male who presents with ulcer to the left heel  Patient states that it hurts and he does not know how it started        Consults    Review of Systems   No n/v/f/c/s  No bruising  No hallucinations  No CP  No SOB  No GERD  No dysuria  No headache  No diplopia  No earache  No sore throat  No rashes    Historical Information   Past Medical History:   Diagnosis Date    Ambulatory dysfunction     Atrial fibrillation ([facility]     CHF (congestive heart failure) ([facility])     Chronic kidney disease     [facility]ch[facility]     Diabetes mellitus ([facility] )     Hyperlipidemia     Metabolic encephalopathy     Osteomyelitis ([facility] )      Past Surgical History:   Procedure Laterality Date    ABDOMINAL SURGERY      JOINT REPLACEMENT      b/l hips     Social History   Social History     Substance and Sexual Activity   Alcohol Use Not Currently    Comment: occ beer     Social History     Substance and Sexual Activity   Drug Use No     Social History     Tobacco Use   Smoking Status Current Every Day Smoker    Packs/day: 1 00   Smokeless Tobacco Never Used     E-Cigarette/Vaping    E-Cigarette Use Never User      E-Cigarette/Vaping Substances    Nicotine Yes     THC No     CBD No     Flavoring No     Other No     Unknown No       Family History:   Family History   Family history unknown: Yes       Meds/Allergies   all current active meds have been reviewed, current meds:   Current Facility-Administered Medications   Medication Dose Route Frequency    acetaminophen (TYLENOL) tablet 650 mg  650 mg Oral Q6H PRN    insulin glargine (LANTUS) subcutaneous injection 8 Units 0 08 mL  8 Units Subcutaneous HS    insulin lispro (HumaLOG) 100 units/mL subcutaneous injection 1-5 Units  1-5 Units Subcutaneous HS    insulin lispro (HumaLOG) 100 units/mL subcutaneous injection 1-6 Units  1-6 Units Subcutaneous TID AC    insulin lispro (HumaLOG) 100 units/mL subcutaneous injection 8 Units  8 Units Subcutaneous TID With Meals    nicotine (NICODERM CQ) 7 mg/24hr TD 24 hr patch 1 patch  1 patch Transdermal Daily    rivaroxaban (XARELTO) tablet 15 mg  15 mg Oral Daily With Breakfast    sacubitril-valsartan (ENTRESTO) 24-26 MG per tablet 1 tablet  1 tablet Oral BID    and PTA meds:   Prior to Admission Medications   Prescriptions Last Dose Informant Patient Reported? Taking? Rivaroxaban (XARELTO PO)   Yes No   Sig: Take 15 mg by mouth   Sacubitril-Valsartan (ENTRESTO PO)   Yes No   Sig: Take 24-26 mg by mouth   choline fenofibrate (TRILIPIX) 135 MG capsule   Yes No   Sig: Take 135 mg by mouth daily   ergocalciferol (VITAMIN D2) 50,000 units   No No   Sig: Take 1 capsule (50,000 Units total) by mouth once a week   insulin glargine (LANTUS) 100 units/mL subcutaneous injection   No No   Sig: Inject 8 Units under the skin daily at bedtime   insulin lispro (HumaLOG) 100 units/mL injection   Yes No   Sig: Inject 8-12 Units under the skin 3 (three) times a day before meals      Facility-Administered Medications: None     Allergies   Allergen Reactions    Aspirin GI Intolerance       Objective   Vitals: Blood pressure 105/63, pulse 71, temperature 98 °F (36 7 °C), temperature source Oral, resp  rate 18, height 5' 6", weight 80 kg (176 lb 5 9 oz), SpO2 90 %      Intake/Output Summary (Last 24 hours) at 8/2/2020 0802  Last data filed at 8/2/2020 0554  Gross per 24 hour   Intake 250 ml   Output 975 ml   Net -725 ml     Invasive Devices     Peripheral Intravenous Line            Peripheral IV 07/30/20 Left Antecubital 3 days          Drain            Urethral Catheter Non-latex 18 Fr  1 day                Physical Exam  Patient is awake and alert  Vascular: No pulses, no pedal hair, thin shiny skin, dry scaly skin, CRT increased  Neurological: Sensation is intact, POP to the left heel ulcer, no babinski  Orthopedic: Contractures to lesser digits, ROM is limited to both ankles  Dermatological: Open lesion to the left heel 6 cm x 6 cm x 0 cm, malodor, 100% eschar and necrotic tissue, no purulence, no fluctuence, thin shiny skin     LEADs  CONCLUSION:  Impression:  RIGHT LOWER LIMB:  This resting evaluation shows evidence of significant diffuse lower extremity  arterial occlusive disease with no discrete stenosis visualized  Ankle/Brachial index: 0 6  PVR/ PPG tracings are dampened  Metatarsal pressure of 42 mmHg  Great toe pressure of 34 mmHg, NOT within the healing range  LEFT LOWER LIMB:  This resting evaluation shows evidence of significant diffuse lower extremity  arterial occlusive disease with no discrete stenosis visualized  However, there  is a significant velocity drop and waveform change from distal superior femoral  artery to the proximal popliteal artery  There is a good likely barber of  significant stenosis in this area that is not visualized at this time  Ankle/Brachial index: 0 52  PVR/ PPG tracings are dampened    Metatarsal pressure of 33 mmHg  Great toe pressure of 36 mmHg, NOT within the healing range

## 2020-08-02 NOTE — ASSESSMENT & PLAN NOTE
· Clinical Images in media section  · Wound care evaluated wounds on posterior Right ankle  · Foul smell with concern for infection  · Status post podiatry evaluation, arterial doppler showing extensive bilateral occlusive disease of lower extremities   · Vascular surgery was consulted on recommendations by podiatry  · Nonetheless, patient would prefer conservative management, not interested in amputation

## 2020-08-02 NOTE — PROGRESS NOTES
Progress Note - Solitario Arrieta 1933, 80 y o  male MRN: [id number]    Unit/Bed#: -01 Encounter: [id number]    Primary Care Provider: Josefina Collado MD   Date and time admitted to hospital: 7/30/2020  5:23 PM        Pressure ulcer of ankle  Assessment & Plan  · Clinical Images in media section  · Wound care evaluated wounds on posterior Right ankle  · Foul smell with concern for infection  · Status post podiatry evaluation, arterial doppler showing extensive bilateral occlusive disease of lower extremities   · Vascular surgery was consulted on recommendations by podiatry  · Nonetheless, patient would prefer conservative management, not interested in amputation    Chronic diastolic congestive heart failure ([facility] )  Assessment & Plan  Wt Readings from Last 3 Encounters:   07/30/20 80 kg (176 lb 5 9 oz)   07/14/20 85 1 kg (187 lb 9 6 oz)   07/07/20 85 1 kg (187 lb 9 6 oz)     · Continue home Entresto  · Currently euvolemic    A-fib ([facility] )  Assessment & Plan  · Continue home Xarelto    Stage 3 chronic [facility] ([facility] )  Assessment & Plan  · Baseline creatinine 1 4-1 8  · Currently at baseline    * Abnormal urinalysis  Assessment & Plan  · + UA, urine culture growing GNR, will discuss with ID in AM  · Did receive dose of Antibiotics in ED, but seen by infectious disease and recommending discontinuation of antibiotics secondary to lack of symptoms and chronic mccoy suggestive of colonization        VTE Pharmacologic Prophylaxis:   Pharmacologic: Rivaroxaban (Xarelto)  Mechanical VTE Prophylaxis in Place: Yes    Patient Centered Rounds: I have performed bedside rounds with nursing staff today  Discussions with Specialists or Other Care Team Provider: Podiatry    Education and Discussions with Family / Patient: Daughter at bedside     Time Spent for Care: 30 minutes  More than 50% of total time spent on counseling and coordination of care as described above      Current Length of Stay: 3 day(s)    Current Patient Status: Inpatient   Certification Statement: The patient will continue to require additional inpatient hospital stay due to vascular surgery evaluation    Discharge Plan: To be determined    Code Status: Level 1 - Full Code      Subjective:   Patient feels well today    Objective:     Vitals:   Temp (24hrs), Av 7 °F (36 5 °C), Min:97 2 °F (36 2 °C), Max:98 °F (36 7 °C)    Temp:  [97 2 °F (36 2 °C)-98 °F (36 7 °C)] 98 °F (36 7 °C)  HR:  [71-72] 71  Resp:  [18] 18  BP: (105-112)/(63-65) 110/65  SpO2:  [90 %-100 %] 90 %  Body mass index is 28 47 kg/m²  Input and Output Summary (last 24 hours): Intake/Output Summary (Last 24 hours) at 2020 1210  Last data filed at 2020 0554  Gross per 24 hour   Intake 250 ml   Output 975 ml   Net -725 ml       Physical Exam:     Constitutional: No distress  Cardiovascular:   Irregularly Irregular    Pulmonary/Chest: Effort normal and breath sounds normal    Abdominal: Soft  Bowel sounds are normal    Genitourinary:   Genitourinary Comments: Baxter intact   Neurological: He is alert  Skin: Skin is warm and dry  See clinical images for lower extremity description  Psychiatric: He has a normal mood and affect   His behavior is normal        Additional Data:     Labs:    Results from last 7 days   Lab Units 20  0552   WBC Thousand/uL 8 66   HEMOGLOBIN g/dL 11 9*   HEMATOCRIT % 37 5   PLATELETS Thousands/uL 338   NEUTROS PCT % 73   LYMPHS PCT % 16   MONOS PCT % 8   EOS PCT % 2     Results from last 7 days   Lab Units 20  0552   SODIUM mmol/L 141   POTASSIUM mmol/L 4 2   CHLORIDE mmol/L 107   CO2 mmol/L 29   BUN mg/dL 41*   CREATININE mg/dL 1 40*   ANION GAP mmol/L 5   CALCIUM mg/dL 8 5   ALBUMIN g/dL 2 1*   TOTAL BILIRUBIN mg/dL 0 60   ALK PHOS U/L 80   ALT U/L 35   AST U/L 49*   GLUCOSE RANDOM mg/dL 97     Results from last 7 days   Lab Units 20  1902   INR  2 46*     Results from last 7 days   Lab Units 20  1055 20  0612 08/01/20  1530 08/01/20  1136 08/01/20  0657 07/31/20  2048 07/31/20  1724 07/31/20  1133 07/31/20  0614 07/30/20  2248   POC GLUCOSE mg/dl 280* 81 165* 218* 160* 210* 139 247* 139 164*         Results from last 7 days   Lab Units 07/30/20  1902   LACTIC ACID mmol/L 1 2   PROCALCITONIN ng/ml 0 23           * I Have Reviewed All Lab Data Listed Above  * Additional Pertinent Lab Tests Reviewed: Davion [facility] Admission Reviewed    Imaging:    Imaging Reports Reviewed Today Include: Arterial duplex  Imaging Personally Reviewed by Myself Includes: Arterial duplex    Recent Cultures (last 7 days):     Results from last 7 days   Lab Units 07/30/20  2146 07/30/20  2101 07/30/20  1939   BLOOD CULTURE  No Growth at 48 hrs  --  No Growth at 48 hrs  URINE CULTURE   --  >100,000 cfu/ml Gram Negative Cameron*  7065-0133 cfu/ml Gram Negative Cameron*  --        Last 24 Hours Medication List:   acetaminophen, 650 mg, Oral, Q6H PRN, Ren Medina MD  insulin glargine, 8 Units, Subcutaneous, HS, Ren Medina MD  insulin lispro, 1-5 Units, Subcutaneous, HS, Ren Medina MD  insulin lispro, 1-6 Units, Subcutaneous, TID AC, Ren Medina MD  insulin lispro, 8 Units, Subcutaneous, TID With Meals, Ren Medina MD  nicotine, 1 patch, Transdermal, Daily, Ren Medina MD  rivaroxaban, 15 mg, Oral, Daily With Breakfast, Ren Medina MD  sacubitril-valsartan, 1 tablet, Oral, BID, Zoraida Benitez MD         Today, Patient Was Seen By: KIRAN Longoria      ** Please Note: Dictation voice to text software may have been used in the creation of this document   **

## 2020-08-02 NOTE — ASSESSMENT & PLAN NOTE
· + UA, urine culture growing GNR, will discuss with ID in AM  · Did receive dose of Antibiotics in ED, but seen by infectious disease and recommending discontinuation of antibiotics secondary to lack of symptoms and chronic mccoy suggestive of colonization

## 2020-08-02 NOTE — ASSESSMENT & PLAN NOTE
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, COPD, osteoarthritis bilateral knee and history of bilateral hip replacements, recurrent UTI chronic Baxter placement who presented with discharge from Baxter catheter also noted to have bilateral heel ulcers, left greater than right with underlying peripheral arterial disease and vascular is consulted for evaluation    Diagnostics:  LEAD 7/31 showed diffuse right lower extremity disease without focal stenosis right JAMAL 0 6/42/34 and left lower extremity with diffuse disease, no focal stenosis, decrease in velocities in SFA to popliteal artery suggestive of possible stenosis he has a left JAMAL 0 52/33/36  Xray foot 7/31 pending     Labs:  Creatinine 1 4/GFR 45    Plan:  -Bilateral heel pressure ulcers, left significantly worse than right with underlying peripheral arterial disease and chronic kidney disease  -Recommended left lower extremity angiogram with revascularization and attempt for limb salvage  -Angiogram procedure likely to be difficult as patient was unable to straighten the left leg due to heel pain  May have contracture of the left knee  Likely will require anesthesia to lie still for procedure and straighten left leg  -Discussed with patient and daughter  Verbalized they want all measures taken to save the left leg    Not accepting of limb amputation at this time   -He remains high risk for limb loss  -Will consult Nephrology for renal optimization for angiogram   -Off load heels with pressure bed   -Betadine paint to heel   -Will discuss to Dr Mik George

## 2020-08-02 NOTE — CONSULTS
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, COPD, osteoarthritis bilateral knee and history of bilateral hip replacements, recurrent UTI chronic Baxter placement who presented with discharge from Baxter catheter also noted to have bilateral heel ulcers, left greater than right with underlying peripheral arterial disease and vascular is consulted for evaluation    Diagnostics:  LEAD 7/31 showed diffuse right lower extremity disease without focal stenosis right JAMAL 0 6/42/34 and left lower extremity with diffuse disease, no focal stenosis, decrease in velocities in SFA to popliteal artery suggestive of possible stenosis he has a left JAMAL 0 52/33/36  Xray foot 7/31 pending     Labs:  Creatinine 1 4/GFR 45    Plan:  -Bilateral heel pressure ulcers, left significantly worse than right with underlying peripheral arterial disease and chronic kidney disease  -Recommended left lower extremity angiogram with revascularization and attempt for limb salvage  -Angiogram procedure likely to be difficult as patient was unable to straighten the left leg due to heel pain  May have contracture of the left knee  Likely will require anesthesia to lie still for procedure and straighten left leg  -Discussed with patient and daughter  Verbalized they want all measures taken to save the left leg    Not accepting of limb amputation at this time   -He remains high risk for limb loss  -Will consult Nephrology for renal optimization for angiogram   -Off load heels with pressure bed   -Betadine paint to heel   -Will discuss to Dr Neema Hatch Pressure ulcer of ankle  Assessment & Plan  L>R heel ulcer   -Off load pressure   -plan for angiogram     Stage 3 chronic kidney disease ([address] )  Assessment & Plan  Chronic kidney disease   Creatinine 1 82 on admission 1 4 today  Will consult Nephrology for renal optimization in preparation for angiogram         ______________________________________________________________________    Consulting Service: SLIM    Chief Complaint: Left heel ulcer     HPI: Solitario Arrieta is a 80 y o  male who presents with purulence drainage at the Baxter catheter  Patient also noted to have bilateral heel pressure ulcers, left greater than right  He has a past medical history significant for chronic diastolic heart failure, diabetes, osteoarthritis with history of bilateral hip replacements, 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 and significant osteoarthritis of bilateral knees  He is very minimally ambulatory, daughter reports he is able to pivott and requires a walker and 1 person assistance for toileting  He has not walked in the past 5 years due to arthritis of the knee  He was hospitalized in June and then discharged to rehabilitation center for about a month her daughter and when he was discharged home with heel ulcerations  More recently he started complaining of pain to the left heel  He has significant pressure ulcer on the left heel  He is able to lie flat though not completely straighten the left lower extremity  He has chronic kidney disease and had previously seen a nephrologist more than 3 years ago per the daughter  Daughter exhibits blaming behavior  We reviewed arterial duplex, discussed possibility of angiogram and attempt at limb salvage as well as the risk of limb loss  Patient and daughter verbalized they want to have everything done      Review of Systems:  General: positive for  - weakness  Cardiovascular: no chest pain or dyspnea on exertion  Respiratory: no cough, shortness of breath, or wheezing  Gastrointestinal: negative  Genitourinary ROS: positive for - dysuria, urinary frequency/urgency and mccoy catheter  Musculoskeletal ROS: positive for - gait disturbance, joint stiffness and pain in knee - left and left heel   Neurological ROS: no TIA or stroke symptoms  Hematological and Lymphatic ROS: positive for - bleeding problems and blood clots  Dermatological ROS: positive for heel wound  Psychological ROS: negative  Ophthalmic ROS: negative  ENT ROS: negative    Past Medical History:  Past Medical History:   Diagnosis Date    Ambulatory dysfunction     Atrial fibrillation (HCC)     [facility]     Chronic kidney disease     COPD (chronic obstructive pulmonary disease) ([facility]     Diabetes mellitus ([place] [address] )     Hyperlipidemia     Metabolic encephalopathy     Osteomyelitis ([place] [address] )        Past Surgical History:  Past Surgical History:   Procedure Laterality Date    ABDOMINAL SURGERY      JOINT REPLACEMENT      b/l hips       Social History:  Social History     Substance and Sexual Activity   Alcohol Use Not Currently    Comment: occ beer     Social History     Substance and Sexual Activity   Drug Use No     Social History     Tobacco Use   Smoking Status Current Every Day Smoker    Packs/day: 1 00   Smokeless Tobacco Never Used       Family History:  Family History   Family history unknown: Yes       Allergies:   Allergies   Allergen Reactions    Aspirin GI Intolerance       Medications:  Current Facility-Administered Medications   Medication Dose Route Frequency    acetaminophen (TYLENOL) tablet 650 mg  650 mg Oral Q6H PRN    HYDROmorphone (DILAUDID) injection 0 5 mg  0 5 mg Intravenous Q4H PRN    insulin glargine (LANTUS) subcutaneous injection 8 Units 0 08 mL  8 Units Subcutaneous HS    insulin lispro (HumaLOG) 100 units/mL subcutaneous injection 1-5 Units  1-5 Units Subcutaneous HS    insulin lispro (HumaLOG) 100 units/mL subcutaneous injection 1-6 Units  1-6 Units Subcutaneous TID AC    insulin lispro (HumaLOG) 100 units/mL subcutaneous injection 8 Units  8 Units Subcutaneous TID With Meals    nicotine (NICODERM CQ) 7 mg/24hr TD 24 hr patch 1 patch  1 patch Transdermal Daily    oxyCODONE-acetaminophen (PERCOCET) 5-325 mg per tablet 1 tablet  1 tablet Oral Q4H PRN    rivaroxaban (XARELTO) tablet 15 mg  15 mg Oral Daily With Breakfast    sacubitril-valsartan (ENTRESTO) 24-26 MG per tablet 1 tablet  1 tablet Oral BID       Vitals:  Vitals:    08/02/20 0741   BP:    Pulse:    Resp:    Temp:    SpO2: 90%       I/Os:  I/O last 3 completed shifts: In: 250 [P O :250]  Out: 1725 [Urine:1725]  No intake/output data recorded      Lab Results and Cultures:   CBC with diff:   Lab Results   Component Value Date    WBC 8 66 08/02/2020    HGB 11 9 (L) 08/02/2020    HCT 37 5 08/02/2020    MCV 89 08/02/2020     08/02/2020    MCH 28 1 08/02/2020    MCHC 31 7 08/02/2020    RDW 15 6 (H) 08/02/2020    MPV 10 3 08/02/2020    NRBC 0 08/02/2020   ,   BMP/CMP:  Lab Results   Component Value Date     04/01/2018    K 4 2 08/02/2020    K 4 6 04/01/2018     08/02/2020     (H) 04/01/2018    CO2 29 08/02/2020    CO2 23 04/01/2018    ANIONGAP 14 6 04/01/2018    BUN 41 (H) 08/02/2020    BUN 39 (H) 04/01/2018    CREATININE 1 40 (H) 08/02/2020    CREATININE 1 61 (H) 04/01/2018    CALCIUM 8 5 08/02/2020    CALCIUM 8 6 04/01/2018    AST 49 (H) 08/02/2020    AST 57 (H) 03/29/2018    ALT 35 08/02/2020    ALT 47 (H) 03/29/2018    ALKPHOS 80 08/02/2020    ALKPHOS 58 03/29/2018    PROT 6 0 (L) 03/29/2018    BILITOT 0 8 03/29/2018    EGFR 45 08/02/2020   ,   Lipid Panel: No results found for: CHOL,   Coags:   Lab Results   Component Value Date    PTT 72 (H) 07/30/2020    INR 2 46 (H) 07/30/2020   ,     Blood Culture:   Lab Results   Component Value Date    BLOODCX No Growth at 48 hrs  07/30/2020   ,   Urinalysis:   Lab Results   Component Value Date    COLORU Yellow 07/30/2020 CLARITYU Clear 07/30/2020    SPECGRAV 1 025 07/30/2020    PHUR 5 5 07/30/2020    PHUR 7 0 01/28/2019    LEUKOCYTESUR Moderate (A) 07/30/2020    NITRITE Positive (A) 07/30/2020    GLUCOSEU Negative 07/30/2020    KETONESU Negative 07/30/2020    BILIRUBINUR Negative 07/30/2020    BLOODU Large (A) 07/30/2020   ,   Urine Culture:   Lab Results   Component Value Date    URINECX >100,000 cfu/ml Gram Negative Cameron (A) 07/30/2020    URINECX 3871-0337 cfu/ml Gram Negative [place] (A) 07/30/2020   ,   Wound Culure: No results found for: WOUNDCULT    Imaging:     [facility] REPORT  CLINICAL:  Indications:  PVD, Unspecified [I73 9]  Non healing wounds bilaterally on heel  FINDINGS:     Right                  Impression      PSV (cm/s)  EDV    Common Femoral Artery                          98    0    Prox Profunda                                  44    6    Prox SFA                                       66    0    Mid SFA                                        80    9    Dist SFA                                       44    0    Distal Pop                                     53    1    Dist Post Tibial                               30    7    Dist  Ant  Tibial                              64   16    Dist Peroneal          Not visualized                        Left                   Impression      PSV (cm/s)  EDV    Common Femoral Artery                          75    8    Prox Profunda                                  61    0    Prox SFA                                      118   16    Mid SFA                                        94   10    Dist SFA                                      106   14    Proximal Pop                                   48   13    Prox Post Tibial       Not visualized                     Dist Post Tibial       Occluded                           Prox  Ant  Tibial      Not visualized                     Dist  Ant   Tibial                              54   21    Dist Peroneal          Not visualized CONCLUSION:  Impression:  RIGHT LOWER LIMB:  This resting evaluation shows evidence of significant diffuse lower extremity  arterial occlusive disease with no discrete stenosis visualized  Ankle/Brachial index: 0 6  PVR/ PPG tracings are dampened  Metatarsal pressure of 42 mmHg  Great toe pressure of 34 mmHg, NOT within the healing range  LEFT LOWER LIMB:  This resting evaluation shows evidence of significant diffuse lower extremity  arterial occlusive disease with no discrete stenosis visualized  However, there  is a significant velocity drop and waveform change from distal superior femoral  artery to the proximal popliteal artery  There is a good likely barber of  significant stenosis in this area that is not visualized at this time  Ankle/Brachial index: 0 52  PVR/ PPG tracings are dampened  Metatarsal pressure of 33 mmHg  Great toe pressure of 36 mmHg, NOT within the healing range       Physical Exam:    General appearance: Patient alert, chronically ill-appearing, calling out in pain to left heel  Head: Normocephalic, without obvious abnormality, atraumatic  Eyes: EOMI  Throat: Dry oral mucosa  Neck: no adenopathy, no carotid bruit, no JVD and supple, symmetrical, trachea midline  Back: negative  Lungs: clear to auscultation bilaterally  Chest wall: no tenderness  Heart: regular rate and rhythm  Abdomen: soft, non-tender; bowel sounds normal; no masses,  no organomegaly  Genitalia: deferred  Rectal: deferred  Extremities: BLE trace swelling with chronic skin changes and hyperpigmentations below knee to foot bilaterally   Large left heel DTI approximately 4x4cm and small left heel ulceratiion with dry scaling skin  Neurologic: Grossly normal    Wound/Incision:  Left heel        right heel           Pulse exam:  Femoral: Right: 2+ Left: 2+  Popliteal: Right: non-palpable Left: non-palpable  DP: Right: doppler signal and non-palpable Left: doppler signal and non-palpable  PT: Right: non-palpable Left: non-palpable       Tundeelpidio Brendan, [address]  8/2/2020

## 2020-08-03 LAB
ALBUMIN SERPL BCP-MCNC: 2.1 G/DL (ref 3.5–5)
ALP SERPL-CCNC: 78 U/L (ref 46–116)
ALT SERPL W P-5'-P-CCNC: 35 U/L (ref 12–78)
ANION GAP SERPL CALCULATED.3IONS-SCNC: 6 MMOL/L (ref 4–13)
AST SERPL W P-5'-P-CCNC: 48 U/L (ref 5–45)
BACTERIA UR CULT: ABNORMAL
BASOPHILS # BLD AUTO: 0.05 THOUSANDS/ΜL (ref 0–0.1)
BASOPHILS NFR BLD AUTO: 1 % (ref 0–1)
BILIRUB SERPL-MCNC: 0.5 MG/DL (ref 0.2–1)
BUN SERPL-MCNC: 36 MG/DL (ref 5–25)
CALCIUM SERPL-MCNC: 8.8 MG/DL (ref 8.3–10.1)
CHLORIDE SERPL-SCNC: 106 MMOL/L (ref 100–108)
CO2 SERPL-SCNC: 29 MMOL/L (ref 21–32)
CREAT SERPL-MCNC: 1.24 MG/DL (ref 0.6–1.3)
EOSINOPHIL # BLD AUTO: 0.18 THOUSAND/ΜL (ref 0–0.61)
EOSINOPHIL NFR BLD AUTO: 2 % (ref 0–6)
ERYTHROCYTE [DISTWIDTH] IN BLOOD BY AUTOMATED COUNT: 15.7 % (ref 11.6–15.1)
GFR SERPL CREATININE-BSD FRML MDRD: 52 ML/MIN/1.73SQ M
GLUCOSE SERPL-MCNC: 131 MG/DL (ref 65–140)
GLUCOSE SERPL-MCNC: 131 MG/DL (ref 65–140)
GLUCOSE SERPL-MCNC: 140 MG/DL (ref 65–140)
GLUCOSE SERPL-MCNC: 294 MG/DL (ref 65–140)
HCT VFR BLD AUTO: 37.5 % (ref 36.5–49.3)
HGB BLD-MCNC: 11.9 G/DL (ref 12–17)
IMM GRANULOCYTES # BLD AUTO: 0.08 THOUSAND/UL (ref 0–0.2)
IMM GRANULOCYTES NFR BLD AUTO: 1 % (ref 0–2)
LYMPHOCYTES # BLD AUTO: 1.47 THOUSANDS/ΜL (ref 0.6–4.47)
LYMPHOCYTES NFR BLD AUTO: 17 % (ref 14–44)
MAGNESIUM SERPL-MCNC: 2 MG/DL (ref 1.6–2.6)
MCH RBC QN AUTO: 28.1 PG (ref 26.8–34.3)
MCHC RBC AUTO-ENTMCNC: 31.7 G/DL (ref 31.4–37.4)
MCV RBC AUTO: 89 FL (ref 82–98)
MONOCYTES # BLD AUTO: 0.58 THOUSAND/ΜL (ref 0.17–1.22)
MONOCYTES NFR BLD AUTO: 7 % (ref 4–12)
NEUTROPHILS # BLD AUTO: 6.4 THOUSANDS/ΜL (ref 1.85–7.62)
NEUTS SEG NFR BLD AUTO: 72 % (ref 43–75)
NRBC BLD AUTO-RTO: 0 /100 WBCS
PLATELET # BLD AUTO: 339 THOUSANDS/UL (ref 149–390)
PMV BLD AUTO: 10 FL (ref 8.9–12.7)
POTASSIUM SERPL-SCNC: 4.3 MMOL/L (ref 3.5–5.3)
PROT SERPL-MCNC: 6.8 G/DL (ref 6.4–8.2)
RBC # BLD AUTO: 4.23 MILLION/UL (ref 3.88–5.62)
SODIUM SERPL-SCNC: 141 MMOL/L (ref 136–145)
WBC # BLD AUTO: 8.76 THOUSAND/UL (ref 4.31–10.16)

## 2020-08-03 PROCEDURE — 80053 COMPREHEN METABOLIC PANEL: CPT | Performed by: STUDENT IN AN ORGANIZED HEALTH CARE EDUCATION/TRAINING PROGRAM

## 2020-08-03 PROCEDURE — 99233 SBSQ HOSP IP/OBS HIGH 50: CPT | Performed by: SURGERY

## 2020-08-03 PROCEDURE — 99232 SBSQ HOSP IP/OBS MODERATE 35: CPT | Performed by: INTERNAL MEDICINE

## 2020-08-03 PROCEDURE — 85025 COMPLETE CBC W/AUTO DIFF WBC: CPT | Performed by: STUDENT IN AN ORGANIZED HEALTH CARE EDUCATION/TRAINING PROGRAM

## 2020-08-03 PROCEDURE — 99223 1ST HOSP IP/OBS HIGH 75: CPT | Performed by: INTERNAL MEDICINE

## 2020-08-03 PROCEDURE — 99232 SBSQ HOSP IP/OBS MODERATE 35: CPT | Performed by: STUDENT IN AN ORGANIZED HEALTH CARE EDUCATION/TRAINING PROGRAM

## 2020-08-03 PROCEDURE — 82948 REAGENT STRIP/BLOOD GLUCOSE: CPT

## 2020-08-03 PROCEDURE — 83735 ASSAY OF MAGNESIUM: CPT | Performed by: STUDENT IN AN ORGANIZED HEALTH CARE EDUCATION/TRAINING PROGRAM

## 2020-08-03 RX ADMIN — INSULIN LISPRO 8 UNITS: 100 INJECTION, SOLUTION INTRAVENOUS; SUBCUTANEOUS at 16:34

## 2020-08-03 RX ADMIN — INSULIN LISPRO 4 UNITS: 100 INJECTION, SOLUTION INTRAVENOUS; SUBCUTANEOUS at 13:35

## 2020-08-03 RX ADMIN — RIVAROXABAN 15 MG: 15 TABLET, FILM COATED ORAL at 10:26

## 2020-08-03 RX ADMIN — INSULIN LISPRO 8 UNITS: 100 INJECTION, SOLUTION INTRAVENOUS; SUBCUTANEOUS at 10:27

## 2020-08-03 RX ADMIN — NICOTINE 1 PATCH: 7 PATCH TRANSDERMAL at 10:26

## 2020-08-03 RX ADMIN — INSULIN LISPRO 8 UNITS: 100 INJECTION, SOLUTION INTRAVENOUS; SUBCUTANEOUS at 13:35

## 2020-08-03 NOTE — PLAN OF CARE
Problem: Prexisting or High Potential for Compromised Skin Integrity  Goal: Skin integrity is maintained or improved  Description: INTERVENTIONS:  - Identify patients at risk for skin breakdown  - Assess and monitor skin integrity  - Assess and monitor nutrition and hydration status  - Monitor labs   - Assess for incontinence   - Turn and reposition patient  - Assist with mobility/ambulation  - Relieve pressure over bony prominences  - Avoid friction and shearing  - Provide appropriate hygiene as needed including keeping skin clean and dry  - Evaluate need for skin moisturizer/barrier cream  - Collaborate with interdisciplinary team   - Patient/family teaching  - Consider wound care consult   Outcome: Progressing     Problem: Potential for Falls  Goal: Patient will remain free of falls  Description: INTERVENTIONS:  - Assess patient frequently for physical needs  -  Identify cognitive and physical deficits and behaviors that affect risk of falls    -  [place] fall precautions as indicated by assessment   - Educate patient/family on patient safety including physical limitations  - Instruct patient to call for assistance with activity based on assessment  - Modify environment to reduce risk of injury  - Consider OT/PT consult to assist with strengthening/mobility  Outcome: Progressing     Problem: PAIN - ADULT  Goal: Verbalizes/displays adequate comfort level or baseline comfort level  Description: Interventions:  - Encourage patient to monitor pain and request assistance  - Assess pain using appropriate pain scale  - Administer analgesics based on type and severity of pain and evaluate response  - Implement non-pharmacological measures as appropriate and evaluate response  - Consider cultural and social influences on pain and pain management  - Notify physician/advanced practitioner if interventions unsuccessful or patient reports new pain  Outcome: Progressing     Problem: INFECTION - ADULT  Goal: Absence or prevention of progression during hospitalization  Description: INTERVENTIONS:  - Assess and monitor for signs and symptoms of infection  - Monitor lab/diagnostic results  - Monitor all insertion sites, i e  indwelling lines, tubes, and drains  - Monitor endotracheal if appropriate and nasal secretions for changes in amount and color  - [place] appropriate cooling/warming therapies per order  - Administer medications as ordered  - Instruct and encourage patient and family to use good hand hygiene technique  - Identify and instruct in appropriate isolation precautions for identified infection/condition  Outcome: Progressing  Goal: Absence of fever/infection during neutropenic period  Description: INTERVENTIONS:  - Monitor WBC    Outcome: Progressing     Problem: SAFETY ADULT  Goal: Patient will remain free of falls  Description: INTERVENTIONS:  - Assess patient frequently for physical needs  -  Identify cognitive and physical deficits and behaviors that affect risk of falls    -  [place] fall precautions as indicated by assessment   - Educate patient/family on patient safety including physical limitations  - Instruct patient to call for assistance with activity based on assessment  - Modify environment to reduce risk of injury  - Consider OT/PT consult to assist with strengthening/mobility  Outcome: Progressing  Goal: Maintain or return to baseline ADL function  Description: INTERVENTIONS:  -  Assess patient's ability to carry out ADLs; assess patient's baseline for ADL function and identify physical deficits which impact ability to perform ADLs (bathing, care of mouth/teeth, toileting, grooming, dressing, etc )  - Assess/evaluate cause of self-care deficits   - Assess range of motion  - Assess patient's mobility; develop plan if impaired  - Assess patient's need for assistive devices and provide as appropriate  - Encourage maximum independence but intervene and supervise when necessary  - Involve family in performance of ADLs  - Assess for home care needs following discharge   - Consider OT consult to assist with ADL evaluation and planning for discharge  - Provide patient education as appropriate  Outcome: Progressing  Goal: Maintain or return mobility status to optimal level  Description: INTERVENTIONS:  - Assess patient's baseline mobility status (ambulation, transfers, stairs, etc )    - Identify cognitive and physical deficits and behaviors that affect mobility  - Identify mobility aids required to assist with transfers and/or ambulation (gait belt, sit-to-stand, lift, walker, cane, etc )  - [place] fall precautions as indicated by assessment  - Record patient progress and toleration of activity level on Mobility SBAR; progress patient to next Phase/Stage  - Instruct patient to call for assistance with activity based on assessment  - Consider rehabilitation consult to assist with strengthening/weightbearing, etc   Outcome: Progressing     Problem: DISCHARGE PLANNING  Goal: Discharge to home or other facility with appropriate resources  Description: INTERVENTIONS:  - Identify barriers to discharge w/patient and caregiver  - Arrange for needed discharge resources and transportation as appropriate  - Identify discharge learning needs (meds, wound care, etc )  - Arrange for interpretive services to assist at discharge as needed  - Refer to Case Management Department for coordinating discharge planning if the patient needs post-hospital services based on physician/advanced practitioner order or complex needs related to functional status, cognitive ability, or social support system  Outcome: Progressing     Problem: Knowledge Deficit  Goal: Patient/family/caregiver demonstrates understanding of disease process, treatment plan, medications, and discharge instructions  Description: Complete learning assessment and assess knowledge base    Interventions:  - Provide teaching at level of understanding  - Provide teaching via preferred learning methods  Outcome: Progressing     Problem: GENITOURINARY - ADULT  Goal: Maintains or returns to baseline urinary function  Description: INTERVENTIONS:  - Assess urinary function  - Encourage oral fluids to ensure adequate hydration if ordered  - Administer IV fluids as ordered to ensure adequate hydration  - Administer ordered medications as needed  - Offer frequent toileting  - Follow urinary retention protocol if ordered  Outcome: Progressing  Goal: Absence of urinary retention  Description: INTERVENTIONS:  - Assess patients ability to void and empty bladder  - Monitor I/O  - Bladder scan as needed  - Discuss with physician/AP medications to alleviate retention as needed  - Discuss catheterization for long term situations as appropriate  Outcome: Progressing  Goal: Urinary catheter remains patent  Description: INTERVENTIONS:  - Assess patency of urinary catheter  - If patient has a chronic mccoy, consider changing catheter if non-functioning  - Follow guidelines for intermittent irrigation of non-functioning urinary catheter  Outcome: Progressing     Problem: SKIN/TISSUE INTEGRITY - ADULT  Goal: Skin integrity remains intact  Description: INTERVENTIONS  - Identify patients at risk for skin breakdown  - Assess and monitor skin integrity  - Assess and monitor nutrition and hydration status  - Monitor labs (i e  albumin)  - Assess for incontinence   - Turn and reposition patient  - Assist with mobility/ambulation  - Relieve pressure over bony prominences  - Avoid friction and shearing  - Provide appropriate hygiene as needed including keeping skin clean and dry  - Evaluate need for skin moisturizer/barrier cream  - Collaborate with interdisciplinary team (i e  Nutrition, Rehabilitation, etc )   - Patient/family teaching  Outcome: Progressing  Goal: Incision(s), wounds(s) or drain site(s) healing without S/S of infection  Description: INTERVENTIONS  - Assess and document risk factors for skin impairment   - Assess and document dressing, incision, wound bed, drain sites and surrounding tissue  - Consider nutrition services referral as needed  - Oral mucous membranes remain intact  - Provide patient/ family education  Outcome: Progressing  Goal: Oral mucous membranes remain intact  Description: INTERVENTIONS  - Assess oral mucosa and hygiene practices  - Implement preventative oral hygiene regimen  - Implement oral medicated treatments as ordered  - Initiate Nutrition services referral as needed  Outcome: Progressing     Problem: Nutrition/Hydration-ADULT  Goal: Nutrient/Hydration intake appropriate for improving, restoring or maintaining nutritional needs  Description: Monitor and assess patient's nutrition/hydration status for malnutrition  Collaborate with interdisciplinary team and initiate plan and interventions as ordered  Monitor patient's weight and dietary intake as ordered or per policy  Utilize nutrition screening tool and intervene as necessary  Determine patient's food preferences and provide high-protein, high-caloric foods as appropriate       INTERVENTIONS:  - Monitor oral intake, urinary output, labs, and treatment plans  - Assess nutrition and hydration status and recommend course of action  - Evaluate amount of meals eaten  - Assist patient with eating if necessary   - Allow adequate time for meals  - Recommend/ encourage appropriate diets, oral nutritional supplements, and vitamin/mineral supplements  - Order, calculate, and assess calorie counts as needed  - Recommend, monitor, and adjust tube feedings based on assessed needs  - Assess need for intravenous fluids  - Provide nutrition/hydration education as appropriate  - Include patient/family/caregiver in decisions related to nutrition   Outcome: Progressing

## 2020-08-03 NOTE — ASSESSMENT & PLAN NOTE
Wt Readings from Last 3 Encounters:   07/30/20 80 kg (176 lb 5 9 oz)   07/14/20 85 1 kg (187 lb 9 6 oz)   07/07/20 85 1 kg (187 lb 9 6 oz)     · Unclear why patient is on entresto after further medication review  · Last ECHO in 2019 showing EF 60%, which is most current TTE  · Due to low normal blood pressures, will discontinue entresto  · Currently euvolemic

## 2020-08-03 NOTE — ASSESSMENT & PLAN NOTE
-baseline creatinine 1 4-1 8  -creat 1 82 on admission    Creat 1 24/GFR 52 this am  -Nephrology consult for renal optimization in preparation for possible angiogram   -continue to avoid nephrotoxic agents  -trend creatinine

## 2020-08-03 NOTE — PROGRESS NOTES
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 [facility])  Assessment & Plan  · See plan above    Diabetes [facility])  Assessment & Plan  Lab Results   Component Value Date    HGBA1C 7 0 (H) 06/12/2020       Recent Labs     08/02/20 2051 08/02/20  2202 08/03/20  0554 08/03/20  1055   POCGLU 75 115 131 294*       Blood Sugar Average: Last 72 hrs:  (P) 180 [id number]     · Last A1c 7%, continue insulin sliding scale, basal/prandial insulin  · No episodes of hypoglycemia, but glucose levels have been labile    Chronic diastolic congestive heart failure (HCC)  Assessment & Plan  Wt Readings from Last 3 Encounters:   07/30/20 80 kg (176 lb 5 9 oz)   07/14/20 85 1 kg (187 lb 9 6 oz)   07/07/20 85 1 kg (187 lb 9 6 oz)     · Unclear why patient is on entresto after further medication review  · Last ECHO in 2019 showing EF 60%, which is most current TTE  · Due to low normal blood pressures, will discontinue entresto  · Currently euvolemic    A-fib (HCC)  Assessment & Plan  · Controlled, on xarelto  · Holding xarelto for 48 hours for angiogram    Abnormal urinalysis  Assessment & Plan  · + UA, urine culture growing GNR, will discuss with ID in AM  · Did receive dose of Antibiotics in ED, but seen by infectious disease and recommending discontinuation of antibiotics secondary to lack of symptoms and chronic mccoy suggestive of colonization    Stage 3 chronic kidney disease ([facility] [address] )  Assessment & Plan  · Baseline creatinine 1 4-1 8  · Currently at baseline    VTE Pharmacologic Prophylaxis:   Pharmacologic: Holding xarelto for angiogram  Mechanical VTE Prophylaxis in Place: Yes    Patient Centered Rounds: I have performed bedside rounds with nursing staff today  Discussions with Specialists or Other Care Team Provider: Vascular surgery    Time Spent for Care: 30 minutes  More than 50% of total time spent on counseling and coordination of care as described above  Current Length of Stay: 4 day(s)    Current Patient Status: Inpatient   Certification Statement: The patient will continue to require additional inpatient hospital stay due to Angiogram    Discharge Plan: To be determined    Code Status: Level 1 - Full Code      Subjective:   Patient feels better with adjustments in pain medication today    Objective:     Vitals:   Temp (24hrs), Av 3 °F (36 3 °C), Min:97 2 °F (36 2 °C), Max:97 5 °F (36 4 °C)    Temp:  [97 2 °F (36 2 °C)-97 5 °F (36 4 °C)] 97 5 °F (36 4 °C)  HR:  [64-71] 71  Resp:  [17-18] 18  BP: ()/(50-70) 88/70  SpO2:  [98 %-100 %] 98 %  Body mass index is 28 47 kg/m²  Input and Output Summary (last 24 hours): Intake/Output Summary (Last 24 hours) at 8/3/2020 1415  Last data filed at 8/3/2020 0550  Gross per 24 hour   Intake 730 ml   Output 900 ml   Net -170 ml       Physical Exam:     Constitutional: No distress  Cardiovascular:   Irregularly Irregular    Pulmonary/Chest: Effort normal and breath sounds normal    Abdominal: Soft  Bowel sounds are normal    Genitourinary:   Genitourinary Comments: Baxter intact   Neurological: He is alert  Skin: Skin is warm and dry  See clinical images for lower extremity description  Psychiatric: He has a normal mood and affect   His behavior is normal        Additional Data:     Labs:    Results from last 7 days   Lab Units 20  0540   WBC Thousand/uL 8 76   HEMOGLOBIN g/dL 11 9*   HEMATOCRIT % 37 5   PLATELETS Thousands/uL 339 NEUTROS PCT % 72   LYMPHS PCT % 17   MONOS PCT % 7   EOS PCT % 2     Results from last 7 days   Lab Units 08/03/20  0540   SODIUM mmol/L 141   POTASSIUM mmol/L 4 3   CHLORIDE mmol/L 106   CO2 mmol/L 29   BUN mg/dL 36*   CREATININE mg/dL 1 24   ANION GAP mmol/L 6   CALCIUM mg/dL 8 8   ALBUMIN g/dL 2 1*   TOTAL BILIRUBIN mg/dL 0 50   ALK PHOS U/L 78   ALT U/L 35   AST U/L 48*   GLUCOSE RANDOM mg/dL 131     Results from last 7 days   Lab Units 07/30/20  1902   INR  2 46*     Results from last 7 days   Lab Units 08/03/20  1055 08/03/20  0554 08/02/20  2202 08/02/20  2051 08/02/20  1605 08/02/20  1055 08/02/20  0612 08/01/20  1530 08/01/20  1136 08/01/20  0657 07/31/20  2048 07/31/20  1724   POC GLUCOSE mg/dl 294* 131 115 75 279* 280* 81 165* 218* 160* 210* 139         Results from last 7 days   Lab Units 07/30/20  1902   LACTIC ACID mmol/L 1 2   PROCALCITONIN ng/ml 0 23           * I Have Reviewed All Lab Data Listed Above  * Additional Pertinent Lab Tests Reviewed: [address] Admission Reviewed    Imaging:    Imaging Reports Reviewed Today Include: NA  Imaging Personally Reviewed by Myself Includes:  NA    Recent Cultures (last 7 days):     Results from last 7 days   Lab Units 07/30/20  2146 07/30/20  2101 07/30/20  1939   BLOOD CULTURE  No Growth at 72 hrs   --  No Growth at 72 hrs     URINE CULTURE   --  >100,000 cfu/ml Escherichia coli ESBL*  [id number] cfu/ml Proteus mirabilis*  10,000-19,000 cfu/ml Enterococcus faecalis*  --        Last 24 Hours Medication List:   acetaminophen, 650 mg, Oral, Q6H PRN, Ren Medina MD  HYDROmorphone, 0 5 mg, Intravenous, Q4H PRN, Cristina Hackett MD  insulin glargine, 8 Units, Subcutaneous, HS, Ren Medina MD  insulin lispro, 1-5 Units, Subcutaneous, HS, Ren Medina MD  insulin lispro, 1-6 Units, Subcutaneous, TID AC, Ren Medina MD  insulin lispro, 8 Units, Subcutaneous, TID With Meals, Ren Medina MD  nicotine, 1 patch, Transdermal, Daily, Ren Medina, MD  oxyCODONE-acetaminophen, 1 tablet, Oral, Q4H PRN, Elana Holley MD         Today, Patient Was Seen By: KIRAN Mathur      ** Please Note: Dictation voice to text software may have been used in the creation of this document   **

## 2020-08-03 NOTE — ASSESSMENT & PLAN NOTE
-stable, rate controlled  -continue current medical regimen with medication modifications per primary medical service  -continue anticoagulation therapy with Xarelto 15 mg daily

## 2020-08-03 NOTE — CONSULTS
NEPHROLOGY CONSULTATION NOTE    Patient: Karen November               Sex: male          DOA: 7/30/2020  5:23 PM   Date of Birth: @        Age: @        LOS:  LOS: 4 days     REFERRING PHYSICIAN: Rufus Resendiz, [id number] [address] [place]:  Further management of VALDO/VALDO risk reduction strategies prior to undergoing leg angiogram    DATE OF CONSULTATION / SERVICE: 8/3/2020    ADMISSION DIAGNOSIS: Abnormal urinalysis     CHIEF COMPLAINT     I have weakness weakness    HPI     This is 59-year-old male with past medical history significant for AFib, diabetes type 2 was initially admitted for weakness  Nephrology were consulted for VALDO risk reduction strategies in the light of undergoing leg angiogram     Upon review of old medical records, patient's baseline creatinine is around 1 4  Patient was recently discharged from skilled nursing Unit after found to have UTI and urinary retention and currently also have indwelling Baxter catheter  Patient also have nonhealing foot ulcer and currently also been followed by vascular surgery which is tentatively planning to pursue take angiogram for further evaluation  Patient's daughter was present at the time of consultation and most of the history was obtained from her and all the questions were answered  Patient has underlying diabetes type 2 and currently sugars are acceptable with the use of insulin  Patient also carries a diagnosis of CHF but last echocardiogram showed EF > 60%  Patient is also currently on Entresto for unknown reason  Patient also have underlying AFib and currently taking Xarelto  Patient was sleepy but easily arousable and denied nausea, vomiting, headache, dizziness, abdominal pain, constipation or rash      PAST MEDICAL HISTORY     Past Medical History:   Diagnosis Date    Ambulatory dysfunction     Atrial fibrillation (HCC)     CHF (congestive heart failure) (HCC)     Chronic kidney disease     [facility]ch[facility] ([facility] )     Diabetes mellitus ([place] [address] )     Hyperlipidemia     Metabolic encephalopathy     Osteomyelitis ([place] [address] )        PAST SURGICAL HISTORY     Past Surgical History:   Procedure Laterality Date    ABDOMINAL SURGERY      JOINT REPLACEMENT      b/l hips       ALLERGIES     Allergies   Allergen Reactions    Aspirin GI Intolerance       SOCIAL HISTORY     Social History     Substance and Sexual Activity   Alcohol Use Not Currently    Comment: occ beer     Social History     Substance and Sexual Activity   Drug Use No     Social History     Tobacco Use   Smoking Status Current Every Day Smoker    Packs/day: 1 00   Smokeless Tobacco Never Used       FAMILY HISTORY     Family History   Family history unknown: Yes       CURRENT MEDICATIONS       Current Facility-Administered Medications:     acetaminophen (TYLENOL) tablet 650 mg, 650 mg, Oral, Q6H PRN, Ren Medina MD, 650 mg at 08/02/20 1114    HYDROmorphone (DILAUDID) injection 0 5 mg, 0 5 mg, Intravenous, Q4H PRN, Celeste Power MD    insulin glargine (LANTUS) subcutaneous injection 8 Units 0 08 mL, 8 Units, Subcutaneous, HS, Ren Medina MD, 8 Units at 08/02/20 2206    insulin lispro (HumaLOG) 100 units/mL subcutaneous injection 1-5 Units, 1-5 Units, Subcutaneous, HS, Ren Medina MD, 1 Units at 08/01/20 2122    insulin lispro (HumaLOG) 100 units/mL subcutaneous injection 1-6 Units, 1-6 Units, Subcutaneous, TID AC, 4 Units at 08/02/20 1724 **AND** Fingerstick Glucose (POCT), , , TID AC, Ren Medina MD    insulin lispro (HumaLOG) 100 units/mL subcutaneous injection 8 Units, 8 Units, Subcutaneous, TID With Meals, Ren Medina MD, 8 Units at 08/03/20 1027    nicotine (NICODERM CQ) 7 mg/24hr TD 24 hr patch 1 patch, 1 patch, Transdermal, Daily, Ren Medina MD, 1 patch at 08/03/20 1026    oxyCODONE-acetaminophen (PERCOCET) 5-325 mg per tablet 1 tablet, 1 tablet, Oral, Q4H PRN, Celeste Power MD    rivaroxaban (XARELTO) tablet 15 mg, 15 mg, Oral, Daily With Breakfast, Ren Medina MD, 15 mg at 08/03/20 1026    REVIEW OF SYSTEMS     Complete 10 points of review of systems were obtained and discussed in length with patient today  Complete 10 points of review of systems were negative/unremarkable except mentioned in the HPI section  OBJECTIVE     Current Weight: Weight - Scale: 80 kg (176 lb 5 9 oz)  BP (!) 88/70   Pulse 71   Temp 97 5 °F (36 4 °C) (Oral)   Resp 18   Ht 5' 6"   Wt 80 kg (176 lb 5 9 oz)   SpO2 98%   BMI 28 47 kg/m²   Vitals:    08/03/20 0725   BP: (!) 88/70   Pulse: 71   Resp: 18   Temp: 97 5 °F (36 4 °C)   SpO2: 98%     Body mass index is 28 47 kg/m²  Intake/Output Summary (Last 24 hours) at 8/3/2020 1035  Last data filed at 8/3/2020 0550  Gross per 24 hour   Intake 730 ml   Output 900 ml   Net -170 ml       PHYSICAL EXAMINATION     Physical Exam   Constitutional: No distress  Elderly and ill looking   HENT:   Head: Normocephalic and atraumatic  Eyes: No scleral icterus  Neck: Neck supple  No JVD present  Cardiovascular: Normal rate  Pulmonary/Chest: No accessory muscle usage  No respiratory distress  He has no wheezes  Abdominal: Soft  He exhibits no distension  Musculoskeletal:      Right hand: He exhibits no laceration  Left hand: He exhibits no laceration  Neurological: He is alert  Skin: Skin is warm  Psychiatric: He is not combative  He is communicative           LAB RESULTS        Results from last 7 days   Lab Units 08/03/20  0540 08/02/20  0552 08/01/20  0852 07/31/20  0541 07/30/20  1902   WBC Thousand/uL 8 76 8 66 8 50 10 84* 10 89*   HEMOGLOBIN g/dL 11 9* 11 9* 12 0 12 5 12 3   HEMATOCRIT % 37 5 37 5 39 2 40 1 38 6   PLATELETS Thousands/uL 339 338 366 387 375   SODIUM mmol/L 141 141 142 142 140   POTASSIUM mmol/L 4 3 4 2 4 2 4 1 4 5   CHLORIDE mmol/L 106 107 106 106 105   CO2 mmol/L 29 29 30 27 28   BUN mg/dL 36* 41* 47* 50* 60*   CREATININE mg/dL 1 24 1 40* 1 60* 1 56* 1 82* EGFR ml/min/1 73sq m 52 45 38 40 33   CALCIUM mg/dL 8 8 8 5 8 8 8 7 8 8   MAGNESIUM mg/dL 2 0 2 0 2 1  --  2 4       I have personally reviewed the old medical records and patient's previously known baseline creatinine level is ~ 1 4    RADIOLOGY RESULTS     XR foot 3+ vw right   Final Result by Prasad Butcher MD (08/03 0920)      No evidence of osteomyelitis, fracture or other significant bony abnormality  Workstation performed: [id number]         XR foot 3+ vw left   Final Result by Prasad Butcher MD (08/03 1130)      No evidence of fracture, osteomyelitis or other significant bony abnormality in the left foot  Workstation performed: [id number]         VAS lower limb arterial duplex, complete bilateral   Final Result by Swati Del Rio MD (08/01 1108)      XR chest 1 view portable   Final Result by Ata Suh MD (07/31 1817)      No acute cardiopulmonary disease  Workstation performed: [id number]           2D echocardiogram done on 1/30/2019 showed EF of 60%  PLAN / RECOMMENDATIONS      1  CKD stage 3 + VALDO risk reduction strategies in the light of undergoing leg angiogram     Upon review of old medical records, including reviewing records from Care everywhere, baseline creatinine seems to be around 1 4  Patient was admitted with nonhealing foot ulcer and currently been followed by vascular surgery and they are planning to pursue leg angiogram for further evaluation  Patient's current creatinine is 1 24 which is better than previously known baseline creatinine  Patient also have chronic indwelling Baxter catheter and currently seems nonoliguric  Patient is currently taking Entresto but last known echocardiogram showed EF > 60% and discussed with primary team regarding further use of Entresto     I have also obtain history from patient's daughter who also told me that patient's heart function was okay/normal in the past     I have personally discussed the risks and benefits of the leg angiogram from a renal standpoint with the patient in depth, and advised the patient about the risk of VALDO and the course of VALDO if it occurs with the small probability of the need for renal replacement therapy in the worse case scenario  Patient voiced understanding   From a renal standpoint the patient is renally optimized for leg angiogram with the following recommendations:   Fluids to administer:  Plan to start patient on normal saline at 100 mL/hour prior to procedure (discussed with vascular surgery team and date of angiogram is unknown)   Medication Recommendations:   Minimize any IV contrast use   Hold NSAIDs for at least 10 days prior to surgery   Plan to avoid diuretics and ACE-I/ARB along with NSAIDs for time being   Patient is currently taking Entresto but last known EF was > 60% and would consider holding Entresto prior to undergoing leg angiogram     Thank you for the consultation to participate in patient's care  I have personally discussed my overall above mentioned plan with the vascular surgery team + SLIM physician  Ronny Thomas MD  Nephrology  8/3/2020        Portions of the record may have been created with voice recognition software  Occasional wrong word or "sound a like" substitutions may have occurred due to the inherent limitations of voice recognition software  Read the chart carefully and recognize, using context, where substitutions have occurred

## 2020-08-03 NOTE — PROGRESS NOTES
Progress Note - Infectious Disease   Mary Stone 80 y o  male MRN: [id number]  Unit/Bed#: -01 Encounter: [id number]      2  Purulent drainage around Mccoy catheter  UA and UCx likely represent colonization due to indwelling Mccoy catheter  - no antibiotics as this time   - exchange mccoy if not yet done on this admission   - observe    2  CKD - stable    3  Bilateral heel pressure ulcers with underlying PAD  Evaluated by vascular - plan for angiogram with renal optimization  Patient remains afebrile without leukocytosis at this time  Xrays of both feet obtained - no sign of osteomyelitis at this time  - Await angiogram   - Continue local care for now   - continue to observe off antibiotics  Antibiotics:  Remains off antibiotcs    Subjective:  Patient seen on AM rounds  Denies fevers, chills, or sweats  Denies nausea, vomiting, or diarrhea  Objective:  Vitals:  Temp:  [97 2 °F (36 2 °C)-97 5 °F (36 4 °C)] 97 5 °F (36 4 °C)  HR:  [64-71] 71  Resp:  [17-18] 18  BP: ()/(50-70) 88/70  SpO2:  [98 %-100 %] 98 %  Temp (24hrs), Av 3 °F (36 3 °C), Min:97 2 °F (36 2 °C), Max:97 5 °F (36 4 °C)  Current: Temperature: 97 5 °F (36 4 °C)    Physical Exam:   General:  No acute distress  Psychiatric:  Awake and alert  Heart: +s1/s2  Pulmonary:  Normal respiratory excursion without accessory muscle use  Abdomen:  Soft, nontender  Extremities:  No edema, dressings in place  Skin:  No rashes    Lab Results:  I have personally reviewed pertinent labs    Results from last 7 days   Lab Units 20  0540 20  0552 20  0852   POTASSIUM mmol/L 4 3 4 2 4 2   CHLORIDE mmol/L 106 107 106   CO2 mmol/L 29 29 30   BUN mg/dL 36* 41* 47*   CREATININE mg/dL 1 24 1 40* 1 60*   EGFR ml/min/1 73sq m 52 45 38   CALCIUM mg/dL 8 8 8 5 8 8   AST U/L 48* 49* 66*   ALT U/L 35 35 46   ALK PHOS U/L 78 80 90     Results from last 7 days   Lab Units 20  0540 20  0552 20  0852   WBC Thousand/uL 8  76 8 66 8 50   HEMOGLOBIN g/dL 11 9* 11 9* 12 0   PLATELETS Thousands/uL 339 338 366     Results from last 7 days   Lab Units 07/30/20  2146 07/30/20  2101 07/30/20  1939   BLOOD CULTURE  No Growth at 72 hrs   --  No Growth at 72 hrs  URINE CULTURE   --  >100,000 cfu/ml Gram Negative Cameron*  6824-4091 cfu/ml Gram Negative Cameron*  --        Imaging Studies:   I have personally reviewed pertinent imaging study reports and images in PACS  EKG, Pathology, and Other Studies:   I have personally reviewed pertinent reports

## 2020-08-03 NOTE — PROGRESS NOTES
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 [facility])  Assessment & Plan  80year-old minimally ambulatory male smoker w/chronic diastolic heart failure, type 2 DM, CKD 3 (baseline creatinine 1 4-1 8), Afib on Xarelto, COPD, OA bilateral knee, s/p bilateral ANDREA, recurrent UTI w/chronic indwelling Baxter catheter admitted w/pyuria and PAD w/bilateral heel pressure wounds, L >R  Diagnostics:  -LEAD 7/31:  diffuse right lower extremity disease without focal stenosis, R JAMAL 0 6/42/34 and left lower extremity with diffuse disease, no focal stenosis, decrease in velocities in SFA to popliteal artery suggestive of possible stenosis, L JAMAL 0 52/33/36  -Xray foot: pending     Plan:  -Recommended LLE angiogram with revascularization and attempt for limb salvage   + L knee contracture  Will likely require general anesthesia for Agram   D/w Dr Jerome Slater who will evaluate patient today   -Discussed with patient and daughter  Verbalized they want all measures taken to save the left leg  Not accepting of limb amputation at this time  -Nephrology consult pending for renal optimization for angiogram   Creat improved to 1 24/GFR 52 this a m   Or require IV hydration pre and post angiogram venous  -will need to hold Xarelto 48 hours prior to angiogram  -Off load heels with pressure bed and boots  -Betadine paint to heel   -Will discuss to Dr Jerome Slater  **Addendum:  Will plan for CO2 LLE angiogram under general anesthesia  IR consulted and d/w IR  Plan for agram on 8/5/2020  D/w nephrology and will plan pre and post procedure IV hydration  Npo after midnight tomorrow night    Recheck INR in am since elevated on admission**    Stage 3 chronic kidney disease ([facility] [address] )  Assessment & Plan  -baseline creatinine 1 4-1 8  -creat 1 82 on admission  Creat 1 24/GFR 52 this am  -Nephrology consult for renal optimization in preparation for possible angiogram   -continue to avoid nephrotoxic agents  -trend creatinine    Pressure ulcer of ankle  Assessment & Plan  L>R heel ulcer w/dry gangrene L heel  -heel pressure pressure off-loading   -plan for abdominal aortogram with left lower extremity runoff, possible endovascular intervention     A-fib (HCC)  Assessment & Plan  -stable, rate controlled  -continue current medical regimen with medication modifications per primary medical service  -continue anticoagulation therapy with Xarelto 15 mg daily        Subjective:  Patient without new complaints  Complains mild bilateral heel pain but denies rest pain  Nontoxic  Unable to straighten left knee  Afebrile  Blood cultures negative x 72 hours  VS    Vitals:  BP (!) 88/70   Pulse 71   Temp 97 5 °F (36 4 °C) (Oral)   Resp 18   Ht 5' 6"   Wt 80 kg (176 lb 5 9 oz)   SpO2 98%   BMI 28 47 kg/m²     I/Os:  I/O last 3 completed shifts: In: 730 [P O :730]  Out: 1725 [Urine:1725]  No intake/output data recorded      Lab Results and Cultures:   Lab Results   Component Value Date    WBC 8 76 08/03/2020    HGB 11 9 (L) 08/03/2020    HCT 37 5 08/03/2020    MCV 89 08/03/2020     08/03/2020     Lab Results   Component Value Date    CALCIUM 8 8 08/03/2020     04/01/2018    K 4 3 08/03/2020    CO2 29 08/03/2020     08/03/2020    BUN 36 (H) 08/03/2020    CREATININE 1 24 08/03/2020     Lab Results   Component Value Date    INR 2 46 (H) 07/30/2020    INR 1 23 (H) 06/11/2020    INR 2 13 (H) 01/28/2019    PROTIME 25 5 (H) 07/30/2020    PROTIME 15 5 (H) 06/11/2020    PROTIME 23 5 (H) 01/28/2019        Blood Culture:   Lab Results   Component Value Date    BLOODCX No Growth at 48 hrs  07/30/2020   ,   Urinalysis:   Lab Results   Component Value Date    COLORU Yellow 07/30/2020    CLARITYU Clear 07/30/2020    SPECGRAV 1 025 07/30/2020    PHUR 5 5 07/30/2020    PHUR 7 0 01/28/2019    LEUKOCYTESUR Moderate (A) 07/30/2020    NITRITE Positive (A) 07/30/2020    GLUCOSEU Negative 07/30/2020    KETONESU Negative 07/30/2020    BILIRUBINUR Negative 07/30/2020    BLOODU Large (A) 07/30/2020   ,   Urine Culture:   Lab Results   Component Value Date    URINECX >100,000 cfu/ml Gram Negative Cameron (A) 07/30/2020    URINECX 9698-4673 cfu/ml Gram Negative Cameron (A) 07/30/2020   ,   Wound Culure: No results found for: WOUNDCULT    Medications:  Current Facility-Administered Medications   Medication Dose Route Frequency    acetaminophen (TYLENOL) tablet 650 mg  650 mg Oral Q6H PRN    HYDROmorphone (DILAUDID) injection 0 5 mg  0 5 mg Intravenous Q4H PRN    insulin glargine (LANTUS) subcutaneous injection 8 Units 0 08 mL  8 Units Subcutaneous HS    insulin lispro (HumaLOG) 100 units/mL subcutaneous injection 1-5 Units  1-5 Units Subcutaneous HS    insulin lispro (HumaLOG) 100 units/mL subcutaneous injection 1-6 Units  1-6 Units Subcutaneous TID AC    insulin lispro (HumaLOG) 100 units/mL subcutaneous injection 8 Units  8 Units Subcutaneous TID With Meals    nicotine (NICODERM CQ) 7 mg/24hr TD 24 hr patch 1 patch  1 patch Transdermal Daily    oxyCODONE-acetaminophen (PERCOCET) 5-325 mg per tablet 1 tablet  1 tablet Oral Q4H PRN    rivaroxaban (XARELTO) tablet 15 mg  15 mg Oral Daily With Breakfast    sacubitril-valsartan (ENTRESTO) 24-26 MG per tablet 1 tablet  1 tablet Oral BID       Imaging:  X-ray bilateral feet 7/31/2020:  Report pending    Physical Exam:    General appearance: alert and distracted  Neurologic: Grossly normal  Neck: no adenopathy, no carotid bruit, no JVD, supple, symmetrical, trachea midline and thyroid not enlarged, symmetric, no tenderness/mass/nodules  Lungs: clear to auscultation bilaterally  Heart: irregularly irregular rhythm, S1, S2 normal, no S3 or S4, no rub and No murmur  Abdomen: soft, non-tender; bowel sounds normal; no masses,  no organomegaly and Nondistended  No abdominal bruits  Extremities: Bilateral lower extremities warm, pink, motor and sensory intact  Chronic venous stasis changes noted bilateral lower legs  Bilateral heel dressings in place  +left knee contracture @ approximately 70 degrees  Unable to straighten left knee      Wound/Incision:  Bilateral heel dressing clean, dry, and intact     Right heel    Left heel      Pulse exam:  Radial: Right: 2+ Left[de-identified] 2+  Femoral: Right: 2+ Left: 2+  DP: Right: non-palpable Left: non-palpable  PT: Right: non-palpable Left: non-palpable      Leandra Schmid PA-C  8/3/2020  [facility], [phone]

## 2020-08-03 NOTE — ASSESSMENT & PLAN NOTE
L>R heel ulcer w/dry gangrene L heel  -heel pressure pressure off-loading   -plan for abdominal aortogram with left lower extremity runoff, possible endovascular intervention

## 2020-08-03 NOTE — ASSESSMENT & PLAN NOTE
· Secondary to peripheral arterial disease, R>L  · Podiatry was consulted, recommending vascular surgery evaluation  · Foot xrays negative for osteomyelitis  · Arterial duplex showing occlusive disease of lower extremities bilaterally  · Plan for angiogram

## 2020-08-03 NOTE — ASSESSMENT & PLAN NOTE
80year-old minimally ambulatory male smoker w/chronic diastolic heart failure, type 2 DM, CKD 3 (baseline creatinine 1 4-1 8), Afib on Xarelto, COPD, OA bilateral knee, s/p bilateral ANDREA, recurrent UTI w/chronic indwelling Baxter catheter admitted w/pyuria and PAD w/bilateral heel pressure wounds, L >R  Diagnostics:  -LEAD 7/31:  diffuse right lower extremity disease without focal stenosis, R JAMAL 0 6/42/34 and left lower extremity with diffuse disease, no focal stenosis, decrease in velocities in SFA to popliteal artery suggestive of possible stenosis, L JAMAL 0 52/33/36  -Xray foot: pending     Plan:  -Recommended LLE angiogram with revascularization and attempt for limb salvage   + L knee contracture  Will likely require general anesthesia for Agram   D/w Dr Pura Davidson who will evaluate patient today   -Discussed with patient and daughter  Verbalized they want all measures taken to save the left leg  Not accepting of limb amputation at this time    -Nephrology consult pending for renal optimization for angiogram   Creat improved to 1 24/GFR 52 this a m   Or require IV hydration pre and post angiogram venous  -will need to hold Xarelto 48 hours prior to angiogram  -Off load heels with pressure bed and boots  -Betadine paint to heel   -Will discuss to Dr Pura Davidson

## 2020-08-04 LAB
ALBUMIN SERPL BCP-MCNC: 2.2 G/DL (ref 3.5–5)
ALP SERPL-CCNC: 78 U/L (ref 46–116)
ALT SERPL W P-5'-P-CCNC: 40 U/L (ref 12–78)
ANION GAP SERPL CALCULATED.3IONS-SCNC: 5 MMOL/L (ref 4–13)
AST SERPL W P-5'-P-CCNC: 61 U/L (ref 5–45)
BASOPHILS # BLD AUTO: 0.05 THOUSANDS/ΜL (ref 0–0.1)
BASOPHILS NFR BLD AUTO: 1 % (ref 0–1)
BILIRUB SERPL-MCNC: 0.6 MG/DL (ref 0.2–1)
BUN SERPL-MCNC: 36 MG/DL (ref 5–25)
CALCIUM SERPL-MCNC: 8.7 MG/DL (ref 8.3–10.1)
CHLORIDE SERPL-SCNC: 106 MMOL/L (ref 100–108)
CO2 SERPL-SCNC: 31 MMOL/L (ref 21–32)
CREAT SERPL-MCNC: 1.24 MG/DL (ref 0.6–1.3)
EOSINOPHIL # BLD AUTO: 0.16 THOUSAND/ΜL (ref 0–0.61)
EOSINOPHIL NFR BLD AUTO: 2 % (ref 0–6)
ERYTHROCYTE [DISTWIDTH] IN BLOOD BY AUTOMATED COUNT: 15.9 % (ref 11.6–15.1)
GFR SERPL CREATININE-BSD FRML MDRD: 52 ML/MIN/1.73SQ M
GLUCOSE SERPL-MCNC: 110 MG/DL (ref 65–140)
GLUCOSE SERPL-MCNC: 120 MG/DL (ref 65–140)
GLUCOSE SERPL-MCNC: 179 MG/DL (ref 65–140)
GLUCOSE SERPL-MCNC: 241 MG/DL (ref 65–140)
GLUCOSE SERPL-MCNC: 269 MG/DL (ref 65–140)
HCT VFR BLD AUTO: 37.1 % (ref 36.5–49.3)
HGB BLD-MCNC: 11.9 G/DL (ref 12–17)
IMM GRANULOCYTES # BLD AUTO: 0.08 THOUSAND/UL (ref 0–0.2)
IMM GRANULOCYTES NFR BLD AUTO: 1 % (ref 0–2)
INR PPP: 1.5 (ref 0.84–1.19)
LYMPHOCYTES # BLD AUTO: 1.88 THOUSANDS/ΜL (ref 0.6–4.47)
LYMPHOCYTES NFR BLD AUTO: 18 % (ref 14–44)
MAGNESIUM SERPL-MCNC: 1.9 MG/DL (ref 1.6–2.6)
MCH RBC QN AUTO: 28.2 PG (ref 26.8–34.3)
MCHC RBC AUTO-ENTMCNC: 32.1 G/DL (ref 31.4–37.4)
MCV RBC AUTO: 88 FL (ref 82–98)
MONOCYTES # BLD AUTO: 0.62 THOUSAND/ΜL (ref 0.17–1.22)
MONOCYTES NFR BLD AUTO: 6 % (ref 4–12)
NEUTROPHILS # BLD AUTO: 7.47 THOUSANDS/ΜL (ref 1.85–7.62)
NEUTS SEG NFR BLD AUTO: 72 % (ref 43–75)
NRBC BLD AUTO-RTO: 0 /100 WBCS
PLATELET # BLD AUTO: 359 THOUSANDS/UL (ref 149–390)
PMV BLD AUTO: 10.6 FL (ref 8.9–12.7)
POTASSIUM SERPL-SCNC: 4.7 MMOL/L (ref 3.5–5.3)
PROT SERPL-MCNC: 7 G/DL (ref 6.4–8.2)
PROTHROMBIN TIME: 18.4 SECONDS (ref 11.6–14.5)
RBC # BLD AUTO: 4.22 MILLION/UL (ref 3.88–5.62)
SODIUM SERPL-SCNC: 142 MMOL/L (ref 136–145)
WBC # BLD AUTO: 10.26 THOUSAND/UL (ref 4.31–10.16)

## 2020-08-04 PROCEDURE — 85025 COMPLETE CBC W/AUTO DIFF WBC: CPT | Performed by: STUDENT IN AN ORGANIZED HEALTH CARE EDUCATION/TRAINING PROGRAM

## 2020-08-04 PROCEDURE — 99233 SBSQ HOSP IP/OBS HIGH 50: CPT | Performed by: NURSE PRACTITIONER

## 2020-08-04 PROCEDURE — 99233 SBSQ HOSP IP/OBS HIGH 50: CPT | Performed by: INTERNAL MEDICINE

## 2020-08-04 PROCEDURE — 80053 COMPREHEN METABOLIC PANEL: CPT | Performed by: STUDENT IN AN ORGANIZED HEALTH CARE EDUCATION/TRAINING PROGRAM

## 2020-08-04 PROCEDURE — 82948 REAGENT STRIP/BLOOD GLUCOSE: CPT

## 2020-08-04 PROCEDURE — 99232 SBSQ HOSP IP/OBS MODERATE 35: CPT | Performed by: INTERNAL MEDICINE

## 2020-08-04 PROCEDURE — 83735 ASSAY OF MAGNESIUM: CPT | Performed by: STUDENT IN AN ORGANIZED HEALTH CARE EDUCATION/TRAINING PROGRAM

## 2020-08-04 PROCEDURE — 99232 SBSQ HOSP IP/OBS MODERATE 35: CPT | Performed by: STUDENT IN AN ORGANIZED HEALTH CARE EDUCATION/TRAINING PROGRAM

## 2020-08-04 PROCEDURE — 85610 PROTHROMBIN TIME: CPT | Performed by: PHYSICIAN ASSISTANT

## 2020-08-04 RX ORDER — SODIUM CHLORIDE 9 MG/ML
100 INJECTION, SOLUTION INTRAVENOUS CONTINUOUS
Status: DISPENSED | OUTPATIENT
Start: 2020-08-04 | End: 2020-08-05

## 2020-08-04 RX ADMIN — OXYCODONE HYDROCHLORIDE AND ACETAMINOPHEN 1 TABLET: 5; 325 TABLET ORAL at 04:50

## 2020-08-04 RX ADMIN — INSULIN GLARGINE 8 UNITS: 100 INJECTION, SOLUTION SUBCUTANEOUS at 22:08

## 2020-08-04 RX ADMIN — INSULIN LISPRO 8 UNITS: 100 INJECTION, SOLUTION INTRAVENOUS; SUBCUTANEOUS at 17:43

## 2020-08-04 RX ADMIN — INSULIN LISPRO 3 UNITS: 100 INJECTION, SOLUTION INTRAVENOUS; SUBCUTANEOUS at 17:44

## 2020-08-04 RX ADMIN — SODIUM CHLORIDE 100 ML/HR: 0.9 INJECTION, SOLUTION INTRAVENOUS at 22:17

## 2020-08-04 RX ADMIN — INSULIN LISPRO 8 UNITS: 100 INJECTION, SOLUTION INTRAVENOUS; SUBCUTANEOUS at 12:56

## 2020-08-04 RX ADMIN — INSULIN LISPRO 1 UNITS: 100 INJECTION, SOLUTION INTRAVENOUS; SUBCUTANEOUS at 22:08

## 2020-08-04 RX ADMIN — INSULIN LISPRO 8 UNITS: 100 INJECTION, SOLUTION INTRAVENOUS; SUBCUTANEOUS at 09:15

## 2020-08-04 RX ADMIN — INSULIN LISPRO 3 UNITS: 100 INJECTION, SOLUTION INTRAVENOUS; SUBCUTANEOUS at 12:56

## 2020-08-04 RX ADMIN — NICOTINE 1 PATCH: 7 PATCH TRANSDERMAL at 09:15

## 2020-08-04 RX ADMIN — OXYCODONE HYDROCHLORIDE AND ACETAMINOPHEN 1 TABLET: 5; 325 TABLET ORAL at 22:17

## 2020-08-04 NOTE — ASSESSMENT & PLAN NOTE
Lab Results   Component Value Date    HGBA1C 7 0 (H) 06/12/2020       Recent Labs     08/03/20  1055 08/03/20  1550 08/04/20  0502 08/04/20  1056   POCGLU 294* 140 120 269*       Blood Sugar Average: Last 72 hrs:  (P) 179     · Last A1c 7%, continue insulin sliding scale, basal/prandial insulin  · No episodes of hypoglycemia, but glucose levels have been labile,  Especially at 11 AM fingerstick check  · Otherwise well controlled

## 2020-08-04 NOTE — PROGRESS NOTES
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 [facility])  Assessment & Plan  · See plan above    Diabetes [facility])  Assessment & Plan  Lab Results   Component Value Date    HGBA1C 7 0 (H) 06/12/2020       Recent Labs     08/03/20  1055 08/03/20  1550 08/04/20  0502 08/04/20  1056   POCGLU 294* 140 120 269*       Blood Sugar Average: Last 72 hrs:  (P) 179     · Last A1c 7%, continue insulin sliding scale, basal/prandial insulin  · No episodes of hypoglycemia, but glucose levels have been labile,  Especially at 11 AM fingerstick check  · Otherwise well controlled     Chronic diastolic congestive heart failure (HCC)  Assessment & Plan  Wt Readings from Last 3 Encounters:   07/30/20 80 kg (176 lb 5 9 oz)   07/14/20 85 1 kg (187 lb 9 6 oz)   07/07/20 85 1 kg (187 lb 9 6 oz)     · Unclear why patient is on entresto after further medication review  · Last ECHO in 2019 showing EF 60%, which is most current TTE  · Due to low normal blood pressures, will discontinue entresto  · Currently euvolemic    A-fib (HCC)  Assessment & Plan  · Controlled, on xarelto  · Holding xarelto for 48 hours for angiogram    Abnormal urinalysis  Assessment & Plan  · + UA, urine culture growing GNR, will discuss with ID in AM  · Did receive dose of Antibiotics in ED, but seen by infectious disease and recommending discontinuation of antibiotics secondary to lack of symptoms and chronic nadine suggestive of colonization    Stage 3 chronic kidney disease (HCC)  Assessment & Plan  · Baseline creatinine 1 4-1 8  · Currently optimized      VTE Pharmacologic Prophylaxis:   Pharmacologic: Holding xarelto with plan for angiogram in AM  Mechanical VTE Prophylaxis in Place: Yes    Patient Centered Rounds: I have performed bedside rounds with nursing staff today  Discussions with Specialists or Other Care Team Provider: Vascular surgery    Time Spent for Care: 30 minutes  More than 50% of total time spent on counseling and coordination of care as described above  Current Length of Stay: 5 day(s)    Current Patient Status: Inpatient   Certification Statement: The patient will continue to require additional inpatient hospital stay due to Angiogram    Discharge Plan: 48 hours    Code Status: Level 1 - Full Code      Subjective:   Patient feels better today    Objective:     Vitals:   Temp (24hrs), Av 8 °F (36 °C), Min:96 6 °F (35 9 °C), Max:97 1 °F (36 2 °C)    Temp:  [96 6 °F (35 9 °C)-97 1 °F (36 2 °C)] 96 8 °F (36 °C)  HR:  [59-72] 69  Resp:  [18] 18  BP: ()/(68-76) 119/75  SpO2:  [89 %-97 %] 96 %  Body mass index is 28 47 kg/m²  Input and Output Summary (last 24 hours): Intake/Output Summary (Last 24 hours) at 2020 1134  Last data filed at 2020 0159  Gross per 24 hour   Intake 0 ml   Output 600 ml   Net -600 ml       Physical Exam:     Constitutional: No distress  Cardiovascular:   Irregularly Irregular    Pulmonary/Chest: Effort normal and breath sounds normal    Abdominal: Soft  Bowel sounds are normal    Genitourinary:   Genitourinary Comments: Baxter intact   Neurological: He is alert  Skin: Skin is warm and dry  See clinical images for lower extremity description  Psychiatric: He has a normal mood and affect   His behavior is normal        Additional Data:     Labs:    Results from last 7 days   Lab Units 20  0459   WBC Thousand/uL 10 26*   HEMOGLOBIN g/dL 11 9*   HEMATOCRIT % 37 1   PLATELETS Thousands/uL 359   NEUTROS PCT % 72   LYMPHS PCT % 18   MONOS PCT % 6   EOS PCT % 2     Results from last 7 days   Lab Units 08/04/20  0459   SODIUM mmol/L 142   POTASSIUM mmol/L 4 7   CHLORIDE mmol/L 106   CO2 mmol/L 31   BUN mg/dL 36*   CREATININE mg/dL 1 24   ANION GAP mmol/L 5   CALCIUM mg/dL 8 7   ALBUMIN g/dL 2 2*   TOTAL BILIRUBIN mg/dL 0 60   ALK PHOS U/L 78   ALT U/L 40   AST U/L 61*   GLUCOSE RANDOM mg/dL 110     Results from last 7 days   Lab Units 08/04/20  0459   INR  1 50*     Results from last 7 days   Lab Units 08/04/20  1056 08/04/20  0502 08/03/20  1550 08/03/20  1055 08/03/20  0554 08/02/20  2202 08/02/20  2051 08/02/20  1605 08/02/20  1055 08/02/20  0612 08/01/20  1530 08/01/20  1136   POC GLUCOSE mg/dl 269* 120 140 294* 131 115 75 279* 280* 81 165* 218*         Results from last 7 days   Lab Units 07/30/20  1902   LACTIC ACID mmol/L 1 2   PROCALCITONIN ng/ml 0 23           * I Have Reviewed All Lab Data Listed Above  * Additional Pertinent Lab Tests Reviewed: Davion 66 Admission Reviewed    Imaging:    Imaging Reports Reviewed Today Include: NA  Imaging Personally Reviewed by Myself Includes:  NA    Recent Cultures (last 7 days):     Results from last 7 days   Lab Units 07/30/20  2146 07/30/20  2101 07/30/20  1939   BLOOD CULTURE  No Growth After 4 Days  --  No Growth After 4 Days     URINE CULTURE   --  >100,000 cfu/ml Escherichia coli ESBL*  7769-2409 cfu/ml Proteus mirabilis*  10,000-19,000 cfu/ml Enterococcus faecalis*  --        Last 24 Hours Medication List:   acetaminophen, 650 mg, Oral, Q6H PRN, Ren Medina MD  HYDROmorphone, 0 5 mg, Intravenous, Q4H PRN, Miquel Baez MD  insulin glargine, 8 Units, Subcutaneous, HS, Ren Medina MD  insulin lispro, 1-5 Units, Subcutaneous, HS, Ren Medina MD  insulin lispro, 1-6 Units, Subcutaneous, TID ACRen MD  insulin lispro, 8 Units, Subcutaneous, TID With Meals, Ren Celso Escobar MD  nicotine, 1 patch, Transdermal, Daily, Ren Medina MD  oxyCODONE-acetaminophen, 1 tablet, Oral, Q4H PRN, Junie Hairston MD  sodium chloride, 100 mL/hr, Intravenous, Continuous, Linden Gutierrez MD         Today, Patient Was Seen By: KIRAN Rico      ** Please Note: Dictation voice to text software may have been used in the creation of this document   **

## 2020-08-04 NOTE — ASSESSMENT & PLAN NOTE
· Secondary to peripheral arterial disease, R>L  · Podiatry was consulted, recommending vascular surgery evaluation  · Foot xrays negative for osteomyelitis  · Arterial duplex showing occlusive disease of lower extremities bilaterally  · Plan for angiogram on 8/5 after holding xarelto for 48 hours

## 2020-08-04 NOTE — PROGRESS NOTES
NEPHROLOGY PROGRESS NOTE    Patient: Leatha Infante               Sex: male          DOA: 7/30/2020  5:23 PM   Date of Birth: @        Age: @        LOS:  LOS: 5 days   8/4/2020    REASON FOR THE CONSULTATION:  Further management of VALDO risk reduction strategies    HPI     This is a 80 y o  male admitted for Pressure ulcer of ankle     SUBJECTIVE     - patient was sleepy but arousable  Patient denies nausea, vomiting, headache or dizziness today    - Reviewed last 24 hrs events     CURRENT MEDICATIONS       Current Facility-Administered Medications:     acetaminophen (TYLENOL) tablet 650 mg, 650 mg, Oral, Q6H PRN, Ren Medina MD, 650 mg at 08/02/20 1114    HYDROmorphone (DILAUDID) injection 0 5 mg, 0 5 mg, Intravenous, Q4H PRN, Bernabe Lockhart MD    insulin glargine (LANTUS) subcutaneous injection 8 Units 0 08 mL, 8 Units, Subcutaneous, HS, Ren Medina MD, 8 Units at 08/02/20 2206    insulin lispro (HumaLOG) 100 units/mL subcutaneous injection 1-5 Units, 1-5 Units, Subcutaneous, HS, Ren Medina MD, 1 Units at 08/01/20 2122    insulin lispro (HumaLOG) 100 units/mL subcutaneous injection 1-6 Units, 1-6 Units, Subcutaneous, TID AC, 3 Units at 08/04/20 1256 **AND** Fingerstick Glucose (POCT), , , TID AC, Ren Medina MD    insulin lispro (HumaLOG) 100 units/mL subcutaneous injection 8 Units, 8 Units, Subcutaneous, TID With Meals, Ren Medina MD, 8 Units at 08/04/20 1256    nicotine (NICODERM CQ) 7 mg/24hr TD 24 hr patch 1 patch, 1 patch, Transdermal, Daily, Ren Medina MD, 1 patch at 08/04/20 0915    oxyCODONE-acetaminophen (PERCOCET) 5-325 mg per tablet 1 tablet, 1 tablet, Oral, Q4H PRN, Bernabe Lockhart MD, 1 tablet at 08/04/20 0450    sodium chloride 0 9 % infusion, 100 mL/hr, Intravenous, Continuous, London Corbett MD    OBJECTIVE     Current Weight: Weight - Scale: 80 kg (176 lb 5 9 oz)  /75   Pulse 69   Temp (!) 96 8 °F (36 °C)   Resp 18   Ht 5' 6"   Wt 80 kg (176 lb 5 9 oz)   SpO2 96% BMI 28 47 kg/m²   Vitals:    08/04/20 0711   BP: 119/75   Pulse: 69   Resp: 18   Temp: (!) 96 8 °F (36 °C)   SpO2: 96%     Body mass index is 28 47 kg/m²  Intake/Output Summary (Last 24 hours) at 8/4/2020 1311  Last data filed at 8/4/2020 0915  Gross per 24 hour   Intake 120 ml   Output 600 ml   Net -480 ml       PHYSICAL EXAMINATION     Physical Exam   Constitutional: No distress  Ill looking   HENT:   Head: Normocephalic and atraumatic  Eyes: No scleral icterus  Neck: Neck supple  No JVD present  Cardiovascular: Normal rate  Pulmonary/Chest: No accessory muscle usage  No respiratory distress  He has no wheezes  Abdominal: Soft  He exhibits no distension  Musculoskeletal:      Right hand: He exhibits no laceration  Left hand: He exhibits no laceration  Neurological: He is alert  Skin: Skin is warm  Psychiatric: He is not combative  He is communicative  LAB RESULTS     Results from last 7 days   Lab Units 08/04/20  0459 08/03/20  0540 08/02/20  0552 08/01/20  0852 07/31/20  0541 07/30/20  1902   WBC Thousand/uL 10 26* 8 76 8 66 8 50 10 84* 10 89*   HEMOGLOBIN g/dL 11 9* 11 9* 11 9* 12 0 12 5 12 3   HEMATOCRIT % 37 1 37 5 37 5 39 2 40 1 38 6   PLATELETS Thousands/uL 359 339 338 366 387 375   SODIUM mmol/L 142 141 141 142 142 140   POTASSIUM mmol/L 4 7 4 3 4 2 4 2 4 1 4 5   CHLORIDE mmol/L 106 106 107 106 106 105   CO2 mmol/L 31 29 29 30 27 28   BUN mg/dL 36* 36* 41* 47* 50* 60*   CREATININE mg/dL 1 24 1 24 1 40* 1 60* 1 56* 1 82*   EGFR ml/min/1 73sq m 52 52 45 38 40 33   CALCIUM mg/dL 8 7 8 8 8 5 8 8 8 7 8 8   MAGNESIUM mg/dL 1 9 2 0 2 0 2 1  --  2 4       I have personally reviewed the old medical records and patient's previously known baseline creatinine level is ~ 1 4    RADIOLOGY RESULTS      XR foot 3+ vw right   Final Result by Eleuterio Jenkins MD (08/03 0920)      No evidence of osteomyelitis, fracture or other significant bony abnormality        Workstation performed: [id number]         XR foot 3+ vw left   Final Result by Darlyn Null MD (08/03 0271)      No evidence of fracture, osteomyelitis or other significant bony abnormality in the left foot  Workstation performed: [id number]         VAS lower limb arterial duplex, complete bilateral   Final Result by Carrie Manzanares MD (08/01 1108)      XR chest 1 view portable   Final Result by Flako Balbuena MD (07/31 6081)      No acute cardiopulmonary disease  Workstation performed: [id number]         IR abdominal angiography / intervention    (Results Pending)       PLAN / RECOMMENDATIONS      1  CKD stage 3 + VALDO risk reduction strategies in the light of undergoing leg angiogram     Upon review of old medical records, previously known baseline creatinine is around 1 4  Current creatinine is 1 24 which is better than previously known baseline creatinine  VALDO risk reduction strategies has been discussed with patient and family earlier  Patient is now scheduled to undergo leg angiogram tomorrow and we will plan to start patient on normal saline at 100 mL/hour starting tonight for 24 hr   Patient was previously also taking Entresto which is currently on hold  Overall above mentioned plan was also d/w Sabra Beard MD  Nephrology  8/4/2020        Portions of the record may have been created with voice recognition software  Occasional wrong word or "sound a like" substitutions may have occurred due to the inherent limitations of voice recognition software  Read the chart carefully and recognize, using context, where substitutions have occurred

## 2020-08-04 NOTE — PROGRESS NOTES
Progress Note - Infectious Disease   Tiffany Padilla 80 y o  male MRN: [id number]  Unit/Bed#: -01 Encounter: [id number]      Impression/Plan:  1  Purulent drainage around Baxter catheter  This appears to have resolved  Patient has no other signs and symptoms of UTI  He has no fever  WBC only slightly above normal range  Maintain off antibiotics  Recommend Baxter catheter exchange  Follow up any pending cultures  Additional supportive care as per primary     2  CKD  Creatinine baseline  Continue to monitor creatinine  If antibiotic started will need to dose adjust appropriately  3  Positive urine culture  This likely represents asymptomatic bacteriuria in the setting of chronic Baxter catheter  No antibiotics for this issue  4  Left heel ulcer with peripheral vascular disease  Patient has been evaluated by vascular surgery and is pending angiogram for renal optimization  X-rays without signs of osteomyelitis  Patient remains largely hemodynamically stable and white count only mildly fluctuating  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 at the site that may need intervention  Given current stability will maintain off antibiotics  Will follow-up angiogram  Ongoing follow-up by vascular surgery  Ongoing follow-up by Podiatry  Serial skin exams  If patient clinically worsens or site progresses consider initiation of Ancef with Flagyl  Additional supportive care as per primary    Above plan discussed briefly with the patient at bedside  ID consult service will continue to follow  Antibiotics:  None    24 hour events:  No acute events noted overnight on chart review  White blood cell count 10 2  No new cultures  No new imaging  Patient's other vitals are stable  Labs otherwise unremarkable    Subjective:  Patient currently denies having any nausea, vomiting, chest pain or shortness of breath  He reports discomfort in his left leg    Intermittently falling asleep on exam     Objective:  Vitals:  Temp: [96 6 °F (35 9 °C)-97 1 °F (36 2 °C)] 96 8 °F (36 °C)  HR:  [59-72] 69  Resp:  [18] 18  BP: ()/(68-76) 119/75  SpO2:  [89 %-97 %] 96 %  Temp (24hrs), Av 8 °F (36 °C), Min:96 6 °F (35 9 °C), Max:97 1 °F (36 2 °C)  Current: Temperature: (!) 96 8 °F (36 °C)    Physical Exam:   General Appearance:  Sleepy, nontoxic, no acute distress  Throat: Oropharynx moist without lesions  Lungs:   Clear to auscultation bilaterally; no wheezes, rhonchi or rales; respirations unlabored on room air   Heart:  RRR; no murmur, rub or gallop appreciated   Abdomen:   Soft, non-tender, non-distended, positive bowel sounds  Extremities: No clubbing, cyanosis or edema   Skin: No new rashes or lesions  No new draining wounds noted  Faint erythema again of the left leg is stable from prior  Unable to fully evaluate the patient's ulceration as he has pain whenever I try to manipulate the leg  Labs, Imaging, & Other studies:   All pertinent labs and imaging studies were personally reviewed  Results from last 7 days   Lab Units 20  04520  0540 20  0552   WBC Thousand/uL 10 26* 8 76 8 66   HEMOGLOBIN g/dL 11 9* 11 9* 11 9*   PLATELETS Thousands/uL 359 339 338     Results from last 7 days   Lab Units 20  04520  0540 20  0552   POTASSIUM mmol/L 4 7 4 3 4 2   CHLORIDE mmol/L 106 106 107   CO2 mmol/L 31 29 29   BUN mg/dL 36* 36* 41*   CREATININE mg/dL 1 24 1 24 1 40*   EGFR ml/min/1 73sq m 52 52 45   CALCIUM mg/dL 8 7 8 8 8 5   AST U/L 61* 48* 49*   ALT U/L 40 35 35   ALK PHOS U/L 78 78 80     Results from last 7 days   Lab Units 20  2146 20  2101 20  1939   BLOOD CULTURE  No Growth After 4 Days  --  No Growth After 4 Days     URINE CULTURE   --  >100,000 cfu/ml Escherichia coli ESBL*  7627-4011 cfu/ml Proteus mirabilis*  10,000-19,000 cfu/ml Enterococcus faecalis*  --

## 2020-08-04 NOTE — ASSESSMENT & PLAN NOTE
80year-old minimally ambulatory male smoker w/chronic diastolic heart failure, type 2 DM, CKD 3 (baseline creatinine 1 4-1 8), Afib on Xarelto, COPD, OA bilateral knee, s/p bilateral ANDREA, recurrent UTI w/chronic indwelling Baxter catheter admitted w/pyuria and PAD w/bilateral heel pressure wounds, L >R  Diagnostics:  -LEAD 7/31:  diffuse right lower extremity disease without focal stenosis, R JAMAL 0 6/42/34 and left lower extremity with diffuse disease, no focal stenosis, decrease in velocities in SFA to popliteal artery suggestive of possible stenosis, L JAMAL 0 52/33/36  -Xray left foot 7/31: No evidence of fracture, osteomyelitis or other significant bony abnormality in the left foot  -Xray right foot 7/31: No evidence of osteomyelitis, fracture or other significant bony abnormality  Plan:  -Recommended LLE angiogram with revascularization and attempt for limb salvage   + L knee contracture  IR consult for Angiogram w/ LLE run off possible intervention w/ general anesthesia tomorrow   -Nephrology consult appreciated  Serum creatinine 1 24/GFR 52 today  IVF pre/post angio and hold nephro toxic agents consult pending for renal optimization for angiogram    -NPO after midnight for angio tomorrow   -Hold Xarelto 48 hours  Last dose 8/3 a m  Continue to hold in preparation for angiogram  -Discussed with patient and daughter  Verbalized they want all measures taken to save the left leg    Not accepting of limb amputation at this time  -Off load heels with pressure bed and boots  -Betadine paint to heel   -Will discuss to Dr Linda Beckham

## 2020-08-04 NOTE — ASSESSMENT & PLAN NOTE
-stable, rate controlled  -continue current medical regimen with medication modifications per primary medical service  -hold Xarelto in preparation for angiogram

## 2020-08-04 NOTE — SOCIAL WORK
Per [facility] patient will have an angiogram tomorrow  CM phoned dtr-Keiko who states patient was at [facility] for 30 days discharged from [facility] and came home for one day where the nurse and doctor visited patient at home and recommended patient come to the ED  [place]  There are five stairs to enter from outside  Patient use a walker and a commode  Patient needs assistance with adls  Patient has rehab hx w/ [facility] and hhc hx with [facility]  Patient fills scripts with [employer] in [facility]  Patient does not have MH/substance abuse hx  [facility] Simpler has POA  patient's doctor make house visit  Upon clearance for discharge Lagrangemason Gamez states she wants patient to go to [facility] or [facility]  A post acute care recommendation was made by your care team for STR  Discussed Freedom of Choice with POA  List of facilities given to POA via in person  POA aware the list is custom filtered for them by preferred and that [facility] post acute providers are designated  Referrals are sent  Treatment team informed  CM reviewed discharge planning process including the following: identifying help at home, patient preference for discharge planning needs, pharmacy preference, and availability of treatment team to discuss questions or concerns patient and/or family may have regarding understanding medications and recognizing signs and symptoms once discharged  CM also encouraged patient to follow up with all recommended appointments after discharge  Patient advised of importance for patient and family to participate in managing patients medical well being

## 2020-08-04 NOTE — ASSESSMENT & PLAN NOTE
-baseline creatinine 1 4-1 8  -creat 1 82 on admission    Creat 1 24/GFR 52 this am  -Nephrology input appreciated  -continue to avoid nephrotoxic agents  -trend creatinine

## 2020-08-04 NOTE — PROGRESS NOTES
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 [facility])  Assessment & Plan  80year-old minimally ambulatory male smoker w/chronic diastolic heart failure, type 2 DM, CKD 3 (baseline creatinine 1 4-1 8), Afib on Xarelto, COPD, OA bilateral knee, s/p bilateral ANDREA, recurrent UTI w/chronic indwelling Baxter catheter admitted w/pyuria and PAD w/bilateral heel pressure wounds, L >R  Diagnostics:  -LEAD 7/31:  diffuse right lower extremity disease without focal stenosis, R JAMAL 0 6/42/34 and left lower extremity with diffuse disease, no focal stenosis, decrease in velocities in SFA to popliteal artery suggestive of possible stenosis, L JAMAL 0 52/33/36  -Xray left foot 7/31: No evidence of fracture, osteomyelitis or other significant bony abnormality in the left foot  -Xray right foot 7/31: No evidence of osteomyelitis, fracture or other significant bony abnormality  Plan:  -Recommended LLE angiogram with revascularization and attempt for limb salvage   + L knee contracture  IR consult for Angiogram w/ LLE run off possible intervention w/ general anesthesia tomorrow   -Nephrology consult appreciated  Serum creatinine 1 24/GFR 52 today  IVF pre/post angio and hold nephro toxic agents consult pending for renal optimization for angiogram    -NPO after midnight for angio tomorrow   -Hold Xarelto 48 hours  Last dose 8/3 a m  Continue to hold in preparation for angiogram  -Discussed with patient and daughter  Verbalized they want all measures taken to save the left leg    Not accepting of limb amputation at this time  -Off load heels with pressure bed and boots  -Betadine paint to heel   -Will discuss to Dr Des Alexander    * Pressure ulcer of ankle  Assessment & Plan  L>R heel ulcer w/dry gangrene L heel  -heel pressure pressure off-loading   -plan for abdominal aortogram with left lower extremity runoff, possible endovascular intervention     Stage 3 chronic kidney disease (HCC)  Assessment & Plan  -baseline creatinine 1 4-1 8  -creat 1 82 on admission  Creat 1 24/GFR 52 this am  -Nephrology input appreciated  -continue to avoid nephrotoxic agents  -trend creatinine    A-fib (formerly [facility])  Assessment & Plan  -stable, rate controlled  -continue current medical regimen with medication modifications per primary medical service  -hold Xarelto in preparation for angiogram            Subjective:  Patient in bed  No acute distress  Complain heel pain  Vital signs stable  Hemodynamically stable  Vitals:  /75   Pulse 69   Temp (!) 96 8 °F (36 °C)   Resp 18   Ht 5' 6"   Wt 80 kg (176 lb 5 9 oz)   SpO2 96%   BMI 28 47 kg/m²     I/Os:  I/O last 3 completed shifts: In: 480 [P O :480]  Out: 1500 [Urine:1500]  I/O this shift:  In: 120 [P O :120]  Out: -     Lab Results and Cultures:   Lab Results   Component Value Date    WBC 10 26 (H) 08/04/2020    HGB 11 9 (L) 08/04/2020    HCT 37 1 08/04/2020    MCV 88 08/04/2020     08/04/2020     Lab Results   Component Value Date    CALCIUM 8 7 08/04/2020     04/01/2018    K 4 7 08/04/2020    CO2 31 08/04/2020     08/04/2020    BUN 36 (H) 08/04/2020    CREATININE 1 24 08/04/2020     Lab Results   Component Value Date    INR 1 50 (H) 08/04/2020    INR 2 46 (H) 07/30/2020    INR 1 23 (H) 06/11/2020    PROTIME 18 4 (H) 08/04/2020    PROTIME 25 5 (H) 07/30/2020    PROTIME 15 5 (H) 06/11/2020        Blood Culture:   Lab Results   Component Value Date    BLOODCX No Growth After 4 Days   07/30/2020   ,   Urinalysis:   Lab Results   Component Value Date    COLORU Yellow 07/30/2020    CLARITYU Clear 07/30/2020    SPECGRAV 1 025 07/30/2020    PHUR 5 5 07/30/2020    PHUR 7 0 01/28/2019    LEUKOCYTESUR Moderate (A) 07/30/2020    NITRITE Positive (A) 07/30/2020 GLUCOSEU Negative 07/30/2020    KETONESU Negative 07/30/2020    BILIRUBINUR Negative 07/30/2020    BLOODU Large (A) 07/30/2020   ,   Urine Culture:   Lab Results   Component Value Date    URINECX >100,000 cfu/ml Escherichia coli ESBL (A) 07/30/2020    URINECX 8966-8311 cfu/ml Proteus mirabilis (A) 07/30/2020    URINECX 10,000-19,000 cfu/ml Enterococcus faecalis (A) 07/30/2020   ,   Wound Culure: No results found for: WOUNDCULT    Medications:  Current Facility-Administered Medications   Medication Dose Route Frequency    acetaminophen (TYLENOL) tablet 650 mg  650 mg Oral Q6H PRN    HYDROmorphone (DILAUDID) injection 0 5 mg  0 5 mg Intravenous Q4H PRN    insulin glargine (LANTUS) subcutaneous injection 8 Units 0 08 mL  8 Units Subcutaneous HS    insulin lispro (HumaLOG) 100 units/mL subcutaneous injection 1-5 Units  1-5 Units Subcutaneous HS    insulin lispro (HumaLOG) 100 units/mL subcutaneous injection 1-6 Units  1-6 Units Subcutaneous TID AC    insulin lispro (HumaLOG) 100 units/mL subcutaneous injection 8 Units  8 Units Subcutaneous TID With Meals    nicotine (NICODERM CQ) 7 mg/24hr TD 24 hr patch 1 patch  1 patch Transdermal Daily    oxyCODONE-acetaminophen (PERCOCET) 5-325 mg per tablet 1 tablet  1 tablet Oral Q4H PRN    sodium chloride 0 9 % infusion  100 mL/hr Intravenous Continuous       Imaging:  No new imaging studies to review    Physical Exam:    General appearance: alert and oriented, in no acute distress  Skin: Skin color, texture, turgor normal  No rashes or lesions  Neurologic: Grossly normal  Head: Normocephalic, without obvious abnormality, atraumatic  Eyes: Extraocular movements intact  Throat: lips, mucosa, and tongue normal; teeth and gums normal  Lungs: clear to auscultation bilaterally  Chest wall: no tenderness  Heart: regular rate and rhythm  Abdomen: soft, non-tender; bowel sounds normal; no masses,  no organomegaly  Extremities: No lower extremity swelling  Left knee contracture  Unable to straighten left knee   Left heel dry gangrene pressure ulcer and right heel smaller ulceration     Wound/Incision:  Bilateral heel dressing clean, dry, and intact    Pulse exam:  Femoral: Right: 2+ Left: 2+  Popliteal: Right: non-palpable Left: non-palpable  DP: Right: non-palpable Left: non-palpable  PT: Right: non-palpable Left: non-palpable      FAREED Gomes  8/4/2020  [facility]  [phone]

## 2020-08-05 ENCOUNTER — ANESTHESIA (INPATIENT)
Dept: INTERVENTIONAL RADIOLOGY/VASCULAR | Facility: HOSPITAL | Age: 85
DRG: 253 | End: 2020-08-05
Payer: MEDICARE

## 2020-08-05 LAB
ALBUMIN SERPL BCP-MCNC: 2 G/DL (ref 3.5–5)
ALP SERPL-CCNC: 81 U/L (ref 46–116)
ALT SERPL W P-5'-P-CCNC: 45 U/L (ref 12–78)
ANION GAP SERPL CALCULATED.3IONS-SCNC: 7 MMOL/L (ref 4–13)
AST SERPL W P-5'-P-CCNC: 58 U/L (ref 5–45)
BACTERIA BLD CULT: NORMAL
BACTERIA BLD CULT: NORMAL
BASOPHILS # BLD AUTO: 0.05 THOUSANDS/ΜL (ref 0–0.1)
BASOPHILS NFR BLD AUTO: 1 % (ref 0–1)
BILIRUB SERPL-MCNC: 0.4 MG/DL (ref 0.2–1)
BUN SERPL-MCNC: 41 MG/DL (ref 5–25)
CALCIUM SERPL-MCNC: 8.5 MG/DL (ref 8.3–10.1)
CHLORIDE SERPL-SCNC: 107 MMOL/L (ref 100–108)
CO2 SERPL-SCNC: 28 MMOL/L (ref 21–32)
CREAT SERPL-MCNC: 1.29 MG/DL (ref 0.6–1.3)
EOSINOPHIL # BLD AUTO: 0.2 THOUSAND/ΜL (ref 0–0.61)
EOSINOPHIL NFR BLD AUTO: 2 % (ref 0–6)
ERYTHROCYTE [DISTWIDTH] IN BLOOD BY AUTOMATED COUNT: 15.7 % (ref 11.6–15.1)
GFR SERPL CREATININE-BSD FRML MDRD: 50 ML/MIN/1.73SQ M
GLUCOSE SERPL-MCNC: 116 MG/DL (ref 65–140)
GLUCOSE SERPL-MCNC: 125 MG/DL (ref 65–140)
GLUCOSE SERPL-MCNC: 128 MG/DL (ref 65–140)
GLUCOSE SERPL-MCNC: 159 MG/DL (ref 65–140)
GLUCOSE SERPL-MCNC: 174 MG/DL (ref 65–140)
GLUCOSE SERPL-MCNC: 195 MG/DL (ref 65–140)
HCT VFR BLD AUTO: 35.2 % (ref 36.5–49.3)
HGB BLD-MCNC: 11 G/DL (ref 12–17)
IMM GRANULOCYTES # BLD AUTO: 0.1 THOUSAND/UL (ref 0–0.2)
IMM GRANULOCYTES NFR BLD AUTO: 1 % (ref 0–2)
LYMPHOCYTES # BLD AUTO: 1.66 THOUSANDS/ΜL (ref 0.6–4.47)
LYMPHOCYTES NFR BLD AUTO: 18 % (ref 14–44)
MAGNESIUM SERPL-MCNC: 2 MG/DL (ref 1.6–2.6)
MCH RBC QN AUTO: 27.8 PG (ref 26.8–34.3)
MCHC RBC AUTO-ENTMCNC: 31.3 G/DL (ref 31.4–37.4)
MCV RBC AUTO: 89 FL (ref 82–98)
MONOCYTES # BLD AUTO: 0.73 THOUSAND/ΜL (ref 0.17–1.22)
MONOCYTES NFR BLD AUTO: 8 % (ref 4–12)
NEUTROPHILS # BLD AUTO: 6.4 THOUSANDS/ΜL (ref 1.85–7.62)
NEUTS SEG NFR BLD AUTO: 70 % (ref 43–75)
NRBC BLD AUTO-RTO: 0 /100 WBCS
PLATELET # BLD AUTO: 355 THOUSANDS/UL (ref 149–390)
PMV BLD AUTO: 10.4 FL (ref 8.9–12.7)
POTASSIUM SERPL-SCNC: 4.7 MMOL/L (ref 3.5–5.3)
PROT SERPL-MCNC: 6.5 G/DL (ref 6.4–8.2)
RBC # BLD AUTO: 3.95 MILLION/UL (ref 3.88–5.62)
SARS-COV-2 RNA RESP QL NAA+PROBE: NEGATIVE
SODIUM SERPL-SCNC: 142 MMOL/L (ref 136–145)
WBC # BLD AUTO: 9.14 THOUSAND/UL (ref 4.31–10.16)

## 2020-08-05 PROCEDURE — B41C1ZZ FLUOROSCOPY OF PELVIC ARTERIES USING LOW OSMOLAR CONTRAST: ICD-10-PCS | Performed by: RADIOLOGY

## 2020-08-05 PROCEDURE — 99232 SBSQ HOSP IP/OBS MODERATE 35: CPT | Performed by: PHYSICIAN ASSISTANT

## 2020-08-05 PROCEDURE — C1769 GUIDE WIRE: HCPCS

## 2020-08-05 PROCEDURE — 82948 REAGENT STRIP/BLOOD GLUCOSE: CPT

## 2020-08-05 PROCEDURE — C1725 CATH, TRANSLUMIN NON-LASER: HCPCS

## 2020-08-05 PROCEDURE — C1894 INTRO/SHEATH, NON-LASER: HCPCS

## 2020-08-05 PROCEDURE — 99233 SBSQ HOSP IP/OBS HIGH 50: CPT | Performed by: INTERNAL MEDICINE

## 2020-08-05 PROCEDURE — 80053 COMPREHEN METABOLIC PANEL: CPT | Performed by: STUDENT IN AN ORGANIZED HEALTH CARE EDUCATION/TRAINING PROGRAM

## 2020-08-05 PROCEDURE — B41GYZZ FLUOROSCOPY OF LEFT LOWER EXTREMITY ARTERIES USING OTHER CONTRAST: ICD-10-PCS | Performed by: RADIOLOGY

## 2020-08-05 PROCEDURE — 37224 PR REVSC OPN/PRG FEM/POP W/ANGIOPLASTY UNI: CPT | Performed by: RADIOLOGY

## 2020-08-05 PROCEDURE — 047U3ZZ DILATION OF LEFT PERONEAL ARTERY, PERCUTANEOUS APPROACH: ICD-10-PCS | Performed by: RADIOLOGY

## 2020-08-05 PROCEDURE — 047L3ZZ DILATION OF LEFT FEMORAL ARTERY, PERCUTANEOUS APPROACH: ICD-10-PCS | Performed by: RADIOLOGY

## 2020-08-05 PROCEDURE — 75625 CONTRAST EXAM ABDOMINL AORTA: CPT | Performed by: RADIOLOGY

## 2020-08-05 PROCEDURE — 99152 MOD SED SAME PHYS/QHP 5/>YRS: CPT | Performed by: RADIOLOGY

## 2020-08-05 PROCEDURE — 75710 ARTERY X-RAYS ARM/LEG: CPT | Performed by: RADIOLOGY

## 2020-08-05 PROCEDURE — 75774 ARTERY X-RAY EACH VESSEL: CPT | Performed by: RADIOLOGY

## 2020-08-05 PROCEDURE — 83735 ASSAY OF MAGNESIUM: CPT | Performed by: STUDENT IN AN ORGANIZED HEALTH CARE EDUCATION/TRAINING PROGRAM

## 2020-08-05 PROCEDURE — 76937 US GUIDE VASCULAR ACCESS: CPT | Performed by: RADIOLOGY

## 2020-08-05 PROCEDURE — C1760 CLOSURE DEV, VASC: HCPCS

## 2020-08-05 PROCEDURE — 37228 PR REVASCULARIZE TIBIAL/PERON ARTERY,ANGIOPLASTY INITIAL: CPT | Performed by: RADIOLOGY

## 2020-08-05 PROCEDURE — 047N3ZZ DILATION OF LEFT POPLITEAL ARTERY, PERCUTANEOUS APPROACH: ICD-10-PCS | Performed by: RADIOLOGY

## 2020-08-05 PROCEDURE — C1893 INTRO/SHEATH, FIXED,NON-PEEL: HCPCS

## 2020-08-05 PROCEDURE — 85025 COMPLETE CBC W/AUTO DIFF WBC: CPT | Performed by: STUDENT IN AN ORGANIZED HEALTH CARE EDUCATION/TRAINING PROGRAM

## 2020-08-05 PROCEDURE — G9198 NO ORDER FOR CEPH NO REASON: HCPCS | Performed by: RADIOLOGY

## 2020-08-05 PROCEDURE — C1887 CATHETER, GUIDING: HCPCS

## 2020-08-05 PROCEDURE — C1758 CATHETER, URETERAL: HCPCS

## 2020-08-05 PROCEDURE — 87635 SARS-COV-2 COVID-19 AMP PRB: CPT | Performed by: ANESTHESIOLOGY

## 2020-08-05 PROCEDURE — 99232 SBSQ HOSP IP/OBS MODERATE 35: CPT | Performed by: GENERAL PRACTICE

## 2020-08-05 RX ORDER — METOCLOPRAMIDE HYDROCHLORIDE 5 MG/ML
10 INJECTION INTRAMUSCULAR; INTRAVENOUS ONCE AS NEEDED
Status: DISCONTINUED | OUTPATIENT
Start: 2020-08-05 | End: 2020-08-05 | Stop reason: HOSPADM

## 2020-08-05 RX ORDER — LIDOCAINE WITH 8.4% SOD BICARB 0.9%(10ML)
SYRINGE (ML) INJECTION CODE/TRAUMA/SEDATION MEDICATION
Status: COMPLETED | OUTPATIENT
Start: 2020-08-05 | End: 2020-08-05

## 2020-08-05 RX ORDER — GLYCOPYRROLATE 0.2 MG/ML
INJECTION INTRAMUSCULAR; INTRAVENOUS AS NEEDED
Status: DISCONTINUED | OUTPATIENT
Start: 2020-08-05 | End: 2020-08-05

## 2020-08-05 RX ORDER — HEPARIN SODIUM 1000 [USP'U]/ML
INJECTION, SOLUTION INTRAVENOUS; SUBCUTANEOUS AS NEEDED
Status: DISCONTINUED | OUTPATIENT
Start: 2020-08-05 | End: 2020-08-05

## 2020-08-05 RX ORDER — NEOSTIGMINE METHYLSULFATE 1 MG/ML
INJECTION INTRAVENOUS AS NEEDED
Status: DISCONTINUED | OUTPATIENT
Start: 2020-08-05 | End: 2020-08-05

## 2020-08-05 RX ORDER — ROCURONIUM BROMIDE 10 MG/ML
INJECTION, SOLUTION INTRAVENOUS AS NEEDED
Status: DISCONTINUED | OUTPATIENT
Start: 2020-08-05 | End: 2020-08-05

## 2020-08-05 RX ORDER — PROMETHAZINE HYDROCHLORIDE 25 MG/ML
25 INJECTION, SOLUTION INTRAMUSCULAR; INTRAVENOUS ONCE AS NEEDED
Status: DISCONTINUED | OUTPATIENT
Start: 2020-08-05 | End: 2020-08-05 | Stop reason: HOSPADM

## 2020-08-05 RX ORDER — MEPERIDINE HYDROCHLORIDE 50 MG/ML
12.5 INJECTION INTRAMUSCULAR; INTRAVENOUS; SUBCUTANEOUS ONCE AS NEEDED
Status: DISCONTINUED | OUTPATIENT
Start: 2020-08-05 | End: 2020-08-05 | Stop reason: HOSPADM

## 2020-08-05 RX ORDER — DIPHENHYDRAMINE HYDROCHLORIDE 50 MG/ML
12.5 INJECTION INTRAMUSCULAR; INTRAVENOUS ONCE AS NEEDED
Status: DISCONTINUED | OUTPATIENT
Start: 2020-08-05 | End: 2020-08-05 | Stop reason: HOSPADM

## 2020-08-05 RX ORDER — ONDANSETRON 2 MG/ML
INJECTION INTRAMUSCULAR; INTRAVENOUS AS NEEDED
Status: DISCONTINUED | OUTPATIENT
Start: 2020-08-05 | End: 2020-08-05

## 2020-08-05 RX ORDER — SUCCINYLCHOLINE/SOD CL,ISO/PF 100 MG/5ML
SYRINGE (ML) INTRAVENOUS AS NEEDED
Status: DISCONTINUED | OUTPATIENT
Start: 2020-08-05 | End: 2020-08-05

## 2020-08-05 RX ORDER — PROPOFOL 10 MG/ML
INJECTION, EMULSION INTRAVENOUS AS NEEDED
Status: DISCONTINUED | OUTPATIENT
Start: 2020-08-05 | End: 2020-08-05

## 2020-08-05 RX ORDER — LIDOCAINE HYDROCHLORIDE 10 MG/ML
INJECTION, SOLUTION EPIDURAL; INFILTRATION; INTRACAUDAL; PERINEURAL AS NEEDED
Status: DISCONTINUED | OUTPATIENT
Start: 2020-08-05 | End: 2020-08-05

## 2020-08-05 RX ORDER — FENTANYL CITRATE/PF 50 MCG/ML
25 SYRINGE (ML) INJECTION
Status: DISCONTINUED | OUTPATIENT
Start: 2020-08-05 | End: 2020-08-05 | Stop reason: HOSPADM

## 2020-08-05 RX ADMIN — LIDOCAINE HYDROCHLORIDE 50 MG: 10 INJECTION, SOLUTION EPIDURAL; INFILTRATION; INTRACAUDAL; PERINEURAL at 14:57

## 2020-08-05 RX ADMIN — NITROGLYCERIN 50 MCG: 20 INJECTION INTRAVENOUS at 16:36

## 2020-08-05 RX ADMIN — Medication 100 MG: at 14:58

## 2020-08-05 RX ADMIN — Medication 3 ML: at 15:24

## 2020-08-05 RX ADMIN — ONDANSETRON 4 MG: 2 INJECTION INTRAMUSCULAR; INTRAVENOUS at 17:19

## 2020-08-05 RX ADMIN — PHENYLEPHRINE HYDROCHLORIDE 400 MCG: 10 INJECTION INTRAVENOUS at 15:08

## 2020-08-05 RX ADMIN — PROPOFOL 100 MG: 10 INJECTION, EMULSION INTRAVENOUS at 14:58

## 2020-08-05 RX ADMIN — NEOSTIGMINE METHYLSULFATE 3 MG: 1 INJECTION, SOLUTION INTRAVENOUS at 17:23

## 2020-08-05 RX ADMIN — ROCURONIUM BROMIDE 10 MG: 50 INJECTION, SOLUTION INTRAVENOUS at 15:52

## 2020-08-05 RX ADMIN — PHENYLEPHRINE HYDROCHLORIDE 50 MCG/MIN: 10 INJECTION INTRAVENOUS at 15:10

## 2020-08-05 RX ADMIN — GLYCOPYRROLATE 0.4 MG: 0.2 INJECTION, SOLUTION INTRAMUSCULAR; INTRAVENOUS at 17:23

## 2020-08-05 RX ADMIN — ROCURONIUM BROMIDE 20 MG: 50 INJECTION, SOLUTION INTRAVENOUS at 15:19

## 2020-08-05 RX ADMIN — PHENYLEPHRINE HYDROCHLORIDE 100 MCG: 10 INJECTION INTRAVENOUS at 14:57

## 2020-08-05 RX ADMIN — INSULIN LISPRO 8 UNITS: 100 INJECTION, SOLUTION INTRAVENOUS; SUBCUTANEOUS at 19:44

## 2020-08-05 RX ADMIN — PHENYLEPHRINE HYDROCHLORIDE 200 MCG: 10 INJECTION INTRAVENOUS at 15:28

## 2020-08-05 RX ADMIN — INSULIN LISPRO 1 UNITS: 100 INJECTION, SOLUTION INTRAVENOUS; SUBCUTANEOUS at 19:43

## 2020-08-05 RX ADMIN — HEPARIN SODIUM 5000 UNITS: 1000 INJECTION, SOLUTION INTRAVENOUS; SUBCUTANEOUS at 15:42

## 2020-08-05 NOTE — BRIEF OP NOTE (RAD/CATH)
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 ([address] )    6  Stage 3 chronic kidney disease ([facility] [address] )        Surgeon:   Svitlana Salcido MD  Assistants:     No qualified resident was available, Resident is only observing    Estimated Blood Loss:  Minimal    Findings:     Mild (less than 50%) multifocal stenoses along the left SFA, treated with 5 mm balloon angioplasty  Critical multifocal stenoses along the P3 segment and the origin the tibioperoneal trunk, treated with 2 0 mm, 2 5 mm and 3 5 mm balloon angioplasty  Mild (less than 50%) stenosis at the proximal peroneal artery, treated with 2 0 mm and 2 5 mm balloon angioplasty  Peroneal artery provides the only in-line flow down the foot and short segment occlusion at the origin of the anterior tibial artery and the remaining anterior tibial artery is reconstituted by geniculate collaterals  Posterior tibial artery is chronically occluded and reconstituted distally at the level of the ankle  There is a robust network of collaterals at the ankle and hyperemia at the heel and hindfoot  Unsuccessful recanalization of the chronically occluded origin of the anterior tibial artery despite attempts with multiple combinations of wires and catheters      At the conclusion of the case, there was inline flow down the foot via the peroneal artery and the delayed filling of the anterior tibial artery and the posterior tibial artery via collaterals      Specimens:  None    Complications:  None    Anesthesia: General    Toni Cruz MD     Date: 8/5/2020  Time: 5:48 PM

## 2020-08-05 NOTE — QUICK NOTE
Attempted to see patient today but he was at procedure  Chart reviewed and he remains afebrile and without leukocytosis  He is plan for angiogram today  At this time will maintain off antibiotics  Should the patient clinically decompensates and would consider the addition of Ancef and Flagyl for his lower wound  ID consult service will formally re-evaluate the patient again in consult tomorrow  Please call if questions

## 2020-08-05 NOTE — ASSESSMENT & PLAN NOTE
80year-old minimally ambulatory male smoker w/chronic diastolic heart failure, type 2 DM, CKD 3 (baseline creatinine 1 4-1 8), Afib on Xarelto, COPD, OA bilateral knee, s/p bilateral ANDREA, recurrent UTI w/chronic indwelling Baxter catheter admitted w/pyuria and PAD w/bilateral heel pressure wounds, L >R  Diagnostics:  -LEAD 7/31:  diffuse right lower extremity disease without focal stenosis, R JAMAL 0 6/42/34 and left lower extremity with diffuse disease, no focal stenosis, decrease in velocities in SFA to popliteal artery suggestive of possible stenosis, L JAMAL 0 52/33/36  -Xray left foot 7/31: No evidence of fracture, osteomyelitis or other significant bony abnormality in the left foot  -Xray right foot 7/31: No evidence of osteomyelitis, fracture or other significant bony abnormality  -BC: neg x 5 days    Plan:  -for angiogram w/LLE runoff and possible endovascular revascularization today by IR under general anethesia for attempt for limb salvage   + L knee contracture  -Nephrology consult appreciated  Serum creatinine 1 29/GFR 50 today  IVF pre/post angio and hold nephrotoxic agents   -NPO in anticipation of agram today  -Xarelto on hold since 8/3 am  -Discussed with patient and daughter  Verbalized they want all measures taken to save the left leg  Not accepting of limb amputation at this time  -Off load heels with pressure bed and boots  -Betadine paint to heel   -Will discuss to Dr Wolfe Else    Formal recommendations to follow results of Agram later this afternoon

## 2020-08-05 NOTE — ASSESSMENT & PLAN NOTE
L>R heel ulcer w/dry gangrene L heel  -heel pressure pressure off-loading   -plan for abdominal aortogram with left lower extremity runoff, possible endovascular intervention today

## 2020-08-05 NOTE — PROGRESS NOTES
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 [facility])  Assessment & Plan  · See plan above    Diabetes [facility])  Assessment & Plan  Lab Results   Component Value Date    HGBA1C 7 0 (H) 06/12/2020       Recent Labs     08/04/20  1056 08/04/20  1539 08/04/20  2116 08/05/20  0640   POCGLU 269* 241* 179* 128       Blood Sugar Average: Last 72 hrs:  (P) 646 [id number]     · Last A1c 7%, continue insulin sliding scale, basal/prandial insulin  · No episodes of hypoglycemia, but glucose levels have been labile,  Especially at 11 AM fingerstick check  · Otherwise well controlled     Chronic diastolic congestive heart failure (HCC)  Assessment & Plan  Wt Readings from Last 3 Encounters:   07/30/20 80 kg (176 lb 5 9 oz)   07/14/20 85 1 kg (187 lb 9 6 oz)   07/07/20 85 1 kg (187 lb 9 6 oz)     · Unclear why patient is on entresto after further medication review  · Last ECHO in 2019 showing EF 60%, which is most current TTE  · Due to low normal blood pressures, will discontinue entresto  · Currently euvolemic    A-fib ([facility])  Assessment & Plan  · Controlled, on xarelto  · Holding xarelto for 48 hours for angiogram    Abnormal urinalysis  Assessment & Plan  · + UA, urine culture growing esbl e coli; ID following  · Did receive dose of Antibiotics in ED, but seen by infectious disease and recommending discontinuation of antibiotics secondary to lack of symptoms and chronic mccoy suggestive of colonization    Stage 3 chronic kidney disease (HCC)  Assessment & Plan  · Baseline creatinine 1 4-1 8  · Stable  · nephro following-receiving IVF pending angiogram    * Pressure ulcer of ankle  Assessment & Plan  · Secondary to peripheral arterial disease, R>L  · Podiatry was consulted due to PVD-likely needs higher amputation, vascular surgery consulted and angiogram pending today  · Foot xrays negative for osteomyelitis  · Arterial duplex showing occlusive disease of lower extremities bilaterally  · Plan for angiogram on  after holding xarelto for 48 hours      VTE Pharmacologic Prophylaxis:   Pharmacologic: Rivaroxaban (Xarelto)-will resume after arteriogram today  Mechanical VTE Prophylaxis in Place: Yes    Patient Centered Rounds: I have performed bedside rounds with nursing staff today  Discussions with Specialists or Other Care Team Provider:     Education and Discussions with Family / Patient:     Time Spent for Care: 30 minutes  More than 50% of total time spent on counseling and coordination of care as described above  Current Length of Stay: 6 day(s)    Current Patient Status: Inpatient   Certification Statement: The patient will continue to require additional inpatient hospital stay due to angiogram today    Discharge Plan: pending results of angiogram    Code Status: Level 1 - Full Code      Subjective:   Awake and alert-c/o foot/leg pain  Objective:     Vitals:   Temp (24hrs), Av 4 °F (36 9 °C), Min:97 9 °F (36 6 °C), Max:98 9 °F (37 2 °C)    Temp:  [97 9 °F (36 6 °C)-98 9 °F (37 2 °C)] 97 9 °F (36 6 °C)  HR:  [75-77] 75  Resp:  [17-20] 17  BP: (105-119)/(75-76) 119/75  SpO2:  [98 %-99 %] 99 %  Body mass index is 28 47 kg/m²  Input and Output Summary (last 24 hours): Intake/Output Summary (Last 24 hours) at 2020 0830  Last data filed at 2020 0500  Gross per 24 hour   Intake 120 ml   Output 150 ml   Net -30 ml       Physical Exam:     Physical Exam   Constitutional: He appears well-developed and well-nourished  HENT:   Head: Normocephalic and atraumatic  Eyes: Pupils are equal, round, and reactive to light  Cardiovascular: Regular rhythm and S1 normal    Pulmonary/Chest: Effort normal and breath sounds normal    Abdominal: Soft   Bowel sounds are normal  Musculoskeletal:         General: No edema  Neurological: He is alert  Skin: Skin is warm and dry  Heels and ankles with foul smelling discharge; skin dusky lower ext b/l to mid shin   Psychiatric: He has a normal mood and affect  His behavior is normal          Additional Data:     Labs:    Results from last 7 days   Lab Units 08/05/20  0515   WBC Thousand/uL 9 14   HEMOGLOBIN g/dL 11 0*   HEMATOCRIT % 35 2*   PLATELETS Thousands/uL 355   NEUTROS PCT % 70   LYMPHS PCT % 18   MONOS PCT % 8   EOS PCT % 2     Results from last 7 days   Lab Units 08/05/20  0515   SODIUM mmol/L 142   POTASSIUM mmol/L 4 7   CHLORIDE mmol/L 107   CO2 mmol/L 28   BUN mg/dL 41*   CREATININE mg/dL 1 29   ANION GAP mmol/L 7   CALCIUM mg/dL 8 5   ALBUMIN g/dL 2 0*   TOTAL BILIRUBIN mg/dL 0 40   ALK PHOS U/L 81   ALT U/L 45   AST U/L 58*   GLUCOSE RANDOM mg/dL 116     Results from last 7 days   Lab Units 08/04/20  0459   INR  1 50*     Results from last 7 days   Lab Units 08/05/20  0640 08/04/20  2116 08/04/20  1539 08/04/20  1056 08/04/20  0502 08/03/20  1550 08/03/20  1055 08/03/20  0554 08/02/20  2202 08/02/20  2051 08/02/20  1605 08/02/20  1055   POC GLUCOSE mg/dl 128 179* 241* 269* 120 140 294* 131 115 75 279* 280*         Results from last 7 days   Lab Units 07/30/20  1902   LACTIC ACID mmol/L 1 2   PROCALCITONIN ng/ml 0 23           * I Have Reviewed All Lab Data Listed Above  * Additional Pertinent Lab Tests Reviewed: All Labs Within Last 24 Hours Reviewed    Imaging:    Imaging Reports Reviewed Today Include:   Imaging Personally Reviewed by Myself Includes:      Recent Cultures (last 7 days):     Results from last 7 days   Lab Units 07/30/20  2146 07/30/20  2101 07/30/20  1939   BLOOD CULTURE  No Growth After 4 Days  --  No Growth After 5 Days     URINE CULTURE   --  >100,000 cfu/ml Escherichia coli ESBL*  8662-6567 cfu/ml Proteus mirabilis*  10,000-19,000 cfu/ml Enterococcus faecalis*  --        Last 24 Hours Medication List:   acetaminophen, 650 mg, Oral, Q6H PRN, Ren Medina MD  HYDROmorphone, 0 5 mg, Intravenous, Q4H PRN, Yanci Rodriguez MD  insulin glargine, 8 Units, Subcutaneous, HS, Ren Medina MD  insulin lispro, 1-5 Units, Subcutaneous, HS, Ren Medina MD  insulin lispro, 1-6 Units, Subcutaneous, TID AC, Ren Medina MD  insulin lispro, 8 Units, Subcutaneous, TID With Meals, Ren Medina MD  nicotine, 1 patch, Transdermal, Daily, Ren Medina MD  oxyCODONE-acetaminophen, 1 tablet, Oral, Q4H PRN, Yanci Rodriguez MD  sodium chloride, 100 mL/hr, Intravenous, Continuous, Erika Verdugo MD, Last Rate: 100 mL/hr (08/04/20 3991)         Today, Patient Was Seen By: Darshan Munoz MD    ** Please Note: Dictation voice to text software may have been used in the creation of this document   **

## 2020-08-05 NOTE — ASSESSMENT & PLAN NOTE
-baseline creatinine 1 4-1 8  -Creat 1 29/GFR 50 this am  -Nephrology input appreciated  -continue to avoid nephrotoxic agents  -trend creatinine  -pre and post IV hydration today with agram

## 2020-08-05 NOTE — ASSESSMENT & PLAN NOTE
· Secondary to peripheral arterial disease, R>L  · Podiatry was consulted due to PVD-likely needs higher amputation, vascular surgery consulted and angiogram pending today  · Foot xrays negative for osteomyelitis  · Arterial duplex showing occlusive disease of lower extremities bilaterally  · Plan for angiogram on 8/5 after holding xarelto for 48 hours

## 2020-08-05 NOTE — ASSESSMENT & PLAN NOTE
· + UA, urine culture growing esbl e coli; ID following  · Did receive dose of Antibiotics in ED, but seen by infectious disease and recommending discontinuation of antibiotics secondary to lack of symptoms and chronic mccoy suggestive of colonization

## 2020-08-05 NOTE — PROGRESS NOTES
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 [facility])  Assessment & Plan  80year-old minimally ambulatory male smoker w/chronic diastolic heart failure, type 2 DM, CKD 3 (baseline creatinine 1 4-1 8), Afib on Xarelto, COPD, OA bilateral knee, s/p bilateral ANDREA, recurrent UTI w/chronic indwelling Baxter catheter admitted w/pyuria and PAD w/bilateral heel pressure wounds, L >R  Diagnostics:  -LEAD 7/31:  diffuse right lower extremity disease without focal stenosis, R JAMAL 0 6/42/34 and left lower extremity with diffuse disease, no focal stenosis, decrease in velocities in SFA to popliteal artery suggestive of possible stenosis, L JAMAL 0 52/33/36  -Xray left foot 7/31: No evidence of fracture, osteomyelitis or other significant bony abnormality in the left foot  -Xray right foot 7/31: No evidence of osteomyelitis, fracture or other significant bony abnormality  -BC: neg x 5 days    Plan:  -for angiogram w/LLE runoff and possible endovascular revascularization today by IR under general anethesia for attempt for limb salvage   + L knee contracture  -Nephrology consult appreciated  Serum creatinine 1 29/GFR 50 today  IVF pre/post angio and hold nephrotoxic agents   -NPO in anticipation of agram today  -Xarelto on hold since 8/3 am  -Discussed with patient and daughter  Verbalized they want all measures taken to save the left leg  Not accepting of limb amputation at this time  -Off load heels with pressure bed and boots  -Betadine paint to heel   -Will discuss to Dr Joel Nelson    Formal recommendations to follow results of Agram later this afternoon    Stage 3 chronic kidney disease ([facility] [address] )  Assessment & Plan  -baseline creatinine 1 4-1 8  -Creat 1 29/GFR 50 this am  -Nephrology input appreciated  -continue to avoid nephrotoxic agents  -trend creatinine  -pre and post IV hydration today with agram    A-fib (HCC)  Assessment & Plan  -stable, rate controlled  -continue current medical regimen with medication modifications per primary medical service  -hold Xarelto in preparation for angiogram      * Pressure ulcer of ankle  Assessment & Plan  L>R heel ulcer w/dry gangrene L heel  -heel pressure pressure off-loading   -plan for abdominal aortogram with left lower extremity runoff, possible endovascular intervention today        Subjective:  Patient without new complaints  Reports bilateral heel pain but denies rest pain  NPO in anticipation of LLE angiogram under general anesthesia today  Patient expresses understanding  Creatinine stable at 1 29/GFR 50 this a m     Blood cultures negative X 5 days  VSS    Vitals:  /75   Pulse 75   Temp 97 9 °F (36 6 °C)   Resp 17   Ht 5' 6"   Wt 80 kg (176 lb 5 9 oz)   SpO2 99%   BMI 28 47 kg/m²     I/Os:  I/O last 3 completed shifts: In: 120 [P O :120]  Out: 350 [Urine:350]  No intake/output data recorded  Lab Results and Cultures:   Lab Results   Component Value Date    WBC 9 14 08/05/2020    HGB 11 0 (L) 08/05/2020    HCT 35 2 (L) 08/05/2020    MCV 89 08/05/2020     08/05/2020     Lab Results   Component Value Date    CALCIUM 8 5 08/05/2020     04/01/2018    K 4 7 08/05/2020    CO2 28 08/05/2020     08/05/2020    BUN 41 (H) 08/05/2020    CREATININE 1 29 08/05/2020     Lab Results   Component Value Date    INR 1 50 (H) 08/04/2020    INR 2 46 (H) 07/30/2020    INR 1 23 (H) 06/11/2020    PROTIME 18 4 (H) 08/04/2020    PROTIME 25 5 (H) 07/30/2020    PROTIME 15 5 (H) 06/11/2020        Blood Culture:   Lab Results   Component Value Date    BLOODCX No Growth After 5 Days   07/30/2020   ,   Urinalysis:   Lab Results   Component Value Date    COLORU Yellow 07/30/2020    CLARITYU Clear 07/30/2020    SPECGRAV 1 025 07/30/2020    PHUR 5 5 07/30/2020    PHUR 7 0 01/28/2019    LEUKOCYTESUR Moderate (A) 07/30/2020    NITRITE Positive (A) 07/30/2020    GLUCOSEU Negative 07/30/2020    KETONESU Negative 07/30/2020    BILIRUBINUR Negative 07/30/2020    BLOODU Large (A) 07/30/2020   ,   Urine Culture:   Lab Results   Component Value Date    URINECX >100,000 cfu/ml Escherichia coli ESBL (A) 07/30/2020    URINECX 3246-8084 cfu/ml Proteus mirabilis (A) 07/30/2020    URINECX 10,000-19,000 cfu/ml Enterococcus faecalis (A) 07/30/2020   ,   Wound Culure: No results found for: WOUNDCULT    Medications:  Current Facility-Administered Medications   Medication Dose Route Frequency    acetaminophen (TYLENOL) tablet 650 mg  650 mg Oral Q6H PRN    HYDROmorphone (DILAUDID) injection 0 5 mg  0 5 mg Intravenous Q4H PRN    insulin glargine (LANTUS) subcutaneous injection 8 Units 0 08 mL  8 Units Subcutaneous HS    insulin lispro (HumaLOG) 100 units/mL subcutaneous injection 1-5 Units  1-5 Units Subcutaneous HS    insulin lispro (HumaLOG) 100 units/mL subcutaneous injection 1-6 Units  1-6 Units Subcutaneous TID AC    insulin lispro (HumaLOG) 100 units/mL subcutaneous injection 8 Units  8 Units Subcutaneous TID With Meals    nicotine (NICODERM CQ) 7 mg/24hr TD 24 hr patch 1 patch  1 patch Transdermal Daily    oxyCODONE-acetaminophen (PERCOCET) 5-325 mg per tablet 1 tablet  1 tablet Oral Q4H PRN    sodium chloride 0 9 % infusion  100 mL/hr Intravenous Continuous       Imaging:  Abdominal aortogram with left lower extremity runoff (IR) 8/5/2020:  Pending    Physical Exam:    General appearance: alert and fatigued  Neurologic: Grossly normal  Neck: no adenopathy, no carotid bruit, no JVD, supple, symmetrical, trachea midline and thyroid not enlarged, symmetric, no tenderness/mass/nodules  Lungs: clear to auscultation bilaterally and Decreased breath sounds bilateral bases  Heart: irregularly irregular rhythm, S1, S2 normal, no S3 or S4, no rub and No murmur  Abdomen: soft, non-tender; bowel sounds normal; no masses,  no organomegaly and Nondistended  No abdominal bruits  Extremities: Bilateral lower extremities warm, pink, motor and sensory intact  Chronic venous stasis changes noted bilateral lower legs  Bilateral heel dressings in place  +left knee contracture @ approximately 70 degrees    Unable to straighten left knee    Wound/Incision:  Bilateral heel dressing clean, dry, and intact     Left heel    Right heel      Pulse exam:  Radial: Right: 2+ Left[de-identified] 2+  Femoral: Right: 2+ Left: 2+  Popliteal: Right: non-palpable Left: non-palpable  DP: Right: non-palpable Left: non-palpable  PT: Right: non-palpable Left: non-palpable      Lulú Coy PA-C  8/5/2020  [facility], [phone]

## 2020-08-05 NOTE — ANESTHESIA PREPROCEDURE EVALUATION
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 (HCC)   (+) Chronic diastolic congestive heart failure (HCC)   (+) HLD (hyperlipidemia)   (+) PVD (peripheral vascular disease) (HCC)      ENDO   (+) Type 2 diabetes mellitus, with long-term current use of insulin (HCC)      /RENAL   (+) Stage 3 chronic kidney disease (HCC)      PULMONARY   (+) COPD without exacerbation (HCC)      Other   (+) Diabetes (HCC)   (+) Myalgia   (+) Neuropathy   (+) Pressure ulcer of ankle      Size was normal  Systolic function was normal  Ejection fraction was estimated to be 60 %  Although no diagnostic regional wall motion abnormality was identified, this possibility cannot be completely excluded on the basis  of this study  Wall thickness was mildly increased  There was mild concentric hypertrophy  DOPPLER: Transmitral flow pattern: atrial fibrillation  The study was not technically sufficient to allow evaluation of LV diastolic function      RIGHT VENTRICLE: The size was normal  Systolic function was normal  Wall thickness was normal      LEFT ATRIUM: The atrium was mildly dilated      ATRIAL SEPTUM: There was increased thickness of the septum, consistent with lipomatous hypertrophy      RIGHT ATRIUM: The atrium was mildly dilated      MITRAL VALVE: There was moderate annular calcification  Valve structure was normal  There was normal leaflet separation  DOPPLER: The transmitral velocity was within the normal range  There was no evidence for stenosis  There was trace  regurgitation      AORTIC VALVE: The valve was trileaflet  Leaflets exhibited mildly increased thickness, mild calcification, and normal cuspal separation  DOPPLER: Transaortic velocity was within the normal range  There was no evidence for stenosis   There  was no significant regurgitation      TRICUSPID VALVE: The valve structure was normal  There was normal leaflet separation  DOPPLER: The transtricuspid velocity was within the normal range  There was no evidence for stenosis  There was trace regurgitation  Pulmonary artery  systolic pressure was within the normal range      PULMONIC VALVE: Leaflets exhibited normal thickness, no calcification, and normal cuspal separation  DOPPLER: The transpulmonic velocity was within the normal range  There was no significant regurgitation      PERICARDIUM: There was no pericardial effusion  The pericardium was normal in appearance      AORTA: The root exhibited normal size  Physical Exam    Airway    Mallampati score: II  TM Distance: >3 FB  Neck ROM: full     Dental       Cardiovascular  Cardiovascular exam normal    Pulmonary  Pulmonary exam normal     Other Findings        Anesthesia Plan  ASA Score- 4     Anesthesia Type- general with ASA Monitors  Additional Monitors: arterial line  Airway Plan:           Plan Factors-    Chart reviewed  EKG reviewed  Imaging results reviewed  Existing labs reviewed  Induction- intravenous  Postoperative Plan-     Informed Consent- Anesthetic plan and risks discussed with patient  I personally reviewed this patient with the CRNA  Discussed and agreed on the Anesthesia Plan with the CRNA  Katlin Ingram

## 2020-08-05 NOTE — ASSESSMENT & PLAN NOTE
Lab Results   Component Value Date    HGBA1C 7 0 (H) 06/12/2020       Recent Labs     08/04/20  1056 08/04/20  1539 08/04/20  2116 08/05/20  0640   POCGLU 269* 241* 179* 128       Blood Sugar Average: Last 72 hrs:  (P) 116 [id number]     · Last A1c 7%, continue insulin sliding scale, basal/prandial insulin  · No episodes of hypoglycemia, but glucose levels have been labile,  Especially at 11 AM fingerstick check  · Otherwise well controlled

## 2020-08-05 NOTE — ANESTHESIA POSTPROCEDURE EVALUATION
Post-Op Assessment Note    CV Status:  Stable  Pain Score: 0    Pain management: adequate     Mental Status:  Sleepy and awake   Hydration Status:  Stable   PONV Controlled:  None   Airway Patency:  Patent and adequate      Post Op Vitals Reviewed: Yes      Staff: Anesthesiologist, CRNA         No complications documented      BP      Temp      Pulse     Resp      SpO2

## 2020-08-05 NOTE — PROGRESS NOTES
NEPHROLOGY PROGRESS NOTE    Patient: Kristi Renteria               Sex: male          DOA: 7/30/2020  5:23 PM   Date of Birth: @        Age: @        LOS:  LOS: 6 days   8/5/2020    REASON FOR THE CONSULTATION:  Further management of CKD stage 3  HPI     This is a 80 y o  male admitted for Pressure ulcer of ankle     SUBJECTIVE     - patient was sleepy but arousable  Awaiting to undergo leg angiogram today  Patient denies nausea, vomiting, headache or dizziness today    - Reviewed last 24 hrs events     CURRENT MEDICATIONS       Current Facility-Administered Medications:     acetaminophen (TYLENOL) tablet 650 mg, 650 mg, Oral, Q6H PRN, Ren Medina MD, 650 mg at 08/02/20 1114    HYDROmorphone (DILAUDID) injection 0 5 mg, 0 5 mg, Intravenous, Q4H PRN, Jessica Jiménez MD    insulin glargine (LANTUS) subcutaneous injection 8 Units 0 08 mL, 8 Units, Subcutaneous, HS, Ren Medina MD, 8 Units at 08/04/20 2208    insulin lispro (HumaLOG) 100 units/mL subcutaneous injection 1-5 Units, 1-5 Units, Subcutaneous, HS, Ren Medina MD, 1 Units at 08/04/20 2208    insulin lispro (HumaLOG) 100 units/mL subcutaneous injection 1-6 Units, 1-6 Units, Subcutaneous, TID AC, 3 Units at 08/04/20 1744 **AND** Fingerstick Glucose (POCT), , , TID AC, Ren Medina MD    insulin lispro (HumaLOG) 100 units/mL subcutaneous injection 8 Units, 8 Units, Subcutaneous, TID With Meals, Ren Medina MD, 8 Units at 08/04/20 1743    nicotine (NICODERM CQ) 7 mg/24hr TD 24 hr patch 1 patch, 1 patch, Transdermal, Daily, Ren Medina MD, 1 patch at 08/04/20 0915    oxyCODONE-acetaminophen (PERCOCET) 5-325 mg per tablet 1 tablet, 1 tablet, Oral, Q4H PRN, Jessica Jiménez MD, 1 tablet at 08/04/20 2217    sodium chloride 0 9 % infusion, 100 mL/hr, Intravenous, Continuous, Torie Ramos MD, Last Rate: 100 mL/hr at 08/05/20 1029, 100 mL/hr at 08/05/20 1029    OBJECTIVE     Current Weight: Weight - Scale: 80 kg (176 lb 5 9 oz)  /75   Pulse 75 Temp 97 9 °F (36 6 °C)   Resp 17   Ht 5' 6"   Wt 80 kg (176 lb 5 9 oz)   SpO2 97%   BMI 28 47 kg/m²   Vitals:    08/05/20 1015   BP:    Pulse:    Resp:    Temp:    SpO2: 97%     Body mass index is 28 47 kg/m²  Intake/Output Summary (Last 24 hours) at 8/5/2020 1105  Last data filed at 8/5/2020 0500  Gross per 24 hour   Intake    Output 150 ml   Net -150 ml       PHYSICAL EXAMINATION     Physical Exam   Constitutional: No distress  HENT:   Head: Normocephalic and atraumatic  Eyes: No scleral icterus  Neck: Neck supple  No JVD present  Cardiovascular: Normal rate  Pulmonary/Chest: No accessory muscle usage  No respiratory distress  He has no wheezes  Abdominal: Soft  He exhibits no distension  Musculoskeletal:      Right hand: He exhibits no laceration  Left hand: He exhibits no laceration  Neurological: He is alert  Skin: Skin is warm  Psychiatric: He is not combative  He is communicative           LAB RESULTS     Results from last 7 days   Lab Units 08/05/20  0515 08/04/20  0459 08/03/20  0540 08/02/20  0552 08/01/20  0852 07/31/20  0541 07/30/20  1902   WBC Thousand/uL 9 14 10 26* 8 76 8 66 8 50 10 84* 10 89*   HEMOGLOBIN g/dL 11 0* 11 9* 11 9* 11 9* 12 0 12 5 12 3   HEMATOCRIT % 35 2* 37 1 37 5 37 5 39 2 40 1 38 6   PLATELETS Thousands/uL 355 359 339 338 366 387 375   SODIUM mmol/L 142 142 141 141 142 142 140   POTASSIUM mmol/L 4 7 4 7 4 3 4 2 4 2 4 1 4 5   CHLORIDE mmol/L 107 106 106 107 106 106 105   CO2 mmol/L 28 31 29 29 30 27 28   BUN mg/dL 41* 36* 36* 41* 47* 50* 60*   CREATININE mg/dL 1 29 1 24 1 24 1 40* 1 60* 1 56* 1 82*   EGFR ml/min/1 73sq m 50 52 52 45 38 40 33   CALCIUM mg/dL 8 5 8 7 8 8 8 5 8 8 8 7 8 8   MAGNESIUM mg/dL 2 0 1 9 2 0 2 0 2 1  --  2 4       I have personally reviewed the old medical records and patient's previously known baseline creatinine level is ~ 1 4    RADIOLOGY RESULTS      XR foot 3+ vw right   Final Result by Lazara Tarango MD (08/03 0920) No evidence of osteomyelitis, fracture or other significant bony abnormality  Workstation performed: [id number]         XR foot 3+ vw left   Final Result by Pati Cooper MD (08/03 3467)      No evidence of fracture, osteomyelitis or other significant bony abnormality in the left foot  Workstation performed: [id number]         VAS lower limb arterial duplex, complete bilateral   Final Result by Lazaro Guevara MD (08/01 1108)      XR chest 1 view portable   Final Result by Marcela Glover MD (07/31 4164)      No acute cardiopulmonary disease  Workstation performed: [id number]         IR abdominal angiography / intervention    (Results Pending)       PLAN / RECOMMENDATIONS      1  CKD stage 3+ VALDO risk reduction strategies in the light of undergoing leg angiogram     Upon review of old medical records, previously known baseline creatinine is around 1 4  Current creatinine is 1 29 which is better than previously known baseline creatinine  VALDO risk reduction strategies has been discussed with patient and family earlier and patient is scheduled to undergo leg angiogram today  Started on IV fluid earlier  Monitor renal function while in the hospital   Patient was also previously taking Entresto which is currently on hold  Overall above mentioned plan was also d/w Margi Haro MD  Nephrology  8/5/2020        Portions of the record may have been created with voice recognition software  Occasional wrong word or "sound a like" substitutions may have occurred due to the inherent limitations of voice recognition software  Read the chart carefully and recognize, using context, where substitutions have occurred

## 2020-08-06 ENCOUNTER — APPOINTMENT (INPATIENT)
Dept: ULTRASOUND IMAGING | Facility: HOSPITAL | Age: 85
DRG: 253 | End: 2020-08-06
Payer: MEDICARE

## 2020-08-06 LAB
ALBUMIN SERPL BCP-MCNC: 2 G/DL (ref 3.5–5)
ALP SERPL-CCNC: 73 U/L (ref 46–116)
ALT SERPL W P-5'-P-CCNC: 39 U/L (ref 12–78)
ANION GAP SERPL CALCULATED.3IONS-SCNC: 7 MMOL/L (ref 4–13)
AST SERPL W P-5'-P-CCNC: 54 U/L (ref 5–45)
BASOPHILS # BLD AUTO: 0.05 THOUSANDS/ΜL (ref 0–0.1)
BASOPHILS NFR BLD AUTO: 1 % (ref 0–1)
BILIRUB SERPL-MCNC: 0.5 MG/DL (ref 0.2–1)
BUN SERPL-MCNC: 31 MG/DL (ref 5–25)
CALCIUM SERPL-MCNC: 8.6 MG/DL (ref 8.3–10.1)
CHLORIDE SERPL-SCNC: 109 MMOL/L (ref 100–108)
CO2 SERPL-SCNC: 27 MMOL/L (ref 21–32)
CREAT SERPL-MCNC: 1.28 MG/DL (ref 0.6–1.3)
EOSINOPHIL # BLD AUTO: 0.12 THOUSAND/ΜL (ref 0–0.61)
EOSINOPHIL NFR BLD AUTO: 1 % (ref 0–6)
ERYTHROCYTE [DISTWIDTH] IN BLOOD BY AUTOMATED COUNT: 15.9 % (ref 11.6–15.1)
GFR SERPL CREATININE-BSD FRML MDRD: 50 ML/MIN/1.73SQ M
GLUCOSE SERPL-MCNC: 104 MG/DL (ref 65–140)
GLUCOSE SERPL-MCNC: 182 MG/DL (ref 65–140)
GLUCOSE SERPL-MCNC: 209 MG/DL (ref 65–140)
GLUCOSE SERPL-MCNC: 264 MG/DL (ref 65–140)
GLUCOSE SERPL-MCNC: 92 MG/DL (ref 65–140)
GLUCOSE SERPL-MCNC: 99 MG/DL (ref 65–140)
HCT VFR BLD AUTO: 35 % (ref 36.5–49.3)
HGB BLD-MCNC: 10.9 G/DL (ref 12–17)
IMM GRANULOCYTES # BLD AUTO: 0.06 THOUSAND/UL (ref 0–0.2)
IMM GRANULOCYTES NFR BLD AUTO: 1 % (ref 0–2)
LYMPHOCYTES # BLD AUTO: 1.11 THOUSANDS/ΜL (ref 0.6–4.47)
LYMPHOCYTES NFR BLD AUTO: 12 % (ref 14–44)
MAGNESIUM SERPL-MCNC: 1.8 MG/DL (ref 1.6–2.6)
MCH RBC QN AUTO: 27.8 PG (ref 26.8–34.3)
MCHC RBC AUTO-ENTMCNC: 31.1 G/DL (ref 31.4–37.4)
MCV RBC AUTO: 89 FL (ref 82–98)
MONOCYTES # BLD AUTO: 0.65 THOUSAND/ΜL (ref 0.17–1.22)
MONOCYTES NFR BLD AUTO: 7 % (ref 4–12)
NEUTROPHILS # BLD AUTO: 7.01 THOUSANDS/ΜL (ref 1.85–7.62)
NEUTS SEG NFR BLD AUTO: 78 % (ref 43–75)
NRBC BLD AUTO-RTO: 0 /100 WBCS
PLATELET # BLD AUTO: 336 THOUSANDS/UL (ref 149–390)
PMV BLD AUTO: 9.9 FL (ref 8.9–12.7)
POTASSIUM SERPL-SCNC: 4.6 MMOL/L (ref 3.5–5.3)
PROT SERPL-MCNC: 6.5 G/DL (ref 6.4–8.2)
RBC # BLD AUTO: 3.92 MILLION/UL (ref 3.88–5.62)
SODIUM SERPL-SCNC: 143 MMOL/L (ref 136–145)
WBC # BLD AUTO: 9 THOUSAND/UL (ref 4.31–10.16)

## 2020-08-06 PROCEDURE — 99232 SBSQ HOSP IP/OBS MODERATE 35: CPT | Performed by: INTERNAL MEDICINE

## 2020-08-06 PROCEDURE — 82948 REAGENT STRIP/BLOOD GLUCOSE: CPT

## 2020-08-06 PROCEDURE — 99233 SBSQ HOSP IP/OBS HIGH 50: CPT | Performed by: INTERNAL MEDICINE

## 2020-08-06 PROCEDURE — 93923 UPR/LXTR ART STDY 3+ LVLS: CPT

## 2020-08-06 PROCEDURE — 85025 COMPLETE CBC W/AUTO DIFF WBC: CPT | Performed by: STUDENT IN AN ORGANIZED HEALTH CARE EDUCATION/TRAINING PROGRAM

## 2020-08-06 PROCEDURE — 99233 SBSQ HOSP IP/OBS HIGH 50: CPT | Performed by: NURSE PRACTITIONER

## 2020-08-06 PROCEDURE — 80053 COMPREHEN METABOLIC PANEL: CPT | Performed by: STUDENT IN AN ORGANIZED HEALTH CARE EDUCATION/TRAINING PROGRAM

## 2020-08-06 PROCEDURE — 83735 ASSAY OF MAGNESIUM: CPT | Performed by: STUDENT IN AN ORGANIZED HEALTH CARE EDUCATION/TRAINING PROGRAM

## 2020-08-06 RX ADMIN — INSULIN LISPRO 1 UNITS: 100 INJECTION, SOLUTION INTRAVENOUS; SUBCUTANEOUS at 22:56

## 2020-08-06 RX ADMIN — OXYCODONE HYDROCHLORIDE AND ACETAMINOPHEN 1 TABLET: 5; 325 TABLET ORAL at 04:57

## 2020-08-06 RX ADMIN — RIVAROXABAN 15 MG: 15 TABLET, FILM COATED ORAL at 18:19

## 2020-08-06 RX ADMIN — INSULIN GLARGINE 8 UNITS: 100 INJECTION, SOLUTION SUBCUTANEOUS at 22:54

## 2020-08-06 RX ADMIN — OXYCODONE HYDROCHLORIDE AND ACETAMINOPHEN 1 TABLET: 5; 325 TABLET ORAL at 13:19

## 2020-08-06 NOTE — ASSESSMENT & PLAN NOTE
Lab Results   Component Value Date    HGBA1C 7 0 (H) 06/12/2020       Recent Labs     08/05/20  1942 08/05/20  2109 08/06/20  0601 08/06/20  1115   POCGLU 159* 125 99 104       Blood Sugar Average: Last 72 hrs:  (P) 109 [id number]     · Last A1c 7%, continue insulin sliding scale, basal/prandial insulin  · No episodes of hypoglycemia, but glucose levels have been labile,  Especially at 11 AM fingerstick check  · Otherwise well controlled

## 2020-08-06 NOTE — ASSESSMENT & PLAN NOTE
-baseline creatinine 1 4-1 8  -Creat 1 29/GFR 50 this am  -Nephrology input appreciated  -continue to avoid nephrotoxic agents  -trend creatinine  -d/w Dr Ulises Barker

## 2020-08-06 NOTE — PROGRESS NOTES
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 [facility])  Assessment & Plan  · See plan above    Diabetes [facility])  Assessment & Plan  Lab Results   Component Value Date    HGBA1C 7 0 (H) 06/12/2020       Recent Labs     08/05/20  1942 08/05/20  2109 08/06/20  0601 08/06/20  1115   POCGLU 159* 125 99 104       Blood Sugar Average: Last 72 hrs:  (P) 319 [id number]     · Last A1c 7%, continue insulin sliding scale, basal/prandial insulin  · No episodes of hypoglycemia, but glucose levels have been labile,  Especially at 11 AM fingerstick check  · Otherwise well controlled     Chronic diastolic congestive heart failure (HCC)  Assessment & Plan  Wt Readings from Last 3 Encounters:   07/30/20 80 kg (176 lb 5 9 oz)   07/14/20 85 1 kg (187 lb 9 6 oz)   07/07/20 85 1 kg (187 lb 9 6 oz)     · Unclear why patient is on entresto after further medication review  · Last ECHO in 2019 showing EF 60%, which is most current TTE  · Due to low normal blood pressures, will discontinue entresto  · Currently euvolemic    A-fib ([facility])  Assessment & Plan  · Controlled, on xarelto  · Holding xarelto for 48 hours for angiogram  · Resumed xarelto per vascular    Abnormal urinalysis  Assessment & Plan  · + UA, urine culture growing esbl e coli; ID following  · Did receive dose of Antibiotics in ED, but seen by infectious disease and recommending discontinuation of antibiotics secondary to lack of symptoms and chronic mccoy suggestive of colonization    Stage 3 chronic kidney disease (HCC)  Assessment & Plan  · Baseline creatinine 1 4-1 8  · Stable      * Pressure ulcer of ankle  Assessment & Plan  · Secondary to peripheral arterial disease, R>L  · Podiatry was consulted due to PVD-likely needs higher amputation, vascular surgery consulted and angiogram pending today  · Foot xrays negative for osteomyelitis  · Arterial duplex showing occlusive disease of lower extremities bilaterally  · S/p arteriogram 20 with intervention  · Resume xarelto when ok per vascular   · ID following-off abx      VTE Pharmacologic Prophylaxis:   Pharmacologic: Rivaroxaban (Xarelto)  Mechanical VTE Prophylaxis in Place: Yes    Patient Centered Rounds: I have performed bedside rounds with nursing staff today  Discussions with Specialists or Other Care Team Provider:     Education and Discussions with Family / Patient:     Time Spent for Care: 30 minutes  More than 50% of total time spent on counseling and coordination of care as described above  Current Length of Stay: 7 day(s)    Current Patient Status: Inpatient   Certification Statement: The patient will continue to require additional inpatient hospital stay due to vascular surgery eval    Discharge Plan:     Code Status: Level 1 - Full Code      Subjective:   Denies pain this morning  Objective:     Vitals:   Temp (24hrs), Av 9 °F (36 6 °C), Min:97 6 °F (36 4 °C), Max:98 4 °F (36 9 °C)    Temp:  [97 6 °F (36 4 °C)-98 4 °F (36 9 °C)] 97 6 °F (36 4 °C)  HR:  [68-81] 68  Resp:  [16-31] 18  BP: ()/(49-98) 139/60  SpO2:  [93 %-100 %] 93 %  Body mass index is 28 47 kg/m²  Input and Output Summary (last 24 hours): Intake/Output Summary (Last 24 hours) at 2020 1227  Last data filed at 2020 0502  Gross per 24 hour   Intake 1093 33 ml   Output 550 ml   Net 543 33 ml       Physical Exam:     Physical Exam  Constitutional:       Appearance: He is well-developed and well-nourished  HENT:      Head: Normocephalic and atraumatic  Eyes:      Pupils: Pupils are equal, round, and reactive to light  Neck:      Musculoskeletal: Neck supple  Cardiovascular:      Pulses: Pulses are palpable        Comments: irreg irreg  Pulmonary:      Effort: Pulmonary effort is normal       Breath sounds: Normal breath sounds  Abdominal:      General: Bowel sounds are normal       Palpations: Abdomen is soft  Musculoskeletal:         General: Edema present  Comments: Foot wounds with discharge   Skin:     General: Skin is warm and dry  Neurological:      Mental Status: He is alert  Psychiatric:         Behavior: Behavior normal            Additional Data:     Labs:    Results from last 7 days   Lab Units 08/06/20  0500   WBC Thousand/uL 9 00   HEMOGLOBIN g/dL 10 9*   HEMATOCRIT % 35 0*   PLATELETS Thousands/uL 336   NEUTROS PCT % 78*   LYMPHS PCT % 12*   MONOS PCT % 7   EOS PCT % 1     Results from last 7 days   Lab Units 08/06/20  0500   SODIUM mmol/L 143   POTASSIUM mmol/L 4 6   CHLORIDE mmol/L 109*   CO2 mmol/L 27   BUN mg/dL 31*   CREATININE mg/dL 1 28   ANION GAP mmol/L 7   CALCIUM mg/dL 8 6   ALBUMIN g/dL 2 0*   TOTAL BILIRUBIN mg/dL 0 50   ALK PHOS U/L 73   ALT U/L 39   AST U/L 54*   GLUCOSE RANDOM mg/dL 92     Results from last 7 days   Lab Units 08/04/20  0459   INR  1 50*     Results from last 7 days   Lab Units 08/06/20  1115 08/06/20  0601 08/05/20  2109 08/05/20  1942 08/05/20  1751 08/05/20  1343 08/05/20  1144 08/05/20  0640 08/04/20  2116 08/04/20  1539 08/04/20  1056 08/04/20  0502   POC GLUCOSE mg/dl 104 99 125 159* 209* 195* 174* 128 179* 241* 269* 120         Results from last 7 days   Lab Units 07/30/20  1902   LACTIC ACID mmol/L 1 2   PROCALCITONIN ng/ml 0 23           * I Have Reviewed All Lab Data Listed Above  * Additional Pertinent Lab Tests Reviewed:     Imaging:    Imaging Reports Reviewed Today Include:  Imaging Personally Reviewed by Myself Includes:      Recent Cultures (last 7 days):     Results from last 7 days   Lab Units 07/30/20  2146 07/30/20  2101 07/30/20  1939   BLOOD CULTURE  No Growth After 5 Days  --  No Growth After 5 Days     URINE CULTURE   --  >100,000 cfu/ml Escherichia coli ESBL*  4243-7251 cfu/ml Proteus mirabilis* 10,000-19,000 cfu/ml Enterococcus faecalis*  --        Last 24 Hours Medication List:   acetaminophen, 650 mg, Oral, Q6H PRN, Ren Medina MD  HYDROmorphone, 0 5 mg, Intravenous, Q4H PRN, Edwin Marcial MD  insulin glargine, 8 Units, Subcutaneous, HS, Ren Medina MD  insulin lispro, 1-5 Units, Subcutaneous, HS, Ren Medina MD  insulin lispro, 1-6 Units, Subcutaneous, TID AC, Ren Medina MD  insulin lispro, 8 Units, Subcutaneous, TID With Meals, Shailesh Brice MD  nicotine, 1 patch, Transdermal, Daily, Ren Medina MD  oxyCODONE-acetaminophen, 1 tablet, Oral, Q4H PRN, Edwin Marcial MD  rivaroxaban, 15 mg, Oral, Daily With Dinner, Tamar Boone MD         Today, Patient Was Seen By: Tamar Boone MD    ** Please Note: Dictation voice to text software may have been used in the creation of this document   **

## 2020-08-06 NOTE — ASSESSMENT & PLAN NOTE
· Secondary to peripheral arterial disease, R>L  · Podiatry was consulted due to PVD-likely needs higher amputation, vascular surgery consulted and angiogram pending today  · Foot xrays negative for osteomyelitis  · Arterial duplex showing occlusive disease of lower extremities bilaterally  · S/p arteriogram 8/5/20 with intervention  · Resume xarelto when ok per vascular   · ID following-off abx

## 2020-08-06 NOTE — ASSESSMENT & PLAN NOTE
80year-old minimally ambulatory male smoker w/chronic diastolic heart failure, type 2 DM, CKD 3 (baseline creatinine 1 4-1 8), Afib on Xarelto, COPD, OA bilateral knee, s/p bilateral ANDREA, recurrent UTI w/chronic indwelling Baxter catheter admitted w/pyuria and PAD w/bilateral heel pressure wounds, L >R  Diagnostics:  -LEAD 7/31:  diffuse right lower extremity disease without focal stenosis, R JAMAL 0 6/42/34 and left lower extremity with diffuse disease, no focal stenosis, decrease in velocities in SFA to popliteal artery suggestive of possible stenosis, L JAMAL 0 52/33/36  -Xray left foot 7/31: No evidence of fracture, osteomyelitis or other significant bony abnormality in the left foot  -Xray right foot 7/31: No evidence of osteomyelitis, fracture or other significant bony abnormality  -BC: neg x 5 days    Plan:  -Angiogram 8/5/2020 L SFA PTA, TPT PTA, prox Peroneal PTA  Peroneal only in-line flow to the foot with short segment occlusion at origin of AT and remaining AT reconstituted by geniculate collaterals, PT chronically occluded & reconstituted distally  Robust collaterals  Peroneal runoff with delayed filling of PT/AT  -Nephrology consult appreciated  Serum creatinine 1 28/GFR 50 today post procedure   Continue to trend and avoid nephrotoxic meds  -Xarelto on hold since 8/3 am  -Off load heels with pressure bed and boots  -Betadine paint to heel   -Repeat JAMAL for new baseline and recommendations to follow  -Will discuss to Dr Edvin Marroquin

## 2020-08-06 NOTE — PROGRESS NOTES
NEPHROLOGY PROGRESS NOTE    Patient: Shashank Diaz               Sex: male          DOA: 7/30/2020  5:23 PM   Date of Birth: @        Age: @        LOS:  LOS: 7 days   8/6/2020    REASON FOR THE CONSULTATION:  Further management of CKD stage 3  HPI     This is a 80 y o  male admitted for Pressure ulcer of ankle     SUBJECTIVE     - underwent leg angiogram yesterday  Patient was sleepy but easily arousable and also denies nausea, vomiting, headache or dizziness today    - Reviewed last 24 hrs events     CURRENT MEDICATIONS       Current Facility-Administered Medications:     acetaminophen (TYLENOL) tablet 650 mg, 650 mg, Oral, Q6H PRN, Ren Medina MD, 650 mg at 08/02/20 1114    HYDROmorphone (DILAUDID) injection 0 5 mg, 0 5 mg, Intravenous, Q4H PRN, Megan Rojas MD    insulin glargine (LANTUS) subcutaneous injection 8 Units 0 08 mL, 8 Units, Subcutaneous, HS, Ren Medina MD, 8 Units at 08/04/20 2208    insulin lispro (HumaLOG) 100 units/mL subcutaneous injection 1-5 Units, 1-5 Units, Subcutaneous, HS, Ren Medina MD, 1 Units at 08/04/20 2208    insulin lispro (HumaLOG) 100 units/mL subcutaneous injection 1-6 Units, 1-6 Units, Subcutaneous, TID AC, 1 Units at 08/05/20 1943 **AND** Fingerstick Glucose (POCT), , , TID AC, Ren Medina MD    insulin lispro (HumaLOG) 100 units/mL subcutaneous injection 8 Units, 8 Units, Subcutaneous, TID With Meals, Trinity Florentino MD, 8 Units at 08/05/20 1944    nicotine (NICODERM CQ) 7 mg/24hr TD 24 hr patch 1 patch, 1 patch, Transdermal, Daily, Ren Medina MD, 1 patch at 08/04/20 0915    oxyCODONE-acetaminophen (PERCOCET) 5-325 mg per tablet 1 tablet, 1 tablet, Oral, Q4H PRN, Megan Rojas MD, 1 tablet at 08/06/20 6386    OBJECTIVE     Current Weight: Weight - Scale: 80 kg (176 lb 5 9 oz)  /60   Pulse 68   Temp 97 6 °F (36 4 °C)   Resp 18   Ht 5' 6" (1 676 m)   Wt 80 kg (176 lb 5 9 oz)   SpO2 93%   BMI 28 47 kg/m²   Vitals:    08/06/20 0900   BP:    Pulse: Resp:    Temp:    SpO2: 93%     Body mass index is 28 47 kg/m²  Intake/Output Summary (Last 24 hours) at 8/6/2020 1216  Last data filed at 8/6/2020 0502  Gross per 24 hour   Intake 1093 33 ml   Output 550 ml   Net 543 33 ml       PHYSICAL EXAMINATION     Physical Exam   Constitutional: No distress  HENT:   Head: Normocephalic and atraumatic  Eyes: No scleral icterus  Neck: Neck supple  No JVD present  Cardiovascular: Normal rate  Pulmonary/Chest: No accessory muscle usage  No respiratory distress  He has no wheezes  Abdominal: Soft  He exhibits no distension  Musculoskeletal:      Right hand: He exhibits no laceration  Left hand: He exhibits no laceration  Neurological: He is alert  Skin: Skin is warm  Psychiatric: He is not combative  He is communicative           LAB RESULTS     Results from last 7 days   Lab Units 08/06/20  0500 08/05/20  0515 08/04/20  0459 08/03/20  0540 08/02/20  0552 08/01/20  0852 07/31/20  0541 07/30/20  1902   WBC Thousand/uL 9 00 9 14 10 26* 8 76 8 66 8 50 10 84* 10 89*   HEMOGLOBIN g/dL 10 9* 11 0* 11 9* 11 9* 11 9* 12 0 12 5 12 3   HEMATOCRIT % 35 0* 35 2* 37 1 37 5 37 5 39 2 40 1 38 6   PLATELETS Thousands/uL 336 355 359 339 338 366 387 375   SODIUM mmol/L 143 142 142 141 141 142 142 140   POTASSIUM mmol/L 4 6 4 7 4 7 4 3 4 2 4 2 4 1 4 5   CHLORIDE mmol/L 109* 107 106 106 107 106 106 105   CO2 mmol/L 27 28 31 29 29 30 27 28   BUN mg/dL 31* 41* 36* 36* 41* 47* 50* 60*   CREATININE mg/dL 1 28 1 29 1 24 1 24 1 40* 1 60* 1 56* 1 82*   EGFR ml/min/1 73sq m 50 50 52 52 45 38 40 33   CALCIUM mg/dL 8 6 8 5 8 7 8 8 8 5 8 8 8 7 8 8   MAGNESIUM mg/dL 1 8 2 0 1 9 2 0 2 0 2 1  --  2 4       I have personally reviewed the old medical records and patient's previously known baseline creatinine level is ~ 1 4    RADIOLOGY RESULTS      IR abdominal angiography / intervention   Final Result by Grabiel Paz MD (08/06 1000)   Left lower extremity arteriography with SFA, popliteal and peroneal artery angioplasty as described above  Unsuccessful catheterization and recannulization of flush short segment occlusion of the anterior tibial artery  Workstation performed: [id number]         XR foot 3+ vw right   Final Result by Neha Chowdhury MD (08/03 0920)      No evidence of osteomyelitis, fracture or other significant bony abnormality  Workstation performed: [id number]         XR foot 3+ vw left   Final Result by Neha Chowdhury MD (08/03 3196)      No evidence of fracture, osteomyelitis or other significant bony abnormality in the left foot  Workstation performed: [id number]         VAS lower limb arterial duplex, complete bilateral   Final Result by Sharon Lemus MD (08/01 1108)      XR chest 1 view portable   Final Result by Serena Brown MD (07/31 2908)      No acute cardiopulmonary disease  Workstation performed: [id number]         VAS JAMAL & waveform analysis, multiple levels    (Results Pending)       PLAN / RECOMMENDATIONS      1  CKD stage 3 + VALDO risk reduction strategies in the light of undergoing leg angiogram     Upon review of old medical records, previously known baseline creatinine is around 1 4  Patient is now s/p leg angiogram   Received IV fluid as a part of VALDO risk reduction strategies which was also stopped earlier today  Overnight patient has remained nonoliguric and renal function has remained stable with current creatinine of 1 28 which is better and below the previously known baseline creatinine  Consider monitor renal function while in the hospital     Will sign off today  Overall above mentioned plan was also d/w vascular surgery team + Crispin Mckenna MD  Nephrology  8/6/2020        Portions of the record may have been created with voice recognition software  Occasional wrong word or "sound a like" substitutions may have occurred due to the inherent limitations of voice recognition software   Read the chart carefully and recognize, using context, where substitutions have occurred

## 2020-08-06 NOTE — PROGRESS NOTES
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 [facility])  Assessment & Plan  80year-old minimally ambulatory male smoker w/chronic diastolic heart failure, type 2 DM, CKD 3 (baseline creatinine 1 4-1 8), Afib on Xarelto, COPD, OA bilateral knee, s/p bilateral ANDREA, recurrent UTI w/chronic indwelling Baxter catheter admitted w/pyuria and PAD w/bilateral heel pressure wounds, L >R  Diagnostics:  -LEAD 7/31:  diffuse right lower extremity disease without focal stenosis, R JAMAL 0 6/42/34 and left lower extremity with diffuse disease, no focal stenosis, decrease in velocities in SFA to popliteal artery suggestive of possible stenosis, L JAMAL 0 52/33/36  -Xray left foot 7/31: No evidence of fracture, osteomyelitis or other significant bony abnormality in the left foot  -Xray right foot 7/31: No evidence of osteomyelitis, fracture or other significant bony abnormality  -BC: neg x 5 days    Plan:  -Angiogram 8/5/2020 L SFA PTA, TPT PTA, prox Peroneal PTA  Peroneal only in-line flow to the foot with short segment occlusion at origin of AT and remaining AT reconstituted by geniculate collaterals, PT chronically occluded & reconstituted distally  Robust collaterals  Peroneal runoff with delayed filling of PT/AT  -Nephrology consult appreciated  Serum creatinine 1 28/GFR 50 today post procedure  Continue to trend and avoid nephrotoxic meds  -Xarelto can be resumed; discussed with SLIM  -Off load heels with pressure bed and boots  -Betadine paint to heel   -Repeat JAMAL for new baseline and recommendations to follow  -Discussed w/ Dr Ania Antunez  Patient maximally revascularized at this time  Prognosis remains poor for limb salvage and ultimately he may need to consider amputation    I had a lengthy conversation with his daughter Miya Lester about this and let her know the results of angiogram   Will will set up an office visit at the [facility] office per her request for follow-up     -Continue Wound Care and f/u with Podiatry as outpatient  A-fib ([facility] )  Assessment & Plan  -stable, rate controlled  -continue current medical regimen with medication modifications per primary medical service  -Xarelto; ok restart today      Stage 3 chronic kidney disease ([facility] )  Assessment & Plan  -baseline creatinine 1 4-1 8  -Creat 1 29/GFR 50 this am  -Nephrology input appreciated  -continue to avoid nephrotoxic agents  -trend creatinine  -d/w Dr Ulises Barker    * Pressure ulcer of ankle  Assessment & Plan  L>R heel ulcer w/dry gangrene L heel  -heel pressure pressure off-loading         Subjective:  Patient reports some pain to bilateral lower extremities  When asked if the pain to the right leg is improved since the procedure yesterday he reported yes  He denies any chest pain, shortness of breath, fever, chills  Vitals:  /60   Pulse 68   Temp 97 6 °F (36 4 °C)   Resp 18   Ht 5' 6" (1 676 m)   Wt 80 kg (176 lb 5 9 oz)   SpO2 97%   BMI 28 47 kg/m²     I/Os:  I/O last 3 completed shifts: In: 1093 3 [I V :1093 3]  Out: 700 [Urine:700]  No intake/output data recorded      Lab Results and Cultures:   Lab Results   Component Value Date    WBC 9 00 08/06/2020    HGB 10 9 (L) 08/06/2020    HCT 35 0 (L) 08/06/2020    MCV 89 08/06/2020     08/06/2020     Lab Results   Component Value Date    CALCIUM 8 6 08/06/2020     04/01/2018    K 4 6 08/06/2020    CO2 27 08/06/2020     (H) 08/06/2020    BUN 31 (H) 08/06/2020    CREATININE 1 28 08/06/2020     Lab Results   Component Value Date    INR 1 50 (H) 08/04/2020    INR 2 46 (H) 07/30/2020    INR 1 23 (H) 06/11/2020    PROTIME 18 4 (H) 08/04/2020    PROTIME 25 5 (H) 07/30/2020    PROTIME 15 5 (H) 06/11/2020        Blood Culture:   Lab Results   Component Value Date BLOODCX No Growth After 5 Days  2020   ,   Urinalysis:   Lab Results   Component Value Date    COLORU Yellow 2020    CLARITYU Clear 2020    SPECGRAV 1 025 2020    PHUR 5 5 2020    PHUR 7 0 2019    LEUKOCYTESUR Moderate (A) 2020    NITRITE Positive (A) 2020    GLUCOSEU Negative 2020    KETONESU Negative 2020    BILIRUBINUR Negative 2020    BLOODU Large (A) 2020   ,   Urine Culture:   Lab Results   Component Value Date    URINECX >100,000 cfu/ml Escherichia coli ESBL (A) 2020    URINECX 8251-1006 cfu/ml Proteus mirabilis (A) 2020    URINECX 10,000-19,000 cfu/ml Enterococcus faecalis (A) 2020   ,   Wound Culure: No results found for: WOUNDCULT    Medications:  Current Facility-Administered Medications   Medication Dose Route Frequency    acetaminophen (TYLENOL) tablet 650 mg  650 mg Oral Q6H PRN    HYDROmorphone (DILAUDID) injection 0 5 mg  0 5 mg Intravenous Q4H PRN    insulin glargine (LANTUS) subcutaneous injection 8 Units 0 08 mL  8 Units Subcutaneous HS    insulin lispro (HumaLOG) 100 units/mL subcutaneous injection 1-5 Units  1-5 Units Subcutaneous HS    insulin lispro (HumaLOG) 100 units/mL subcutaneous injection 1-6 Units  1-6 Units Subcutaneous TID AC    insulin lispro (HumaLOG) 100 units/mL subcutaneous injection 8 Units  8 Units Subcutaneous TID With Meals    nicotine (NICODERM CQ) 7 mg/24hr TD 24 hr patch 1 patch  1 patch Transdermal Daily    oxyCODONE-acetaminophen (PERCOCET) 5-325 mg per tablet 1 tablet  1 tablet Oral Q4H PRN       Imagin2020 Angiogram:  L SFA PTA, TPT PTA, prox Peroneal PTA  Peroneal only in-line flow to the foot with short segment occlusion at origin of AT and remaining AT reconstituted by geniculate collaterals, PT chronically occluded & reconstituted distally  Robust collaterals  Peroneal runoff with delayed filling of PT/AT         Physical Exam:    General appearance: alert, cooperative, no distress and mildly obese  Skin: Bilateral lower extremity hyperpigmentation and chronic skin changes likely related to venous insufficiency  Patient also has left heel wound, smaller right heel wound  Neurologic: Grossly normal  Neck: no adenopathy, no carotid bruit, no JVD, supple, symmetrical, trachea midline and thyroid not enlarged, symmetric, no tenderness/mass/nodules  Lungs: clear to auscultation bilaterally  Heart: irregularly irregular rhythm, S1, S2 normal, no S3 or S4, no click and no rub  Abdomen: soft, non-tender; bowel sounds normal; no masses,  no organomegaly  Extremities: Bilateral lower extremities are warm and dry  Motor and sensory intact slightly diminished  Chronic venous stasis changes to lower legs with hyperpigmentation and thickened skin in some areas  Bilateral heel dressings in place with pillows under the leg  Right leg currently in a knee immobilizer which can be removed  The right groin is soft, no hematoma or abnormal pulsatility  Bilateral lower extremities without palpable pulses  Left leg with contracture       Wound/Incision:    LEFT HEEL:        RIGHT HEEL:          Pulse exam:  Radial: Right: 2+ Left[de-identified] 2+  Femoral: Right: 2+ Left: 2+  Popliteal: Right: non-palpable Left: non-palpable  DP: Right: non-palpable Left: non-palpable  PT: Right: non-palpable Left: non-palpable      FAREED Burgess  8/6/2020  [facility]  [phone]

## 2020-08-06 NOTE — PROGRESS NOTES
Progress Note - Infectious Disease   Prasad Richter 80 y o  male MRN: [id number]  Unit/Bed#: -01 Encounter: [id number]      Impression/Plan:  1  Purulent drainage around Baxter catheter   This appears to have resolved  González Rogel has no other signs and symptoms of UTI   He has no fever   WBC only slightly above normal range  Maintain off antibiotics  Recommend Baxter catheter exchange  Additional supportive care as per primary     2  CKD   Creatinine baseline  Continue to monitor creatinine  If antibiotic started will need to dose adjust appropriately      3  Positive urine culture  This likely represents asymptomatic bacteriuria in the setting of chronic Baxter catheter  No antibiotics for this issue      4  Left heel ulcer with peripheral vascular disease  Patient has been evaluated by vascular surgery and is pending angiogram for renal optimization  X-rays without signs of osteomyelitis  Patient remains largely hemodynamically stable and white count only mildly fluctuating  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 and patient will likely need at least local debridement  He has fortunately not systemically ill from this issue  Will continue to maintain off antibiotics  Will follow-up angiogram  Ongoing follow-up by vascular surgery  Ongoing follow-up by Podiatry  Serial skin exams  If patient clinically worsens or site progresses consider initiation of Ancef with Flagyl  Additional supportive care as per primary     Above plan discussed briefly with the patient at bedside      ID consult service will continue to follow  Antibiotics:  None    24 hour events:  No acute events noted overnight on chart review  Patient is currently afebrile and without leukocytosis  No new cultures  No new imaging  Patient's other vitals are stable  Labs otherwise unremarkable    Subjective:  Patient reports that his leg is significantly bothering him  He is frustrated that it is not getting better    Unclear if he truly understand his multiple comorbidities  He otherwise denies having any nausea, vomiting, chest pain or shortness of breath  Objective:  Vitals:  Temp:  [97 6 °F (36 4 °C)-98 4 °F (36 9 °C)] 97 6 °F (36 4 °C)  HR:  [68-81] 68  Resp:  [16-31] 18  BP: ()/(49-98) 139/60  SpO2:  [93 %-100 %] 93 %  Temp (24hrs), Av 9 °F (36 6 °C), Min:97 6 °F (36 4 °C), Max:98 4 °F (36 9 °C)  Current: Temperature: 97 6 °F (36 4 °C)    Physical Exam:   General Appearance:  Alert, interactive, nontoxic, no acute distress  Throat: Oropharynx moist without lesions  Lungs:   Clear to auscultation bilaterally; no wheezes, rhonchi or rales; respirations unlabored on room air   Heart:  RRR; no murmur, rub or gallop   Abdomen:   Soft, non-tender, non-distended, positive bowel sounds  Extremities: No clubbing, cyanosis or edema; muscle atrophy noted   Skin: No new rashes or lesions  No new draining wounds noted  Patient's heel wound is starting to have increasing drainage and wetness  There are no concerning signs of cellulitis and patient does not appear systemically ill from this  Labs, Imaging, & Other studies:   All pertinent labs and imaging studies were personally reviewed  Results from last 7 days   Lab Units 20  0500 20  0515 20  0459   WBC Thousand/uL 9 00 9 14 10 26*   HEMOGLOBIN g/dL 10 9* 11 0* 11 9*   PLATELETS Thousands/uL 336 355 359     Results from last 7 days   Lab Units 20  0500 20  0515 20  0459   POTASSIUM mmol/L 4 6 4 7 4 7   CHLORIDE mmol/L 109* 107 106   CO2 mmol/L 27 28 31   BUN mg/dL 31* 41* 36*   CREATININE mg/dL 1 28 1 29 1 24   EGFR ml/min/1 73sq m 50 50 52   CALCIUM mg/dL 8 6 8 5 8 7   AST U/L 54* 58* 61*   ALT U/L 39 45 40   ALK PHOS U/L 73 81 78     Results from last 7 days   Lab Units 20  9656 20  2101 20  1939   BLOOD CULTURE  No Growth After 5 Days  --  No Growth After 5 Days     URINE CULTURE   --  >100,000 cfu/ml Escherichia coli ESBL*  1729-0204 cfu/ml Proteus mirabilis*  10,000-19,000 cfu/ml Enterococcus faecalis*  --

## 2020-08-06 NOTE — ASSESSMENT & PLAN NOTE
-stable, rate controlled  -continue current medical regimen with medication modifications per primary medical service  -Xarelto on hold

## 2020-08-06 NOTE — ASSESSMENT & PLAN NOTE
· Controlled, on xarelto  · Holding xarelto for 48 hours for angiogram  · Resumed xarelto per vascular

## 2020-08-07 LAB
25(OH)D3 SERPL-MCNC: 38.6 NG/ML (ref 30–100)
ALBUMIN SERPL BCP-MCNC: 2.1 G/DL (ref 3.5–5)
ALP SERPL-CCNC: 79 U/L (ref 46–116)
ALT SERPL W P-5'-P-CCNC: 49 U/L (ref 12–78)
ANION GAP SERPL CALCULATED.3IONS-SCNC: 4 MMOL/L (ref 4–13)
ANION GAP SERPL CALCULATED.3IONS-SCNC: 5 MMOL/L (ref 4–13)
AST SERPL W P-5'-P-CCNC: 69 U/L (ref 5–45)
BACTERIA UR QL AUTO: ABNORMAL /HPF
BASOPHILS # BLD AUTO: 0.05 THOUSANDS/ΜL (ref 0–0.1)
BASOPHILS NFR BLD AUTO: 1 % (ref 0–1)
BILIRUB SERPL-MCNC: 0.5 MG/DL (ref 0.2–1)
BILIRUB UR QL STRIP: NEGATIVE
BUN SERPL-MCNC: 30 MG/DL (ref 5–25)
BUN SERPL-MCNC: 32 MG/DL (ref 5–25)
CALCIUM SERPL-MCNC: 8.6 MG/DL (ref 8.3–10.1)
CALCIUM SERPL-MCNC: 8.9 MG/DL (ref 8.3–10.1)
CHLORIDE SERPL-SCNC: 106 MMOL/L (ref 100–108)
CHLORIDE SERPL-SCNC: 106 MMOL/L (ref 100–108)
CHLORIDE UR-SCNC: 144 MMOL/L
CLARITY UR: ABNORMAL
CO2 SERPL-SCNC: 31 MMOL/L (ref 21–32)
CO2 SERPL-SCNC: 32 MMOL/L (ref 21–32)
COLOR UR: YELLOW
CREAT SERPL-MCNC: 1.3 MG/DL (ref 0.6–1.3)
CREAT SERPL-MCNC: 1.43 MG/DL (ref 0.6–1.3)
CREAT UR-MCNC: 106 MG/DL
CREAT UR-MCNC: 110 MG/DL
EOSINOPHIL # BLD AUTO: 0.18 THOUSAND/ΜL (ref 0–0.61)
EOSINOPHIL NFR BLD AUTO: 2 % (ref 0–6)
ERYTHROCYTE [DISTWIDTH] IN BLOOD BY AUTOMATED COUNT: 15.9 % (ref 11.6–15.1)
GFR SERPL CREATININE-BSD FRML MDRD: 44 ML/MIN/1.73SQ M
GFR SERPL CREATININE-BSD FRML MDRD: 49 ML/MIN/1.73SQ M
GLUCOSE SERPL-MCNC: 113 MG/DL (ref 65–140)
GLUCOSE SERPL-MCNC: 114 MG/DL (ref 65–140)
GLUCOSE SERPL-MCNC: 139 MG/DL (ref 65–140)
GLUCOSE SERPL-MCNC: 171 MG/DL (ref 65–140)
GLUCOSE SERPL-MCNC: 33 MG/DL (ref 65–140)
GLUCOSE SERPL-MCNC: 41 MG/DL (ref 65–140)
GLUCOSE SERPL-MCNC: 57 MG/DL (ref 65–140)
GLUCOSE SERPL-MCNC: 80 MG/DL (ref 65–140)
GLUCOSE SERPL-MCNC: <20 MG/DL (ref 65–140)
GLUCOSE UR STRIP-MCNC: NEGATIVE MG/DL
HCT VFR BLD AUTO: 35.8 % (ref 36.5–49.3)
HGB BLD-MCNC: 11 G/DL (ref 12–17)
HGB UR QL STRIP.AUTO: ABNORMAL
IMM GRANULOCYTES # BLD AUTO: 0.06 THOUSAND/UL (ref 0–0.2)
IMM GRANULOCYTES NFR BLD AUTO: 1 % (ref 0–2)
KETONES UR STRIP-MCNC: NEGATIVE MG/DL
LEUKOCYTE ESTERASE UR QL STRIP: ABNORMAL
LYMPHOCYTES # BLD AUTO: 0.92 THOUSANDS/ΜL (ref 0.6–4.47)
LYMPHOCYTES NFR BLD AUTO: 11 % (ref 14–44)
MAGNESIUM SERPL-MCNC: 1.8 MG/DL (ref 1.6–2.6)
MCH RBC QN AUTO: 28 PG (ref 26.8–34.3)
MCHC RBC AUTO-ENTMCNC: 30.7 G/DL (ref 31.4–37.4)
MCV RBC AUTO: 91 FL (ref 82–98)
MICROALBUMIN UR-MCNC: 522 MG/L (ref 0–20)
MICROALBUMIN/CREAT 24H UR: 492 MG/G CREATININE (ref 0–30)
MONOCYTES # BLD AUTO: 0.54 THOUSAND/ΜL (ref 0.17–1.22)
MONOCYTES NFR BLD AUTO: 7 % (ref 4–12)
MUCOUS THREADS UR QL AUTO: ABNORMAL
NEUTROPHILS # BLD AUTO: 6.36 THOUSANDS/ΜL (ref 1.85–7.62)
NEUTS SEG NFR BLD AUTO: 78 % (ref 43–75)
NITRITE UR QL STRIP: POSITIVE
NON-SQ EPI CELLS URNS QL MICRO: ABNORMAL /HPF
NRBC BLD AUTO-RTO: 0 /100 WBCS
OSMOLALITY UR: 695 MMOL/KG
PH UR STRIP.AUTO: 6 [PH]
PLATELET # BLD AUTO: 340 THOUSANDS/UL (ref 149–390)
PMV BLD AUTO: 10 FL (ref 8.9–12.7)
POTASSIUM SERPL-SCNC: 4 MMOL/L (ref 3.5–5.3)
POTASSIUM SERPL-SCNC: 4.2 MMOL/L (ref 3.5–5.3)
PROT SERPL-MCNC: 6.8 G/DL (ref 6.4–8.2)
PROT UR STRIP-MCNC: ABNORMAL MG/DL
RBC # BLD AUTO: 3.93 MILLION/UL (ref 3.88–5.62)
RBC #/AREA URNS AUTO: ABNORMAL /HPF
SODIUM 24H UR-SCNC: 115 MOL/L
SODIUM SERPL-SCNC: 142 MMOL/L (ref 136–145)
SODIUM SERPL-SCNC: 142 MMOL/L (ref 136–145)
SP GR UR STRIP.AUTO: 1.02 (ref 1–1.03)
UROBILINOGEN UR QL STRIP.AUTO: 1 E.U./DL
WBC # BLD AUTO: 8.11 THOUSAND/UL (ref 4.31–10.16)
WBC #/AREA URNS AUTO: ABNORMAL /HPF

## 2020-08-07 PROCEDURE — 82436 ASSAY OF URINE CHLORIDE: CPT | Performed by: INTERNAL MEDICINE

## 2020-08-07 PROCEDURE — 93923 UPR/LXTR ART STDY 3+ LVLS: CPT | Performed by: SURGERY

## 2020-08-07 PROCEDURE — 80053 COMPREHEN METABOLIC PANEL: CPT | Performed by: STUDENT IN AN ORGANIZED HEALTH CARE EDUCATION/TRAINING PROGRAM

## 2020-08-07 PROCEDURE — 84300 ASSAY OF URINE SODIUM: CPT | Performed by: INTERNAL MEDICINE

## 2020-08-07 PROCEDURE — 99232 SBSQ HOSP IP/OBS MODERATE 35: CPT | Performed by: GENERAL PRACTICE

## 2020-08-07 PROCEDURE — 83935 ASSAY OF URINE OSMOLALITY: CPT | Performed by: INTERNAL MEDICINE

## 2020-08-07 PROCEDURE — 99233 SBSQ HOSP IP/OBS HIGH 50: CPT | Performed by: INTERNAL MEDICINE

## 2020-08-07 PROCEDURE — 97163 PT EVAL HIGH COMPLEX 45 MIN: CPT

## 2020-08-07 PROCEDURE — 82043 UR ALBUMIN QUANTITATIVE: CPT | Performed by: INTERNAL MEDICINE

## 2020-08-07 PROCEDURE — 81001 URINALYSIS AUTO W/SCOPE: CPT | Performed by: INTERNAL MEDICINE

## 2020-08-07 PROCEDURE — 80048 BASIC METABOLIC PNL TOTAL CA: CPT | Performed by: GENERAL PRACTICE

## 2020-08-07 PROCEDURE — 97167 OT EVAL HIGH COMPLEX 60 MIN: CPT

## 2020-08-07 PROCEDURE — 99232 SBSQ HOSP IP/OBS MODERATE 35: CPT | Performed by: INTERNAL MEDICINE

## 2020-08-07 PROCEDURE — 83735 ASSAY OF MAGNESIUM: CPT | Performed by: STUDENT IN AN ORGANIZED HEALTH CARE EDUCATION/TRAINING PROGRAM

## 2020-08-07 PROCEDURE — 87205 SMEAR GRAM STAIN: CPT | Performed by: INTERNAL MEDICINE

## 2020-08-07 PROCEDURE — 82570 ASSAY OF URINE CREATININE: CPT | Performed by: INTERNAL MEDICINE

## 2020-08-07 PROCEDURE — 85025 COMPLETE CBC W/AUTO DIFF WBC: CPT | Performed by: STUDENT IN AN ORGANIZED HEALTH CARE EDUCATION/TRAINING PROGRAM

## 2020-08-07 PROCEDURE — 82948 REAGENT STRIP/BLOOD GLUCOSE: CPT

## 2020-08-07 PROCEDURE — 99233 SBSQ HOSP IP/OBS HIGH 50: CPT | Performed by: NURSE PRACTITIONER

## 2020-08-07 PROCEDURE — 82306 VITAMIN D 25 HYDROXY: CPT | Performed by: INTERNAL MEDICINE

## 2020-08-07 RX ORDER — METOCLOPRAMIDE HYDROCHLORIDE 5 MG/ML
10 INJECTION INTRAMUSCULAR; INTRAVENOUS ONCE AS NEEDED
Status: DISCONTINUED | OUTPATIENT
Start: 2020-08-07 | End: 2020-08-12 | Stop reason: HOSPADM

## 2020-08-07 RX ORDER — DEXTROSE MONOHYDRATE 25 G/50ML
INJECTION, SOLUTION INTRAVENOUS
Status: COMPLETED
Start: 2020-08-07 | End: 2020-08-07

## 2020-08-07 RX ORDER — ONDANSETRON 2 MG/ML
4 INJECTION INTRAMUSCULAR; INTRAVENOUS ONCE AS NEEDED
Status: DISCONTINUED | OUTPATIENT
Start: 2020-08-07 | End: 2020-08-12 | Stop reason: HOSPADM

## 2020-08-07 RX ORDER — DEXTROSE MONOHYDRATE 25 G/50ML
25 INJECTION, SOLUTION INTRAVENOUS ONCE
Status: COMPLETED | OUTPATIENT
Start: 2020-08-07 | End: 2020-08-07

## 2020-08-07 RX ORDER — FENTANYL CITRATE/PF 50 MCG/ML
25 SYRINGE (ML) INJECTION
Status: DISCONTINUED | OUTPATIENT
Start: 2020-08-07 | End: 2020-08-12 | Stop reason: HOSPADM

## 2020-08-07 RX ADMIN — DEXTROSE MONOHYDRATE 25 ML: 25 INJECTION, SOLUTION INTRAVENOUS at 17:44

## 2020-08-07 RX ADMIN — RIVAROXABAN 15 MG: 15 TABLET, FILM COATED ORAL at 17:00

## 2020-08-07 RX ADMIN — DEXTROSE 50 % IN WATER (D50W) INTRAVENOUS SYRINGE 25 ML: at 17:44

## 2020-08-07 RX ADMIN — INSULIN LISPRO 8 UNITS: 100 INJECTION, SOLUTION INTRAVENOUS; SUBCUTANEOUS at 13:00

## 2020-08-07 RX ADMIN — INSULIN LISPRO 1 UNITS: 100 INJECTION, SOLUTION INTRAVENOUS; SUBCUTANEOUS at 22:28

## 2020-08-07 RX ADMIN — INSULIN GLARGINE 8 UNITS: 100 INJECTION, SOLUTION SUBCUTANEOUS at 22:29

## 2020-08-07 RX ADMIN — INSULIN LISPRO 8 UNITS: 100 INJECTION, SOLUTION INTRAVENOUS; SUBCUTANEOUS at 08:59

## 2020-08-07 NOTE — PROGRESS NOTES
NEPHROLOGY PROGRESS NOTE    Patient: Karen November               Sex: male          DOA: 7/30/2020  5:23 PM   YOB: 1933        Age:  80 y o         LOS:  LOS: 8 days       HPI     Patient with a peripheral vascular disease and we are asked to see the patient again because of need for arteriogram    SUBJECTIVE     Patient is overall doing well  Still has a pain in both legs    Denies any shortness of breath chest pain no palpitation    He just had arteriogram left lower extremity with intervention  Patient will also need right lower extremity arteriogram so we are being consulted for that    Patient does history of diabetes and history of hypertension    Does have chronic leg pain    Denies any urinary complaint    CURRENT MEDICATIONS       Current Facility-Administered Medications:     acetaminophen (TYLENOL) tablet 650 mg, 650 mg, Oral, Q6H PRN, Ren Medina MD, 650 mg at 08/02/20 1114    fentaNYL (SUBLIMAZE) injection 25 mcg, 25 mcg, Intravenous, Q5 Min PRN, Salomon Christensen CRNA    HYDROmorphone (DILAUDID) injection 0 5 mg, 0 5 mg, Intravenous, Q4H PRN, Celeste Power MD    insulin glargine (LANTUS) subcutaneous injection 8 Units 0 08 mL, 8 Units, Subcutaneous, HS, Ren Medina MD, 8 Units at 08/06/20 2254    insulin lispro (HumaLOG) 100 units/mL subcutaneous injection 1-5 Units, 1-5 Units, Subcutaneous, HS, Ren Medina MD, 1 Units at 08/06/20 2256    insulin lispro (HumaLOG) 100 units/mL subcutaneous injection 1-6 Units, 1-6 Units, Subcutaneous, TID AC, 1 Units at 08/05/20 1943 **AND** Fingerstick Glucose (POCT), , , TID AC, Ren Medina MD    insulin lispro (HumaLOG) 100 units/mL subcutaneous injection 8 Units, 8 Units, Subcutaneous, TID With Meals, Ren Medina MD, 8 Units at 08/07/20 1300    metoclopramide (REGLAN) injection 10 mg, 10 mg, Intravenous, Once PRN, Salomon Christensen CRNA    nicotine (NICODERM CQ) 7 mg/24hr TD 24 hr patch 1 patch, 1 patch, Transdermal, Daily, Ren Medina MD, 1 patch at 08/04/20 0915    ondansetron (ZOFRAN) injection 4 mg, 4 mg, Intravenous, Once PRN, Bobbi Krishnamurthy CRNA    oxyCODONE-acetaminophen (PERCOCET) 5-325 mg per tablet 1 tablet, 1 tablet, Oral, Q4H PRN, Edna Landaverde MD, 1 tablet at 08/06/20 1319    rivaroxaban (XARELTO) tablet 15 mg, 15 mg, Oral, Daily With Dinner, Margoth Anderson MD, 15 mg at 08/06/20 1819    OBJECTIVE     Current Weight: Weight - Scale: 80 kg (176 lb 5 9 oz)  Vitals:    08/07/20 0830   BP: 130/83   Pulse: 71   Resp: 16   Temp: (!) 97 4 °F (36 3 °C)   SpO2: (!) 86%       Intake/Output Summary (Last 24 hours) at 8/7/2020 1458  Last data filed at 8/7/2020 0957  Gross per 24 hour   Intake 120 ml   Output 1100 ml   Net -980 ml       PHYSICAL EXAMINATION     Physical Exam  Constitutional:       General: He is not in acute distress  Appearance: He is well-developed  HENT:      Head: Normocephalic  Mouth/Throat:      Mouth: Oropharynx is clear and moist    Eyes:      General: No scleral icterus  Conjunctiva/sclera: Conjunctivae normal    Neck:      Musculoskeletal: Neck supple  Vascular: No JVD  Cardiovascular:      Rate and Rhythm: Normal rate  Heart sounds: Normal heart sounds  Pulmonary:      Effort: Pulmonary effort is normal  No respiratory distress  Breath sounds: Normal breath sounds  No wheezing  Abdominal:      General: Bowel sounds are normal  There is no distension  Palpations: Abdomen is soft  Tenderness: There is no abdominal tenderness  Musculoskeletal: Normal range of motion  General: No edema  Skin:     General: Skin is warm  Findings: No rash  Neurological:      Mental Status: He is alert and oriented to person, place, and time     Psychiatric:         Mood and Affect: Mood and affect normal          Behavior: Behavior normal           LAB RESULTS     Results from last 7 days   Lab Units 08/07/20  1002 08/06/20  0500 08/05/20  0515 08/04/20  0459 08/03/20  0540 08/02/20  0552 08/01/20  0852   WBC Thousand/uL 8 11 9 00 9 14 10 26* 8 76 8 66 8 50   HEMOGLOBIN g/dL 11 0* 10 9* 11 0* 11 9* 11 9* 11 9* 12 0   HEMATOCRIT % 35 8* 35 0* 35 2* 37 1 37 5 37 5 39 2   PLATELETS Thousands/uL 340 336 355 359 339 338 366   POTASSIUM mmol/L 4 0 4 6 4 7 4 7 4 3 4 2 4 2   CHLORIDE mmol/L 106 109* 107 106 106 107 106   CO2 mmol/L 32 27 28 31 29 29 30   BUN mg/dL 32* 31* 41* 36* 36* 41* 47*   CREATININE mg/dL 1 43* 1 28 1 29 1 24 1 24 1 40* 1 60*   EGFR ml/min/1 73sq m 44 50 50 52 52 45 38   CALCIUM mg/dL 8 6 8 6 8 5 8 7 8 8 8 5 8 8   MAGNESIUM mg/dL 1 8 1 8 2 0 1 9 2 0 2 0 2 1       RADIOLOGY RESULTS      Results for orders placed during the hospital encounter of 07/30/20   XR chest 1 view portable    Narrative CHEST     INDICATION:   sepsis  COMPARISON:  6/11/2020    EXAM PERFORMED/VIEWS:  XR CHEST PORTABLE      FINDINGS:    Cardiomediastinal silhouette appears unremarkable  The lungs are clear  No pneumothorax or pleural effusion  Osseous structures appear within normal limits for patient age  Impression No acute cardiopulmonary disease  Workstation performed: [id number]       Results for orders placed during the hospital encounter of 06/11/20   XR chest 2 views    Narrative CHEST     INDICATION:   AMS  COMPARISON:  Radiograph 1/28/2019    EXAM PERFORMED/VIEWS:  XR CHEST PA & LATERAL      FINDINGS:    Heart shadow is enlarged but unchanged from prior exam     Small right pleural effusion  No pneumothorax or focal consolidation  No evidence of pulmonary edema  Osseous structures appear within normal limits for patient age  Impression Stable cardiomegaly  Small right pleural effusion  No evidence of pulmonary edema  Workstation performed: [id number]       No results found for this or any previous visit  No results found for this or any previous visit  No results found for this or any previous visit    Results for orders placed in visit on 01/27/20   US retroperitoneal complete    Narrative [phone] [id number]       PLAN / RECOMMENDATIONS      Acute kidney injury:  Creatinine is increasing  Possibly to contrast ATN  Will monitor it if he does not get to be will need IV hydration  Will also check urine electrolytes    Peripheral vascular disease:  Patient will need another arteriogram   Because of possible contrast ATN he will be high risk to develop another contrast ATN  Will monitor him closely at this point  CKD stage 3 :  Baseline creatinine is 1 24 with GFR of 52 making it stage III  Will continue monitor closely  Will check urine for protein also as part of the CKD management    Diabetes type 2:  Not very well control on definitely contributing to renal failure and also will make came high risk for the contrast ATN    Hypertension:  Very well controlled with present management  Will continue      Monisha Nichole MD  Nephrology  8/7/2020        Portions of the record may have been created with voice recognition software  Occasional wrong word or "sound a like" substitutions may have occurred due to the inherent limitations of voice recognition software  Read the chart carefully and recognize, using context, where substitutions have occurred

## 2020-08-07 NOTE — PROGRESS NOTES
Progress Note - Infectious Disease   Ronny Concepcion 80 y o  male MRN: [id number]  Unit/Bed#: -01 Encounter: [id number]      Impression/Plan:  1  Purulent drainage around Baxter catheter   This appears to have resolved  Cely Ramírez has no other signs and symptoms of UTI   He has no fever  No leukocytosis  Maintain off antibiotics  Recommend Baxter catheter exchange  Additional supportive care as per primary     2  CKD   Creatinine baseline   Continue to monitor creatinine   If antibiotic started will need to dose adjust appropriately      3  Positive urine culture   This likely represents asymptomatic bacteriuria in the setting of chronic Baxter catheter   No antibiotics for this issue      4  Left heel ulcer with peripheral vascular disease   Patient has been evaluated by vascular surgery and is pending angiogram for renal optimization   X-rays without signs of osteomyelitis   Patient remains largely hemodynamically stable and white count only mildly fluctuating  Patient has not been recommended for debridement at this time and only local wound  He continues ongoing vascular optimization  At this time would continue to maintain off antibiotics  Ongoing follow-up by vascular surgery  Ongoing follow-up by Podiatry  Continue local wound care as per Podiatry/wound care recommendations  Serial skin exams  If patient clinically worsens or site progresses consider initiation of Ancef with Flagyl  Additional supportive care as per primary     Above plan discussed briefly with the patient at bedside  Above plan discussed in detail with primary service attending      ID consult service will sign off at this time  Please call if questions      Antibiotics:  None    24 hour events:  No acute events noted overnight on chart review  Patient does not appear to be plan for any additional surgical intervention  Wound care evaluation noted   Patient is currently afebrile  White count 8 1  No new cultures  No new images  Patient's other vitals are stable  Labs otherwise unremarkable    Subjective:  Patient currently denies having any nausea, vomiting, chest pain or shortness of breath  He reports having some discomfort in his leg  Otherwise has no other complaints  Objective:  Vitals:  Temp:  [96 5 °F (35 8 °C)-97 4 °F (36 3 °C)] 97 4 °F (36 3 °C)  HR:  [68-84] 71  Resp:  [16-18] 16  BP: (102-130)/(46-89) 130/83  SpO2:  [86 %-97 %] 86 %  Temp (24hrs), Av 9 °F (36 1 °C), Min:96 5 °F (35 8 °C), Max:97 4 °F (36 3 °C)  Current: Temperature: (!) 97 4 °F (36 3 °C)    Physical Exam:   General Appearance:  Alert, interactive, nontoxic, no acute distress  Throat: Oropharynx moist without lesions  Lungs:   Clear to auscultation bilaterally; no wheezes, rhonchi or rales; respirations unlabored on room air   Heart:  RRR; no murmur, rub or gallop appreciated   Abdomen:   Soft, non-tender, non-distended, positive bowel sounds  Extremities: No clubbing, cyanosis or edema   Skin: No new rashes or lesions  No new draining wounds noted  Patient's lower extremity wounds are currently wrapped at this time  There is no spreading or expanding erythema in the lower leg  Patient remains very sensitive to any light palpation of the legs         Labs, Imaging, & Other studies:   All pertinent labs and imaging studies were personally reviewed  Results from last 7 days   Lab Units 20  1002 20  0500 20  0515   WBC Thousand/uL 8 11 9 00 9 14   HEMOGLOBIN g/dL 11 0* 10 9* 11 0*   PLATELETS Thousands/uL 340 336 355     Results from last 7 days   Lab Units 20  1002 20  0500 20  0515   POTASSIUM mmol/L 4 0 4 6 4 7   CHLORIDE mmol/L 106 109* 107   CO2 mmol/L 32 27 28   BUN mg/dL 32* 31* 41*   CREATININE mg/dL 1 43* 1 28 1 29   EGFR ml/min/1 73sq m 44 50 50   CALCIUM mg/dL 8 6 8 6 8 5   AST U/L 69* 54* 58*   ALT U/L 49 39 45   ALK PHOS U/L 79 73 81

## 2020-08-07 NOTE — OCCUPATIONAL THERAPY NOTE
Occupational Therapy Evaluation        Patient Name: Cruz Rajput  [id number]'[id number] Date: 8/7/2020 08/07/20 1615   Note Type   Note type Eval only   Restrictions/Precautions   Weight Bearing Precautions Per Order No  (will maintain NWB secondary to pressure injury of L heel)   Braces or Orthoses Other (Comment)  (none reported)   Other Precautions Cognitive; Bed Alarm;Multiple lines;Telemetry; Fall Risk;Pain  (Lower leg skin breakdown)   Pain Assessment   Pain Assessment Tool FLACC   Pain Score Worst Possible Pain  (SHEREE Aguilera present in room)   Pain Location/Orientation Orientation: Left; Location: Leg   Pain Onset/Description Onset: Ongoing; Descriptor: [address] Pain Intervention(s) Repositioned; Emotional support   Multiple Pain Sites No   Pain Rating: FLACC (Rest) - Face 0   Pain Rating: FLACC (Rest) - Legs 0   Pain Rating: FLACC (Rest) - Activity 0   Pain Rating: FLACC (Rest) - Cry 0   Pain Rating: FLACC (Rest) - Consolability 0   Score: FLACC (Rest) 0   Pain Rating: FLACC (Activity) - Face 1   Pain Rating: FLACC (Activity) - Legs 1   Pain Rating: FLACC (Activity) - Activity 1   Pain Rating: FLACC (Activity) - Cry 1   Pain Rating: FLACC (Activity) - Consolability 1   Score: FLACC (Activity) 5   Home Living   Type of Home House   Home Layout Multi-level;Stairs to enter with rails  (6 MELIDA followed by 1 full flight)   Bathroom Shower/Tub Walk-in shower   Bathroom Toilet Standard   Bathroom Equipment Grab bars in shower   [facility]; Wheelchair-manual   Additional Comments Patient unreliable/poor historian  Accuracy of information obtained is unclear      Prior Function   Level of [place] Needs assistance with ADLs and functional mobility   Lives With Family  (Sister)   Receives Help From Family   ADL Assistance Needs assistance   IADLs Needs assistance   Lifestyle   Autonomy Patient is unreliable historian, verbalized different accounts of level of assistance required at home, chart review indicates (CM note from 8/4) - "[place]  There are five stairs to enter from outside  Patient use a walker and a commode  Patient needs assistance with adls"   Patient reported he lived with his sister and family in a multi level house with 6 MELIDA and full flight to main living area  Patient aslo reported ability to ambulate initially and later on reported not able to walk , "my brother in law pushes my butt up the stairs"   Reciprocal Relationships Supportive Family   Psychosocial   Psychosocial (WDL) WDL   ADL   Eating Assistance 5  Supervision/Setup   Grooming Assistance 5  Supervision/Setup   UB Bathing Assistance 3  Moderate Assistance   LB Bathing Assistance 2  Maximal Assistance   UB Dressing Assistance 3  Moderate Assistance   LB Dressing Assistance 2  Maximal [address]  2  Maximal Assistance   Functional Assistance 2  Maximal Assistance   Bed Mobility   Rolling L 3  Moderate assistance   Additional items Assist x 2; Increased time required;Verbal cues;LE management   Supine to Sit 3  Moderate assistance   Additional items Assist x 2;Leg ;Verbal cues;LE management;HOB elevated; Bedrails   Sit to Supine 3  Moderate assistance   Additional items Assist x 2; Increased time required;Verbal cues;LE management   Transfers   Sit to Stand Unable to assess  (deferred poor sitting balance/ sitting tolerance)   Balance   Static Sitting Poor +   Dynamic Sitting Poor   Activity Tolerance   Activity Tolerance Patient limited by fatigue   Nurse Made Aware SHEREE Burrows confirmed patient appropriate for therapy; post-session: patient repositioned, all needs within reach and bed alarm engaged   RUE Assessment   RUE Assessment WFL  (limited overhead movements/ grossly WFL)   LUE Assessment   LUE Assessment WFL  (limited overhead movements/ grossly WFL)   Hand Function   Gross Motor Coordination Impaired   Fine Motor Coordination Impaired   Sensation   Light Touch No apparent deficits  (BUEs)   Vision-Basic Assessment   Current Vision Does not wear glasses   Cognition   Overall Cognitive Status Impaired   Arousal/Participation Alert; Responsive   Attention Attends with cues to redirect   Orientation Level Oriented to person;Oriented to place; Disoriented to time;Disoriented to situation   Memory Decreased recall of biographical information;Decreased short term memory;Decreased recall of recent events   Following Commands Follows one step commands with increased time or repetition   Assessment   Limitation Decreased ADL status; Decreased Safe judgement during ADL;Decreased endurance;Decreased fine motor control;Decreased self-care trans;Decreased high-level ADLs;Mood limitation   Prognosis Good   Assessment Patient is a 80 y o  male seen for OT evaluation s/p admit to [id number] [facility] on 7/30/2020 w/Pressure ulcer of ankle  Commorbidities affecting patient's functional performance at time of assessment include: 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, diabetes, chronic diastolic heart failure, A-fib, stage 3 CKD  Orders placed for OT evaluation and treatment  Performed at least two patient identifiers during session including name and wristband  Prior to admission, Patient is unreliable historian, verbalized different accounts of level of assistance required at home, chart review indicates (CM note from 8/4) - "Patient [place]  There are five stairs to enter from outside  Patient use a walker and a commode  Patient needs assistance with adls"   Patient reported he lived with his sister and family in a multi level house with 6 MELIDA and full flight to main living area  Patient aslo reported ability to ambulate initially and later on reported not able to walk , "my brother in law pushes my butt up the stairs"    Personal factors affecting patient at time of initial evaluation include: limited caregiver support, limited insight into deficits, non compliance, flat affect, decreased initiation and engagement, difficulty performing ADLs and difficulty performing IADLs  Upon evaluation, patient requires moderate and maximal assist for UB ADLs, maximal and total assist for LB ADLs  Occupational performance is affected by the following deficits: orientation, attention span, flat affect, impulsive behavior, decreased functional use of BUEs, degenerative arthritic joint changes, impaired gross motor coordination, impaired fine motor coordination, dynamic sit/ stand balance deficit with poor standing tolerance time for self care and functional mobility, decreased activity tolerance, impaired memory, impaired problem solving, decreased safety awareness, increased pain and postural control and postural alignment deficit, requiring external assistance to complete transitional movements  Therapist completed  extensive additional review of medical records and additional review of physical, cognitive or psychosocial history, clinical examination identifying 5 or more performance deficits, clinical decision making of a high complexity , consistent with a high complexity level evaluation  Goals   Patient Goals to be able to walk   Plan   Treatment Interventions ADL retraining;Functional transfer training;UE strengthening/ROM; Cognitive reorientation; Endurance training;Patient/family training;Equipment evaluation/education; Compensatory technique education; Fine motor coordination activities;Continued evaluation; Energy conservation; Activityengagement   Goal Expiration Date 08/21/20   OT Frequency 3-5x/wk   Recommendation   OT Discharge Recommendation Post-Acute Rehabilitation Services   Barthel Index   Feeding 5   Bathing 0   Grooming Score 0   Dressing Score 0   Bladder Score 0   Bowels Score 5   Toilet Use Score 0   Transfers (Bed/Chair) Score 5   Mobility (Level Surface) Score 0   Stairs Score 0   Barthel Index Score 15   Modified Moody Scale   Modified Keon Scale 5         Patient to benefit from continued Occupational Therapy treatment while in the hospital to address deficits as defined above and maximize level of functional independence with ADLs and functional mobility  Occupational Performance areas to address include: eating, grooming , bathing/ shower, dressing, toilet hygiene, transfer to all surfaces, functional mobility, IADLs: safety procedures, Leisure Participation and Social participation  From OT standpoint, recommendation at time of d/c would be Short Term Rehab

## 2020-08-07 NOTE — PROGRESS NOTES
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 [facility])  Assessment & Plan  · See plan above    Diabetes [facility])  Assessment & Plan  Lab Results   Component Value Date    HGBA1C 7 0 (H) 06/12/2020       Recent Labs     08/06/20  0601 08/06/20  1115 08/06/20  1618 08/06/20 2053   POCGLU 99 104 264* 182*       Blood Sugar Average: Last 72 hrs:  (P) 174 [id number]     · Last A1c 7%, continue insulin sliding scale, basal/prandial insulin  · No episodes of hypoglycemia, but glucose levels have been labile,  Especially at 11 AM fingerstick check  · Otherwise well controlled     Chronic diastolic congestive heart failure (HCC)  Assessment & Plan  Wt Readings from Last 3 Encounters:   07/30/20 80 kg (176 lb 5 9 oz)   07/14/20 85 1 kg (187 lb 9 6 oz)   07/07/20 85 1 kg (187 lb 9 6 oz)     · Unclear why patient is on entresto after further medication review  · Last ECHO in 2019 showing EF 60%, which is most current TTE  · Due to low normal blood pressures, will discontinue entresto  · Currently euvolemic    A-fib ([facility])  Assessment & Plan  · Controlled, on xarelto  · Holding xarelto for 48 hours for angiogram  · Resumed xarelto per vascular    Abnormal urinalysis  Assessment & Plan  · + UA, urine culture growing esbl e coli; ID following  · Did receive dose of Antibiotics in ED, but seen by infectious disease and recommending discontinuation of antibiotics secondary to lack of symptoms and chronic mccoy suggestive of colonization    Stage 3 chronic kidney disease (HCC)  Assessment & Plan  · Baseline creatinine 1 4-1 8  · Stable  · Received IVF post angiogram       * Pressure ulcer of ankle  Assessment & Plan  · Secondary to peripheral arterial disease, R>L  · Podiatry was consulted due to PVD-likely needs higher amputation, vascular surgery consulted and angiogram pending today  · Foot xrays negative for osteomyelitis  · Arterial duplex showing occlusive disease of lower extremities bilaterally  · S/p arteriogram 20 with intervention  · Resume xarelto when ok per vascular   · ID following-off abx  · Case d/w vascular surgery 20-some revascularization accomplished but still at high risk for amputation, case also d/w podiatry no intervention needed at this time-continue wound care      VTE Pharmacologic Prophylaxis:   Pharmacologic: Rivaroxaban (Xarelto)  Mechanical VTE Prophylaxis in Place: Yes    Patient Centered Rounds: I have performed bedside rounds with nursing staff today  Discussions with Specialists or Other Care Team Provider:     Education and Discussions with Family / Patient:     Time Spent for Care: 30 minutes  More than 50% of total time spent on counseling and coordination of care as described above  Current Length of Stay: 8 day(s)    Current Patient Status: Inpatient   Certification Statement: The patient will continue to require additional inpatient hospital stay due to PVD, nonhealling ulcer    Discharge Plan: STR    Code Status: Level 1 - Full Code      Subjective:   C/o right heel pain  Objective:     Vitals:   Temp (24hrs), Av °F (36 1 °C), Min:96 5 °F (35 8 °C), Max:97 6 °F (36 4 °C)    Temp:  [96 5 °F (35 8 °C)-97 6 °F (36 4 °C)] 96 9 °F (36 1 °C)  HR:  [68-84] 84  Resp:  [18] 18  BP: (102-139)/(46-89) 122/89  SpO2:  [93 %-97 %] 93 %  Body mass index is 28 47 kg/m²  Input and Output Summary (last 24 hours): Intake/Output Summary (Last 24 hours) at 2020 9181  Last data filed at 2020 0307  Gross per 24 hour   Intake 480 ml   Output 1100 ml   Net -620 ml       Physical Exam:     Physical Exam  Constitutional:       Appearance: He is well-developed and well-nourished  HENT:      Head: Normocephalic and atraumatic     Eyes:      Pupils: Pupils are equal, round, and reactive to light  Neck:      Musculoskeletal: Neck supple  Cardiovascular:      Rate and Rhythm: Regular rhythm  Pulmonary:      Effort: Pulmonary effort is normal       Breath sounds: Normal breath sounds  Abdominal:      General: Bowel sounds are normal       Palpations: Abdomen is soft  Musculoskeletal:         General: Tenderness and edema present  Skin:     General: Skin is warm  Findings: Erythema present  Neurological:      Mental Status: He is alert  Motor: Abnormal muscle tone: heels b/l and ankles  Psychiatric:         Mood and Affect: Mood and affect normal            Additional Data:     Labs:    Results from last 7 days   Lab Units 08/06/20  0500   WBC Thousand/uL 9 00   HEMOGLOBIN g/dL 10 9*   HEMATOCRIT % 35 0*   PLATELETS Thousands/uL 336   NEUTROS PCT % 78*   LYMPHS PCT % 12*   MONOS PCT % 7   EOS PCT % 1     Results from last 7 days   Lab Units 08/06/20  0500   SODIUM mmol/L 143   POTASSIUM mmol/L 4 6   CHLORIDE mmol/L 109*   CO2 mmol/L 27   BUN mg/dL 31*   CREATININE mg/dL 1 28   ANION GAP mmol/L 7   CALCIUM mg/dL 8 6   ALBUMIN g/dL 2 0*   TOTAL BILIRUBIN mg/dL 0 50   ALK PHOS U/L 73   ALT U/L 39   AST U/L 54*   GLUCOSE RANDOM mg/dL 92     Results from last 7 days   Lab Units 08/04/20  0459   INR  1 50*     Results from last 7 days   Lab Units 08/06/20  2053 08/06/20  1618 08/06/20  1115 08/06/20  0601 08/05/20  2109 08/05/20  1942 08/05/20  1751 08/05/20  1343 08/05/20  1144 08/05/20  0640 08/04/20  2116 08/04/20  1539   POC GLUCOSE mg/dl 182* 264* 104 99 125 159* 209* 195* 174* 128 179* 241*                   * I Have Reviewed All Lab Data Listed Above  * Additional Pertinent Lab Tests Reviewed:  All Labs Within Last 24 Hours Reviewed    Imaging:    Imaging Reports Reviewed Today Include:   Imaging Personally Reviewed by Myself Includes:      Recent Cultures (last 7 days):           Last 24 Hours Medication List:   acetaminophen, 650 mg, Oral, Q6H PRN, Ren Medina MD  HYDROmorphone, 0 5 mg, Intravenous, Q4H PRN, Yanci Rodriguez MD  insulin glargine, 8 Units, Subcutaneous, HS, Ren Medina MD  insulin lispro, 1-5 Units, Subcutaneous, HS, Ren Medina MD  insulin lispro, 1-6 Units, Subcutaneous, TID AC, Ren Medina MD  insulin lispro, 8 Units, Subcutaneous, TID With Meals, Alexa Corcoran MD  nicotine, 1 patch, Transdermal, Daily, Ren Medina MD  oxyCODONE-acetaminophen, 1 tablet, Oral, Q4H PRN, Yanci Rodriguez MD  rivaroxaban, 15 mg, Oral, Daily With Dinner, Darshan Munoz MD         Today, Patient Was Seen By: Darshan Munoz MD    ** Please Note: Dictation voice to text software may have been used in the creation of this document   **

## 2020-08-07 NOTE — SOCIAL WORK
CM spoke to pt at bedside and to daughter Skylar Fischer via phone regarding dcp  Pt only has 5 STR bed days left and owes money to PVM, so they will not be able to accept  Skylar Fischer feels that the $176 00 co-pay that would be required once bed days ran out, would be a hardship to pt  She is requesting referrals to acute rehabs  If acute rehabs cannot accept, she will consider STR

## 2020-08-07 NOTE — PHYSICAL THERAPY NOTE
Physical Therapy Evaluation     Patient's Name: Trista Umana    Admitting Diagnosis  UTI (urinary tract infection) [N39 0]    Problem List  Patient Active Problem List   Diagnosis    Hydrocele    Paronychia of finger    [facility] )    Abnormal urinalysis    Pressure injury of back, stage 2 ([facility] )    HLD (hyperlipidemia)    COPD without exacerbation ([place] [address] )    Cellulitis of buttock    A-fib ([place] [address] )    Chronic diastolic congestive heart failure ([place] [address] )    Diabetes ([place] [address] )    Vitamin D deficiency    PVD (peripheral vascular disease) ([place] [address] )    Toxic metabolic encephalopathy    Ambulatory dysfunction    Acute cystitis    Type 2 diabetes mellitus, with long-term current use of insulin ([facility])    Metabolic encephalopathy    Dry skin dermatitis    Urinary retention    Myalgia    Neuropathy    Speech abnormality    Positive urine culture    Balanitis    Pressure ulcer of ankle    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)       Past Medical History  Past Medical History:   Diagnosis Date    Ambulatory dysfunction     Atrial fibrillation ([facility])     CHF (congestive heart failure) ([facility])     Chronic kidney disease     COPD (chronic obstructive pulmonary disease) ([place] [address] )     Diabetes mellitus ([place] [address] )     Hyperlipidemia     Metabolic encephalopathy     Osteomyelitis ([place] [address] )        Past Surgical History  Past Surgical History:   Procedure Laterality Date    ABDOMINAL SURGERY      IR ABDOMINAL ANGIOGRAPHY / INTERVENTION  8/5/2020    JOINT REPLACEMENT      b/l hips          08/07/20 1311   Note Type   Note type Eval only   Pain Assessment   Pain Assessment Tool FLACC   Pain Score Worst Possible Pain   Pain Location/Orientation Orientation: Left; Location: Leg   Pain Onset/Description Onset: Ongoing   Hospital Pain Intervention(s) Repositioned   Pain Rating: FLACC (Rest) - Face 0   Pain Rating: FLACC (Rest) - Legs 0   Pain Rating: FLACC (Rest) - Activity 0   Pain Rating: FLACC (Rest) - Cry 1   Pain Rating: FLACC (Rest) - Consolability 0   Score: FLACC (Rest) 1   Pain Rating: FLACC (Activity) - Face 1   Pain Rating: FLACC (Activity) - Legs 1   Pain Rating: FLACC (Activity) - Activity 1   Pain Rating: FLACC (Activity) - Cry 1   Pain Rating: FLACC (Activity) - Consolability 1   Score: FLACC (Activity) 5   Home Living   Type of Home House   Home Layout Multi-level;Stairs to enter with rails;Bed/bath upstairs  (6 MELIDA)   Bathroom Shower/Tub Walk-in shower   Bathroom Toilet Standard   Bathroom Equipment Grab bars in shower   [address] [facility]; Viviana Ratel  (patient reports he has been non-ambulatory prior to admission)   Additional Comments Patient unreliable/poor historian  Accuracy of information obtained is unclear  Prior Function   Level of [place] Needs assistance with ADLs and functional mobility   Lives With Family  (Sister)   Receives Help From Family   ADL Assistance Needs assistance   IADLs Needs assistance   Falls in the last 6 months 0   Vocational Retired   Comments Patient unreliable/poor historian  Accuracy of information obtained is unclear  Restrictions/Precautions   Weight Bearing Precautions Per Order No  (however, will maintain NWB LLE secondary to pressure injury of L heel)   Braces or Orthoses Other (Comment)  (none reported; prevalon boots intact upon arrival)   Other Precautions Bed Alarm;Contact/isolation; Fall Risk;Pain;Multiple lines  (ESBL, MDRO)   General   Family/Caregiver Present No   Cognition   Overall Cognitive Status Impaired   Arousal/Participation Alert   Orientation Level Oriented to person;Disoriented to time;Disoriented to situation  (Inconsistent to place)   Memory Decreased recall of biographical information;Decreased short term memory;Decreased recall of recent events   Following Commands Follows one step commands with increased time or repetition   Comments patient agreeable to PT eval with encouragement   RUE Assessment   RUE Assessment WFL  (based on functional assessment)   LUE Assessment   LUE Assessment WFL  (based on functional assessment)   RLE Assessment   RLE Assessment X   Strength RLE   RLE Overall Strength 3-/5  (based on functional assessment)   LLE Assessment   LLE Assessment X   Strength LLE   LLE Overall Strength 2+/5  (based on functional assessment; knee flexion contracture noted; significantly limited by pain)   Coordination   Movements are Fluid and Coordinated 1   Sensation X  (history of neuropathy)   Bed Mobility   Rolling L 3  Moderate assistance   Additional items Assist x 2; Increased time required;Verbal cues;LE management;HOB elevated; Bedrails   Supine to Sit 3  Moderate assistance   Additional items Assist x 2; Increased time required;Verbal cues;LE management;HOB elevated; Bedrails   Sit to Supine 3  Moderate assistance   Additional items Assist x 2; Increased time required;Verbal cues;LE management;HOB elevated   Transfers   Sit to Stand Unable to assess   Ambulation/Elevation   Gait pattern Not appropriate; Not tested   Balance   Static Sitting Poor +   Dynamic Sitting Poor   Endurance Deficit   Endurance Deficit Yes   Activity Tolerance   Activity Tolerance Patient limited by fatigue;Patient limited by pain   Medical Staff Made Aware LEYLA Crespo   Nurse Made Aware SHEREE Velez confirmed patient appropriate for therapy; post-session: patient repositioned, all needs within reach and bed alarm engaged   Assessment   Prognosis Poor   Problem List Decreased strength;Decreased range of motion;Decreased endurance; Impaired balance;Decreased mobility; Decreased cognition; Impaired sensation;Pain;Decreased skin integrity   Assessment Pt is 80 y o  male seen for PT evaluation s/p admit to [facility] on 7/30/2020 w/ Pressure ulcer of ankle  PT consulted to assess pt's functional mobility and d/c needs  Order placed for PT eval and tx, w/ up and OOB as tolerated order  Performed at least 2 patient identifiers during session: Name and wristband  Comorbidities affecting pt's physical performance at time of assessment include: 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, diabetes, chronic diastolic heart failure, A-fib, stage 3 CKD  Patient unreliable/poor historian  Accuracy of information obtained is unclear  PTA, pt was requiring A for mobility, has 6 MELIDA, lives w/ sister in multi-level house and retired  Personal factors affecting pt at time of IE include: inaccessible home environment, lives in multi-level house, stairs to enter home, inability to navigate level surfaces w/o external assistance, decreased cognition, decreased initiation and engagement, inability to perform IADLs and inability to perform ADLs  Please find objective findings from PT assessment regarding body systems outlined above with impairments and limitations including weakness, decreased ROM, impaired balance, decreased endurance, pain, decreased activity tolerance, decreased functional mobility tolerance, altered sensation, fall risk, decreased skin integrity and decreased cognition  The following objective measures performed on IE also reveal limitations: Barthel Index: 15/100 and Modified [place]: 5 (severe disability)  Pt's clinical presentation is currently unstable/unpredictable seen in pt's presentation of ongoing medical management/monitoring, fatigue and pain impacting overall mobility status and need for input for mobility technique/safety  Pt to benefit from continued PT tx to address deficits as defined above and maximize level of functional independent mobility and consistency  From PT/mobility standpoint, recommendation at time of d/c would be STR pending progress in order to facilitate return to PLOF     Barriers to Discharge Inaccessible home environment;Decreased caregiver support   Goals   Patient Goals to be able to walk   STG Expiration Date 08/17/20   Short Term Goal #1 In 7-10 days: Increase bilateral LE strength 1/2 grade to facilitate independent mobility, Perform all bed mobility tasks with min A of 1 to decrease caregiver burden, Increase static and dynamic sitting balance 1 grade to decrease risk for falls and PT to see and establish goals for functional transfers when appropriate   PT Treatment Day 0   Plan   Treatment/Interventions LE strengthening/ROM; Therapeutic exercise; Endurance training;Patient/family training;Equipment eval/education; Bed mobility;Continued evaluation;Spoke to nursing;OT;Spoke to case management   PT Frequency Other (Comment)  (3-5x/wk)   Recommendation   PT Discharge Recommendation Post-Acute Rehabilitation Services   PT - OK to Discharge Yes  (when medically cleared; if to STR)   Modified [place] Scale   Modified [place] Scale 5   Barthel Index   Feeding 5   Bathing 0   Grooming Score 0   Dressing Score 0   Bladder Score 0   Bowels Score 5   Toilet Use Score 0   Transfers (Bed/Chair) Score 5   Mobility (Level Surface) Score 0   Stairs Score 0   Barthel Index Score 15         Julissa Benson, PT, DPT

## 2020-08-07 NOTE — PROGRESS NOTES
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 [facility])  Assessment & Plan  80year-old minimally ambulatory male smoker w/chronic diastolic heart failure, type 2 DM, CKD 3 (baseline creatinine 1 4-1 8), Afib on Xarelto, COPD, OA bilateral knee, s/p bilateral ANDREA, recurrent UTI w/chronic indwelling Baxter catheter admitted w/pyuria and PAD w/bilateral heel pressure wounds, L >R  Diagnostics:  -LEAD 7/31:  diffuse right lower extremity disease without focal stenosis, R JAMAL 0 6/42/34 and left lower extremity with diffuse disease, no focal stenosis, decrease in velocities in SFA to popliteal artery suggestive of possible stenosis, L JAMAL 0 52/33/36  -Xray left foot 7/31: No evidence of fracture, osteomyelitis or other significant bony abnormality in the left foot  -Xray right foot 7/31: No evidence of osteomyelitis, fracture or other significant bony abnormality  -BC: neg x 5 days  -Angiogram 8/5/2020 L SFA PTA, TPT PTA, prox Peroneal PTA  Peroneal only in-line flow to the foot with short segment occlusion at origin of AT and remaining AT reconstituted by geniculate collaterals, PT chronically occluded & reconstituted distally  Robust collaterals  Peroneal runoff with delayed filling of PT/AT  -post intervention JAMAL right 0 75/38/26 and left 0 74/26/30 (prior right JAMAL 0 52)    Plan:  -Peripheral arterial disease with bilateral heel deep tissue injury, left greater than right status post left lower extremity angioplasty 8/5  Called today with progression of right heel ulceration  Patient will require angiogram with right lower extremity runoff and attempted revascularization  Will need general anesthesia for angiogram and renal optimization  Coordinating scheduling with IR  Scheduled for Wednesday 8/12  -Nephrology following  Serum creatinine 1 43/GFR 44  Continue to trend and avoid nephrotoxic meds  -resumed Xarelto yesterday  Will need to hold for 48 hours once scheduled with interventional radiology  -Off load heels with pressure bed and boots  -Betadine paint to heel   -Discussed w/ Dr Joel Nelson     A-fib [facility])  Assessment & Plan  -stable, rate controlled  -Xarelto was resumed yesterday  -Hold starting 8/10             Subjective:  Patient in bed  No acute distress  He complains of pain in lower extremities  He is status post left lower extremity angiogram and intervention  Today was noted to have worsening right lower extremity pressure ulcer     Vitals:  /83   Pulse 71   Temp (!) 97 4 °F (36 3 °C)   Resp 16   Ht 5' 6" (1 676 m)   Wt 80 kg (176 lb 5 9 oz)   SpO2 (!) 86%   BMI 28 47 kg/m²     I/Os:  I/O last 3 completed shifts: In: 973 3 [P O :480; I V :493 3]  Out: 1650 [Urine:1650]  I/O this shift:  In: 120 [P O :120]  Out: -     Lab Results and Cultures:   Lab Results   Component Value Date    WBC 8 11 08/07/2020    HGB 11 0 (L) 08/07/2020    HCT 35 8 (L) 08/07/2020    MCV 91 08/07/2020     08/07/2020     Lab Results   Component Value Date    CALCIUM 8 6 08/07/2020     04/01/2018    K 4 0 08/07/2020    CO2 32 08/07/2020     08/07/2020    BUN 32 (H) 08/07/2020    CREATININE 1 43 (H) 08/07/2020     Lab Results   Component Value Date    INR 1 50 (H) 08/04/2020    INR 2 46 (H) 07/30/2020    INR 1 23 (H) 06/11/2020    PROTIME 18 4 (H) 08/04/2020    PROTIME 25 5 (H) 07/30/2020    PROTIME 15 5 (H) 06/11/2020        Blood Culture:   Lab Results   Component Value Date    BLOODCX No Growth After 5 Days   07/30/2020   ,   Urinalysis:   Lab Results   Component Value Date    COLORU Yellow 07/30/2020    CLARITYU Clear 07/30/2020    SPECGRAV 1 025 07/30/2020    PHUR 5 5 07/30/2020    PHUR 7 0 01/28/2019    LEUKOCYTESUR Moderate (A) 07/30/2020    NITRITE Positive (A) 07/30/2020    GLUCOSEU Negative 07/30/2020    KETONESU Negative 07/30/2020    BILIRUBINUR Negative 07/30/2020    BLOODU Large (A) 07/30/2020   ,   Urine Culture:   Lab Results   Component Value Date    URINECX >100,000 cfu/ml Escherichia coli ESBL (A) 07/30/2020    URINECX 3809-7895 cfu/ml Proteus mirabilis (A) 07/30/2020    URINECX 10,000-19,000 cfu/ml Enterococcus faecalis (A) 07/30/2020   ,   Wound Culure: No results found for: WOUNDCULT    Medications:  Current Facility-Administered Medications   Medication Dose Route Frequency    acetaminophen (TYLENOL) tablet 650 mg  650 mg Oral Q6H PRN    fentaNYL (SUBLIMAZE) injection 25 mcg  25 mcg Intravenous Q5 Min PRN    HYDROmorphone (DILAUDID) injection 0 5 mg  0 5 mg Intravenous Q4H PRN    insulin glargine (LANTUS) subcutaneous injection 8 Units 0 08 mL  8 Units Subcutaneous HS    insulin lispro (HumaLOG) 100 units/mL subcutaneous injection 1-5 Units  1-5 Units Subcutaneous HS    insulin lispro (HumaLOG) 100 units/mL subcutaneous injection 1-6 Units  1-6 Units Subcutaneous TID AC    insulin lispro (HumaLOG) 100 units/mL subcutaneous injection 8 Units  8 Units Subcutaneous TID With Meals    metoclopramide (REGLAN) injection 10 mg  10 mg Intravenous Once PRN    nicotine (NICODERM CQ) 7 mg/24hr TD 24 hr patch 1 patch  1 patch Transdermal Daily    ondansetron (ZOFRAN) injection 4 mg  4 mg Intravenous Once PRN    oxyCODONE-acetaminophen (PERCOCET) 5-325 mg per tablet 1 tablet  1 tablet Oral Q4H PRN    rivaroxaban (XARELTO) tablet 15 mg  15 mg Oral Daily With Dinner       Imaging:  Post intervention JAMAL right JAMAL 0 75/38/26 and left JAMAL 0 74/26/30 below the healing range    Physical Exam:    General appearance: Patient is in bed  Arouses easily  No acute distress    Oriented to person and place  Skin: Skin color, texture, turgor normal  No rashes or lesions  Neurologic: Grossly normal  Head: Normocephalic, without obvious abnormality, atraumatic  Eyes: EOMI  Lungs: clear to auscultation bilaterally  Chest wall: no tenderness  Heart: regularly irregular rhythm  Abdomen: soft, non-tender; bowel sounds normal; no masses,  no organomegaly  Extremities: Bilateral heel deep tissue injury, left greater than right  Right groin access site dressing removed  No hematoma or pseudoaneurysm  No bleeding  Chronic venous stasis changes bilateral lower extremities      Wound/Incision:  Bilateral heel  dressing clean, dry, and intact    Pulse exam:  Femoral: Right: 2+ Left: 2+  Popliteal: Right: non-palpable Left: non-palpable  DP: Right: non-palpable Left: non-palpable  PT: Right: non-palpable Left: non-palpable      FAREED Snider  8/7/2020  [facility]  [phone]

## 2020-08-07 NOTE — ASSESSMENT & PLAN NOTE
· Secondary to peripheral arterial disease, R>L  · Podiatry was consulted due to PVD-likely needs higher amputation, vascular surgery consulted and angiogram pending today  · Foot xrays negative for osteomyelitis  · Arterial duplex showing occlusive disease of lower extremities bilaterally  · S/p arteriogram 8/5/20 with intervention  · Resume xarelto when ok per vascular   · ID following-off abx  · Case d/w vascular surgery 8/6/20-some revascularization accomplished but still at high risk for amputation, case also d/w podiatry no intervention needed at this time-continue wound care

## 2020-08-07 NOTE — ASSESSMENT & PLAN NOTE
Lab Results   Component Value Date    HGBA1C 7 0 (H) 06/12/2020       Recent Labs     08/06/20  0601 08/06/20  1115 08/06/20  1618 08/06/20 2053   POCGLU 99 104 264* 182*       Blood Sugar Average: Last 72 hrs:  (P) 174 [id number]     · Last A1c 7%, continue insulin sliding scale, basal/prandial insulin  · No episodes of hypoglycemia, but glucose levels have been labile,  Especially at 11 AM fingerstick check  · Otherwise well controlled

## 2020-08-07 NOTE — ASSESSMENT & PLAN NOTE
-stable, rate controlled  -Xarelto was resumed yesterday  -Will hold for 48 hours prior to angiogram once scheduled

## 2020-08-07 NOTE — ASSESSMENT & PLAN NOTE
80year-old minimally ambulatory male smoker w/chronic diastolic heart failure, type 2 DM, CKD 3 (baseline creatinine 1 4-1 8), Afib on Xarelto, COPD, OA bilateral knee, s/p bilateral ANDREA, recurrent UTI w/chronic indwelling Baxter catheter admitted w/pyuria and PAD w/bilateral heel pressure wounds, L >R  Diagnostics:  -LEAD 7/31:  diffuse right lower extremity disease without focal stenosis, R JAMAL 0 6/42/34 and left lower extremity with diffuse disease, no focal stenosis, decrease in velocities in SFA to popliteal artery suggestive of possible stenosis, L JAMAL 0 52/33/36  -Xray left foot 7/31: No evidence of fracture, osteomyelitis or other significant bony abnormality in the left foot  -Xray right foot 7/31: No evidence of osteomyelitis, fracture or other significant bony abnormality  -BC: neg x 5 days  -Angiogram 8/5/2020 L SFA PTA, TPT PTA, prox Peroneal PTA  Peroneal only in-line flow to the foot with short segment occlusion at origin of AT and remaining AT reconstituted by geniculate collaterals, PT chronically occluded & reconstituted distally  Robust collaterals  Peroneal runoff with delayed filling of PT/AT  -post intervention JAMAL right 0 75/38/26 and left 0 74/26/30 (prior right JAMAL 0 52)    Plan:  -Peripheral arterial disease with bilateral heel deep tissue injury, left greater than right status post left lower extremity agram/angioplasty 8/5  Now with progression of right heel ulceration  Patient will require angiogram with right lower extremity runoff and attempted revascularization  Will need general anesthesia for angiogram and renal optimization  Coordinating scheduling with IR  Scheduled for Wednesday 8/12  -Nephrology following  Serum creatinine 1  18  Continue to trend and avoid nephrotoxic meds  -resumed Xarelto yesterday    [facility] hold [facility] hours [facility]  -Off load heels with pressure bed and boots  -Betadine paint to heel

## 2020-08-07 NOTE — SOCIAL WORK
Pt needs angiogram-( scheduled for Wednesday, 8/12/20 ) prior to d/c  Acute Rehabs pended  CM department will continue to follow

## 2020-08-07 NOTE — ASSESSMENT & PLAN NOTE
-baseline creatinine 1 4-1 8  -Creat 1 43/GFR 44  -Nephrology consult replaced   Discussed with nephrology  -continue to avoid nephrotoxic agents  -trend creatinine  -Will require angiogram for the contralateral lower extremity next week

## 2020-08-07 NOTE — WOUND OSTOMY CARE
Progress Note - Wound   Mary Stone 80 y o  male MRN: [id number]  Unit/Bed#: -01 Encounter: [id number]      Assessment:  Wound care to see patient for weekly follow up visit  Patient seen in bed, alert and oriented x 2  Seen during AM care  On p-500 mattress  Nutrition is following along  Patient is dependent for care - assist of 2 with turning for the assessment  Baxter cath in place  Occasional fecal incontinence noted in the chart  Wedges in use for repositioning  Patient is being followed by vascular and podiatry for the lower extremity wounds - patient underwent angiogram yesterday and only minimal blood flow was restored as per discussion with attending SLIM provider  Post procedure ABIs are still pending, however previous JAMAL's noted blood flow was not in the healing range  Patient with noted contracture of the L knee - flexion position  1  Skin to the b/l upper back is intact with no redness or wounds  No DTI appreciated to this area - blanchable intact pink skin noted  Will recommend to continue with foam dressing for prevention  Skin is non-tender  The sacrum/buttocks appear greatly improved from last weeks photo assessment  2  R sacrum with non-evolving DTI - POA  This wound has the potential to evolve to a full thickness injury, stage 3 or 4  Wound is 100% intact dry non-blanchable purple colored skin  No open aspects  Analisa-wound is intact with blanchable pink and blanchable hyperpigmented skin  3  L upper buttock/lower back with non-evolving DTI - POA  This wound has the potential to evolve to a full thickness injury, stage 3 or 4  Wound is 100% intact dry non-blanchable purple colored skin  No open aspects  Analisa-wound is intact  - the b/l buttocks are otherwise intact with blanchable pink and blanchable hyperpigmented skin  The DTI to the is resolved as evidenced by intact blanchable pink and blanchable hyperpigmented skin   The wounds and skin to the b/l buttocks/sacrum are non-tender   - okay to continue with foam dressing  Recommendations given if patient soils dressing too often due to incontinence  4  L lateral ankle with non-evolving DTI versus underlying arterial disease- POA  This wound has the potential to evolve to a full thickness injury, stage 3 or 4  Wound is 100% intact dry non-blanchable purple/dark maroon colored skin  No open aspects  Analisa-wound is intact with blanchable pink and blanchable hyperpigmented skin  There is an area of well adhered dry scabbing superior to this wound in the photo - no open wound redness/drainage to this location  Area is non-tender  Recommend to continue with 3m no sting skin prep - okay is this area is covered in the dressing that will be applied to the L heel wound  Offload area  5  R posterior ankle/R distal leg (noted just superior to the bony prominence of the heel) - irregular scattered non-blanchable purple colored skin  Given location and scattered appearance, suspect this is related to the patients underlying arterial disease  Cannot rule out pressure  Wound is 100% intact dry non-blanchable purple colored skin  No open aspects  Analisa-wound is intact with blanchable redness, no warmth  Area is non-tender  Recommend to apply 3m no sting skin prep and cover with foam dressing  6  L lateral heel - unstageable pressure injury versus underlying arterial disease - POA  Wound bed is approx: 70% moist brown/black eschar that appears to be un-flo, 20% moist yellow tissue, 10% pink/red moist tissue  Edges fragile with maceration  Analisa-wound with blanchable redness and blanchable hyperpigmented skin  Wound is very painful to the patient  There is strong malodor with thick yellow/serosanguineous drainage noted on the old dressing  Odor decreased only minimally after cleansing  Will recommend dressing to manage the drainage  Any debridement of the wound / softening of the eschar - defer to vascular/podiatry         7  R heel with evolving - DTI versus underlying arterial disease  This wound has the potential to evolve to a full thickness injury, stage 3 or 4  Wound is open partial thickness - wound bed is mixed 50% moist purple tissue, and 50% pink moist tissue  Edges fragile and attached, no maceration  Analisa-wound is intact with dry peeling skin, and blanchable hyperpigmented skin  Wound is tender  Will add silver alginate the foam dressing  Recommend strict offloading of the lower extremities -using prevalon boots and pillows  Given patients poor blood flow patient is at high risk for further skin breakdown  No induration, fluctuance, odor, warmth, redness, or purulence noted to the above noted wounds (except where noted above)  New dressings applied  Patient tolerated well- b/l heel wounds are tender  Primary nurse aware of plan of care  See flow sheets for more detailed assessment findings  Will follow along  Discussed assessment findings and plan with Dr Dione Alegria  Plan:   1  Apply b/l prevalon boots to patient, remove for skin care and skin assessments  2  Apply skin nourishing cream the entire skin daily for moisture  3  Turn and reposition patient every  2 hours   4  Elevate heels off of bed with pillows to offload pressure   5  Apply EHOB waffle cushion to chair when OOB, if able  6  Continue with p-500 mattress  7  Apply Allevyn foam to R upper back bony promience, amy w/P, peel foam check skin integrity q-shift  Change q5d   8  Cleanse L heel and L lateral ankle wounds with NSS and pat dry  Apply adaptic (oil emulsion) to the L heel wound and top with silver alginate  Apply 3m no sting skin prep to the DTI to the L lateral ankle  Cover the areas with ABD pad and secure the dressings with kerlex wrap  Change daily and PRN for soilage/dislodgement  Offload heel at all times     9  Cleanse b/l buttocks and sacrum with soap and water, pat dry, apply foam dressing (ensure makes good coverage with the wounds)  Peel back and assess the skin every shift  Karthik dressing with T  Change every other day and PRN for soilage/dislodgement  If patient soils dressing too often (greater than once per shift) please d/c and use hydraguard cream TID and PRN  10  Cleanse right heel and R posterior ankle/distal leg wounds with NSS and pay dry  Apply maxorb silver to the open wound of the R heel and cover with Allevyn foam dressing  Apply 3m no sting skin prep to the R posterior ankle/distal leg wound  Ensure the wound to the R posterior ankle/distal leg is covered with foam - can use small square bordered Allevyn foam dressing  Karthik w/T, peel foam check skin integrity q-shift  Change every day and PRN for soilage/dislodgement  Offload heel at all times  11  Vascular and podiatry are also following along with patient   12  Wound care will follow along with patient weekly, please call with questions and concerns    Objective:    Vitals: Blood pressure 130/83, pulse 71, temperature (!) 97 4 °F (36 3 °C), resp  rate 16, height 5' 6" (1 676 m), weight 80 kg (176 lb 5 9 oz), SpO2 (!) 86 %  ,Body mass index is 28 47 kg/m²  Wound 07/31/20 Pressure Injury Heel Left (Active)   Wound Image   08/07/20 0838   Wound Description Beefy red;Black; Brown;Slough; Yellow 08/07/20 0838   Staging Unstagable 08/07/20 0838   Analisa-wound Assessment Erythema;Fragile; Maceration 08/07/20 0838   Wound Length (cm) 6 cm 08/07/20 0838   Wound Width (cm) 7 cm 08/07/20 0838   Wound Depth (cm) 0 2 08/07/20 0838   Calculated Wound Area (cm^2) 42 cm^2 08/07/20 0838   Calculated Wound Volume (cm^3) 8 4 cm^3 08/07/20 0838   Tunneling 0 cm 08/07/20 0838   Tunneling in depth located at 0 08/07/20 0838   Undermining 0 08/07/20 0838   Undermining is depth extending from 0 08/07/20 0838   Closure None 08/07/20 0838   Drainage Amount Moderate 08/07/20 0838   Drainage Description Yellow; Foul smelling;Serosanguineous 08/07/20 0762   Non-staged Wound Description Full thickness 08/07/20 0838   Treatments Cleansed;Irrigation with NSS;Site care;Elevated 08/07/20 0838   Dressing Calcium Alginate with Silver;ABD;Dry dressing 08/07/20 0838   Wound packed? No 08/07/20 0838   Packing- # removed 0 08/07/20 0838   Packing- # inserted 0 08/07/20 0838   Dressing Changed New 08/07/20 0838   Patient Tolerance Tolerated well 08/07/20 0838   Dressing Status Clean;Dry; Intact 08/07/20 0838   Number of days: 7       Wound 07/31/20 Pressure Injury Heel Right (Active)   Wound Image   08/07/20 0840   Wound Description Light purple;Pink 08/07/20 0840   Staging Deep Tissue Injury 08/07/20 0840   Analisa-wound Assessment Dry; Intact 08/07/20 0840   Wound Length (cm) 0 5 cm 08/07/20 0840   Wound Width (cm) 1 cm 08/07/20 0840   Wound Depth (cm) 0 1 08/07/20 0840   Calculated Wound Area (cm^2) 0 5 cm^2 08/07/20 0840   Calculated Wound Volume (cm^3) 0 05 cm^3 08/07/20 0840   Tunneling 0 cm 08/07/20 0840   Tunneling in depth located at 0 08/07/20 0840   Undermining 0 08/07/20 0840   Undermining is depth extending from 0 08/07/20 0840   Closure None 08/07/20 0840   Drainage Amount Small 08/07/20 0840   Drainage Description Serosanguineous 08/07/20 0840   Non-staged Wound Description Partial thickness 08/07/20 0840   Treatments Cleansed;Irrigation with NSS;Site care;Elevated 08/07/20 0840   Dressing Calcium Alginate with Silver; Foam, Silicon (eg  Allevyn, etc) 08/07/20 0840   Wound packed? No 08/07/20 0840   Packing- # removed 0 08/07/20 0840   Packing- # inserted 0 08/07/20 0840   Dressing Changed New 08/07/20 0840   Patient Tolerance Tolerated well 08/07/20 0840   Dressing Status Clean;Dry; Intact 08/07/20 0840   Number of days: 7       Wound 07/31/20 Pressure Injury Ankle Left;Lateral (Active)   Wound Image   08/07/20 0839   Wound Description Dry; Intact; Light purple 08/07/20 0839   Staging Deep Tissue Injury 08/07/20 0839   Analisa-wound Assessment Dry; Intact; Hyperpigmented 08/07/20 0839   Wound Length (cm) 1 cm 08/07/20 0839   Wound Width (cm) 1 cm 08/07/20 0839   Wound Depth (cm) 0 08/07/20 0839   Calculated Wound Area (cm^2) 1 cm^2 08/07/20 0839   Calculated Wound Volume (cm^3) 0 cm^3 08/07/20 0839   Tunneling 0 cm 08/07/20 0839   Tunneling in depth located at 0 08/07/20 0839   Undermining 0 08/07/20 0839   Undermining is depth extending from 0 08/07/20 0839   Closure None 08/07/20 0839   Drainage Amount None 08/07/20 0839   Non-staged Wound Description Not applicable 72/70/63 3649   Treatments Cleansed;Site care;Elevated;Irrigation with NSS 08/07/20 0839   Dressing Dry dressing 08/07/20 0839   Wound packed? No 08/07/20 0839   Packing- # removed 0 08/07/20 0839   Packing- # inserted 0 08/07/20 0839   Dressing Changed New 08/07/20 0839   Patient Tolerance Tolerated well 08/07/20 0839   Dressing Status Clean;Dry; Intact 08/07/20 0839   Number of days: 7       Wound 07/31/20 Pressure Injury Buttocks Left; Lower (Active)   Wound Description Dry; Intact; Light purple 08/07/20 0838   Staging Deep Tissue Injury 08/07/20 0838   Analisa-wound Assessment Dry; Intact;Fragile 08/07/20 0838   Wound Length (cm) 0 5 cm 08/07/20 0838   Wound Width (cm) 0 7 cm 08/07/20 0838   Wound Depth (cm) 0 08/07/20 0838   Calculated Wound Area (cm^2) 0 35 cm^2 08/07/20 0838   Calculated Wound Volume (cm^3) 0 cm^3 08/07/20 0838   Tunneling 0 cm 08/07/20 0838   Tunneling in depth located at 0 08/07/20 0838   Undermining 0 08/07/20 0838   Undermining is depth extending from 0 08/07/20 0838   Closure None 08/07/20 0838   Drainage Amount None 08/07/20 0838   Non-staged Wound Description Not applicable 66/91/86 8960   Treatments Cleansed;Irrigation with NSS;Site care;Elevated 08/07/20 0838   Dressing Foam, Silicon (eg  Allevyn, etc) 08/07/20 6673   Wound packed?  No 08/07/20 0838   Packing- # removed 0 08/07/20 0838   Packing- # inserted 0 08/07/20 0838   Dressing Changed New 08/07/20 0838   Patient Tolerance Tolerated well 08/07/20 0838   Dressing Status Clean;Dry; Intact 08/07/20 0838   Number of days: 7       Wound 07/31/20 Pressure Injury Sacrum Mid (Active)   Wound Image   08/07/20 0835   Wound Description Dry; Intact; Light purple 08/07/20 0835   Staging Deep Tissue Injury 08/07/20 0835   Analisa-wound Assessment Dry; Intact;Fragile; Hyperpigmented;Pink 08/07/20 0835   Wound Length (cm) 0 5 cm 08/07/20 0835   Wound Width (cm) 1 cm 08/07/20 0835   Wound Depth (cm) 0 08/07/20 0835   Calculated Wound Area (cm^2) 0 5 cm^2 08/07/20 0835   Calculated Wound Volume (cm^3) 0 cm^3 08/07/20 0835   Tunneling 0 cm 08/07/20 0835   Tunneling in depth located at 0 08/07/20 0835   Undermining 0 08/07/20 0835   Undermining is depth extending from 0 08/07/20 0835   Closure None 08/07/20 0835   Drainage Amount None 08/07/20 0835   Drainage Description Bloody 08/03/20 1339   Non-staged Wound Description Not applicable 57/00/42 5763   Treatments Cleansed;Irrigation with NSS;Site care;Elevated 08/07/20 0835   Dressing Foam, Silicon (eg  Allevyn, etc) 08/07/20 0835   Wound packed? No 08/07/20 0835   Packing- # removed 0 08/07/20 0835   Packing- # inserted 0 08/07/20 9876   Dressing Changed New 08/07/20 0835   Patient Tolerance Tolerated well 08/07/20 0835   Dressing Status Clean;Dry; Intact 08/07/20 0835   Number of days: 7       Wound 08/07/20 Arterial/ischemic ulcer Tibial Distal;Posterior;Right (Active)   Wound Image   08/07/20 0841   Wound Description Dry; Intact; Light purple 08/07/20 0841   Analisa-wound Assessment Dry; Intact; Erythema 08/07/20 0841   Wound Length (cm) 2 cm 08/07/20 0841   Wound Width (cm) 8 cm 08/07/20 0841   Wound Depth (cm) 0 08/07/20 0841   Calculated Wound Area (cm^2) 16 cm^2 08/07/20 0841   Calculated Wound Volume (cm^3) 0 cm^3 08/07/20 0841   Tunneling 0 cm 08/07/20 0841   Tunneling in depth located at 0 08/07/20 0841   Undermining 0 08/07/20 0841   Undermining is depth extending from 0 08/07/20 0841   Closure None 08/07/20 0841   Drainage Amount None 08/07/20 0841 Non-staged Wound Description Not applicable 11/83/56 5202   Treatments Cleansed;Site care;Elevated;Irrigation with NSS 08/07/20 0841   Dressing Foam, Silicon (eg  Allevyn, etc) 08/07/20 0841   Wound packed? No 08/07/20 0841   Packing- # removed 0 08/07/20 0841   Packing- # inserted 0 08/07/20 0841   Dressing Changed New 08/07/20 0841   Patient Tolerance Tolerated well 08/07/20 0841   Dressing Status Clean;Dry; Intact 08/07/20 0841   Number of days: 0       Recommendations written as orders  AVS updated    Lazarus Park RN BSN [facility]

## 2020-08-07 NOTE — ASSESSMENT & PLAN NOTE
80year-old minimally ambulatory male smoker w/chronic diastolic heart failure, type 2 DM, CKD 3 (baseline creatinine 1 4-1 8), Afib on Xarelto, COPD, OA bilateral knee, s/p bilateral ANDREA, recurrent UTI w/chronic indwelling Baxter catheter admitted w/pyuria and PAD w/bilateral heel pressure wounds, L >R  Diagnostics:  -LEAD 7/31:  diffuse right lower extremity disease without focal stenosis, R JAMAL 0 6/42/34 and left lower extremity with diffuse disease, no focal stenosis, decrease in velocities in SFA to popliteal artery suggestive of possible stenosis, L JAMAL 0 52/33/36  -Xray left foot 7/31: No evidence of fracture, osteomyelitis or other significant bony abnormality in the left foot  -Xray right foot 7/31: No evidence of osteomyelitis, fracture or other significant bony abnormality  -BC: neg x 5 days  -Angiogram 8/5/2020 L SFA PTA, TPT PTA, prox Peroneal PTA  Peroneal only in-line flow to the foot with short segment occlusion at origin of AT and remaining AT reconstituted by geniculate collaterals, PT chronically occluded & reconstituted distally  Robust collaterals  Peroneal runoff with delayed filling of PT/AT  -post intervention JAMAL right 0 75/38/26 and left 0 74/26/30 (prior right JAMAL 0 52)    Plan:  -Peripheral arterial disease with bilateral heel deep tissue injury, left greater than right status post left lower extremity angioplasty 8/5  Called today with progression of right heel ulceration  Patient will require angiogram with right lower extremity runoff and attempted revascularization  Will need general anesthesia for angiogram and renal optimization  Coordinating scheduling with IR  -Nephrology following  Serum creatinine 1 43/GFR 44  Continue to trend and avoid nephrotoxic meds  -resumed Xarelto yesterday    Will need to hold for 48 hours once scheduled with interventional radiology  -Off load heels with pressure bed and boots  -Betadine paint to heel   -Discussed w/ Dr Angélica Quach

## 2020-08-07 NOTE — PLAN OF CARE
Problem: PHYSICAL THERAPY ADULT  Goal: Performs mobility at highest level of function for planned discharge setting  See evaluation for individualized goals  Description: Treatment/Interventions: LE strengthening/ROM, Therapeutic exercise, Endurance training, Patient/family training, Equipment eval/education, Bed mobility, Continued evaluation, Spoke to nursing, OT, Spoke to case management          See flowsheet documentation for full assessment, interventions and recommendations  Note: Prognosis: Poor  Problem List: Decreased strength, Decreased range of motion, Decreased endurance, Impaired balance, Decreased mobility, Decreased cognition, Impaired sensation, Pain, Decreased skin integrity  Assessment: Pt is 80 y o  male seen for PT evaluation s/p admit to [facility] on 7/30/2020 w/ Pressure ulcer of ankle  PT consulted to assess pt's functional mobility and d/c needs  Order placed for PT eval and tx, w/ up and OOB as tolerated order  Performed at least 2 patient identifiers during session: Name and wristband  Comorbidities affecting pt's physical performance at time of assessment include: 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, diabetes, chronic diastolic heart failure, A-fib, stage 3 CKD  Patient unreliable/poor historian  Accuracy of information obtained is unclear  PTA, pt was requiring A for mobility, has 6 MELIDA, lives w/ sister in multi-level house and retired  Personal factors affecting pt at time of IE include: inaccessible home environment, lives in multi-level house, stairs to enter home, inability to navigate level surfaces w/o external assistance, decreased cognition, decreased initiation and engagement, inability to perform IADLs and inability to perform ADLs   Please find objective findings from PT assessment regarding body systems outlined above with impairments and limitations including weakness, decreased ROM, impaired balance, decreased endurance, pain, decreased activity tolerance, decreased functional mobility tolerance, altered sensation, fall risk, decreased skin integrity and decreased cognition  The following objective measures performed on IE also reveal limitations: Barthel Index: 15/100 and Modified Weston: 5 (severe disability)  Pt's clinical presentation is currently unstable/unpredictable seen in pt's presentation of ongoing medical management/monitoring, fatigue and pain impacting overall mobility status and need for input for mobility technique/safety  Pt to benefit from continued PT tx to address deficits as defined above and maximize level of functional independent mobility and consistency  From PT/mobility standpoint, recommendation at time of d/c would be STR pending progress in order to facilitate return to PLOF  Barriers to Discharge: Inaccessible home environment, Decreased caregiver support     PT Discharge Recommendation: [id number]     PT - OK to Discharge: Yes(when medically cleared; if to STR)    See flowsheet documentation for full assessment

## 2020-08-07 NOTE — PLAN OF CARE
Problem: OCCUPATIONAL THERAPY ADULT  Goal: Performs self-care activities at highest level of function for planned discharge setting  See evaluation for individualized goals  Description: Treatment Interventions: ADL retraining, Functional transfer training, UE strengthening/ROM, Cognitive reorientation, Endurance training, Patient/family training, Equipment evaluation/education, Compensatory technique education, Fine motor coordination activities, Continued evaluation, Energy conservation, Activityengagement          See flowsheet documentation for full assessment, interventions and recommendations  Note: Limitation: Decreased ADL status, Decreased Safe judgement during ADL, Decreased endurance, Decreased fine motor control, Decreased self-care trans, Decreased high-level ADLs, Mood limitation  Prognosis: Good  Assessment: Patient is a 80 y o  male seen for OT evaluation s/p admit to [id number] [facility] on 7/30/2020 w/Pressure ulcer of ankle  Commorbidities affecting patient's functional performance at time of assessment include: 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, diabetes, chronic diastolic heart failure, A-fib, stage 3 CKD  Orders placed for OT evaluation and treatment  Performed at least two patient identifiers during session including name and wristband  Prior to admission, Patient is unreliable historian, verbalized different accounts of level of assistance required at home, chart review indicates (CM note from 8/4) - "[place]  There are five stairs to enter from outside  Patient use a walker and a commode  Patient needs assistance with adls"   Patient reported he lived with his sister and family in a multi level house with 6 MELIDA and full flight to main living area  Patient aslo reported ability to ambulate initially and later on reported not able to walk , "my brother in law pushes my butt up the stairs"    Personal factors affecting patient at time of initial evaluation include: limited caregiver support, limited insight into deficits, non compliance, flat affect, decreased initiation and engagement, difficulty performing ADLs and difficulty performing IADLs  Upon evaluation, patient requires moderate and maximal assist for UB ADLs, maximal and total assist for LB ADLs  Occupational performance is affected by the following deficits: orientation, attention span, flat affect, impulsive behavior, decreased functional use of BUEs, degenerative arthritic joint changes, impaired gross motor coordination, impaired fine motor coordination, dynamic sit/ stand balance deficit with poor standing tolerance time for self care and functional mobility, decreased activity tolerance, impaired memory, impaired problem solving, decreased safety awareness, increased pain and postural control and postural alignment deficit, requiring external assistance to complete transitional movements  Therapist completed  extensive additional review of medical records and additional review of physical, cognitive or psychosocial history, clinical examination identifying 5 or more performance deficits, clinical decision making of a high complexity , consistent with a high complexity level evaluation        OT Discharge Recommendation: Post-Acute Rehabilitation Services

## 2020-08-07 NOTE — SOCIAL WORK
ANDRES spoke with Jovita Mancera at [facility] via telephone  She reported that she does not have a bed available for pt today  She stated that she ran pt's insurance and pt was in a SNF from 6/12-6/30 and only has 5 days left  She reported that pt has [address] as secondary but only has a hospital plan  It does not cover SNF  Therefore, after day 5, he will go into his copayment days  The daily copayment is $176

## 2020-08-07 NOTE — SOCIAL WORK
JOSE ANTONIO and [facility] are reviewing  Will need PT/OT eval  SLIM and Therapy Team made aware

## 2020-08-08 LAB
ANION GAP SERPL CALCULATED.3IONS-SCNC: 6 MMOL/L (ref 4–13)
BASOPHILS # BLD AUTO: 0.03 THOUSANDS/ΜL (ref 0–0.1)
BASOPHILS NFR BLD AUTO: 0 % (ref 0–1)
BUN SERPL-MCNC: 29 MG/DL (ref 5–25)
CALCIUM SERPL-MCNC: 8.6 MG/DL (ref 8.3–10.1)
CHLORIDE SERPL-SCNC: 105 MMOL/L (ref 100–108)
CO2 SERPL-SCNC: 28 MMOL/L (ref 21–32)
CREAT SERPL-MCNC: 1.18 MG/DL (ref 0.6–1.3)
EOSINOPHIL # BLD AUTO: 0.21 THOUSAND/ΜL (ref 0–0.61)
EOSINOPHIL NFR BLD AUTO: 3 % (ref 0–6)
EOSINOPHIL NFR URNS MANUAL: 1 %
ERYTHROCYTE [DISTWIDTH] IN BLOOD BY AUTOMATED COUNT: 15.9 % (ref 11.6–15.1)
GFR SERPL CREATININE-BSD FRML MDRD: 56 ML/MIN/1.73SQ M
GLUCOSE SERPL-MCNC: 122 MG/DL (ref 65–140)
GLUCOSE SERPL-MCNC: 129 MG/DL (ref 65–140)
GLUCOSE SERPL-MCNC: 153 MG/DL (ref 65–140)
GLUCOSE SERPL-MCNC: 201 MG/DL (ref 65–140)
GLUCOSE SERPL-MCNC: 217 MG/DL (ref 65–140)
HCT VFR BLD AUTO: 34.7 % (ref 36.5–49.3)
HGB BLD-MCNC: 10.7 G/DL (ref 12–17)
IMM GRANULOCYTES # BLD AUTO: 0.06 THOUSAND/UL (ref 0–0.2)
IMM GRANULOCYTES NFR BLD AUTO: 1 % (ref 0–2)
LYMPHOCYTES # BLD AUTO: 1.47 THOUSANDS/ΜL (ref 0.6–4.47)
LYMPHOCYTES NFR BLD AUTO: 22 % (ref 14–44)
MCH RBC QN AUTO: 28 PG (ref 26.8–34.3)
MCHC RBC AUTO-ENTMCNC: 30.8 G/DL (ref 31.4–37.4)
MCV RBC AUTO: 91 FL (ref 82–98)
MONOCYTES # BLD AUTO: 0.52 THOUSAND/ΜL (ref 0.17–1.22)
MONOCYTES NFR BLD AUTO: 8 % (ref 4–12)
NEUTROPHILS # BLD AUTO: 4.42 THOUSANDS/ΜL (ref 1.85–7.62)
NEUTS SEG NFR BLD AUTO: 66 % (ref 43–75)
NRBC BLD AUTO-RTO: 0 /100 WBCS
PHOSPHATE SERPL-MCNC: 3 MG/DL (ref 2.3–4.1)
PLATELET # BLD AUTO: 310 THOUSANDS/UL (ref 149–390)
PMV BLD AUTO: 10 FL (ref 8.9–12.7)
POTASSIUM SERPL-SCNC: 4.6 MMOL/L (ref 3.5–5.3)
PTH-INTACT SERPL-MCNC: 29.9 PG/ML (ref 18.4–80.1)
RBC # BLD AUTO: 3.82 MILLION/UL (ref 3.88–5.62)
SODIUM SERPL-SCNC: 139 MMOL/L (ref 136–145)
URATE SERPL-MCNC: 7 MG/DL (ref 4.2–8)
WBC # BLD AUTO: 6.71 THOUSAND/UL (ref 4.31–10.16)

## 2020-08-08 PROCEDURE — 84100 ASSAY OF PHOSPHORUS: CPT | Performed by: INTERNAL MEDICINE

## 2020-08-08 PROCEDURE — 83970 ASSAY OF PARATHORMONE: CPT | Performed by: INTERNAL MEDICINE

## 2020-08-08 PROCEDURE — 84550 ASSAY OF BLOOD/URIC ACID: CPT | Performed by: INTERNAL MEDICINE

## 2020-08-08 PROCEDURE — 99232 SBSQ HOSP IP/OBS MODERATE 35: CPT | Performed by: PHYSICIAN ASSISTANT

## 2020-08-08 PROCEDURE — 99232 SBSQ HOSP IP/OBS MODERATE 35: CPT | Performed by: GENERAL PRACTICE

## 2020-08-08 PROCEDURE — 82948 REAGENT STRIP/BLOOD GLUCOSE: CPT

## 2020-08-08 PROCEDURE — 85025 COMPLETE CBC W/AUTO DIFF WBC: CPT | Performed by: INTERNAL MEDICINE

## 2020-08-08 PROCEDURE — 80048 BASIC METABOLIC PNL TOTAL CA: CPT | Performed by: INTERNAL MEDICINE

## 2020-08-08 PROCEDURE — 99233 SBSQ HOSP IP/OBS HIGH 50: CPT | Performed by: INTERNAL MEDICINE

## 2020-08-08 RX ADMIN — INSULIN LISPRO 2 UNITS: 100 INJECTION, SOLUTION INTRAVENOUS; SUBCUTANEOUS at 12:33

## 2020-08-08 RX ADMIN — INSULIN GLARGINE 8 UNITS: 100 INJECTION, SOLUTION SUBCUTANEOUS at 21:59

## 2020-08-08 RX ADMIN — INSULIN LISPRO 8 UNITS: 100 INJECTION, SOLUTION INTRAVENOUS; SUBCUTANEOUS at 12:33

## 2020-08-08 RX ADMIN — INSULIN LISPRO 1 UNITS: 100 INJECTION, SOLUTION INTRAVENOUS; SUBCUTANEOUS at 22:00

## 2020-08-08 RX ADMIN — RIVAROXABAN 15 MG: 15 TABLET, FILM COATED ORAL at 16:47

## 2020-08-08 NOTE — PROGRESS NOTES
NEPHROLOGY PROGRESS NOTE    Patient: Martin Burns               Sex: male          DOA: 7/30/2020  5:23 PM   YOB: 1933        Age:  80 y o         LOS:  LOS: 9 days       HPI     Patient with peripheral vascular disease will require arteriogram    SUBJECTIVE     Patient is awake to seems to be confused    Denies any complaint    Still has a leg pain though    No chest pain no palpitation or shortness of breath    CURRENT MEDICATIONS       Current Facility-Administered Medications:     acetaminophen (TYLENOL) tablet 650 mg, 650 mg, Oral, Q6H PRN, Ren Medina MD, 650 mg at 08/02/20 1114    fentaNYL (SUBLIMAZE) injection 25 mcg, 25 mcg, Intravenous, Q5 Min PRN, Pooja Gifford CRNA    HYDROmorphone (DILAUDID) injection 0 5 mg, 0 5 mg, Intravenous, Q4H PRN, Junie Hairston MD    insulin glargine (LANTUS) subcutaneous injection 8 Units 0 08 mL, 8 Units, Subcutaneous, HS, Ren Medina MD, 8 Units at 08/07/20 2229    insulin lispro (HumaLOG) 100 units/mL subcutaneous injection 1-5 Units, 1-5 Units, Subcutaneous, HS, Ren Medina MD, 1 Units at 08/07/20 2228    insulin lispro (HumaLOG) 100 units/mL subcutaneous injection 1-6 Units, 1-6 Units, Subcutaneous, TID AC, 2 Units at 08/08/20 1233 **AND** Fingerstick Glucose (POCT), , , TID AC, Ren Medina MD    insulin lispro (HumaLOG) 100 units/mL subcutaneous injection 8 Units, 8 Units, Subcutaneous, TID With Meals, Ren Medina MD, 8 Units at 08/08/20 1233    metoclopramide (REGLAN) injection 10 mg, 10 mg, Intravenous, Once PRN, Pooja Gifford CRNA    nicotine (NICODERM CQ) 7 mg/24hr TD 24 hr patch 1 patch, 1 patch, Transdermal, Daily, Ren Medina MD, 1 patch at 08/04/20 0915    ondansetron (ZOFRAN) injection 4 mg, 4 mg, Intravenous, Once PRN, Pooja Gifford CRNA    oxyCODONE-acetaminophen (PERCOCET) 5-325 mg per tablet 1 tablet, 1 tablet, Oral, Q4H PRN, Junie Hairston MD, 1 tablet at 08/06/20 1319    rivaroxaban (XARELTO) tablet 15 mg, 15 mg, Oral, Daily With Dinner, Dalton Keller MD, 15 mg at 08/07/20 1700    OBJECTIVE     Current Weight: Weight - Scale: 80 kg (176 lb 5 9 oz)  Vitals:    08/08/20 0900   BP: 148/60   Pulse:    Resp:    Temp:    SpO2:        Intake/Output Summary (Last 24 hours) at 8/8/2020 1407  Last data filed at 8/8/2020 1101  Gross per 24 hour   Intake 250 ml   Output 520 ml   Net -270 ml       PHYSICAL EXAMINATION     Physical Exam  Constitutional:       General: He is not in acute distress  Appearance: He is well-developed  HENT:      Head: Normocephalic  Mouth/Throat:      Mouth: Oropharynx is clear and moist    Eyes:      General: No scleral icterus  Conjunctiva/sclera: Conjunctivae normal    Neck:      Musculoskeletal: Neck supple  Vascular: No JVD  Cardiovascular:      Rate and Rhythm: Normal rate  Heart sounds: Normal heart sounds  Pulmonary:      Effort: Pulmonary effort is normal  No respiratory distress  Breath sounds: No wheezing  Abdominal:      General: Bowel sounds are normal       Palpations: Abdomen is soft  Tenderness: There is no abdominal tenderness  Musculoskeletal: Normal range of motion  General: No edema  Skin:     General: Skin is warm  Findings: No rash  Neurological:      Mental Status: He is alert     Psychiatric:         Mood and Affect: Mood and affect normal          Behavior: Behavior normal           LAB RESULTS     Results from last 7 days   Lab Units 08/08/20  0503 08/08/20  0502 08/07/20  1756 08/07/20  1002 08/06/20  0500 08/05/20  0515 08/04/20  0459 08/03/20  0540 08/02/20  0552   WBC Thousand/uL  --  6 71  --  8 11 9 00 9 14 10 26* 8 76 8 66   HEMOGLOBIN g/dL  --  10 7*  --  11 0* 10 9* 11 0* 11 9* 11 9* 11 9*   HEMATOCRIT %  --  34 7*  --  35 8* 35 0* 35 2* 37 1 37 5 37 5   PLATELETS Thousands/uL  --  310  --  340 336 355 359 339 338   POTASSIUM mmol/L 4 6  --  4 2 4 0 4 6 4 7 4 7 4 3 4 2   CHLORIDE mmol/L 105  --  106 106 109* 107 106 106 107   CO2 mmol/L 28  --  31 32 27 28 31 29 29   BUN mg/dL 29*  --  30* 32* 31* 41* 36* 36* 41*   CREATININE mg/dL 1 18  --  1 30 1 43* 1 28 1 29 1 24 1 24 1 40*   EGFR ml/min/1 73sq m 56  --  49 44 50 50 52 52 45   CALCIUM mg/dL 8 6  --  8 9 8 6 8 6 8 5 8 7 8 8 8 5   MAGNESIUM mg/dL  --   --   --  1 8 1 8 2 0 1 9 2 0 2 0   PHOSPHORUS mg/dL 3 0  --   --   --   --   --   --   --   --        RADIOLOGY RESULTS      Results for orders placed during the hospital encounter of 07/30/20   XR chest 1 view portable    Narrative CHEST     INDICATION:   sepsis  COMPARISON:  6/11/2020    EXAM PERFORMED/VIEWS:  XR CHEST PORTABLE      FINDINGS:    Cardiomediastinal silhouette appears unremarkable  The lungs are clear  No pneumothorax or pleural effusion  Osseous structures appear within normal limits for patient age  Impression No acute cardiopulmonary disease  Workstation performed: [id number]       Results for orders placed during the hospital encounter of 06/11/20   XR chest 2 views    Narrative CHEST     INDICATION:   AMS  COMPARISON:  Radiograph 1/28/2019    EXAM PERFORMED/VIEWS:  XR CHEST PA & LATERAL      FINDINGS:    Heart shadow is enlarged but unchanged from prior exam     Small right pleural effusion  No pneumothorax or focal consolidation  No evidence of pulmonary edema  Osseous structures appear within normal limits for patient age  Impression Stable cardiomegaly  Small right pleural effusion  No evidence of pulmonary edema  Workstation performed: [id number]       No results found for this or any previous visit  No results found for this or any previous visit  No results found for this or any previous visit  Results for orders placed in visit on 01/27/20   US retroperitoneal complete    Narrative 5 [phone] [id number]       PLAN / RECOMMENDATIONS      Acute kidney injury:  Improved with hydration    I think patient had contrast ATN and got better    CKD stage 3:  Baseline GFR is about 56    Diabetes type 2:  Glucose still fluctuating    Peripheral vascular disease: Will require arteriogram which is scheduled on Wednesday  Will continue to monitor  Will prophylax with IV hydration and Mucomyst at that time will be high risk for contrast ATN    Will continue to follow    Mishel London MD  Nephrology  8/8/2020        Portions of the record may have been created with voice recognition software  Occasional wrong word or "sound a like" substitutions may have occurred due to the inherent limitations of voice recognition software  Read the chart carefully and recognize, using context, where substitutions have occurred

## 2020-08-08 NOTE — ASSESSMENT & PLAN NOTE
-baseline creatinine 1 4-1 8  -Creat 1 18  -Nephrology following will require prep for procedure  -continue to avoid nephrotoxic agents

## 2020-08-08 NOTE — PROGRESS NOTES
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 [facility])  Assessment & Plan  · See plan above    Diabetes [facility])  Assessment & Plan  Lab Results   Component Value Date    HGBA1C 7 0 (H) 06/12/2020       Recent Labs     08/07/20  1724 08/07/20  1801 08/07/20  2219 08/08/20  0649   POCGLU <20* 113 171* 122       Blood Sugar Average: Last 72 hrs:  (P) 209 [id number]     · Last A1c 7%, continue insulin sliding scale, basal/prandial insulin  · No episodes of hypoglycemia, but glucose levels have been labile,  Especially at 11 AM fingerstick check  · Otherwise well controlled     Chronic diastolic congestive heart failure (HCC)  Assessment & Plan  Wt Readings from Last 3 Encounters:   07/30/20 80 kg (176 lb 5 9 oz)   07/14/20 85 1 kg (187 lb 9 6 oz)   07/07/20 85 1 kg (187 lb 9 6 oz)     · Unclear why patient is on entresto after further medication review  · Last ECHO in 2019 showing EF 60%, which is most current TTE  · Due to low normal blood pressures, will discontinue entresto  · Currently euvolemic    Abnormal urinalysis  Assessment & Plan  · + UA, urine culture growing esbl e coli; ID following  · Did receive dose of Antibiotics in ED, but seen by infectious disease and recommending discontinuation of antibiotics secondary to lack of symptoms and chronic mccoy suggestive of colonization    Stage 3 chronic kidney disease (HCC)  Assessment & Plan  · Baseline creatinine 1 4-1 8  · Stable  Received IVF post angiogram   Nephrology following    * Pressure ulcer of ankle  Assessment & Plan  · Secondary to peripheral arterial disease, R>L  · Podiatry was consulted due to PVD-likely needs higher amputation, vascular surgery consulted and angiogram pending today  · Foot xrays negative for osteomyelitis  · Arterial duplex showing occlusive disease of lower extremities bilaterally  · S/p arteriogram 20 with intervention  · Resume xarelto when ok per vascular   · ID following-off abx  · Case d/w vascular surgery 20-some revascularization accomplished but still at high risk for amputation, case also d/w podiatry no intervention needed at this time-continue wound care  · Repeat JAMAL 20 improved   · Case d/w daughter Richard Levin this time do not want further surgical intervention/amputation, wish to see how wounds improve s/p angiogram with intervention  · Given worsening RLE wound-angiogram with possible int ervention now pending for  per vascular surgery  · ID has signed off, currently off antibiotics  · Will need xarelto held 48 hours prior to arteriogram  20      VTE Pharmacologic Prophylaxis:   Pharmacologic: Rivaroxaban (Xarelto)  Mechanical VTE Prophylaxis in Place: Yes    Patient Centered Rounds: I have performed bedside rounds with nursing staff today  Discussions with Specialists or Other Care Team Provider:     Education and Discussions with Family / Patient:     Time Spent for Care: 30 minutes  More than 50% of total time spent on counseling and coordination of care as described above  Current Length of Stay: 9 day(s)    Current Patient Status: Inpatient   Certification Statement: The patient will continue to require additional inpatient hospital stay due to aniogram pending 20    Discharge Plan: STR    Code Status: Level 3 - DNAR and DNI      Subjective:   Denies c/o  Case d/w daughter Nico  [address]  Objective:     Vitals:   Temp (24hrs), Av 3 °F (36 3 °C), Min:97 3 °F (36 3 °C), Max:97 3 °F (36 3 °C)    Temp:  [97 3 °F (36 3 °C)] 97 3 °F (36 3 °C)  HR:  [79] 79  Resp:  [18] 18  BP: (148-169)/() 148/60  SpO2:  [92 %] 92 %  Body mass index is 28 47 kg/m²  Input and Output Summary (last 24 hours):        Intake/Output Summary (Last 24 hours) at 8/8/2020 1349  Last data filed at 8/8/2020 1101  Gross per 24 hour   Intake 250 ml   Output 520 ml   Net -270 ml       Physical Exam:     Physical Exam  Constitutional:       Appearance: He is well-developed and well-nourished  HENT:      Head: Normocephalic and atraumatic  Eyes:      Pupils: Pupils are equal, round, and reactive to light  Neck:      Musculoskeletal: Neck supple  Cardiovascular:      Rate and Rhythm: Normal rate and regular rhythm  Pulses: Pulses are palpable  Pulmonary:      Effort: Pulmonary effort is normal       Breath sounds: Normal breath sounds  Abdominal:      General: Bowel sounds are normal       Palpations: Abdomen is soft  Musculoskeletal:         General: Deformity and edema present  Comments: Lower ext rle-warm to palpation with stasis changes   Skin:     General: Skin is warm and dry  Neurological:      Mental Status: He is alert  Psychiatric:         Mood and Affect: Mood and affect normal          Behavior: Behavior normal            Additional Data:     Labs:    Results from last 7 days   Lab Units 08/08/20  0502   WBC Thousand/uL 6 71   HEMOGLOBIN g/dL 10 7*   HEMATOCRIT % 34 7*   PLATELETS Thousands/uL 310   NEUTROS PCT % 66   LYMPHS PCT % 22   MONOS PCT % 8   EOS PCT % 3     Results from last 7 days   Lab Units 08/08/20  0503  08/07/20  1002   SODIUM mmol/L 139   < > 142   POTASSIUM mmol/L 4 6   < > 4 0   CHLORIDE mmol/L 105   < > 106   CO2 mmol/L 28   < > 32   BUN mg/dL 29*   < > 32*   CREATININE mg/dL 1 18   < > 1 43*   ANION GAP mmol/L 6   < > 4   CALCIUM mg/dL 8 6   < > 8 6   ALBUMIN g/dL  --   --  2 1*   TOTAL BILIRUBIN mg/dL  --   --  0 50   ALK PHOS U/L  --   --  79   ALT U/L  --   --  49   AST U/L  --   --  69*   GLUCOSE RANDOM mg/dL 129   < > 139    < > = values in this interval not displayed       Results from last 7 days   Lab Units 08/04/20  0459   INR  1 50*     Results from last 7 days   Lab Units 08/08/20  1109 08/08/20  5113 08/07/20  2219 08/07/20  1801 08/07/20  1724 08/07/20  1659 08/07/20  1651 08/07/20  1644 08/07/20  1204 08/06/20  2053 08/06/20  1618 08/06/20  1115   POC GLUCOSE mg/dl 217* 122 171* 113 <20* 57* 41* 33* 114 182* 264* 104                   * I Have Reviewed All Lab Data Listed Above  * Additional Pertinent Lab Tests Reviewed: All Labs Within Last 24 Hours Reviewed    Imaging:    Imaging Reports Reviewed Today Include:   Imaging Personally Reviewed by Myself Includes:      Recent Cultures (last 7 days):           Last 24 Hours Medication List:   Current Facility-Administered Medications   Medication Dose Route Frequency Provider Last Rate    acetaminophen  650 mg Oral Q6H PRN Ren Medina MD      fentaNYL  25 mcg Intravenous Q5 Min PRN Hardik Chiang CRNA      HYDROmorphone  0 5 mg Intravenous Q4H PRN Ginette Reddy MD      insulin glargine  8 Units Subcutaneous HS Ren Medina MD      insulin lispro  1-5 Units Subcutaneous HS Ren Medina MD      insulin lispro  1-6 Units Subcutaneous TID AC Ren Medina MD      insulin lispro  8 Units Subcutaneous TID With Meals Ren Medina MD      metoclopramide  10 mg Intravenous Once PRN Hardik Chiang CRNA      nicotine  1 patch Transdermal Daily Meño Lee MD      ondansetron  4 mg Intravenous Once PRN Hardik Chiang CRNA      oxyCODONE-acetaminophen  1 tablet Oral Q4H PRN Ginette Reddy MD      rivaroxaban  15 mg Oral Daily With Dinner Tashia Davis MD          Today, Patient Was Seen By: Tashia Davis MD    ** Please Note: Dictation voice to text software may have been used in the creation of this document   **

## 2020-08-08 NOTE — ASSESSMENT & PLAN NOTE
· Secondary to peripheral arterial disease, R>L  · Podiatry was consulted due to PVD-likely needs higher amputation, vascular surgery consulted and angiogram pending today  · Foot xrays negative for osteomyelitis  · Arterial duplex showing occlusive disease of lower extremities bilaterally  · S/p arteriogram 8/5/20 with intervention  · Resume xarelto when ok per vascular   · ID following-off abx  · Case d/w vascular surgery 8/6/20-some revascularization accomplished but still at high risk for amputation, case also d/w podiatry no intervention needed at this time-continue wound care  · Repeat JAMAL 8/6/20 improved   · Case d/w daughter Charles Winslow this time do not want further surgical intervention/amputation, wish to see how wounds improve s/p angiogram with intervention  · Given worsening RLE wound-angiogram with possible int ervention now pending for Wednesday August 12th per vascular surgery  · ID has signed off, currently off antibiotics  · Will need xarelto held 48 hours prior to arteriogram  8/12/20

## 2020-08-08 NOTE — ASSESSMENT & PLAN NOTE
Lab Results   Component Value Date    HGBA1C 7 0 (H) 06/12/2020       Recent Labs     08/07/20  1724 08/07/20  1801 08/07/20  2219 08/08/20  0649   POCGLU <20* 113 171* 122       Blood Sugar Average: Last 72 hrs:  (P) 556 [id number]     · Last A1c 7%, continue insulin sliding scale, basal/prandial insulin  · No episodes of hypoglycemia, but glucose levels have been labile,  Especially at 11 AM fingerstick check  · Otherwise well controlled

## 2020-08-08 NOTE — PROGRESS NOTES
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 [facility])  Assessment & Plan  80year-old minimally ambulatory male smoker w/chronic diastolic heart failure, type 2 DM, CKD 3 (baseline creatinine 1 4-1 8), Afib on Xarelto, COPD, OA bilateral knee, s/p bilateral ANDREA, recurrent UTI w/chronic indwelling Baxter catheter admitted w/pyuria and PAD w/bilateral heel pressure wounds, L >R  Diagnostics:  -LEAD 7/31:  diffuse right lower extremity disease without focal stenosis, R JAMAL 0 6/42/34 and left lower extremity with diffuse disease, no focal stenosis, decrease in velocities in SFA to popliteal artery suggestive of possible stenosis, L JAMAL 0 52/33/36  -Xray left foot 7/31: No evidence of fracture, osteomyelitis or other significant bony abnormality in the left foot  -Xray right foot 7/31: No evidence of osteomyelitis, fracture or other significant bony abnormality  -BC: neg x 5 days  -Angiogram 8/5/2020 L SFA PTA, TPT PTA, prox Peroneal PTA  Peroneal only in-line flow to the foot with short segment occlusion at origin of AT and remaining AT reconstituted by geniculate collaterals, PT chronically occluded & reconstituted distally  Robust collaterals  Peroneal runoff with delayed filling of PT/AT  -post intervention JAMAL right 0 75/38/26 and left 0 74/26/30 (prior right JAMAL 0 52)    Plan:  -Peripheral arterial disease with bilateral heel deep tissue injury, left greater than right status post left lower extremity agram/angioplasty 8/5  Now with progression of right heel ulceration  Patient will require angiogram with right lower extremity runoff and attempted revascularization  Will need general anesthesia for angiogram and renal optimization    Coordinating scheduling with IR   Scheduled for Wednesday 8/12  -Nephrology following  Serum creatinine 1  18  Continue to trend and avoid nephrotoxic meds  -resumed Xarelto yesterday  Will need to hold for 48 hours prior to Agram [employer])  -Off load heels with pressure bed and boots  -Betadine paint to heel       A-fib (HCC)  Assessment & Plan  -stable, rate controlled  -Xarelto was resumed  -Hold starting 8/10       Stage 3 chronic kidney disease ([facility] [address] )  Assessment & Plan  -baseline creatinine 1 4-1 8  -Creat 1 18  -Nephrology following will require prep for procedure  -continue to avoid nephrotoxic agents      Subjective:  No events overnight, pt agreeable to agram next week    Vitals:  /60 (BP Location: Left arm)   Pulse 79   Temp (!) 97 3 °F (36 3 °C)   Resp 18   Ht 5' 6" (1 676 m)   Wt 80 kg (176 lb 5 9 oz)   SpO2 92%   BMI 28 47 kg/m²     I/Os:  I/O last 3 completed shifts: In: 120 [P O :120]  Out: 1620 [Urine:1620]  No intake/output data recorded  Lab Results and Cultures:   Lab Results   Component Value Date    WBC 6 71 08/08/2020    HGB 10 7 (L) 08/08/2020    HCT 34 7 (L) 08/08/2020    MCV 91 08/08/2020     08/08/2020     Lab Results   Component Value Date    CALCIUM 8 6 08/08/2020     04/01/2018    K 4 6 08/08/2020    CO2 28 08/08/2020     08/08/2020    BUN 29 (H) 08/08/2020    CREATININE 1 18 08/08/2020     Lab Results   Component Value Date    INR 1 50 (H) 08/04/2020    INR 2 46 (H) 07/30/2020    INR 1 23 (H) 06/11/2020    PROTIME 18 4 (H) 08/04/2020    PROTIME 25 5 (H) 07/30/2020    PROTIME 15 5 (H) 06/11/2020        Blood Culture:   Lab Results   Component Value Date    BLOODCX No Growth After 5 Days   07/30/2020   ,   Urinalysis:   Lab Results   Component Value Date    COLORU Yellow 08/07/2020    CLARITYU Cloudy 08/07/2020    SPECGRAV 1 025 08/07/2020    PHUR 6 0 08/07/2020    PHUR 7 0 01/28/2019    LEUKOCYTESUR Moderate (A) 08/07/2020    NITRITE Positive (A) 08/07/2020    GLUCOSEU Negative 08/07/2020    KETONESU Negative 08/07/2020    BILIRUBINUR Negative 08/07/2020    BLOODU Moderate (A) 08/07/2020   ,   Urine Culture:   Lab Results   Component Value Date    URINECX >100,000 cfu/ml Escherichia coli ESBL (A) 07/30/2020    URINECX 9549-1236 cfu/ml Proteus mirabilis (A) 07/30/2020    URINECX 10,000-19,000 cfu/ml Enterococcus faecalis (A) 07/30/2020   ,   Wound Culure: No results found for: WOUNDCULT    Medications:  Current Facility-Administered Medications   Medication Dose Route Frequency    acetaminophen (TYLENOL) tablet 650 mg  650 mg Oral Q6H PRN    fentaNYL (SUBLIMAZE) injection 25 mcg  25 mcg Intravenous Q5 Min PRN    HYDROmorphone (DILAUDID) injection 0 5 mg  0 5 mg Intravenous Q4H PRN    insulin glargine (LANTUS) subcutaneous injection 8 Units 0 08 mL  8 Units Subcutaneous HS    insulin lispro (HumaLOG) 100 units/mL subcutaneous injection 1-5 Units  1-5 Units Subcutaneous HS    insulin lispro (HumaLOG) 100 units/mL subcutaneous injection 1-6 Units  1-6 Units Subcutaneous TID AC    insulin lispro (HumaLOG) 100 units/mL subcutaneous injection 8 Units  8 Units Subcutaneous TID With Meals    metoclopramide (REGLAN) injection 10 mg  10 mg Intravenous Once PRN    nicotine (NICODERM CQ) 7 mg/24hr TD 24 hr patch 1 patch  1 patch Transdermal Daily    ondansetron (ZOFRAN) injection 4 mg  4 mg Intravenous Once PRN    oxyCODONE-acetaminophen (PERCOCET) 5-325 mg per tablet 1 tablet  1 tablet Oral Q4H PRN    rivaroxaban (XARELTO) tablet 15 mg  15 mg Oral Daily With Dinner       Physical Exam:    General appearance: alert and oriented, in no acute distress  Lungs: clear to auscultation bilaterally  Heart: S1, S2 normal  Abdomen: soft, non-tender; bowel sounds normal; no masses,  no organomegaly  Extremities: B/L heel DTI, L>R, chronic BLE chronic venous stasis changes    Wound/Incision:  Right groin puncture incision clean, dry, and intact, no hematoma    Pulse exam:  Femoral: Right: 2+ Left: 2+  Popliteal: Right: non-palpable Left: non-palpable  DP: Right: non-palpable Left: non-palpable  PT: Right: non-palpable Left: non-palpable      Hector Purvis PA-C  8/8/2020

## 2020-08-09 LAB
ANION GAP SERPL CALCULATED.3IONS-SCNC: 5 MMOL/L (ref 4–13)
BASOPHILS # BLD AUTO: 0.03 THOUSANDS/ΜL (ref 0–0.1)
BASOPHILS NFR BLD AUTO: 0 % (ref 0–1)
BUN SERPL-MCNC: 27 MG/DL (ref 5–25)
CALCIUM SERPL-MCNC: 8.4 MG/DL (ref 8.3–10.1)
CHLORIDE SERPL-SCNC: 103 MMOL/L (ref 100–108)
CO2 SERPL-SCNC: 29 MMOL/L (ref 21–32)
CREAT SERPL-MCNC: 1.22 MG/DL (ref 0.6–1.3)
EOSINOPHIL # BLD AUTO: 0.19 THOUSAND/ΜL (ref 0–0.61)
EOSINOPHIL NFR BLD AUTO: 3 % (ref 0–6)
ERYTHROCYTE [DISTWIDTH] IN BLOOD BY AUTOMATED COUNT: 15.8 % (ref 11.6–15.1)
GFR SERPL CREATININE-BSD FRML MDRD: 53 ML/MIN/1.73SQ M
GLUCOSE SERPL-MCNC: 119 MG/DL (ref 65–140)
GLUCOSE SERPL-MCNC: 136 MG/DL (ref 65–140)
GLUCOSE SERPL-MCNC: 148 MG/DL (ref 65–140)
GLUCOSE SERPL-MCNC: 192 MG/DL (ref 65–140)
GLUCOSE SERPL-MCNC: 242 MG/DL (ref 65–140)
HCT VFR BLD AUTO: 33.2 % (ref 36.5–49.3)
HGB BLD-MCNC: 10.3 G/DL (ref 12–17)
IMM GRANULOCYTES # BLD AUTO: 0.06 THOUSAND/UL (ref 0–0.2)
IMM GRANULOCYTES NFR BLD AUTO: 1 % (ref 0–2)
LYMPHOCYTES # BLD AUTO: 1.53 THOUSANDS/ΜL (ref 0.6–4.47)
LYMPHOCYTES NFR BLD AUTO: 21 % (ref 14–44)
MCH RBC QN AUTO: 27.9 PG (ref 26.8–34.3)
MCHC RBC AUTO-ENTMCNC: 31 G/DL (ref 31.4–37.4)
MCV RBC AUTO: 90 FL (ref 82–98)
MONOCYTES # BLD AUTO: 0.6 THOUSAND/ΜL (ref 0.17–1.22)
MONOCYTES NFR BLD AUTO: 8 % (ref 4–12)
NEUTROPHILS # BLD AUTO: 5.01 THOUSANDS/ΜL (ref 1.85–7.62)
NEUTS SEG NFR BLD AUTO: 67 % (ref 43–75)
NRBC BLD AUTO-RTO: 0 /100 WBCS
PLATELET # BLD AUTO: 317 THOUSANDS/UL (ref 149–390)
PMV BLD AUTO: 10.4 FL (ref 8.9–12.7)
POTASSIUM SERPL-SCNC: 4.3 MMOL/L (ref 3.5–5.3)
RBC # BLD AUTO: 3.69 MILLION/UL (ref 3.88–5.62)
SODIUM SERPL-SCNC: 137 MMOL/L (ref 136–145)
WBC # BLD AUTO: 7.42 THOUSAND/UL (ref 4.31–10.16)

## 2020-08-09 PROCEDURE — 99232 SBSQ HOSP IP/OBS MODERATE 35: CPT | Performed by: NURSE PRACTITIONER

## 2020-08-09 PROCEDURE — 99232 SBSQ HOSP IP/OBS MODERATE 35: CPT | Performed by: PHYSICIAN ASSISTANT

## 2020-08-09 PROCEDURE — 82948 REAGENT STRIP/BLOOD GLUCOSE: CPT

## 2020-08-09 PROCEDURE — 99233 SBSQ HOSP IP/OBS HIGH 50: CPT | Performed by: INTERNAL MEDICINE

## 2020-08-09 PROCEDURE — 85025 COMPLETE CBC W/AUTO DIFF WBC: CPT | Performed by: INTERNAL MEDICINE

## 2020-08-09 PROCEDURE — 80048 BASIC METABOLIC PNL TOTAL CA: CPT | Performed by: INTERNAL MEDICINE

## 2020-08-09 RX ADMIN — INSULIN GLARGINE 8 UNITS: 100 INJECTION, SOLUTION SUBCUTANEOUS at 23:03

## 2020-08-09 RX ADMIN — INSULIN LISPRO 8 UNITS: 100 INJECTION, SOLUTION INTRAVENOUS; SUBCUTANEOUS at 11:23

## 2020-08-09 RX ADMIN — INSULIN LISPRO 1 UNITS: 100 INJECTION, SOLUTION INTRAVENOUS; SUBCUTANEOUS at 23:04

## 2020-08-09 RX ADMIN — INSULIN LISPRO 3 UNITS: 100 INJECTION, SOLUTION INTRAVENOUS; SUBCUTANEOUS at 11:22

## 2020-08-09 RX ADMIN — RIVAROXABAN 15 MG: 15 TABLET, FILM COATED ORAL at 17:21

## 2020-08-09 NOTE — ASSESSMENT & PLAN NOTE
· Patient has a scheduled angiogram this coming Wednesday August 12, 2020 for revascularization right lower extremity be pressure injury on his right heel continues to be black and gangrenous  Please see vascular surgery note for more details on the procedure

## 2020-08-09 NOTE — PROGRESS NOTES
NEPHROLOGY PROGRESS NOTE    Patient: Karen November               Sex: male          DOA: 7/30/2020  5:23 PM   YOB: 1933        Age:  80 y o         LOS:  LOS: 10 days       HPI     Patient with peripheral vascular disease and CKD    SUBJECTIVE     Overall seems to be stable    Denies any new complaint awake and alert    CURRENT MEDICATIONS       Current Facility-Administered Medications:     acetaminophen (TYLENOL) tablet 650 mg, 650 mg, Oral, Q6H PRN, Ren Medina MD, 650 mg at 08/02/20 1114    fentaNYL (SUBLIMAZE) injection 25 mcg, 25 mcg, Intravenous, Q5 Min PRN, Salomon Christensen CRNA    HYDROmorphone (DILAUDID) injection 0 5 mg, 0 5 mg, Intravenous, Q4H PRN, Celeste Power MD    insulin glargine (LANTUS) subcutaneous injection 8 Units 0 08 mL, 8 Units, Subcutaneous, HS, Ren Medina MD, 8 Units at 08/08/20 2159    insulin lispro (HumaLOG) 100 units/mL subcutaneous injection 1-5 Units, 1-5 Units, Subcutaneous, HS, Ren Medina MD, 1 Units at 08/08/20 2200    insulin lispro (HumaLOG) 100 units/mL subcutaneous injection 1-6 Units, 1-6 Units, Subcutaneous, TID AC, 2 Units at 08/08/20 1233 **AND** Fingerstick Glucose (POCT), , , TID AC, Ren Medina MD    insulin lispro (HumaLOG) 100 units/mL subcutaneous injection 8 Units, 8 Units, Subcutaneous, TID With Meals, Ren Medina MD, 8 Units at 08/08/20 1233    metoclopramide (REGLAN) injection 10 mg, 10 mg, Intravenous, Once PRN, Salomon Christensen CRNA    nicotine (NICODERM CQ) 7 mg/24hr TD 24 hr patch 1 patch, 1 patch, Transdermal, Daily, Ren Medina MD, 1 patch at 08/04/20 0915    ondansetron (ZOFRAN) injection 4 mg, 4 mg, Intravenous, Once PRN, Salomon Christensen CRNA    oxyCODONE-acetaminophen (PERCOCET) 5-325 mg per tablet 1 tablet, 1 tablet, Oral, Q4H PRN, Celeste Power MD, 1 tablet at 08/06/20 1319    rivaroxaban (XARELTO) tablet 15 mg, 15 mg, Oral, Daily With Dinner, Donald Claros MD, 15 mg at 08/08/20 1647    OBJECTIVE Current Weight: Weight - Scale: 80 kg (176 lb 5 9 oz)  Vitals:    08/08/20 2301   BP: 131/54   Pulse: 67   Resp:    Temp: 98 8 °F (37 1 °C)   SpO2: 97%       Intake/Output Summary (Last 24 hours) at 8/9/2020 0941  Last data filed at 8/9/2020 0435  Gross per 24 hour   Intake 490 ml   Output 1000 ml   Net -510 ml       PHYSICAL EXAMINATION     Physical Exam  Constitutional:       General: He is not in acute distress  Appearance: He is well-developed  HENT:      Head: Normocephalic  Mouth/Throat:      Mouth: Oropharynx is clear and moist    Eyes:      General: No scleral icterus  Conjunctiva/sclera: Conjunctivae normal    Neck:      Musculoskeletal: Neck supple  Vascular: No JVD  Cardiovascular:      Rate and Rhythm: Normal rate  Heart sounds: Normal heart sounds  Pulmonary:      Effort: Pulmonary effort is normal       Breath sounds: No wheezing  Abdominal:      Palpations: Abdomen is soft  Tenderness: There is no abdominal tenderness  Musculoskeletal: Normal range of motion  General: No edema  Skin:     General: Skin is warm  Findings: No rash  Neurological:      Mental Status: He is alert and oriented to person, place, and time     Psychiatric:         Mood and Affect: Mood and affect normal          Behavior: Behavior normal           LAB RESULTS     Results from last 7 days   Lab Units 08/09/20  0428 08/08/20  0503 08/08/20  0502 08/07/20  1756 08/07/20  1002 08/06/20  0500 08/05/20  0515 08/04/20  0459 08/03/20  0540   WBC Thousand/uL 7 42  --  6 71  --  8 11 9 00 9 14 10 26* 8 76   HEMOGLOBIN g/dL 10 3*  --  10 7*  --  11 0* 10 9* 11 0* 11 9* 11 9*   HEMATOCRIT % 33 2*  --  34 7*  --  35 8* 35 0* 35 2* 37 1 37 5   PLATELETS Thousands/uL 317  --  310  --  340 336 355 359 339   POTASSIUM mmol/L 4 3 4 6  --  4 2 4 0 4 6 4 7 4 7 4 3   CHLORIDE mmol/L 103 105  --  106 106 109* 107 106 106   CO2 mmol/L 29 28  --  31 32 27 28 31 29   BUN mg/dL 27* 29*  --  30* 32* 31* 41* 36* 36*   CREATININE mg/dL 1 22 1 18  --  1 30 1 43* 1 28 1 29 1 24 1 24   EGFR ml/min/1 73sq m 53 56  --  49 44 50 50 52 52   CALCIUM mg/dL 8 4 8 6  --  8 9 8 6 8 6 8 5 8 7 8 8   MAGNESIUM mg/dL  --   --   --   --  1 8 1 8 2 0 1 9 2 0   PHOSPHORUS mg/dL  --  3 0  --   --   --   --   --   --   --        RADIOLOGY RESULTS      Results for orders placed during the hospital encounter of 07/30/20   XR chest 1 view portable    Narrative CHEST     INDICATION:   sepsis  COMPARISON:  6/11/2020    EXAM PERFORMED/VIEWS:  XR CHEST PORTABLE      FINDINGS:    Cardiomediastinal silhouette appears unremarkable  The lungs are clear  No pneumothorax or pleural effusion  Osseous structures appear within normal limits for patient age  Impression No acute cardiopulmonary disease  Workstation performed: [id number]       Results for orders placed during the hospital encounter of 06/11/20   XR chest 2 views    Narrative CHEST     INDICATION:   AMS  COMPARISON:  Radiograph 1/28/2019    EXAM PERFORMED/VIEWS:  XR CHEST PA & LATERAL      FINDINGS:    Heart shadow is enlarged but unchanged from prior exam     Small right pleural effusion  No pneumothorax or focal consolidation  No evidence of pulmonary edema  Osseous structures appear within normal limits for patient age  Impression Stable cardiomegaly  Small right pleural effusion  No evidence of pulmonary edema  Workstation performed: [id number]       No results found for this or any previous visit  No results found for this or any previous visit  No results found for this or any previous visit  Results for orders placed in visit on 01/27/20   US retroperitoneal complete    Narrative 2 [id number]       PLAN / RECOMMENDATIONS      CKD stage 3:  Seems to be stable at this point  Peripheral vascular disease: Will need another arteriogram which is scheduled on Wednesday    Patient is high risk for the contrast ATN  Will provide hydration before procedure as part of the renal protection    Heart failure:  Seems to be stable at this point    The continue to monitor    Lazarus Hooker, MD  Nephrology  8/9/2020        Portions of the record may have been created with voice recognition software  Occasional wrong word or "sound a like" substitutions may have occurred due to the inherent limitations of voice recognition software  Read the chart carefully and recognize, using context, where substitutions have occurred

## 2020-08-09 NOTE — ASSESSMENT & PLAN NOTE
80year-old minimally ambulatory male smoker w/chronic diastolic heart failure, type 2 DM, CKD 3 (baseline creatinine 1 4-1 8), Afib on Xarelto, COPD, OA bilateral knee, s/p bilateral ANDREA, recurrent UTI w/chronic indwelling Baxter catheter admitted w/pyuria and PAD w/bilateral heel pressure wounds, L >R  Diagnostics:  -LEAD 7/31:  diffuse right lower extremity disease without focal stenosis, R JAMAL 0 6/42/34 and left lower extremity with diffuse disease, no focal stenosis, decrease in velocities in SFA to popliteal artery suggestive of possible stenosis, L JAMAL 0 52/33/36  -Xray left foot 7/31: No evidence of fracture, osteomyelitis or other significant bony abnormality in the left foot  -Xray right foot 7/31: No evidence of osteomyelitis, fracture or other significant bony abnormality  -BC: neg x 5 days  -Angiogram 8/5/2020 L SFA PTA, TPT PTA, prox Peroneal PTA  Peroneal only in-line flow to the foot with short segment occlusion at origin of AT and remaining AT reconstituted by geniculate collaterals, PT chronically occluded & reconstituted distally  Robust collaterals  Peroneal runoff with delayed filling of PT/AT  -post intervention JAMAL right 0 75/38/26 and left 0 74/26/30 (prior right JAMAL 0 52)    Plan:  -Peripheral arterial disease with bilateral heel deep tissue injury, left greater than right status post left lower extremity agram/angioplasty 8/5  Now with progression of right heel ulceration  Patient will require angiogram with right lower extremity runoff and attempted revascularization  Will need general anesthesia for angiogram and renal optimization  Coordinating scheduling with IR  Scheduled for Wednesday 8/12  -Nephrology following  Serum creatinine 1 22  Continue to trend and avoid nephrotoxic meds  -resumed Xarelto yesterday    Will need to hold for 48 hours [facility]  -Off load heels with pressure bed and boots  -Betadine paint to heel

## 2020-08-09 NOTE — ASSESSMENT & PLAN NOTE
Lab Results   Component Value Date    HGBA1C 7 0 (H) 06/12/2020       Recent Labs     08/08/20  1606 08/08/20  2137 08/09/20  0635 08/09/20  1119   POCGLU 153* 201* 119 242*       Blood Sugar Average: Last 72 hrs:  (P) 139 5     · Last A1c 7%, continue insulin sliding scale, basal/prandial insulin  · No episodes of hypoglycemia, but glucose levels have been labile,  Especially at 11 AM fingerstick check  · Otherwise well controlled

## 2020-08-09 NOTE — ASSESSMENT & PLAN NOTE
· Controlled, on xarelto  · Holding xarelto for 48 hours for angiogram, which would be this Monday August 10th and Tuesday August 11, 2020, prior to angiogram on Wednesday August 12, 2020  · Resumed xarelto per vascular as of today

## 2020-08-09 NOTE — ASSESSMENT & PLAN NOTE
· Baseline creatinine 1 4-1 8  · Stable  Received IVF post angiogram   Nephrology following  Creatinine on 8/9/2020 is 1 22

## 2020-08-09 NOTE — PROGRESS NOTES
Progress Note - Nyla Felipe 1933, 80 y o  male MRN: [id number]    Unit/Bed#: -01 Encounter: [id number]    Primary Care Provider: Ray Reyes MD   Date and time admitted to hospital: 7/30/2020  5:23 PM    * Pressure ulcer of ankle  Assessment & Plan  · Secondary to peripheral arterial disease, R>L  · Arterial duplex showing occlusive disease of lower extremities bilaterally  · S/p arteriogram 8/5/20 with intervention  · Repeat JAMAL 8/6/20 improved   · Angiogram is now scheduled for Wednesday August 12th, 2020, per vascular surgery for attempt at revascularization  · ID has signed off, currently off antibiotics  · Will need xarelto held 48 hours prior to arteriogram 8/12/20  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 [facility])  Assessment & Plan  · Patient has a scheduled angiogram this coming Wednesday August 12, 2020 for revascularization right lower extremity be pressure injury on his right heel continues to be black and gangrenous  Please see vascular surgery note for more details on the procedure      Diabetes [facility])  Assessment & Plan  Lab Results   Component Value Date    HGBA1C 7 0 (H) 06/12/2020       Recent Labs     08/08/20  1606 08/08/20  2137 08/09/20  0635 08/09/20  1119   POCGLU 153* 201* 119 242*       Blood Sugar Average: Last 72 hrs:  (P) 139 5     · Last A1c 7%, continue insulin sliding scale, basal/prandial insulin  · No episodes of hypoglycemia, but glucose levels have been labile,  Especially at 11 AM fingerstick check  · Otherwise well controlled     Chronic diastolic congestive heart failure (HCC)  Assessment & Plan  Wt Readings from Last 3 Encounters:   07/30/20 80 kg (176 lb 5 9 oz)   07/14/20 85 1 kg (187 lb 9 6 oz)   07/07/20 85 1 kg (187 lb 9 6 oz)     · Unclear why patient is on entresto after further medication review  · Last ECHO in 2019 showing EF 60%, which is most current TTE  · Due to low normal blood pressures, will discontinue entresto  · Currently euvolemic    A-fib [facility])  Assessment & Plan  · Controlled, on xarelto  · Holding xarelto for 48 hours for angiogram, which would be this Monday August 10th and Tuesday August 11, 2020, prior to angiogram on Wednesday August 12, 2020  · Resumed xarelto per vascular as of today  Abnormal urinalysis  Assessment & Plan  · + UA, urine culture growing esbl e coli; ID signed off  · Received 1 time dose of antibiotics in ED however infectious disease has been monitoring off antibiotics and patient has remained stable no indication for further antibiotics at this time    Stage 3 chronic kidney disease ([facility] [address] )  Assessment & Plan  · Baseline creatinine 1 4-1 8  · Stable  Received IVF post angiogram   Nephrology following  Creatinine on 8/9/2020 is 1 22  VTE Pharmacologic Prophylaxis:   Pharmacologic: Rivaroxaban (Xarelto)  Mechanical VTE Prophylaxis in Place: Yes    Patient Centered Rounds: I have performed bedside rounds with nursing staff today  Discussions with Specialists or Other Care Team Provider:  Reviewed vascular surgery consult, and their plan for for  an angiogram on Wednesday August 12, 2020 with an attempt at revascularization  Education and Discussions with Family / Patient:  Discussed with patient plan for the rest of his hospital stay including angiogram     Time Spent for Care: 20 minutes  More than 50% of total time spent on counseling and coordination of care as described above  Current Length of Stay: 10 day(s)    Current Patient Status: Inpatient   Certification Statement: The patient will continue to require additional inpatient hospital stay due to Angiogram scheduled on Wednesday August 12, 2020    Discharge Plan / Estimated Discharge Date:  Thursday August 13, 2020  Code Status: Level 3 - DNAR and DNI      Subjective:   Patient seen resting comfortably in bed  Patient's speech is difficult to understand however he offers no complaints    He denies any pain appears to be in no acute distress discussed plan with him extensively denies any additional questions    Patient had pressure injuries his ankles and heels secondary to peripheral arterial disease, R>L  Patient currently states he has no pain, no complaints  Per vascular surgery, the angiogram is now scheduled for 2020, per vascular surgery for attempt at revascularization  The patient will need xarelto held 48 hours prior to arteriogram 20  Objective:     Vitals:   Temp (24hrs), Av 4 °F (36 9 °C), Min:98 1 °F (36 7 °C), Max:98 8 °F (37 1 °C)    Temp:  [98 1 °F (36 7 °C)-98 8 °F (37 1 °C)] 98 2 °F (36 8 °C)  HR:  [65-69] 65  Resp:  [18] 18  BP: (117-134)/(54-66) 134/56  SpO2:  [90 %-98 %] 98 %  Body mass index is 28 47 kg/m²  Input and Output Summary (last 24 hours): Intake/Output Summary (Last 24 hours) at 2020 1504  Last data filed at 2020 0435  Gross per 24 hour   Intake 240 ml   Output 1000 ml   Net -760 ml     Physical Exam:     Physical Exam  Constitutional:       Appearance: He is well-developed and well-nourished  HENT:      Head: Normocephalic and atraumatic  Eyes:      Pupils: Pupils are equal, round, and reactive to light  Neck:      Musculoskeletal: Normal range of motion and neck supple  Cardiovascular:      Rate and Rhythm: Normal rate and regular rhythm  Heart sounds: Normal heart sounds  No murmur  No friction rub  Pulmonary:      Effort: Pulmonary effort is normal  No respiratory distress  Breath sounds: Normal breath sounds  No wheezing or rales  Abdominal:      General: Bowel sounds are normal       Palpations: Abdomen is soft  Musculoskeletal: Normal range of motion  Skin:     General: Skin is warm and dry  Comments: Pressure injuries on his right lower extremity including heel and ankle  Gangrenous tissue on his right heel   Neurological:      Mental Status: He is alert     Psychiatric:         Mood and Affect: Mood and affect normal          Behavior: Behavior normal         Additional Data:     Labs:    Results from last 7 days   Lab Units 08/09/20  0428   WBC Thousand/uL 7 42   HEMOGLOBIN g/dL 10 3*   HEMATOCRIT % 33 2*   PLATELETS Thousands/uL 317   NEUTROS PCT % 67   LYMPHS PCT % 21   MONOS PCT % 8   EOS PCT % 3     Results from last 7 days   Lab Units 08/09/20  0428  08/07/20  1002   POTASSIUM mmol/L 4 3   < > 4 0   CHLORIDE mmol/L 103   < > 106   CO2 mmol/L 29   < > 32   BUN mg/dL 27*   < > 32*   CREATININE mg/dL 1 22   < > 1 43*   CALCIUM mg/dL 8 4   < > 8 6   ALK PHOS U/L  --   --  79   ALT U/L  --   --  49   AST U/L  --   --  69*    < > = values in this interval not displayed  Results from last 7 days   Lab Units 08/04/20  0459   INR  1 50*         Recent Cultures (last 7 days):           Last 24 Hours Medication List:   Current Facility-Administered Medications   Medication Dose Route Frequency Provider Last Rate    acetaminophen  650 mg Oral Q6H PRN Ren Medina MD      fentaNYL  25 mcg Intravenous Q5 Min PRN Yenny Chowdhury CRNA      HYDROmorphone  0 5 mg Intravenous Q4H PRN Cristina Hackett MD      insulin glargine  8 Units Subcutaneous HS Ren Medina MD      insulin lispro  1-5 Units Subcutaneous HS Ren Medina MD      insulin lispro  1-6 Units Subcutaneous TID AC Ren Medina MD      insulin lispro  8 Units Subcutaneous TID With Meals Corey Matthews MD      metoclopramide  10 mg Intravenous Once PRN Yenny Chowdhury CRNA      nicotine  1 patch Transdermal Daily Corey Matthews MD      ondansetron  4 mg Intravenous Once PRN Yenny Chowdhury CRNA      oxyCODONE-acetaminophen  1 tablet Oral Q4H PRN Cristina Hackett MD      rivaroxaban  15 mg Oral Daily With Dinner Jonathan Agudelo MD          Today, Patient Was Seen By: FAREED Ruiz    ** Please Note: Dragon 360 Dictation voice to text software may have been used in the creation of this document   **

## 2020-08-09 NOTE — ASSESSMENT & PLAN NOTE
· Secondary to peripheral arterial disease, R>L  · Arterial duplex showing occlusive disease of lower extremities bilaterally  · S/p arteriogram 8/5/20 with intervention  · Repeat JAMAL 8/6/20 improved   · Angiogram is now scheduled for Wednesday August 12th, 2020, per vascular surgery for attempt at revascularization  · ID has signed off, currently off antibiotics  · Will need xarelto held 48 hours prior to arteriogram 8/12/20

## 2020-08-09 NOTE — PROGRESS NOTES
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 [facility])  Assessment & Plan  80year-old minimally ambulatory male smoker w/chronic diastolic heart failure, type 2 DM, CKD 3 (baseline creatinine 1 4-1 8), Afib on Xarelto, COPD, OA bilateral knee, s/p bilateral ANDREA, recurrent UTI w/chronic indwelling Baxter catheter admitted w/pyuria and PAD w/bilateral heel pressure wounds, L >R  Diagnostics:  -LEAD 7/31:  diffuse right lower extremity disease without focal stenosis, R JAMAL 0 6/42/34 and left lower extremity with diffuse disease, no focal stenosis, decrease in velocities in SFA to popliteal artery suggestive of possible stenosis, L JAMAL 0 52/33/36  -Xray left foot 7/31: No evidence of fracture, osteomyelitis or other significant bony abnormality in the left foot  -Xray right foot 7/31: No evidence of osteomyelitis, fracture or other significant bony abnormality  -BC: neg x 5 days  -Angiogram 8/5/2020 L SFA PTA, TPT PTA, prox Peroneal PTA  Peroneal only in-line flow to the foot with short segment occlusion at origin of AT and remaining AT reconstituted by geniculate collaterals, PT chronically occluded & reconstituted distally  Robust collaterals  Peroneal runoff with delayed filling of PT/AT  -post intervention JAMAL right 0 75/38/26 and left 0 74/26/30 (prior right JAMAL 0 52)    Plan:  -Peripheral arterial disease with bilateral heel deep tissue injury, left greater than right status post left lower extremity agram/angioplasty 8/5  Now with progression of right heel ulceration  Patient will require angiogram with right lower extremity runoff and attempted revascularization  Will need general anesthesia for angiogram and renal optimization    Coordinating scheduling with IR   Scheduled for Wednesday 8/12  -Nephrology following  Serum creatinine 1 22  Continue to trend and avoid nephrotoxic meds  -resumed Xarelto yesterday  Will need to hold for 48 hours [facility] [place] [facility]  -Off load heels with pressure bed and boots  -Betadine paint to heel           Subjective:  No events overnight    Vitals:  /56 (BP Location: Right arm)   Pulse 65   Temp 98 2 °F (36 8 °C) (Oral)   Resp 18   Ht 5' 6" (1 676 m)   Wt 80 kg (176 lb 5 9 oz)   SpO2 98%   BMI 28 47 kg/m²     I/Os:  I/O last 3 completed shifts: In: 56 [P O :480; I V :10]  Out: 1450 [Urine:1450]  No intake/output data recorded  Lab Results and Cultures:   Lab Results   Component Value Date    WBC 7 42 08/09/2020    HGB 10 3 (L) 08/09/2020    HCT 33 2 (L) 08/09/2020    MCV 90 08/09/2020     08/09/2020     Lab Results   Component Value Date    CALCIUM 8 4 08/09/2020     04/01/2018    K 4 3 08/09/2020    CO2 29 08/09/2020     08/09/2020    BUN 27 (H) 08/09/2020    CREATININE 1 22 08/09/2020     Lab Results   Component Value Date    INR 1 50 (H) 08/04/2020    INR 2 46 (H) 07/30/2020    INR 1 23 (H) 06/11/2020    PROTIME 18 4 (H) 08/04/2020    PROTIME 25 5 (H) 07/30/2020    PROTIME 15 5 (H) 06/11/2020        Blood Culture:   Lab Results   Component Value Date    BLOODCX No Growth After 5 Days   07/30/2020   ,   Urinalysis:   Lab Results   Component Value Date    COLORU Yellow 08/07/2020    CLARITYU Cloudy 08/07/2020    SPECGRAV 1 025 08/07/2020    PHUR 6 0 08/07/2020    PHUR 7 0 01/28/2019    LEUKOCYTESUR Moderate (A) 08/07/2020    NITRITE Positive (A) 08/07/2020    GLUCOSEU Negative 08/07/2020    KETONESU Negative 08/07/2020    BILIRUBINUR Negative 08/07/2020    BLOODU Moderate (A) 08/07/2020   ,   Urine Culture:   Lab Results   Component Value Date    URINECX >100,000 cfu/ml Escherichia coli ESBL (A) 07/30/2020    URINECX 2082-9715 cfu/ml Proteus mirabilis (A) 07/30/2020    URINECX 10,000-19,000 cfu/ml Enterococcus faecalis (A) 07/30/2020   ,   Wound Culure: No results found for: WOUNDCULT    Medications:  Current Facility-Administered Medications   Medication Dose Route Frequency    acetaminophen (TYLENOL) tablet 650 mg  650 mg Oral Q6H PRN    fentaNYL (SUBLIMAZE) injection 25 mcg  25 mcg Intravenous Q5 Min PRN    HYDROmorphone (DILAUDID) injection 0 5 mg  0 5 mg Intravenous Q4H PRN    insulin glargine (LANTUS) subcutaneous injection 8 Units 0 08 mL  8 Units Subcutaneous HS    insulin lispro (HumaLOG) 100 units/mL subcutaneous injection 1-5 Units  1-5 Units Subcutaneous HS    insulin lispro (HumaLOG) 100 units/mL subcutaneous injection 1-6 Units  1-6 Units Subcutaneous TID AC    insulin lispro (HumaLOG) 100 units/mL subcutaneous injection 8 Units  8 Units Subcutaneous TID With Meals    metoclopramide (REGLAN) injection 10 mg  10 mg Intravenous Once PRN    nicotine (NICODERM CQ) 7 mg/24hr TD 24 hr patch 1 patch  1 patch Transdermal Daily    ondansetron (ZOFRAN) injection 4 mg  4 mg Intravenous Once PRN    oxyCODONE-acetaminophen (PERCOCET) 5-325 mg per tablet 1 tablet  1 tablet Oral Q4H PRN    rivaroxaban (XARELTO) tablet 15 mg  15 mg Oral Daily With Dinner       Physical Exam:    General appearance: alert and oriented, in no acute distress  Lungs: clear to auscultation bilaterally  Heart: S1, S2 normal  Abdomen: soft, non-tender; bowel sounds normal; no masses,  no organomegaly  Extremities: Feet warm, M/S inact to patient, B/L heel DTI, L>R, chronic venous stasis changes      Pulse exam:  DP: Right: non-palpable Left: non-palpable  PT: Right: non-palpable Left: non-palpable      Marla Yancey PA-C  8/9/2020

## 2020-08-09 NOTE — ASSESSMENT & PLAN NOTE
· + UA, urine culture growing esbl e coli; ID signed off  · Received 1 time dose of antibiotics in ED however infectious disease has been monitoring off antibiotics and patient has remained stable no indication for further antibiotics at this time

## 2020-08-10 LAB
ANION GAP SERPL CALCULATED.3IONS-SCNC: 6 MMOL/L (ref 4–13)
BASOPHILS # BLD AUTO: 0.03 THOUSANDS/ΜL (ref 0–0.1)
BASOPHILS NFR BLD AUTO: 0 % (ref 0–1)
BUN SERPL-MCNC: 27 MG/DL (ref 5–25)
CALCIUM SERPL-MCNC: 8.4 MG/DL (ref 8.3–10.1)
CHLORIDE SERPL-SCNC: 103 MMOL/L (ref 100–108)
CO2 SERPL-SCNC: 27 MMOL/L (ref 21–32)
CREAT SERPL-MCNC: 1.23 MG/DL (ref 0.6–1.3)
EOSINOPHIL # BLD AUTO: 0.08 THOUSAND/ΜL (ref 0–0.61)
EOSINOPHIL NFR BLD AUTO: 1 % (ref 0–6)
ERYTHROCYTE [DISTWIDTH] IN BLOOD BY AUTOMATED COUNT: 15.8 % (ref 11.6–15.1)
GFR SERPL CREATININE-BSD FRML MDRD: 53 ML/MIN/1.73SQ M
GLUCOSE SERPL-MCNC: 114 MG/DL (ref 65–140)
GLUCOSE SERPL-MCNC: 121 MG/DL (ref 65–140)
GLUCOSE SERPL-MCNC: 141 MG/DL (ref 65–140)
GLUCOSE SERPL-MCNC: 170 MG/DL (ref 65–140)
GLUCOSE SERPL-MCNC: 264 MG/DL (ref 65–140)
HCT VFR BLD AUTO: 31.6 % (ref 36.5–49.3)
HGB BLD-MCNC: 10.1 G/DL (ref 12–17)
IMM GRANULOCYTES # BLD AUTO: 0.08 THOUSAND/UL (ref 0–0.2)
IMM GRANULOCYTES NFR BLD AUTO: 1 % (ref 0–2)
LYMPHOCYTES # BLD AUTO: 1.51 THOUSANDS/ΜL (ref 0.6–4.47)
LYMPHOCYTES NFR BLD AUTO: 11 % (ref 14–44)
MCH RBC QN AUTO: 28.1 PG (ref 26.8–34.3)
MCHC RBC AUTO-ENTMCNC: 32 G/DL (ref 31.4–37.4)
MCV RBC AUTO: 88 FL (ref 82–98)
MONOCYTES # BLD AUTO: 0.75 THOUSAND/ΜL (ref 0.17–1.22)
MONOCYTES NFR BLD AUTO: 6 % (ref 4–12)
NEUTROPHILS # BLD AUTO: 10.9 THOUSANDS/ΜL (ref 1.85–7.62)
NEUTS SEG NFR BLD AUTO: 81 % (ref 43–75)
NRBC BLD AUTO-RTO: 0 /100 WBCS
PLATELET # BLD AUTO: 314 THOUSANDS/UL (ref 149–390)
PMV BLD AUTO: 10.1 FL (ref 8.9–12.7)
POTASSIUM SERPL-SCNC: 5.2 MMOL/L (ref 3.5–5.3)
RBC # BLD AUTO: 3.59 MILLION/UL (ref 3.88–5.62)
SODIUM SERPL-SCNC: 136 MMOL/L (ref 136–145)
WBC # BLD AUTO: 13.35 THOUSAND/UL (ref 4.31–10.16)

## 2020-08-10 PROCEDURE — 80048 BASIC METABOLIC PNL TOTAL CA: CPT | Performed by: INTERNAL MEDICINE

## 2020-08-10 PROCEDURE — 99232 SBSQ HOSP IP/OBS MODERATE 35: CPT | Performed by: PHYSICIAN ASSISTANT

## 2020-08-10 PROCEDURE — 85025 COMPLETE CBC W/AUTO DIFF WBC: CPT | Performed by: INTERNAL MEDICINE

## 2020-08-10 PROCEDURE — 99232 SBSQ HOSP IP/OBS MODERATE 35: CPT | Performed by: INTERNAL MEDICINE

## 2020-08-10 PROCEDURE — 99232 SBSQ HOSP IP/OBS MODERATE 35: CPT | Performed by: GENERAL PRACTICE

## 2020-08-10 PROCEDURE — 82948 REAGENT STRIP/BLOOD GLUCOSE: CPT

## 2020-08-10 RX ADMIN — INSULIN LISPRO 8 UNITS: 100 INJECTION, SOLUTION INTRAVENOUS; SUBCUTANEOUS at 13:39

## 2020-08-10 RX ADMIN — OXYCODONE HYDROCHLORIDE AND ACETAMINOPHEN 1 TABLET: 5; 325 TABLET ORAL at 10:23

## 2020-08-10 RX ADMIN — INSULIN LISPRO 3 UNITS: 100 INJECTION, SOLUTION INTRAVENOUS; SUBCUTANEOUS at 13:40

## 2020-08-10 RX ADMIN — INSULIN LISPRO 8 UNITS: 100 INJECTION, SOLUTION INTRAVENOUS; SUBCUTANEOUS at 17:40

## 2020-08-10 RX ADMIN — INSULIN GLARGINE 8 UNITS: 100 INJECTION, SOLUTION SUBCUTANEOUS at 22:04

## 2020-08-10 RX ADMIN — INSULIN LISPRO 1 UNITS: 100 INJECTION, SOLUTION INTRAVENOUS; SUBCUTANEOUS at 17:41

## 2020-08-10 NOTE — PLAN OF CARE
Problem: Prexisting or High Potential for Compromised Skin Integrity  Goal: Skin integrity is maintained or improved  Description: INTERVENTIONS:  - Identify patients at risk for skin breakdown  - Assess and monitor skin integrity  - Assess and monitor nutrition and hydration status  - Monitor labs   - Assess for incontinence   - Turn and reposition patient  - Assist with mobility/ambulation  - Relieve pressure over bony prominences  - Avoid friction and shearing  - Provide appropriate hygiene as needed including keeping skin clean and dry  - Evaluate need for skin moisturizer/barrier cream  - Collaborate with interdisciplinary team   - Patient/family teaching  - Consider wound care consult   Outcome: Progressing     Problem: Potential for Falls  Goal: Patient will remain free of falls  Description: INTERVENTIONS:  - Assess patient frequently for physical needs  -  Identify cognitive and physical deficits and behaviors that affect risk of falls    -  [place] fall precautions as indicated by assessment   - Educate patient/family on patient safety including physical limitations  - Instruct patient to call for assistance with activity based on assessment  - Modify environment to reduce risk of injury  - Consider OT/PT consult to assist with strengthening/mobility  Outcome: Progressing     Problem: PAIN - ADULT  Goal: Verbalizes/displays adequate comfort level or baseline comfort level  Description: Interventions:  - Encourage patient to monitor pain and request assistance  - Assess pain using appropriate pain scale  - Administer analgesics based on type and severity of pain and evaluate response  - Implement non-pharmacological measures as appropriate and evaluate response  - Consider cultural and social influences on pain and pain management  - Notify physician/advanced practitioner if interventions unsuccessful or patient reports new pain  Outcome: Progressing     Problem: INFECTION - ADULT  Goal: Absence or prevention of progression during hospitalization  Description: INTERVENTIONS:  - Assess and monitor for signs and symptoms of infection  - Monitor lab/diagnostic results  - Monitor all insertion sites, i e  indwelling lines, tubes, and drains  - Monitor endotracheal if appropriate and nasal secretions for changes in amount and color  - [place] appropriate cooling/warming therapies per order  - Administer medications as ordered  - Instruct and encourage patient and family to use good hand hygiene technique  - Identify and instruct in appropriate isolation precautions for identified infection/condition  Outcome: Progressing  Goal: Absence of fever/infection during neutropenic period  Description: INTERVENTIONS:  - Monitor WBC    Outcome: Progressing     Problem: SAFETY ADULT  Goal: Patient will remain free of falls  Description: INTERVENTIONS:  - Assess patient frequently for physical needs  -  Identify cognitive and physical deficits and behaviors that affect risk of falls    -  [place] fall precautions as indicated by assessment   - Educate patient/family on patient safety including physical limitations  - Instruct patient to call for assistance with activity based on assessment  - Modify environment to reduce risk of injury  - Consider OT/PT consult to assist with strengthening/mobility  Outcome: Progressing  Goal: Maintain or return to baseline ADL function  Description: INTERVENTIONS:  -  Assess patient's ability to carry out ADLs; assess patient's baseline for ADL function and identify physical deficits which impact ability to perform ADLs (bathing, care of mouth/teeth, toileting, grooming, dressing, etc )  - Assess/evaluate cause of self-care deficits   - Assess range of motion  - Assess patient's mobility; develop plan if impaired  - Assess patient's need for assistive devices and provide as appropriate  - Encourage maximum independence but intervene and supervise when necessary  - Involve family in performance of ADLs  - Assess for home care needs following discharge   - Consider OT consult to assist with ADL evaluation and planning for discharge  - Provide patient education as appropriate  Outcome: Progressing  Goal: Maintain or return mobility status to optimal level  Description: INTERVENTIONS:  - Assess patient's baseline mobility status (ambulation, transfers, stairs, etc )    - Identify cognitive and physical deficits and behaviors that affect mobility  - Identify mobility aids required to assist with transfers and/or ambulation (gait belt, sit-to-stand, lift, walker, cane, etc )  - [place] fall precautions as indicated by assessment  - Record patient progress and toleration of activity level on Mobility SBAR; progress patient to next Phase/Stage  - Instruct patient to call for assistance with activity based on assessment  - Consider rehabilitation consult to assist with strengthening/weightbearing, etc   Outcome: Progressing     Problem: DISCHARGE PLANNING  Goal: Discharge to home or other facility with appropriate resources  Description: INTERVENTIONS:  - Identify barriers to discharge w/patient and caregiver  - Arrange for needed discharge resources and transportation as appropriate  - Identify discharge learning needs (meds, wound care, etc )  - Arrange for interpretive services to assist at discharge as needed  - Refer to Case Management Department for coordinating discharge planning if the patient needs post-hospital services based on physician/advanced practitioner order or complex needs related to functional status, cognitive ability, or social support system  Outcome: Progressing     Problem: Knowledge Deficit  Goal: Patient/family/caregiver demonstrates understanding of disease process, treatment plan, medications, and discharge instructions  Description: Complete learning assessment and assess knowledge base    Interventions:  - Provide teaching at level of understanding  - Provide teaching via preferred learning methods  Outcome: Progressing     Problem: GENITOURINARY - ADULT  Goal: Maintains or returns to baseline urinary function  Description: INTERVENTIONS:  - Assess urinary function  - Encourage oral fluids to ensure adequate hydration if ordered  - Administer IV fluids as ordered to ensure adequate hydration  - Administer ordered medications as needed  - Offer frequent toileting  - Follow urinary retention protocol if ordered  Outcome: Progressing  Goal: Absence of urinary retention  Description: INTERVENTIONS:  - Assess patients ability to void and empty bladder  - Monitor I/O  - Bladder scan as needed  - Discuss with physician/AP medications to alleviate retention as needed  - Discuss catheterization for long term situations as appropriate  Outcome: Progressing  Goal: Urinary catheter remains patent  Description: INTERVENTIONS:  - Assess patency of urinary catheter  - If patient has a chronic mccoy, consider changing catheter if non-functioning  - Follow guidelines for intermittent irrigation of non-functioning urinary catheter  Outcome: Progressing     Problem: SKIN/TISSUE INTEGRITY - ADULT  Goal: Skin integrity remains intact  Description: INTERVENTIONS  - Identify patients at risk for skin breakdown  - Assess and monitor skin integrity  - Assess and monitor nutrition and hydration status  - Monitor labs (i e  albumin)  - Assess for incontinence   - Turn and reposition patient  - Assist with mobility/ambulation  - Relieve pressure over bony prominences  - Avoid friction and shearing  - Provide appropriate hygiene as needed including keeping skin clean and dry  - Evaluate need for skin moisturizer/barrier cream  - Collaborate with interdisciplinary team (i e  Nutrition, Rehabilitation, etc )   - Patient/family teaching  Outcome: Progressing  Goal: Incision(s), wounds(s) or drain site(s) healing without S/S of infection  Description: INTERVENTIONS  - Assess and document risk factors for skin impairment   - Assess and document dressing, incision, wound bed, drain sites and surrounding tissue  - Consider nutrition services referral as needed  - Oral mucous membranes remain intact  - Provide patient/ family education  Outcome: Progressing  Goal: Oral mucous membranes remain intact  Description: INTERVENTIONS  - Assess oral mucosa and hygiene practices  - Implement preventative oral hygiene regimen  - Implement oral medicated treatments as ordered  - Initiate Nutrition services referral as needed  Outcome: Progressing     Problem: Nutrition/Hydration-ADULT  Goal: Nutrient/Hydration intake appropriate for improving, restoring or maintaining nutritional needs  Description: Monitor and assess patient's nutrition/hydration status for malnutrition  Collaborate with interdisciplinary team and initiate plan and interventions as ordered  Monitor patient's weight and dietary intake as ordered or per policy  Utilize nutrition screening tool and intervene as necessary  Determine patient's food preferences and provide high-protein, high-caloric foods as appropriate       INTERVENTIONS:  - Monitor oral intake, urinary output, labs, and treatment plans  - Assess nutrition and hydration status and recommend course of action  - Evaluate amount of meals eaten  - Assist patient with eating if necessary   - Allow adequate time for meals  - Recommend/ encourage appropriate diets, oral nutritional supplements, and vitamin/mineral supplements  - Order, calculate, and assess calorie counts as needed  - Recommend, monitor, and adjust tube feedings based on assessed needs  - Assess need for intravenous fluids  - Provide nutrition/hydration education as appropriate  - Include patient/family/caregiver in decisions related to nutrition   Outcome: Progressing

## 2020-08-10 NOTE — SOCIAL WORK
Per nurse patient angiogram was attempted last week and it was unsuccessful  Per SLIM patient is scheduled for angiogram on this Wednesday

## 2020-08-10 NOTE — PROGRESS NOTES
Progress Note - Indira Cost 1933, 80 y o  male MRN: [id number]    Unit/Bed#: -01 Encounter: [id number]    Primary Care Provider: Jose Varela MD   Date and time admitted to hospital: 7/30/2020  5:23 PM       DOS: 8/10/2020    * Pressure ulcer of ankle  Assessment & Plan  · Secondary to peripheral arterial disease, 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   · Arterial duplex showing occlusive disease of lower extremities bilaterally  · Vascular surgery following,  · S/p angiogram 8/5/20 with intervention with slight improvement of repeat JAMAL  · Now with progression of right heel ulceration, plan for angiogram of RLE with attempted revascularization scheduled for Wednesday August 12th  · Xarelto on hold secondary to arteriogram for now (hold for 48 hours prior to procedure)  · ID has signed off, pt is currently maintained off of antibiotic therapy   · Pt is afebrile and non-toxic appearing  · However did have mild increase in leukocytosis today to 13 35, would obtain procalcitonin and repeat CBC in the AM  If clinically worsening would consider adding IV ancef and flagyl and re consulting ID    Diabetes [facility])  Assessment & Plan  Lab Results   Component Value Date    HGBA1C 7 0 (H) 06/12/2020       Recent Labs     08/09/20  1119 08/09/20  1537 08/09/20  2107 08/10/20  0557   POCGLU 242* 148* 192* 121       Blood Sugar Average: Last 72 hrs:  (P) 640 [id number]   · Last A1c 7%,   · Continue Lantus 8 units QHS with scheduled humalog 8 units TID with SSI coverage  · QID glucose checks   · Consistent carb diet  · Monitor and adjust regimen as needed    Chronic diastolic congestive heart failure (HCC)  Assessment & Plan  Wt Readings from Last 3 Encounters:   07/30/20 80 kg (176 lb 5 9 oz)   07/14/20 85 1 kg (187 lb 9 6 oz)   07/07/20 85 1 kg (187 lb 9 6 oz)     · Unclear why patient is on entresto after further medication review  · Last ECHO from 1/30/2019 showing EF 60%  · Due to low normal blood pressures, Entresto was discontinued   · Currently euvolemic and normotension     A-fib (HCC)  Assessment & Plan  · Currently rate controlled without medications  · A/c with Xarelto, currently on hold for 48 hours prior to angiogram scheduled for Wednesday August 12th  Resume per vascular     Abnormal urinalysis  Assessment & Plan  · (+ )UA on admission, urine culture growing ESBL  · ID signed off,  · Baxter catheter was replaced on 7/31  · Recommended holding additional antibiotics as patient has remained non-toxic and stable    Stage 3 chronic kidney disease ([facility] [address] )  Assessment & Plan  · Baseline creatinine 1 4-1 8 on review  · Nephrology following for renal optimization prior to agram   · Cr  Stable at 1 23  · Continue to monitor      VTE Pharmacologic Prophylaxis:   Pharmacologic: Xarelto on hold secondary to angiogram scheduled for Wednesday  Mechanical VTE Prophylaxis in Place: No    Patient Centered Rounds: I have performed bedside rounds with nursing staff today  Discussions with Specialists or Other Care Team Provider:  Discussed with Nephrology, RN, cm and reviewed previous notes    Education and Discussions with Family / Patient:  Discussed with patient at bedside, will update patient's daughter over the phone as well    Time Spent for Care: 30 minutes  More than 50% of total time spent on counseling and coordination of care as described above  Current Length of Stay: 11 day(s)    Current Patient Status: Inpatient   Certification Statement: The patient will continue to require additional inpatient hospital stay due to Angiogram scheduled for Wednesday, continue monitoring of leukocytosis    Discharge Plan:  Not medically stable as above, not for at least another 48 hours pending angiogram    Code Status: Level 3 - DNAR and DNI      Subjective:   Patient reports that he is feeling well  He denies any significant pain, however does report some mild lower extremity pain    Denies any chest pain or shortness of breath  Objective:     Vitals:   Temp (24hrs), Av 7 °F (37 1 °C), Min:97 6 °F (36 4 °C), Max:99 8 °F (37 7 °C)    Temp:  [97 6 °F (36 4 °C)-99 8 °F (37 7 °C)] 98 6 °F (37 °C)  HR:  [59-73] 72  Resp:  [18-20] 18  BP: (122-152)/(57-77) 122/57  SpO2:  [92 %-98 %] 96 %  Body mass index is 28 47 kg/m²  Input and Output Summary (last 24 hours): Intake/Output Summary (Last 24 hours) at 8/10/2020 1023  Last data filed at 2020 2200  Gross per 24 hour   Intake 420 ml   Output 1000 ml   Net -580 ml       Physical Exam:     Physical Exam  Vitals signs reviewed  Constitutional:       General: He is not in acute distress  Appearance: He is not diaphoretic  Comments: Patient is in no acute distress lying in his hospital bed resting comfortably  Eating breakfast  Does appear to be mildly disoriented which is baseline per discussion with nursing staff today   HENT:      Head: Normocephalic and atraumatic  Eyes:      Conjunctiva/sclera: Conjunctivae normal       Pupils: Pupils are equal, round, and reactive to light  Cardiovascular:      Rate and Rhythm: Normal rate and regular rhythm  Heart sounds: Normal heart sounds  Pulmonary:      Effort: Pulmonary effort is normal  No respiratory distress  Breath sounds: Normal breath sounds  No stridor  No wheezing  Abdominal:      General: Bowel sounds are normal  There is no distension  Palpations: Abdomen is soft  Tenderness: There is no abdominal tenderness  There is no guarding  Musculoskeletal:         General: No edema  Skin:     General: Skin is warm and dry  Findings: No erythema  Comments: Gangrene of the heels   Neurological:      Mental Status: He is alert           Additional Data:     Labs:    Results from last 7 days   Lab Units 08/10/20  0647   WBC Thousand/uL 13 35*   HEMOGLOBIN g/dL 10 1*   HEMATOCRIT % 31 6*   PLATELETS Thousands/uL 314   NEUTROS PCT % 81*   LYMPHS PCT % 11* MONOS PCT % 6   EOS PCT % 1     Results from last 7 days   Lab Units 08/10/20  0647  08/07/20  1002   POTASSIUM mmol/L 5 2   < > 4 0   CHLORIDE mmol/L 103   < > 106   CO2 mmol/L 27   < > 32   BUN mg/dL 27*   < > 32*   CREATININE mg/dL 1 23   < > 1 43*   CALCIUM mg/dL 8 4   < > 8 6   ALK PHOS U/L  --   --  79   ALT U/L  --   --  49   AST U/L  --   --  69*    < > = values in this interval not displayed  Results from last 7 days   Lab Units 08/04/20  0459   INR  1 50*       * I Have Reviewed All Lab Data Listed Above  * Additional Pertinent Lab Tests Reviewed: All Labs Within Last 24 Hours Reviewed    Imaging:    Imaging Reports Reviewed Today Include: JAMAL, xrays  Imaging Personally Reviewed by Myself Includes:  none    Recent Cultures (last 7 days):           Last 24 Hours Medication List:   Current Facility-Administered Medications   Medication Dose Route Frequency Provider Last Rate    acetaminophen  650 mg Oral Q6H PRN Ren Medina MD      fentaNYL  25 mcg Intravenous Q5 Min PRN Kerry Carrasco CRNA      HYDROmorphone  0 5 mg Intravenous Q4H PRN Reese Cuenca MD      insulin glargine  8 Units Subcutaneous HS Ren Medina MD      insulin lispro  1-5 Units Subcutaneous HS Ren Medina MD      insulin lispro  1-6 Units Subcutaneous TID AC Ren Medina MD      insulin lispro  8 Units Subcutaneous TID With Meals Shauna Gaitan MD      metoclopramide  10 mg Intravenous Once PRN Kerry Carrasco CRNA      nicotine  1 patch Transdermal Daily Shauna Gaitan MD      ondansetron  4 mg Intravenous Once PRN Kerry Carrasco CRNA      oxyCODONE-acetaminophen  1 tablet Oral Q4H PRN Reese Cuenca MD          Today, Patient Was Seen By: Dannie Spence PA-C    ** Please Note: Dictation voice to text software may have been used in the creation of this document   **

## 2020-08-10 NOTE — ASSESSMENT & PLAN NOTE
· Currently rate controlled without medications  · A/c with Xarelto, currently on hold for 48 hours prior to angiogram scheduled for Wednesday August 12th   Resume per vascular

## 2020-08-10 NOTE — ASSESSMENT & PLAN NOTE
Lab Results   Component Value Date    HGBA1C 7 0 (H) 06/12/2020       Recent Labs     08/09/20  1119 08/09/20  1537 08/09/20  2107 08/10/20  0557   POCGLU 242* 148* 192* 121       Blood Sugar Average: Last 72 hrs:  (P) 907 [id number]   · Last A1c 7%,   · Continue Lantus 8 units QHS with scheduled humalog 8 units TID with SSI coverage  · QID glucose checks   · Consistent carb diet  · Monitor and adjust regimen as needed

## 2020-08-10 NOTE — PROGRESS NOTES
NEPHROLOGY PROGRESS NOTE    Patient: Gregg Waterman               Sex: male          DOA: 7/30/2020  5:23 PM   YOB: 1933        Age:  80 y o         LOS:  LOS: 11 days   8/10/2020    REASON FOR THE CONSULTATION:      Renal optimization prior to arteriogram     SUBJECTIVE     Patient seen and examined next bedside  Awake alert and in no distress  CURRENT MEDICATIONS       Current Facility-Administered Medications:     acetaminophen (TYLENOL) tablet 650 mg, 650 mg, Oral, Q6H PRN, Ren Median MD, 650 mg at 08/02/20 1114    fentaNYL (SUBLIMAZE) injection 25 mcg, 25 mcg, Intravenous, Q5 Min PRN, Rochelle Gagnon CRNA    HYDROmorphone (DILAUDID) injection 0 5 mg, 0 5 mg, Intravenous, Q4H PRN, Mindi Barry MD    insulin glargine (LANTUS) subcutaneous injection 8 Units 0 08 mL, 8 Units, Subcutaneous, HS, Ren Medina MD, 8 Units at 08/09/20 2303    insulin lispro (HumaLOG) 100 units/mL subcutaneous injection 1-5 Units, 1-5 Units, Subcutaneous, HS, Ren Medina MD, 1 Units at 08/09/20 2304    insulin lispro (HumaLOG) 100 units/mL subcutaneous injection 1-6 Units, 1-6 Units, Subcutaneous, TID AC, 3 Units at 08/09/20 1122 **AND** Fingerstick Glucose (POCT), , , TID AC, Ren Medina MD    insulin lispro (HumaLOG) 100 units/mL subcutaneous injection 8 Units, 8 Units, Subcutaneous, TID With Meals, Ren Medina MD, 8 Units at 08/09/20 1123    metoclopramide (REGLAN) injection 10 mg, 10 mg, Intravenous, Once PRN, Rochelle Gagnon CRNA    nicotine (NICODERM CQ) 7 mg/24hr TD 24 hr patch 1 patch, 1 patch, Transdermal, Daily, Ren Medina MD, 1 patch at 08/04/20 0915    ondansetron (ZOFRAN) injection 4 mg, 4 mg, Intravenous, Once PRN, Rochelle Gagnon CRNA    oxyCODONE-acetaminophen (PERCOCET) 5-325 mg per tablet 1 tablet, 1 tablet, Oral, Q4H PRN, Mindi Barry MD, 1 tablet at 08/10/20 1023    REVIEW OF SYSTEMS     Review of Systems   Constitutional: Negative  HENT: Negative  Eyes: Negative  Respiratory: Negative  Cardiovascular: Negative  Gastrointestinal: Negative  Endocrine: Negative  Genitourinary: Negative  Musculoskeletal: Negative  Skin: Negative  Allergic/Immunologic: Negative  Neurological: Negative  Hematological: Negative  All other systems reviewed and are negative  OBJECTIVE     Current Weight: Weight - Scale: 80 kg (176 lb 5 9 oz)  Vitals:    08/10/20 0730   BP: 122/57   Pulse: 72   Resp: 18   Temp: 98 6 °F (37 °C)   SpO2: 96%     Body mass index is 28 47 kg/m²  Intake/Output Summary (Last 24 hours) at 8/10/2020 1130  Last data filed at 8/9/2020 2200  Gross per 24 hour   Intake 420 ml   Output 1000 ml   Net -580 ml       PHYSICAL EXAMINATION     Physical Exam  HENT:      Head: Normocephalic and atraumatic  Eyes:      Pupils: Pupils are equal, round, and reactive to light  Neck:      Musculoskeletal: Neck supple  Vascular: No JVD  Cardiovascular:      Rate and Rhythm: Normal rate and regular rhythm  Heart sounds: Murmur present  No friction rub  Pulmonary:      Effort: Pulmonary effort is normal       Breath sounds: Normal breath sounds  Abdominal:      General: Bowel sounds are normal  There is no distension  Palpations: Abdomen is soft  Tenderness: There is no abdominal tenderness  There is no rebound  Musculoskeletal:         General: No tenderness or edema  Skin:     General: Skin is dry  Findings: Erythema and rash present  Neurological:      Mental Status: He is alert and oriented to person, place, and time     Psychiatric:         Mood and Affect: Mood and affect normal            LAB RESULTS     Results from last 7 days   Lab Units 08/10/20  0647 08/09/20  0428 08/08/20  0503 08/08/20  0502 08/07/20  1756 08/07/20  1002 08/06/20  0500 08/05/20  0515 08/04/20  0459   WBC Thousand/uL 13 35* 7 42  --  6 71  --  8 11 9 00 9 14 10 26*   HEMOGLOBIN g/dL 10 1* 10 3*  --  10 7*  --  11 0* 10 9* 11 0* 11 9* HEMATOCRIT % 31 6* 33 2*  --  34 7*  --  35 8* 35 0* 35 2* 37 1   PLATELETS Thousands/uL 314 317  --  310  --  340 336 355 359   POTASSIUM mmol/L 5 2 4 3 4 6  --  4 2 4 0 4 6 4 7 4 7   CHLORIDE mmol/L 103 103 105  --  106 106 109* 107 106   CO2 mmol/L 27 29 28  --  31 32 27 28 31   BUN mg/dL 27* 27* 29*  --  30* 32* 31* 41* 36*   CREATININE mg/dL 1 23 1 22 1 18  --  1 30 1 43* 1 28 1 29 1 24   EGFR ml/min/1 73sq m 53 53 56  --  49 44 50 50 52   CALCIUM mg/dL 8 4 8 4 8 6  --  8 9 8 6 8 6 8 5 8 7   MAGNESIUM mg/dL  --   --   --   --   --  1 8 1 8 2 0 1 9   PHOSPHORUS mg/dL  --   --  3 0  --   --   --   --   --   --            RADIOLOGY RESULTS      Results for orders placed during the hospital encounter of 07/30/20   XR chest 1 view portable    Narrative CHEST     INDICATION:   sepsis  COMPARISON:  6/11/2020    EXAM PERFORMED/VIEWS:  XR CHEST PORTABLE      FINDINGS:    Cardiomediastinal silhouette appears unremarkable  The lungs are clear  No pneumothorax or pleural effusion  Osseous structures appear within normal limits for patient age  Impression No acute cardiopulmonary disease  Workstation performed: [id number]       Results for orders placed during the hospital encounter of 06/11/20   XR chest 2 views    Narrative CHEST     INDICATION:   AMS  COMPARISON:  Radiograph 1/28/2019    EXAM PERFORMED/VIEWS:  XR CHEST PA & LATERAL      FINDINGS:    Heart shadow is enlarged but unchanged from prior exam     Small right pleural effusion  No pneumothorax or focal consolidation  No evidence of pulmonary edema  Osseous structures appear within normal limits for patient age  Impression Stable cardiomegaly  Small right pleural effusion  No evidence of pulmonary edema          Workstation performed: [id number]         ASSESSMENT/PLAN     51-year-old male with past medical history of chronic kidney disease stage 3, CHF with diastolic dysfunction hypertension, COPD, atrial fibrillation, peripheral vascular disease, ex-smoker, diabetes mellitus type 2, chronic indwelling Baxter catheter presented with pyuria  1  Chronic kidney disease stage 3:  Baseline serum creatinine 1 1-1 3   -current serum creatinine is 1 2 and stable  2  Atherosclerosis of lower extremity with gangrene:  Patient for arteriogram on Wednesday   -lower extremity arterial Doppler study obtained on July 31st had revealed diffuse lower extremity disease without stenosis  3  Renal optimization prior to arteriogram:  Patient scheduled to undergo arteriogram on Wednesday   -will initiate gentle hydration and Mucomyst starting tomorrow  4  CHF with diastolic dysfunction:  Currently off diuretics  5  Atrial fibrillation:  Rate and rhythm control  Xarelto put on hold  6  Abnormal urinalysis:  Urinalysis with evidence of pyuria and urine culture growing ESBL  -recommendation was to hold antibiotics as patient has remained afebrile            Roxanne Robertson MD  Nephrology  8/10/2020

## 2020-08-10 NOTE — ASSESSMENT & PLAN NOTE
Wt Readings from Last 3 Encounters:   07/30/20 80 kg (176 lb 5 9 oz)   07/14/20 85 1 kg (187 lb 9 6 oz)   07/07/20 85 1 kg (187 lb 9 6 oz)     · Unclear why patient is on entresto after further medication review  · Last ECHO from 1/30/2019 showing EF 60%  · Due to low normal blood pressures, Entresto was discontinued   · Currently euvolemic and normotension

## 2020-08-10 NOTE — ASSESSMENT & PLAN NOTE
· (+ )UA on admission, urine culture growing ESBL  · ID signed off,  · Baxter catheter was replaced on 7/31  · Recommended holding additional antibiotics as patient has remained non-toxic and stable

## 2020-08-10 NOTE — ASSESSMENT & PLAN NOTE
80year-old minimally ambulatory male smoker w/chronic diastolic heart failure, type 2 DM, CKD 3 (baseline creatinine 1 4-1 8), Afib on Xarelto, COPD, L knee contracture, s/p bilateral ANDREA, recurrent UTI w/chronic indwelling Baxter catheter admitted w/pyuria and PAD w/bilateral heel pressure wounds, L >R  Diagnostics:  -LEAD 7/31:  diffuse right lower extremity disease without focal stenosis, R JAMAL 0 6/42/34 and left lower extremity with diffuse disease, no focal stenosis, decrease in velocities in SFA to popliteal artery suggestive of possible stenosis, L JAMAL 0 52/33/36  -Xray left foot 7/31: No evidence of fracture, osteomyelitis or other significant bony abnormality in the left foot  -Xray right foot 7/31: No evidence of osteomyelitis, fracture or other significant bony abnormality  -BC: neg x 5 days  -Angiogram 8/5/2020 L SFA PTA, TPT PTA, prox Peroneal PTA  Peroneal only in-line flow to the foot with short segment occlusion at origin of AT and remaining AT reconstituted by geniculate collaterals, PT chronically occluded & reconstituted distally  Robust collaterals  Peroneal runoff with delayed filling of PT/AT  -post intervention JAMAL right 0 75/38/26 and left 0 74/26/30 (prior right JAMAL 0 52)    Plan:  -PAD with bilateral heel deep tissue injury, L>R, s/p L SFA/TPT/peroneal PTA 8/5  Now with progression of right heel ulceration  Patient will require angiogram with right lower extremity runoff and attempted revascularization  Will need general anesthesia for angiogram and renal optimization  Coordinating scheduling with IR  Scheduled for Wednesday 8/12  -Nephrology following    Serum creatinine 1 23/GFR 53 this am  Continue to trend and avoid nephrotoxic meds  -Xarelto on hold today in anticipation of Agram 8/12/2020  -Off load heels with pressure bed and boots  -Betadine paint to heel   -will d/w Dr Bob Early

## 2020-08-10 NOTE — ASSESSMENT & PLAN NOTE
· Baseline creatinine 1 4-1 8 on review  · Nephrology following for renal optimization prior to agram   · Cr   Stable at 1 23  · Continue to monitor

## 2020-08-10 NOTE — PROGRESS NOTES
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 [facility])  Assessment & Plan  80year-old minimally ambulatory male smoker w/chronic diastolic heart failure, type 2 DM, CKD 3 (baseline creatinine 1 4-1 8), Afib on Xarelto, COPD, L knee contracture, s/p bilateral ANDREA, recurrent UTI w/chronic indwelling Baxter catheter admitted w/pyuria and PAD w/bilateral heel pressure wounds, L >R  Diagnostics:  -LEAD 7/31:  diffuse right lower extremity disease without focal stenosis, R JAMAL 0 6/42/34 and left lower extremity with diffuse disease, no focal stenosis, decrease in velocities in SFA to popliteal artery suggestive of possible stenosis, L JAMAL 0 52/33/36  -Xray left foot 7/31: No evidence of fracture, osteomyelitis or other significant bony abnormality in the left foot  -Xray right foot 7/31: No evidence of osteomyelitis, fracture or other significant bony abnormality  -BC: neg x 5 days  -Angiogram 8/5/2020 L SFA PTA, TPT PTA, prox Peroneal PTA  Peroneal only in-line flow to the foot with short segment occlusion at origin of AT and remaining AT reconstituted by geniculate collaterals, PT chronically occluded & reconstituted distally  Robust collaterals  Peroneal runoff with delayed filling of PT/AT  -post intervention JAMAL right 0 75/38/26 and left 0 74/26/30 (prior right JAMAL 0 52)    Plan:  -PAD with bilateral heel deep tissue injury, L>R, s/p L SFA/TPT/peroneal PTA 8/5  Now with progression of right heel ulceration  Patient will require angiogram with right lower extremity runoff and attempted revascularization  Will need general anesthesia for angiogram and renal optimization  Coordinating scheduling with IR  Scheduled for Wednesday 8/12  -Nephrology following    Serum creatinine 1 23/GFR 53 this am  Continue to trend and avoid nephrotoxic meds  -Xarelto on hold today in anticipation of Agram 8/12/2020  -Off load heels with pressure bed and boots  -Betadine paint to heel   -will d/w Dr Adella Cranker      Stage 3 chronic kidney disease [facility])  Assessment & Plan  -baseline creatinine 1 4-1 8  -stable  Creat 1 23/GFR  53 this am  -Nephrology following will require prep for procedure  IVF per nephrology  -continue to avoid nephrotoxic agents      A-fib [facility])  Assessment & Plan  -stable, rate controlled  -Xarelto on hold today in anticipation of Agram this week        * Pressure ulcer of ankle  Assessment & Plan  L>R heel ulcer w/dry gangrene L heel  -heel pressure pressure off-loading           Subjective:  No new complaints  No new events overnight   +leukocytosis this am   No temp spikes  VSS    Vitals:  /57   Pulse 72   Temp 98 6 °F (37 °C)   Resp 18   Ht 5' 6" (1 676 m)   Wt 80 kg (176 lb 5 9 oz)   SpO2 96%   BMI 28 47 kg/m²     I/Os:  I/O last 3 completed shifts: In: 600 [P O :600]  Out: 2000 [Urine:2000]  No intake/output data recorded  Lab Results and Cultures:   Lab Results   Component Value Date    WBC 13 35 (H) 08/10/2020    HGB 10 1 (L) 08/10/2020    HCT 31 6 (L) 08/10/2020    MCV 88 08/10/2020     08/10/2020     Lab Results   Component Value Date    CALCIUM 8 4 08/10/2020     04/01/2018    K 5 2 08/10/2020    CO2 27 08/10/2020     08/10/2020    BUN 27 (H) 08/10/2020    CREATININE 1 23 08/10/2020     Lab Results   Component Value Date    INR 1 50 (H) 08/04/2020    INR 2 46 (H) 07/30/2020    INR 1 23 (H) 06/11/2020    PROTIME 18 4 (H) 08/04/2020    PROTIME 25 5 (H) 07/30/2020    PROTIME 15 5 (H) 06/11/2020        Blood Culture:   Lab Results   Component Value Date    BLOODCX No Growth After 5 Days   07/30/2020   ,   Urinalysis:   Lab Results   Component Value Date    COLORU Yellow 08/07/2020    CLARITYU Cloudy 08/07/2020    SPECGRAV 1 025 08/07/2020    PHUR 6 0 08/07/2020    PHUR 7 0 01/28/2019    LEUKOCYTESUR Moderate (A) 08/07/2020    NITRITE Positive (A) 08/07/2020    GLUCOSEU Negative 08/07/2020    KETONESU Negative 08/07/2020    BILIRUBINUR Negative 08/07/2020    BLOODU Moderate (A) 08/07/2020   ,   Urine Culture:   Lab Results   Component Value Date    URINECX >100,000 cfu/ml Escherichia coli ESBL (A) 07/30/2020    URINECX 3301-1237 cfu/ml Proteus mirabilis (A) 07/30/2020    URINECX 10,000-19,000 cfu/ml Enterococcus faecalis (A) 07/30/2020   ,   Wound Culure: No results found for: WOUNDCULT    Medications:  Current Facility-Administered Medications   Medication Dose Route Frequency    acetaminophen (TYLENOL) tablet 650 mg  650 mg Oral Q6H PRN    fentaNYL (SUBLIMAZE) injection 25 mcg  25 mcg Intravenous Q5 Min PRN    HYDROmorphone (DILAUDID) injection 0 5 mg  0 5 mg Intravenous Q4H PRN    insulin glargine (LANTUS) subcutaneous injection 8 Units 0 08 mL  8 Units Subcutaneous HS    insulin lispro (HumaLOG) 100 units/mL subcutaneous injection 1-5 Units  1-5 Units Subcutaneous HS    insulin lispro (HumaLOG) 100 units/mL subcutaneous injection 1-6 Units  1-6 Units Subcutaneous TID AC    insulin lispro (HumaLOG) 100 units/mL subcutaneous injection 8 Units  8 Units Subcutaneous TID With Meals    metoclopramide (REGLAN) injection 10 mg  10 mg Intravenous Once PRN    nicotine (NICODERM CQ) 7 mg/24hr TD 24 hr patch 1 patch  1 patch Transdermal Daily    ondansetron (ZOFRAN) injection 4 mg  4 mg Intravenous Once PRN    oxyCODONE-acetaminophen (PERCOCET) 5-325 mg per tablet 1 tablet  1 tablet Oral Q4H PRN       Imaging:  No new imaging studies for review      Physical Exam:    General appearance: alert and fatigued  Neurologic: Grossly normal  Neck: no adenopathy, no carotid bruit, no JVD, supple, symmetrical, trachea midline and thyroid not enlarged, symmetric, no tenderness/mass/nodules  Lungs: clear to auscultation bilaterally  Heart: irregularly irregular rhythm, S1, S2 normal, no S3 or S4, no rub and no murmur  Abdomen: soft, non-tender; bowel sounds normal; no masses,  no organomegaly and no abdominal bruits  Extremities: BLE warm, pink and motor/sensory intact  venous stasis changes BLE    heel dressings in place    Wound/Incision:    L heel    R heel      Pulse exam:  Radial: Right: 2+ Left[de-identified] 2+  Femoral: Right: 2+ Left: 2+  DP: Right: non-palpable Left: non-palpable  PT: Right: non-palpable Left: non-palpable      Ewelina Johnson PA-C  8/10/2020  [facility], [phone]

## 2020-08-10 NOTE — ASSESSMENT & PLAN NOTE
-baseline creatinine 1 4-1 8  -stable  Creat 1 23/GFR  53 this am  -Nephrology following will require prep for procedure    IVF per nephrology  -continue to avoid nephrotoxic agents

## 2020-08-10 NOTE — ASSESSMENT & PLAN NOTE
· Secondary to peripheral arterial disease, 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   · Arterial duplex showing occlusive disease of lower extremities bilaterally  · Vascular surgery following,  · S/p angiogram 8/5/20 with intervention with slight improvement of repeat JAMAL  · Now with progression of right heel ulceration, plan for angiogram of RLE with attempted revascularization scheduled for Wednesday August 12th  · Xarelto on hold secondary to arteriogram for now (hold for 48 hours prior to procedure)  · ID has signed off, pt is currently maintained off of antibiotic therapy   · Pt is afebrile and non-toxic appearing  · However did have mild increase in leukocytosis today to 13 35, would obtain procalcitonin and repeat CBC in the AM  If clinically worsening would consider adding IV ancef and flagyl and re consulting ID

## 2020-08-11 LAB
ANION GAP SERPL CALCULATED.3IONS-SCNC: 6 MMOL/L (ref 4–13)
BASOPHILS # BLD AUTO: 0.06 THOUSANDS/ΜL (ref 0–0.1)
BASOPHILS NFR BLD AUTO: 1 % (ref 0–1)
BUN SERPL-MCNC: 29 MG/DL (ref 5–25)
CALCIUM SERPL-MCNC: 8.8 MG/DL (ref 8.3–10.1)
CHLORIDE SERPL-SCNC: 103 MMOL/L (ref 100–108)
CO2 SERPL-SCNC: 29 MMOL/L (ref 21–32)
CREAT SERPL-MCNC: 1.17 MG/DL (ref 0.6–1.3)
EOSINOPHIL # BLD AUTO: 0.3 THOUSAND/ΜL (ref 0–0.61)
EOSINOPHIL NFR BLD AUTO: 4 % (ref 0–6)
ERYTHROCYTE [DISTWIDTH] IN BLOOD BY AUTOMATED COUNT: 16 % (ref 11.6–15.1)
GFR SERPL CREATININE-BSD FRML MDRD: 56 ML/MIN/1.73SQ M
GLUCOSE SERPL-MCNC: 110 MG/DL (ref 65–140)
GLUCOSE SERPL-MCNC: 119 MG/DL (ref 65–140)
GLUCOSE SERPL-MCNC: 178 MG/DL (ref 65–140)
GLUCOSE SERPL-MCNC: 275 MG/DL (ref 65–140)
GLUCOSE SERPL-MCNC: 68 MG/DL (ref 65–140)
HCT VFR BLD AUTO: 34.6 % (ref 36.5–49.3)
HGB BLD-MCNC: 11 G/DL (ref 12–17)
IMM GRANULOCYTES # BLD AUTO: 0.08 THOUSAND/UL (ref 0–0.2)
IMM GRANULOCYTES NFR BLD AUTO: 1 % (ref 0–2)
LYMPHOCYTES # BLD AUTO: 1.6 THOUSANDS/ΜL (ref 0.6–4.47)
LYMPHOCYTES NFR BLD AUTO: 19 % (ref 14–44)
MCH RBC QN AUTO: 28.3 PG (ref 26.8–34.3)
MCHC RBC AUTO-ENTMCNC: 31.8 G/DL (ref 31.4–37.4)
MCV RBC AUTO: 89 FL (ref 82–98)
MONOCYTES # BLD AUTO: 0.66 THOUSAND/ΜL (ref 0.17–1.22)
MONOCYTES NFR BLD AUTO: 8 % (ref 4–12)
NEUTROPHILS # BLD AUTO: 5.93 THOUSANDS/ΜL (ref 1.85–7.62)
NEUTS SEG NFR BLD AUTO: 67 % (ref 43–75)
NRBC BLD AUTO-RTO: 0 /100 WBCS
PLATELET # BLD AUTO: 321 THOUSANDS/UL (ref 149–390)
PMV BLD AUTO: 10 FL (ref 8.9–12.7)
POTASSIUM SERPL-SCNC: 4.7 MMOL/L (ref 3.5–5.3)
RBC # BLD AUTO: 3.89 MILLION/UL (ref 3.88–5.62)
SODIUM SERPL-SCNC: 138 MMOL/L (ref 136–145)
WBC # BLD AUTO: 8.63 THOUSAND/UL (ref 4.31–10.16)

## 2020-08-11 PROCEDURE — 85025 COMPLETE CBC W/AUTO DIFF WBC: CPT | Performed by: INTERNAL MEDICINE

## 2020-08-11 PROCEDURE — 99232 SBSQ HOSP IP/OBS MODERATE 35: CPT | Performed by: PHYSICIAN ASSISTANT

## 2020-08-11 PROCEDURE — 99232 SBSQ HOSP IP/OBS MODERATE 35: CPT | Performed by: INTERNAL MEDICINE

## 2020-08-11 PROCEDURE — 82948 REAGENT STRIP/BLOOD GLUCOSE: CPT

## 2020-08-11 PROCEDURE — 80048 BASIC METABOLIC PNL TOTAL CA: CPT | Performed by: INTERNAL MEDICINE

## 2020-08-11 PROCEDURE — 99232 SBSQ HOSP IP/OBS MODERATE 35: CPT | Performed by: SURGERY

## 2020-08-11 RX ORDER — ACETYLCYSTEINE 200 MG/ML
1200 SOLUTION ORAL; RESPIRATORY (INHALATION) 2 TIMES DAILY
Status: DISPENSED | OUTPATIENT
Start: 2020-08-11 | End: 2020-08-13

## 2020-08-11 RX ORDER — SODIUM CHLORIDE 9 MG/ML
50 INJECTION, SOLUTION INTRAVENOUS CONTINUOUS
Status: DISCONTINUED | OUTPATIENT
Start: 2020-08-11 | End: 2020-08-14

## 2020-08-11 RX ORDER — INSULIN GLARGINE 100 [IU]/ML
5 INJECTION, SOLUTION SUBCUTANEOUS
Status: DISCONTINUED | OUTPATIENT
Start: 2020-08-11 | End: 2020-08-18 | Stop reason: HOSPADM

## 2020-08-11 RX ADMIN — INSULIN LISPRO 1 UNITS: 100 INJECTION, SOLUTION INTRAVENOUS; SUBCUTANEOUS at 22:22

## 2020-08-11 RX ADMIN — INSULIN LISPRO 8 UNITS: 100 INJECTION, SOLUTION INTRAVENOUS; SUBCUTANEOUS at 11:27

## 2020-08-11 RX ADMIN — SODIUM CHLORIDE 75 ML/HR: 0.9 INJECTION, SOLUTION INTRAVENOUS at 23:07

## 2020-08-11 RX ADMIN — INSULIN LISPRO 4 UNITS: 100 INJECTION, SOLUTION INTRAVENOUS; SUBCUTANEOUS at 11:27

## 2020-08-11 RX ADMIN — INSULIN LISPRO 8 UNITS: 100 INJECTION, SOLUTION INTRAVENOUS; SUBCUTANEOUS at 08:31

## 2020-08-11 RX ADMIN — ACETYLCYSTEINE 1200 MG: 200 SOLUTION ORAL; RESPIRATORY (INHALATION) at 11:24

## 2020-08-11 RX ADMIN — INSULIN GLARGINE 5 UNITS: 100 INJECTION, SOLUTION SUBCUTANEOUS at 22:22

## 2020-08-11 RX ADMIN — ACETYLCYSTEINE 1200 MG: 200 SOLUTION ORAL; RESPIRATORY (INHALATION) at 17:57

## 2020-08-11 NOTE — ASSESSMENT & PLAN NOTE
· Secondary to peripheral arterial disease, 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   · Arterial duplex showing occlusive disease of lower extremities bilaterally  · Vascular surgery following,  · S/p angiogram 8/5/20 with intervention with slight improvement of repeat JAMAL  · Now with progression of right heel ulceration, plan for angiogram of RLE with attempted revascularization scheduled for Wednesday August 12th  · Xarelto on hold secondary to arteriogram for now (hold for 48 hours prior to procedure)  · ID has signed off, pt is currently maintained off of antibiotic therapy   · Pt is afebrile and non-toxic appearing  · Leukocytosis is resolved, procal is still pending  · If clinically worsening would consider adding IV ancef and flagyl and re consulting ID

## 2020-08-11 NOTE — PROGRESS NOTES
Progress Note - Krzysztof Blair 1933, 80 y o  male MRN: [id number]    Unit/Bed#: -01 Encounter: [id number]    Primary Care Provider: Zoya Smith MD   Date and time admitted to hospital: 7/30/2020  5:23 PM      DOS: 8/11/2020    * Pressure ulcer of ankle  Assessment & Plan  · Secondary to peripheral arterial disease, 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   · Arterial duplex showing occlusive disease of lower extremities bilaterally  · Vascular surgery following,  · S/p angiogram 8/5/20 with intervention with slight improvement of repeat JAMAL  · Now with progression of right heel ulceration, plan for angiogram of RLE with attempted revascularization scheduled for Wednesday August 12th  · Xarelto on hold secondary to arteriogram for now (hold for 48 hours prior to procedure)  · ID has signed off, pt is currently maintained off of antibiotic therapy   · Pt is afebrile and non-toxic appearing  · Leukocytosis is resolved, procal is still pending  · If clinically worsening would consider adding IV ancef and flagyl and re consulting ID    Diabetes [facility])  Assessment & Plan  Lab Results   Component Value Date    HGBA1C 7 0 (H) 06/12/2020       Recent Labs     08/10/20  1121 08/10/20  1610 08/10/20  2038 08/11/20  0700   POCGLU 264* 170* 114 110       Blood Sugar Average: Last 72 hrs:  (P) 897 [id number]   · Last A1c 7%,   · Decrease Lantus to 5 units QHS with scheduled humalog 8 units TID with SSI coverage  · QID glucose checks   · Consistent carb diet  · Monitor and adjust regimen as needed    Chronic diastolic congestive heart failure (HCC)  Assessment & Plan  Wt Readings from Last 3 Encounters:   07/30/20 80 kg (176 lb 5 9 oz)   07/14/20 85 1 kg (187 lb 9 6 oz)   07/07/20 85 1 kg (187 lb 9 6 oz)     · Previous provider discontinued Entresto secondary to low blood pressures on 8/3  · Last ECHO from 1/30/2019 showing EF 60%  · Currently euvolemic and normotensive     A-fib [facility])  Assessment & Plan  · Currently rate controlled without medications  · A/c with Xarelto, currently on hold for 48 hours prior to angiogram scheduled for Wednesday August 12th  Resume per vascular     Abnormal urinalysis  Assessment & Plan  · (+)UA on admission, urine culture growing ESBL  · ID signed off,  · Baxter catheter was replaced on 7/31  · Recommended holding additional antibiotics as patient has remained non-toxic and stable    Stage 3 chronic kidney disease ([address] )  Assessment & Plan  · Baseline creatinine 1 4-1 8 on review  · Nephrology following for renal optimization prior to agram,  · Plan for start of gentle IV hydration and mucomyst prior  · Cr  Stable at 1 17 today  · Continue to monitor      VTE Pharmacologic Prophylaxis:   Pharmacologic: Xarelto on hold secondary to angiogram tomorrow  Mechanical VTE Prophylaxis in Place: No    Patient Centered Rounds: I have evaluated patient without nursing staff present due to speaking to nurse outside patient's room, [address] with Specialists or Other Care Team Provider: Discussed with nephrology, RN, CM and reviewed previous notes     Education and Discussions with Family / Patient: Discussed with patient at bedside regarding plan of care, will discuss with patient's daughter over the phone as well  Time Spent for Care: 20 minutes  More than 50% of total time spent on counseling and coordination of care as described above  Current Length of Stay: 12 day(s)    Current Patient Status: Inpatient   Certification Statement: The patient will continue to require additional inpatient hospital stay due to angiogram scheduled for tomorrow    Discharge Plan: Not medically stable as above, pending angiogram     Code Status: Level 3 - DNAR and DNI      Subjective:   Pt reports that he feels tired today, however states he did have a good nights sleep last night  Currently denies any chest pain, abdominal pain or significant leg pain   Appears to have a good appetite, ate all of his breakfast  Upon discussion with patient's daughter yesterday, he does have history of mild dementia  Reports his baseline is that he is aware of his surroundings but typically disoriented to time and season  He appears to currently be at baseline mentation wise  Objective:     Vitals:   Temp (24hrs), Av 2 °F (36 8 °C), Min:97 3 °F (36 3 °C), Max:98 6 °F (37 °C)    Temp:  [97 3 °F (36 3 °C)-98 6 °F (37 °C)] 97 3 °F (36 3 °C)  HR:  [71-72] 71  Resp:  [17-18] 17  BP: (116-143)/(54-91) 143/54  SpO2:  [96 %-99 %] 99 %  Body mass index is 28 47 kg/m²  Input and Output Summary (last 24 hours): Intake/Output Summary (Last 24 hours) at 2020 0906  Last data filed at 2020 0300  Gross per 24 hour   Intake    Output 1650 ml   Net -1650 ml       Physical Exam:     Physical Exam  Vitals signs reviewed  Constitutional:       General: He is not in acute distress  Appearance: He is not diaphoretic  Comments: Pt is in no acute distress lying in his hospital bed resting comfortably  Somnolent but easily arousable to verbal stimuli  Alert and oriented x 2 to person and place, appears to be patient's baseline per discussion with daughter over the phone yesterday  HENT:      Head: Normocephalic and atraumatic  Eyes:      Conjunctiva/sclera: Conjunctivae normal       Pupils: Pupils are equal, round, and reactive to light  Cardiovascular:      Rate and Rhythm: Normal rate and regular rhythm  Pulmonary:      Effort: Pulmonary effort is normal  No respiratory distress  Breath sounds: Normal breath sounds  No stridor  No wheezing  Abdominal:      General: Bowel sounds are normal  There is no distension  Palpations: Abdomen is soft  Tenderness: There is no abdominal tenderness  There is no guarding  Skin:     General: Skin is warm and dry  Comments: Gangrene of the bilateral heels    Neurological:      Mental Status: He is alert  Additional Data:     Labs:    Results from last 7 days   Lab Units 08/11/20  0708   WBC Thousand/uL 8 63   HEMOGLOBIN g/dL 11 0*   HEMATOCRIT % 34 6*   PLATELETS Thousands/uL 321   NEUTROS PCT % 67   LYMPHS PCT % 19   MONOS PCT % 8   EOS PCT % 4     Results from last 7 days   Lab Units 08/11/20  0708  08/07/20  1002   POTASSIUM mmol/L 4 7   < > 4 0   CHLORIDE mmol/L 103   < > 106   CO2 mmol/L 29   < > 32   BUN mg/dL 29*   < > 32*   CREATININE mg/dL 1 17   < > 1 43*   CALCIUM mg/dL 8 8   < > 8 6   ALK PHOS U/L  --   --  79   ALT U/L  --   --  49   AST U/L  --   --  69*    < > = values in this interval not displayed  * I Have Reviewed All Lab Data Listed Above  * Additional Pertinent Lab Tests Reviewed:  All Labs Within Last 24 Hours Reviewed    Imaging:    Imaging Reports Reviewed Today Include: JAMAL, angiography   Imaging Personally Reviewed by Myself Includes:  None    Recent Cultures (last 7 days):           Last 24 Hours Medication List:   Current Facility-Administered Medications   Medication Dose Route Frequency Provider Last Rate    acetaminophen  650 mg Oral Q6H PRN Ren Medina MD      fentaNYL  25 mcg Intravenous Q5 Min PRN Brad Barroso CRNA      HYDROmorphone  0 5 mg Intravenous Q4H PRN Megan Rojas MD      insulin glargine  5 Units Subcutaneous HS Carla Ramos PA-C      insulin lispro  1-5 Units Subcutaneous HS Ren Medina MD      insulin lispro  1-6 Units Subcutaneous TID AC Ren Medina MD      insulin lispro  8 Units Subcutaneous TID With Meals Trinity Florentino MD      metoclopramide  10 mg Intravenous Once PRN Brad Barroso CRNA      nicotine  1 patch Transdermal Daily Trinity Florentino MD      ondansetron  4 mg Intravenous Once PRN Brad Barroso CRNA      oxyCODONE-acetaminophen  1 tablet Oral Q4H PRN Megan Rojas MD          Today, Patient Was Seen By: Hector Emerson PA-C    ** Please Note: Dictation voice to text software may have been used in the creation of this document   **

## 2020-08-11 NOTE — ASSESSMENT & PLAN NOTE
-baseline creatinine 1 4-1 8  -stable  Creat 1 17/GFR  56 this am  -Nephrology following will require prep for procedure    IVF per nephrology  -continue to avoid nephrotoxic agents

## 2020-08-11 NOTE — SOCIAL WORK
Per [facility] patient is scheduled for angiogram on tomorrow  CM spoke to Isa who states the discharge plan is acute rehab  If patient is not approved for acute patient will be discharged home with [facility] services  Nia correia patient and family does not want [facility]  Nia Byrnes states patient will not go to SNF but will go home if not accepted at acute  Treatment team informed

## 2020-08-11 NOTE — ASSESSMENT & PLAN NOTE
Lab Results   Component Value Date    HGBA1C 7 0 (H) 06/12/2020       Recent Labs     08/10/20  1121 08/10/20  1610 08/10/20  2038 08/11/20  0700   POCGLU 264* 170* 114 110       Blood Sugar Average: Last 72 hrs:  (P) 629 [id number]   · Last A1c 7%,   · Decrease Lantus to 5 units QHS with scheduled humalog 8 units TID with SSI coverage  · QID glucose checks   · Consistent carb diet  · Monitor and adjust regimen as needed

## 2020-08-11 NOTE — ASSESSMENT & PLAN NOTE
80year-old minimally ambulatory male smoker w/chronic diastolic heart failure, type 2 DM, CKD 3 (baseline creatinine 1 4-1 8), Afib on Xarelto, COPD, L knee contracture, s/p bilateral ANDREA, recurrent UTI w/chronic indwelling Baxter catheter admitted w/pyuria and PAD w/bilateral heel pressure wounds, L >R  Diagnostics:  -LEAD 7/31:  diffuse right lower extremity disease without focal stenosis, R JAMAL 0 6/42/34 and left lower extremity with diffuse disease, no focal stenosis, decrease in velocities in SFA to popliteal artery suggestive of possible stenosis, L JAMAL 0 52/33/36  -Xray left foot 7/31: No evidence of fracture, osteomyelitis or other significant bony abnormality in the left foot  -Xray right foot 7/31: No evidence of osteomyelitis, fracture or other significant bony abnormality  -BC: neg x 5 days  -Angiogram 8/5/2020 L SFA PTA, TPT PTA, prox Peroneal PTA  Peroneal only in-line flow to the foot with short segment occlusion at origin of AT and remaining AT reconstituted by geniculate collaterals, PT chronically occluded & reconstituted distally  Robust collaterals  Peroneal runoff with delayed filling of PT/AT  -post intervention JAMAL right 0 75/38/26 and left 0 74/26/30 (prior right JAMAL 0 52)    Plan:  -PAD with bilateral heel deep tissue injury, L>R, s/p L SFA/TPT/peroneal PTA 8/5  Now with progression of right heel ulceration  Patient will require angiogram with right lower extremity runoff and attempted revascularization  Will need general anesthesia for angiogram and renal optimization  Coordinating scheduling with IR  Scheduled for Wednesday 8/12  -Nephrology following    Serum creatinine 1 17/GFR 56 this am  Continue to trend and avoid nephrotoxic meds  -Xarelto on hold in anticipation of Agram tomorrow  -NPO after midnight for angiogram tomorrow   -Off load heels with pressure bed and boots  -Betadine paint to heel   -will d/w Dr Richi Hernandez

## 2020-08-11 NOTE — ASSESSMENT & PLAN NOTE
· Baseline creatinine 1 4-1 8 on review  · Nephrology following for renal optimization prior to agram,  · Plan for start of gentle IV hydration and mucomyst prior  · Cr   Stable at 1 17 today  · Continue to monitor

## 2020-08-11 NOTE — PROGRESS NOTES
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 [facility])  Assessment & Plan  80year-old minimally ambulatory male smoker w/chronic diastolic heart failure, type 2 DM, CKD 3 (baseline creatinine 1 4-1 8), Afib on Xarelto, COPD, L knee contracture, s/p bilateral ANDREA, recurrent UTI w/chronic indwelling Baxter catheter admitted w/pyuria and PAD w/bilateral heel pressure wounds, L >R  Diagnostics:  -LEAD 7/31:  diffuse right lower extremity disease without focal stenosis, R JAMAL 0 6/42/34 and left lower extremity with diffuse disease, no focal stenosis, decrease in velocities in SFA to popliteal artery suggestive of possible stenosis, L JAMAL 0 52/33/36  -Xray left foot 7/31: No evidence of fracture, osteomyelitis or other significant bony abnormality in the left foot  -Xray right foot 7/31: No evidence of osteomyelitis, fracture or other significant bony abnormality  -BC: neg x 5 days  -Angiogram 8/5/2020 L SFA PTA, TPT PTA, prox Peroneal PTA  Peroneal only in-line flow to the foot with short segment occlusion at origin of AT and remaining AT reconstituted by geniculate collaterals, PT chronically occluded & reconstituted distally  Robust collaterals  Peroneal runoff with delayed filling of PT/AT  -post intervention JAMAL right 0 75/38/26 and left 0 74/26/30 (prior right JAMAL 0 52)    Plan:  -PAD with bilateral heel deep tissue injury, L>R, s/p L SFA/TPT/peroneal PTA 8/5  Now with progression of right heel ulceration  Patient will require angiogram with right lower extremity runoff and attempted revascularization  Will need general anesthesia for angiogram and renal optimization  Coordinating scheduling with IR  Scheduled for Wednesday 8/12  -Nephrology following    Serum creatinine 1  17/GFR 56 this am  Continue to trend and avoid nephrotoxic meds  -Xarelto on hold in anticipation of Agram tomorrow  -NPO after midnight for angiogram tomorrow   -Off load heels with pressure bed and boots  -Betadine paint to heel   -will d/w Dr Dewayne Manzanares      * Pressure ulcer of ankle  Assessment & Plan  L>R heel ulcer w/dry gangrene L heel  -heel pressure pressure off-loading       Stage 3 chronic kidney disease ([address] )  Assessment & Plan  -baseline creatinine 1 4-1 8  -stable  Creat 1 17/GFR  56 this am  -Nephrology following will require prep for procedure  IVF per nephrology  -continue to avoid nephrotoxic agents      A-fib [facility])  Assessment & Plan  -stable, rate controlled  -Xarelto on hold in anticipation of Agram tomorrow              Subjective:  Patient in bed  NAD  Denies any pain or discomfort  Hemodynamically stable  Vital signs stable  Afebrile  Awaiting angiogram tomorrow  Vitals:  /54   Pulse 71   Temp (!) 97 3 °F (36 3 °C)   Resp 17   Ht 5' 6" (1 676 m)   Wt 80 kg (176 lb 5 9 oz)   SpO2 99%   BMI 28 47 kg/m²     I/Os:  I/O last 3 completed shifts:  In: -   Out: 1950 [Urine:1950]  No intake/output data recorded  Lab Results and Cultures:   Lab Results   Component Value Date    WBC 8 63 08/11/2020    HGB 11 0 (L) 08/11/2020    HCT 34 6 (L) 08/11/2020    MCV 89 08/11/2020     08/11/2020     Lab Results   Component Value Date    CALCIUM 8 8 08/11/2020     04/01/2018    K 4 7 08/11/2020    CO2 29 08/11/2020     08/11/2020    BUN 29 (H) 08/11/2020    CREATININE 1 17 08/11/2020     Lab Results   Component Value Date    INR 1 50 (H) 08/04/2020    INR 2 46 (H) 07/30/2020    INR 1 23 (H) 06/11/2020    PROTIME 18 4 (H) 08/04/2020    PROTIME 25 5 (H) 07/30/2020    PROTIME 15 5 (H) 06/11/2020        Blood Culture:   Lab Results   Component Value Date    BLOODCX No Growth After 5 Days   07/30/2020   ,   Urinalysis:   Lab Results   Component Value Date    COLORU Yellow 08/07/2020    CLARITYU Cloudy 08/07/2020    SPECGRAV 1 025 08/07/2020    PHUR 6 0 08/07/2020    PHUR 7 0 01/28/2019    LEUKOCYTESUR Moderate (A) 08/07/2020    NITRITE Positive (A) 08/07/2020    GLUCOSEU Negative 08/07/2020    KETONESU Negative 08/07/2020    BILIRUBINUR Negative 08/07/2020    BLOODU Moderate (A) 08/07/2020   ,   Urine Culture:   Lab Results   Component Value Date    URINECX >100,000 cfu/ml Escherichia coli ESBL (A) 07/30/2020    URINECX 4151-4370 cfu/ml Proteus mirabilis (A) 07/30/2020    URINECX 10,000-19,000 cfu/ml Enterococcus faecalis (A) 07/30/2020   ,   Wound Culure: No results found for: WOUNDCULT    Medications:  Current Facility-Administered Medications   Medication Dose Route Frequency    acetaminophen (TYLENOL) tablet 650 mg  650 mg Oral Q6H PRN    fentaNYL (SUBLIMAZE) injection 25 mcg  25 mcg Intravenous Q5 Min PRN    HYDROmorphone (DILAUDID) injection 0 5 mg  0 5 mg Intravenous Q4H PRN    insulin glargine (LANTUS) subcutaneous injection 5 Units 0 05 mL  5 Units Subcutaneous HS    insulin lispro (HumaLOG) 100 units/mL subcutaneous injection 1-5 Units  1-5 Units Subcutaneous HS    insulin lispro (HumaLOG) 100 units/mL subcutaneous injection 1-6 Units  1-6 Units Subcutaneous TID AC    insulin lispro (HumaLOG) 100 units/mL subcutaneous injection 8 Units  8 Units Subcutaneous TID With Meals    metoclopramide (REGLAN) injection 10 mg  10 mg Intravenous Once PRN    nicotine (NICODERM CQ) 7 mg/24hr TD 24 hr patch 1 patch  1 patch Transdermal Daily    ondansetron (ZOFRAN) injection 4 mg  4 mg Intravenous Once PRN    oxyCODONE-acetaminophen (PERCOCET) 5-325 mg per tablet 1 tablet  1 tablet Oral Q4H PRN       Imaging:  No new imaging studies to review     Physical Exam:    General appearance: alert and oriented, in no acute distress  Skin: Skin color, texture, turgor normal  No rashes or lesions  Neurologic: Grossly normal  Head: Normocephalic, without obvious abnormality, atraumatic  Eyes: EOMI  Neck: no JVD and supple, symmetrical, trachea midline  Lungs: clear to auscultation bilaterally  Chest wall: no tenderness  Heart: regularly irregular rhythm  Abdomen: soft, non-tender; bowel sounds normal; no masses,  no organomegaly  Extremities: Mild BLE swelling   Bilateral heel pressure ulcers, L>R , L knee contracture     Wound/Incision:  Bilateral heel DTI  dressing clean, dry, and intact  Left heel    Right heel          Pulse exam:  Femoral: Right: 2+ Left: 2+  DP: Right: doppler signal and non-palpable Left: doppler signal and non-palpable        FAREED Meehan  8/11/2020  [facility]  [phone]

## 2020-08-11 NOTE — ASSESSMENT & PLAN NOTE
Wt Readings from Last 3 Encounters:   07/30/20 80 kg (176 lb 5 9 oz)   07/14/20 85 1 kg (187 lb 9 6 oz)   07/07/20 85 1 kg (187 lb 9 6 oz)     · Previous provider discontinued Entresto secondary to low blood pressures on 8/3  · Last ECHO from 1/30/2019 showing EF 60%  · Currently euvolemic and normotensive

## 2020-08-11 NOTE — PROGRESS NOTES
NEPHROLOGY PROGRESS NOTE    Patient: Roseanne Gonzalez               Sex: male          DOA: 7/30/2020  5:23 PM   YOB: 1933        Age:  80 y o         LOS:  LOS: 12 days   8/11/2020    REASON FOR THE CONSULTATION:      Renal optimization prior to arteriogram     SUBJECTIVE     Patient seen and examined next to the bedside  Awake alert and in no distress      CURRENT MEDICATIONS       Current Facility-Administered Medications:     acetaminophen (TYLENOL) tablet 650 mg, 650 mg, Oral, Q6H PRN, Ren Medina MD, 650 mg at 08/02/20 1114    acetylcysteine (MUCOMYST) 200 mg/mL oral solution 1,200 mg, 1,200 mg, Oral, BID, Ambreen King MD, 1,200 mg at 08/11/20 1124    fentaNYL (SUBLIMAZE) injection 25 mcg, 25 mcg, Intravenous, Q5 Min PRN, Ze Simpson CRNA    HYDROmorphone (DILAUDID) injection 0 5 mg, 0 5 mg, Intravenous, Q4H PRN, Lillie Sun MD    insulin glargine (LANTUS) subcutaneous injection 5 Units 0 05 mL, 5 Units, Subcutaneous, HS, Carla Ramos PA-C    insulin lispro (HumaLOG) 100 units/mL subcutaneous injection 1-5 Units, 1-5 Units, Subcutaneous, HS, Ren Medina MD, 1 Units at 08/09/20 2304    insulin lispro (HumaLOG) 100 units/mL subcutaneous injection 1-6 Units, 1-6 Units, Subcutaneous, TID AC, 4 Units at 08/11/20 1127 **AND** Fingerstick Glucose (POCT), , , TID AC, Ren Medina MD    insulin lispro (HumaLOG) 100 units/mL subcutaneous injection 8 Units, 8 Units, Subcutaneous, TID With Meals, Ren Medina MD, 8 Units at 08/11/20 1127    metoclopramide (REGLAN) injection 10 mg, 10 mg, Intravenous, Once PRN, Ze Simpson CRNA    nicotine (NICODERM CQ) 7 mg/24hr TD 24 hr patch 1 patch, 1 patch, Transdermal, Daily, Ren Medina MD, 1 patch at 08/04/20 0915    ondansetron (ZOFRAN) injection 4 mg, 4 mg, Intravenous, Once PRN, Ze Simpson CRNA    oxyCODONE-acetaminophen (PERCOCET) 5-325 mg per tablet 1 tablet, 1 tablet, Oral, Q4H PRN, Lillie Sun MD, 1 tablet at 08/10/20 1028   sodium chloride 0 9 % infusion, 75 mL/hr, Intravenous, Continuous, Ewelina Chairez MD    REVIEW OF SYSTEMS     Review of Systems   Constitutional: Negative  HENT: Negative  Eyes: Negative  Respiratory: Negative  Cardiovascular: Negative  Gastrointestinal: Negative  Endocrine: Negative  Genitourinary: Negative  Musculoskeletal: Negative  Skin: Negative  Allergic/Immunologic: Negative  Neurological: Negative  Hematological: Negative  All other systems reviewed and are negative  OBJECTIVE     Current Weight: Weight - Scale: 80 kg (176 lb 5 9 oz)  Vitals:    08/11/20 0717   BP: 143/54   Pulse:    Resp: 17   Temp: (!) 97 3 °F (36 3 °C)   SpO2:      Body mass index is 28 47 kg/m²  Intake/Output Summary (Last 24 hours) at 8/11/2020 1204  Last data filed at 8/11/2020 0300  Gross per 24 hour   Intake    Output 1650 ml   Net -1650 ml       PHYSICAL EXAMINATION     Physical Exam  HENT:      Head: Normocephalic and atraumatic  Eyes:      Pupils: Pupils are equal, round, and reactive to light  Neck:      Musculoskeletal: Neck supple  Vascular: No JVD  Cardiovascular:      Rate and Rhythm: Normal rate and regular rhythm  Heart sounds: Normal heart sounds  No murmur  No friction rub  Pulmonary:      Effort: Pulmonary effort is normal       Breath sounds: Normal breath sounds  Abdominal:      General: Bowel sounds are normal  There is no distension  Palpations: Abdomen is soft  Tenderness: There is no abdominal tenderness  There is no rebound  Musculoskeletal:         General: No tenderness or edema  Skin:     General: Skin is warm and dry  Findings: Rash present  Comments: Bilateral heel dressing intact   Neurological:      Mental Status: He is alert and oriented to person, place, and time     Psychiatric:         Mood and Affect: Mood and affect normal            LAB RESULTS     Results from last 7 days   Lab Units 08/11/20  0708 08/10/20  8257 08/09/20  0428 08/08/20  0503 08/08/20  0502 08/07/20  1756 08/07/20  1002 08/06/20  0500 08/05/20  0515   WBC Thousand/uL 8 63 13 35* 7 42  --  6 71  --  8 11 9 00 9 14   HEMOGLOBIN g/dL 11 0* 10 1* 10 3*  --  10 7*  --  11 0* 10 9* 11 0*   HEMATOCRIT % 34 6* 31 6* 33 2*  --  34 7*  --  35 8* 35 0* 35 2*   PLATELETS Thousands/uL 321 314 317  --  310  --  340 336 355   POTASSIUM mmol/L 4 7 5 2 4 3 4 6  --  4 2 4 0 4 6 4 7   CHLORIDE mmol/L 103 103 103 105  --  106 106 109* 107   CO2 mmol/L 29 27 29 28  --  31 32 27 28   BUN mg/dL 29* 27* 27* 29*  --  30* 32* 31* 41*   CREATININE mg/dL 1 17 1 23 1 22 1 18  --  1 30 1 43* 1 28 1 29   EGFR ml/min/1 73sq m 56 53 53 56  --  49 44 50 50   CALCIUM mg/dL 8 8 8 4 8 4 8 6  --  8 9 8 6 8 6 8 5   MAGNESIUM mg/dL  --   --   --   --   --   --  1 8 1 8 2 0   PHOSPHORUS mg/dL  --   --   --  3 0  --   --   --   --   --            RADIOLOGY RESULTS      Results for orders placed during the hospital encounter of 07/30/20   XR chest 1 view portable    Narrative CHEST     INDICATION:   sepsis  COMPARISON:  6/11/2020    EXAM PERFORMED/VIEWS:  XR CHEST PORTABLE      FINDINGS:    Cardiomediastinal silhouette appears unremarkable  The lungs are clear  No pneumothorax or pleural effusion  Osseous structures appear within normal limits for patient age  Impression No acute cardiopulmonary disease  Workstation performed: [id number]       Results for orders placed during the hospital encounter of 06/11/20   XR chest 2 views    Narrative CHEST     INDICATION:   AMS  COMPARISON:  Radiograph 1/28/2019    EXAM PERFORMED/VIEWS:  XR CHEST PA & LATERAL      FINDINGS:    Heart shadow is enlarged but unchanged from prior exam     Small right pleural effusion  No pneumothorax or focal consolidation  No evidence of pulmonary edema  Osseous structures appear within normal limits for patient age  Impression Stable cardiomegaly      Small right pleural effusion  No evidence of pulmonary edema  Workstation performed: [id number]         ASSESSMENT/PLAN     70-year-old male with past medical history of chronic kidney disease stage 3, CHF with diastolic dysfunction hypertension, COPD, atrial fibrillation, peripheral vascular disease, ex-smoker, diabetes mellitus type 2, chronic indwelling Baxter catheter presented with pyuria  1  Chronic kidney disease stage 3:  Baseline serum creatinine 1 1-1 3   -current serum creatinine is 1 17 and stable  2  Atherosclerosis of lower extremity with gangrene:  Patient for arteriogram in a [employer] [facility] -lower extremity arterial Doppler study obtained on July 31st had revealed diffuse lower extremity disease without stenosis  3  Renal optimization prior to arteriogram:  Patient scheduled to undergo arteriogram in a [employer] [facility] -start normal saline 75 cc an hour at 10:00 p m  Tonight is doing patient will require arteriogram in a [employer] [facility] -initiate Mucomyst 1200 mg p o  B i d  Starting this morning for total of 4 doses  4  CHF with diastolic dysfunction:  Currently off diuretics  5  Atrial fibrillation:  Rate and rhythm control  Xarelto put on hold  6  Abnormal urinalysis:  Urinalysis with evidence of pyuria and urine culture growing ESBL  -recommendation was to hold antibiotics as patient has remained afebrile  7  Insulin-dependent diabetes mellitus:  Currently on short-acting insulin NovoLog 8 units t i d  As well as Lantus 5 units at bedtime            Ambreen King MD  Nephrology  8/11/2020

## 2020-08-11 NOTE — ASSESSMENT & PLAN NOTE
· (+)UA on admission, urine culture growing ESBL  · ID signed off,  · Baxter catheter was replaced on 7/31  · Recommended holding additional antibiotics as patient has remained non-toxic and stable

## 2020-08-12 ENCOUNTER — ANESTHESIA (INPATIENT)
Dept: INTERVENTIONAL RADIOLOGY/VASCULAR | Facility: HOSPITAL | Age: 85
DRG: 253 | End: 2020-08-12
Payer: MEDICARE

## 2020-08-12 PROBLEM — D64.9 ANEMIA: Status: ACTIVE | Noted: 2020-08-12

## 2020-08-12 PROBLEM — M19.90 ARTHRITIS: Status: ACTIVE | Noted: 2020-08-12

## 2020-08-12 LAB
ANION GAP SERPL CALCULATED.3IONS-SCNC: 6 MMOL/L (ref 4–13)
BASOPHILS # BLD AUTO: 0.06 THOUSANDS/ΜL (ref 0–0.1)
BASOPHILS NFR BLD AUTO: 1 % (ref 0–1)
BUN SERPL-MCNC: 31 MG/DL (ref 5–25)
CALCIUM SERPL-MCNC: 8.6 MG/DL (ref 8.3–10.1)
CHLORIDE SERPL-SCNC: 102 MMOL/L (ref 100–108)
CO2 SERPL-SCNC: 28 MMOL/L (ref 21–32)
CREAT SERPL-MCNC: 1.05 MG/DL (ref 0.6–1.3)
EOSINOPHIL # BLD AUTO: 0.28 THOUSAND/ΜL (ref 0–0.61)
EOSINOPHIL NFR BLD AUTO: 3 % (ref 0–6)
ERYTHROCYTE [DISTWIDTH] IN BLOOD BY AUTOMATED COUNT: 15.9 % (ref 11.6–15.1)
GFR SERPL CREATININE-BSD FRML MDRD: 64 ML/MIN/1.73SQ M
GLUCOSE SERPL-MCNC: 123 MG/DL (ref 65–140)
GLUCOSE SERPL-MCNC: 132 MG/DL (ref 65–140)
GLUCOSE SERPL-MCNC: 150 MG/DL (ref 65–140)
GLUCOSE SERPL-MCNC: 166 MG/DL (ref 65–140)
GLUCOSE SERPL-MCNC: 173 MG/DL (ref 65–140)
GLUCOSE SERPL-MCNC: 338 MG/DL (ref 65–140)
HCT VFR BLD AUTO: 34.6 % (ref 36.5–49.3)
HGB BLD-MCNC: 10.8 G/DL (ref 12–17)
IMM GRANULOCYTES # BLD AUTO: 0.09 THOUSAND/UL (ref 0–0.2)
IMM GRANULOCYTES NFR BLD AUTO: 1 % (ref 0–2)
LYMPHOCYTES # BLD AUTO: 1.56 THOUSANDS/ΜL (ref 0.6–4.47)
LYMPHOCYTES NFR BLD AUTO: 18 % (ref 14–44)
MCH RBC QN AUTO: 28.2 PG (ref 26.8–34.3)
MCHC RBC AUTO-ENTMCNC: 31.2 G/DL (ref 31.4–37.4)
MCV RBC AUTO: 90 FL (ref 82–98)
MONOCYTES # BLD AUTO: 0.73 THOUSAND/ΜL (ref 0.17–1.22)
MONOCYTES NFR BLD AUTO: 8 % (ref 4–12)
NEUTROPHILS # BLD AUTO: 6.14 THOUSANDS/ΜL (ref 1.85–7.62)
NEUTS SEG NFR BLD AUTO: 69 % (ref 43–75)
NRBC BLD AUTO-RTO: 0 /100 WBCS
PLATELET # BLD AUTO: 307 THOUSANDS/UL (ref 149–390)
PMV BLD AUTO: 10.2 FL (ref 8.9–12.7)
POTASSIUM SERPL-SCNC: 4.8 MMOL/L (ref 3.5–5.3)
PROCALCITONIN SERPL-MCNC: 0.13 NG/ML
RBC # BLD AUTO: 3.83 MILLION/UL (ref 3.88–5.62)
SODIUM SERPL-SCNC: 136 MMOL/L (ref 136–145)
WBC # BLD AUTO: 8.86 THOUSAND/UL (ref 4.31–10.16)

## 2020-08-12 PROCEDURE — C1769 GUIDE WIRE: HCPCS

## 2020-08-12 PROCEDURE — C1894 INTRO/SHEATH, NON-LASER: HCPCS

## 2020-08-12 PROCEDURE — 99024 POSTOP FOLLOW-UP VISIT: CPT | Performed by: RADIOLOGY

## 2020-08-12 PROCEDURE — 99232 SBSQ HOSP IP/OBS MODERATE 35: CPT | Performed by: NURSE PRACTITIONER

## 2020-08-12 PROCEDURE — 84145 PROCALCITONIN (PCT): CPT | Performed by: PHYSICIAN ASSISTANT

## 2020-08-12 PROCEDURE — B410YZZ FLUOROSCOPY OF ABDOMINAL AORTA USING OTHER CONTRAST: ICD-10-PCS | Performed by: RADIOLOGY

## 2020-08-12 PROCEDURE — 37228 HB TIB/PER REVASC W/TLA: CPT

## 2020-08-12 PROCEDURE — 75625 CONTRAST EXAM ABDOMINL AORTA: CPT

## 2020-08-12 PROCEDURE — B41FYZZ FLUOROSCOPY OF RIGHT LOWER EXTREMITY ARTERIES USING OTHER CONTRAST: ICD-10-PCS | Performed by: RADIOLOGY

## 2020-08-12 PROCEDURE — 047P3ZZ DILATION OF RIGHT ANTERIOR TIBIAL ARTERY, PERCUTANEOUS APPROACH: ICD-10-PCS | Performed by: RADIOLOGY

## 2020-08-12 PROCEDURE — 75625 CONTRAST EXAM ABDOMINL AORTA: CPT | Performed by: RADIOLOGY

## 2020-08-12 PROCEDURE — 36247 INS CATH ABD/L-EXT ART 3RD: CPT

## 2020-08-12 PROCEDURE — 37224 HB FEM/POPL REVAS W/TLA: CPT

## 2020-08-12 PROCEDURE — 75710 ARTERY X-RAYS ARM/LEG: CPT

## 2020-08-12 PROCEDURE — 37224 PR REVSC OPN/PRG FEM/POP W/ANGIOPLASTY UNI: CPT | Performed by: RADIOLOGY

## 2020-08-12 PROCEDURE — 75710 ARTERY X-RAYS ARM/LEG: CPT | Performed by: RADIOLOGY

## 2020-08-12 PROCEDURE — C1725 CATH, TRANSLUMIN NON-LASER: HCPCS

## 2020-08-12 PROCEDURE — C1887 CATHETER, GUIDING: HCPCS

## 2020-08-12 PROCEDURE — 82948 REAGENT STRIP/BLOOD GLUCOSE: CPT

## 2020-08-12 PROCEDURE — 85025 COMPLETE CBC W/AUTO DIFF WBC: CPT | Performed by: INTERNAL MEDICINE

## 2020-08-12 PROCEDURE — 80048 BASIC METABOLIC PNL TOTAL CA: CPT | Performed by: INTERNAL MEDICINE

## 2020-08-12 PROCEDURE — 99233 SBSQ HOSP IP/OBS HIGH 50: CPT | Performed by: PHYSICIAN ASSISTANT

## 2020-08-12 PROCEDURE — 047M3ZZ DILATION OF RIGHT POPLITEAL ARTERY, PERCUTANEOUS APPROACH: ICD-10-PCS | Performed by: RADIOLOGY

## 2020-08-12 PROCEDURE — 37228 PR REVASCULARIZE TIBIAL/PERON ARTERY,ANGIOPLASTY INITIAL: CPT | Performed by: RADIOLOGY

## 2020-08-12 PROCEDURE — 047K3ZZ DILATION OF RIGHT FEMORAL ARTERY, PERCUTANEOUS APPROACH: ICD-10-PCS | Performed by: RADIOLOGY

## 2020-08-12 PROCEDURE — C1760 CLOSURE DEV, VASC: HCPCS

## 2020-08-12 RX ORDER — MEPERIDINE HYDROCHLORIDE 50 MG/ML
12.5 INJECTION INTRAMUSCULAR; INTRAVENOUS; SUBCUTANEOUS ONCE AS NEEDED
Status: DISCONTINUED | OUTPATIENT
Start: 2020-08-12 | End: 2020-08-12 | Stop reason: HOSPADM

## 2020-08-12 RX ORDER — HEPARIN SODIUM 1000 [USP'U]/ML
INJECTION, SOLUTION INTRAVENOUS; SUBCUTANEOUS AS NEEDED
Status: DISCONTINUED | OUTPATIENT
Start: 2020-08-12 | End: 2020-08-12

## 2020-08-12 RX ORDER — ONDANSETRON 2 MG/ML
INJECTION INTRAMUSCULAR; INTRAVENOUS AS NEEDED
Status: DISCONTINUED | OUTPATIENT
Start: 2020-08-12 | End: 2020-08-12

## 2020-08-12 RX ORDER — DIPHENHYDRAMINE HYDROCHLORIDE 50 MG/ML
12.5 INJECTION INTRAMUSCULAR; INTRAVENOUS ONCE AS NEEDED
Status: DISCONTINUED | OUTPATIENT
Start: 2020-08-12 | End: 2020-08-12 | Stop reason: HOSPADM

## 2020-08-12 RX ORDER — DEXAMETHASONE SODIUM PHOSPHATE 10 MG/ML
INJECTION, SOLUTION INTRAMUSCULAR; INTRAVENOUS AS NEEDED
Status: DISCONTINUED | OUTPATIENT
Start: 2020-08-12 | End: 2020-08-12

## 2020-08-12 RX ORDER — SUCCINYLCHOLINE/SOD CL,ISO/PF 100 MG/5ML
SYRINGE (ML) INTRAVENOUS AS NEEDED
Status: DISCONTINUED | OUTPATIENT
Start: 2020-08-12 | End: 2020-08-12

## 2020-08-12 RX ORDER — METOCLOPRAMIDE HYDROCHLORIDE 5 MG/ML
10 INJECTION INTRAMUSCULAR; INTRAVENOUS ONCE AS NEEDED
Status: DISCONTINUED | OUTPATIENT
Start: 2020-08-12 | End: 2020-08-12 | Stop reason: HOSPADM

## 2020-08-12 RX ORDER — FENTANYL CITRATE/PF 50 MCG/ML
25 SYRINGE (ML) INJECTION
Status: DISCONTINUED | OUTPATIENT
Start: 2020-08-12 | End: 2020-08-12 | Stop reason: HOSPADM

## 2020-08-12 RX ORDER — PROMETHAZINE HYDROCHLORIDE 25 MG/ML
25 INJECTION, SOLUTION INTRAMUSCULAR; INTRAVENOUS ONCE AS NEEDED
Status: DISCONTINUED | OUTPATIENT
Start: 2020-08-12 | End: 2020-08-12 | Stop reason: HOSPADM

## 2020-08-12 RX ORDER — PROPOFOL 10 MG/ML
INJECTION, EMULSION INTRAVENOUS AS NEEDED
Status: DISCONTINUED | OUTPATIENT
Start: 2020-08-12 | End: 2020-08-12

## 2020-08-12 RX ORDER — LIDOCAINE HYDROCHLORIDE 10 MG/ML
INJECTION, SOLUTION EPIDURAL; INFILTRATION; INTRACAUDAL; PERINEURAL AS NEEDED
Status: DISCONTINUED | OUTPATIENT
Start: 2020-08-12 | End: 2020-08-12

## 2020-08-12 RX ORDER — LIDOCAINE WITH 8.4% SOD BICARB 0.9%(10ML)
SYRINGE (ML) INJECTION CODE/TRAUMA/SEDATION MEDICATION
Status: COMPLETED | OUTPATIENT
Start: 2020-08-12 | End: 2020-08-12

## 2020-08-12 RX ORDER — ROCURONIUM BROMIDE 10 MG/ML
INJECTION, SOLUTION INTRAVENOUS AS NEEDED
Status: DISCONTINUED | OUTPATIENT
Start: 2020-08-12 | End: 2020-08-12

## 2020-08-12 RX ORDER — NEOSTIGMINE METHYLSULFATE 1 MG/ML
INJECTION INTRAVENOUS AS NEEDED
Status: DISCONTINUED | OUTPATIENT
Start: 2020-08-12 | End: 2020-08-12

## 2020-08-12 RX ORDER — GLYCOPYRROLATE 0.2 MG/ML
INJECTION INTRAMUSCULAR; INTRAVENOUS AS NEEDED
Status: DISCONTINUED | OUTPATIENT
Start: 2020-08-12 | End: 2020-08-12

## 2020-08-12 RX ADMIN — Medication 2 ML: at 13:31

## 2020-08-12 RX ADMIN — Medication 100 MG: at 13:07

## 2020-08-12 RX ADMIN — GLYCOPYRROLATE 0.6 MG: 0.2 INJECTION, SOLUTION INTRAMUSCULAR; INTRAVENOUS at 14:50

## 2020-08-12 RX ADMIN — NICOTINE 1 PATCH: 7 PATCH TRANSDERMAL at 09:27

## 2020-08-12 RX ADMIN — SODIUM CHLORIDE 75 ML/HR: 0.9 INJECTION, SOLUTION INTRAVENOUS at 11:48

## 2020-08-12 RX ADMIN — ROCURONIUM BROMIDE 30 MG: 50 INJECTION, SOLUTION INTRAVENOUS at 13:33

## 2020-08-12 RX ADMIN — LIDOCAINE HYDROCHLORIDE 50 MG: 10 INJECTION, SOLUTION EPIDURAL; INFILTRATION; INTRACAUDAL; PERINEURAL at 13:07

## 2020-08-12 RX ADMIN — IODIXANOL 120 ML: 320 INJECTION, SOLUTION INTRAVASCULAR at 14:58

## 2020-08-12 RX ADMIN — INSULIN GLARGINE 5 UNITS: 100 INJECTION, SOLUTION SUBCUTANEOUS at 22:14

## 2020-08-12 RX ADMIN — FENTANYL CITRATE 25 MCG: 50 INJECTION INTRAMUSCULAR; INTRAVENOUS at 15:19

## 2020-08-12 RX ADMIN — PHENYLEPHRINE HYDROCHLORIDE 100 MCG: 10 INJECTION INTRAVENOUS at 13:07

## 2020-08-12 RX ADMIN — PHENYLEPHRINE HYDROCHLORIDE 200 MCG: 10 INJECTION INTRAVENOUS at 13:18

## 2020-08-12 RX ADMIN — HEPARIN SODIUM 3000 UNITS: 1000 INJECTION INTRAVENOUS; SUBCUTANEOUS at 13:57

## 2020-08-12 RX ADMIN — ONDANSETRON 4 MG: 2 INJECTION INTRAMUSCULAR; INTRAVENOUS at 14:38

## 2020-08-12 RX ADMIN — DEXAMETHASONE SODIUM PHOSPHATE 4 MG: 10 INJECTION, SOLUTION INTRAMUSCULAR; INTRAVENOUS at 13:22

## 2020-08-12 RX ADMIN — PHENYLEPHRINE HYDROCHLORIDE 30 MCG/MIN: 10 INJECTION INTRAVENOUS at 13:18

## 2020-08-12 RX ADMIN — INSULIN LISPRO 4 UNITS: 100 INJECTION, SOLUTION INTRAVENOUS; SUBCUTANEOUS at 22:14

## 2020-08-12 RX ADMIN — NEOSTIGMINE METHYLSULFATE 3 MG: 1 INJECTION, SOLUTION INTRAVENOUS at 14:50

## 2020-08-12 RX ADMIN — INSULIN LISPRO 1 UNITS: 100 INJECTION, SOLUTION INTRAVENOUS; SUBCUTANEOUS at 18:44

## 2020-08-12 RX ADMIN — ACETYLCYSTEINE 1200 MG: 200 SOLUTION ORAL; RESPIRATORY (INHALATION) at 18:41

## 2020-08-12 RX ADMIN — PROPOFOL 70 MG: 10 INJECTION, EMULSION INTRAVENOUS at 13:07

## 2020-08-12 RX ADMIN — FENTANYL CITRATE 25 MCG: 50 INJECTION INTRAMUSCULAR; INTRAVENOUS at 15:31

## 2020-08-12 NOTE — ASSESSMENT & PLAN NOTE
Lab Results   Component Value Date    HGBA1C 7 0 (H) 06/12/2020       Recent Labs     08/12/20  0554 08/12/20  1136 08/12/20  1513 08/12/20  1558   POCGLU 150* 132 166* 173*       Blood Sugar Average: Last 72 hrs:  (P) 163 875   · Last A1c 7%,   · Decrease Lantus to 5 units QHS with scheduled humalog 8 units TID with SSI coverage  · QID glucose checks   · Consistent carb diet  · Monitor and adjust regimen as needed

## 2020-08-12 NOTE — ANESTHESIA POSTPROCEDURE EVALUATION
Post-Op Assessment Note    CV Status:  Stable  Pain Score: 0    Pain management: adequate     Mental Status:  Alert and awake   Hydration Status:  Euvolemic   PONV Controlled:  Controlled   Airway Patency:  Patent      Post Op Vitals Reviewed: Yes      Staff: CRNA         No complications documented      /61 (08/12/20 1511)    Temp (!) 97 3 °F (36 3 °C) (08/12/20 1511)    Pulse 62 (08/12/20 1511)   Resp 20 (08/12/20 1511)    SpO2 97 % (08/12/20 1511)

## 2020-08-12 NOTE — PROGRESS NOTES
Progress Note - Trinh Hastings 1933, 80 y o  male MRN: [id number]    Unit/Bed#: -01 Encounter: [id number]    Primary Care Provider: Sim Palacios MD   Date and time admitted to hospital: 7/30/2020  5:23 PM    * Pressure ulcer of ankle  Assessment & Plan    He underwent right lower extremity angiogram today, 8/12/2020, with successful balloon angioplasty to the right SFA, popliteal and anterior tibial arteries  The posterior tibial could not be visualized  Star closure to the left femoral artery access  Previous left lower extremity intervention was performe  · Secondary to peripheral arterial disease, 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 (of which he already had one previous angiogram performed on 8/5/20  · Arterial duplex showing occlusive disease of lower extremities bilaterally      - check postprocedure JAMAL tomorrow  - monitor serum creatinine and patient with chronic kidney disease (1 05 today)  - resume anticoagulation, Xarelto if no further procedures are planned  - continue with local wound care, Betadine paint to heal and offloading    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 [facility])  Assessment & Plan  The patient underwent right lower extremity angiogram with successful balloon angioplasty to the right SFA, popliteal and anterior tibial arteries  The posterior tibial could not be visualized  Star closure to the left femoral artery access  Previous left lower extremity intervention was performed 08/05/2020    Plan per vascular surgery:   - check postprocedure JAMAL tomorrow  - monitor serum creatinine and patient with chronic kidney disease (1 05 today)  - resume anticoagulation, Xarelto if no further procedures are planned  - continue with local wound care, Betadine paint to heal and offloading    Anemia  Assessment & Plan  Continue to monitor    Patient is stable with hemoglobin of 10 8    Diabetes [facility])  Assessment & Plan  Lab Results   Component Value Date    HGBA1C 7 0 (H) 06/12/2020       Recent Labs     08/12/20  0554 08/12/20  1136 08/12/20  1513 08/12/20  1558   POCGLU 150* 132 166* 173*       Blood Sugar Average: Last 72 hrs:  (P) 163 875   · Last A1c 7%,   · Decrease Lantus to 5 units QHS with scheduled humalog 8 units TID with SSI coverage  · QID glucose checks   · Consistent carb diet  · Monitor and adjust regimen as needed    Chronic diastolic congestive heart failure (HCC)  Assessment & Plan  Wt Readings from Last 3 Encounters:   07/30/20 80 kg (176 lb 5 9 oz)   07/14/20 85 1 kg (187 lb 9 6 oz)   07/07/20 85 1 kg (187 lb 9 6 oz)     · Previous provider discontinued Entresto secondary to low blood pressures on 8/3  · Last ECHO from 1/30/2019 showing EF 60%  · Currently euvolemic and normotensive     A-fib ([facility] )  Assessment & Plan  · Currently rate controlled without medications  · A/C with Xarelto, which was resumed today after the angiogram     Abnormal urinalysis  Assessment & Plan  · (+)UA on admission, urine culture growing ESBL  · ID signed off,  · Baxter catheter was replaced on 7/31  · Recommended holding additional antibiotics as patient has remained non-toxic and stable    Stage 3 chronic kidney disease ([facility] [address] )  Assessment & Plan  · Baseline creatinine 1 4-1 8 on review  · Nephrology following for renal optimization prior to agram,  · Plan for start of gentle IV hydration and mucomyst prior  · Cr  Stable at 1 05 today  · Continue to monitor      VTE Pharmacologic Prophylaxis:   Pharmacologic: Rivaroxaban (Xarelto)  Mechanical VTE Prophylaxis in Place: Yes    Patient Centered Rounds: I have performed bedside rounds with nursing staff today  Discussions with Specialists or Other Care Team Provider:  Consultation with vascular surgery  Education and Discussions with Family / Patient:  Discussed with patient and patient's family plan of action in terms of the patient after his angiogram today   Re-stressed the importance of maintaining patency of the arteries of the risk of amputation of distal occur  Patient had no further questions  Time Spent for Care: 30 minutes  More than 50% of total time spent on counseling and coordination of care as described above  Current Length of Stay: 13 day(s)    Current Patient Status: Inpatient   Certification Statement: The patient will continue to require additional inpatient hospital stay due to Monitoring of healing process of patient's necrotic, gangrenous ulcers, as well as hemoglobin level and kidney function  Discharge Plan / Estimated Discharge Date:  2020  Code Status: Level 3 - DNAR and DNI      Subjective:   Loyd Cruz, an 24-year-old male, presents today in a much better condition after his arteriogram this afternoon  States he has less pain in his legs and overall he feels better  Patient was tolerating meal he was eating for dinner  He offered no further complaints  Patient's family stated that they would like to see what would happen with reintroducing blood flow to the lower extremities  Though, they were counseled on possible options including amputation  Objective:     Vitals:   Temp (24hrs), Av 4 °F (36 3 °C), Min:97 1 °F (36 2 °C), Max:98 2 °F (36 8 °C)    Temp:  [97 1 °F (36 2 °C)-98 2 °F (36 8 °C)] 97 2 °F (36 2 °C)  HR:  [61-84] 62  Resp:  [16-40] 20  BP: (114-162)/(61-76) 136/73  SpO2:  [94 %-100 %] 96 %  Body mass index is 28 47 kg/m²  Input and Output Summary (last 24 hours): Intake/Output Summary (Last 24 hours) at 2020 191  Last data filed at 2020 1508  Gross per 24 hour   Intake 2800 ml   Output 3475 ml   Net -675 ml       Physical Exam:     Physical Exam  Constitutional:       General: He is in acute distress  Comments: Patient had occasional labored breathing and conversational dyspnea  HENT:      Head: Normocephalic and atraumatic  Eyes:      Pupils: Pupils are equal, round, and reactive to light     Neck: Musculoskeletal: Normal range of motion and neck supple  Cardiovascular:      Rate and Rhythm: Normal rate and regular rhythm  Heart sounds: Normal heart sounds, S1 normal and S2 normal  No murmur  No friction rub  No gallop  Pulmonary:      Breath sounds: Normal breath sounds  No stridor  No wheezing or rales  Abdominal:      General: Bowel sounds are normal       Palpations: Abdomen is soft  Musculoskeletal: Normal range of motion  Comments: Black gangrenous ulcers heels both feet   Skin:     General: Skin is warm and dry  Psychiatric:         Mood and Affect: Mood and affect normal          Behavior: Behavior normal      Additional Data:     Labs:    Results from last 7 days   Lab Units 08/12/20  0450   WBC Thousand/uL 8 86   HEMOGLOBIN g/dL 10 8*   HEMATOCRIT % 34 6*   PLATELETS Thousands/uL 307   NEUTROS PCT % 69   LYMPHS PCT % 18   MONOS PCT % 8   EOS PCT % 3     Results from last 7 days   Lab Units 08/12/20  0450  08/07/20  1002   POTASSIUM mmol/L 4 8   < > 4 0   CHLORIDE mmol/L 102   < > 106   CO2 mmol/L 28   < > 32   BUN mg/dL 31*   < > 32*   CREATININE mg/dL 1 05   < > 1 43*   CALCIUM mg/dL 8 6   < > 8 6   ALK PHOS U/L  --   --  79   ALT U/L  --   --  49   AST U/L  --   --  69*    < > = values in this interval not displayed  * I Have Reviewed All Lab Data Listed Above  * Additional Pertinent Lab Tests Reviewed:  [facility] Admission Reviewed    Recent Cultures (last 7 days):           Last 24 Hours Medication List:   Current Facility-Administered Medications   Medication Dose Route Frequency Provider Last Rate    acetaminophen  650 mg Oral Q6H PRN Ren Medina MD      acetylcysteine  1,200 mg Oral BID Lanny Daily MD      HYDROmorphone  0 5 mg Intravenous Q4H PRN Miquel Baez MD      insulin glargine  5 Units Subcutaneous HS Carla Ramos PA-C      insulin lispro  1-5 Units Subcutaneous HS Ren Medina MD      insulin lispro  1-6 Units Subcutaneous TID AC Jeannine Rene MD      nicotine  1 patch Transdermal Daily Jeannine Rene MD      oxyCODONE-acetaminophen  1 tablet Oral Q4H PRN Maribel Vang MD      sodium chloride  75 mL/hr Intravenous Continuous Nila Rawls MD 75 mL/hr (08/12/20 7521)        Today, Patient Was Seen By: FAREED Vargas    ** Please Note: Dragon 360 Dictation voice to text software may have been used in the creation of this document   **

## 2020-08-12 NOTE — ASSESSMENT & PLAN NOTE
· Baseline creatinine 1 4-1 8 on review  · Nephrology following for renal optimization prior to agram,  · Plan for start of gentle IV hydration and mucomyst prior  · Cr   Stable at 1 05 today  · Continue to monitor

## 2020-08-12 NOTE — ASSESSMENT & PLAN NOTE
· Currently rate controlled without medications  · A/C with Xarelto, which was resumed today after the angiogram

## 2020-08-12 NOTE — ASSESSMENT & PLAN NOTE
The patient underwent right lower extremity angiogram with successful balloon angioplasty to the right SFA, popliteal and anterior tibial arteries  The posterior tibial could not be visualized  Star closure to the left femoral artery access    Previous left lower extremity intervention was performed 08/05/2020    Plan per vascular surgery:   - check postprocedure JAMAL tomorrow  - monitor serum creatinine and patient with chronic kidney disease (1 05 today)  - resume anticoagulation, Xarelto if no further procedures are planned  - continue with local wound care, Betadine paint to heal and offloading

## 2020-08-12 NOTE — ASSESSMENT & PLAN NOTE
-baseline creatinine 1 4-1 8  -stable    Creat 1 05  -continue to monitor creatinine postprocedure  -Nephrology following   -continue to avoid nephrotoxic agents

## 2020-08-12 NOTE — PROGRESS NOTES
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 [facility])  Assessment & Plan  80year-old minimally ambulatory male smoker w/chronic diastolic heart failure, type 2 DM, CKD 3 (baseline creatinine 1 4-1 8), Afib on Xarelto, COPD, L knee contracture, s/p bilateral ANDREA, recurrent UTI w/chronic indwelling Baxter catheter admitted w/pyuria and PAD w/bilateral heel pressure wounds, L >R  Diagnostics:  -LEAD 7/31:  diffuse right lower extremity disease without focal stenosis, R JAMAL 0 6/42/34 and left lower extremity with diffuse disease, no focal stenosis, decrease in velocities in SFA to popliteal artery suggestive of possible stenosis, L JAMAL 0 52/33/36  -Xray left foot 7/31: No evidence of fracture, osteomyelitis or other significant bony abnormality in the left foot  -Xray right foot 7/31: No evidence of osteomyelitis, fracture or other significant bony abnormality  -BC: neg x 5 days  -Angiogram 8/5/2020 L SFA PTA, TPT PTA, prox Peroneal PTA  Peroneal only in-line flow to the foot with short segment occlusion at origin of AT and remaining AT reconstituted by geniculate collaterals, PT chronically occluded & reconstituted distally  Robust collaterals  Peroneal runoff with delayed filling of PT/AT  -post intervention JAMAL right 0 75/38/26 and left 0 74/26/30 (prior right JAMAL 0 52)    - RIGHT LE angiogram/intervention 08/12/2020: R SFA, popliteal and anterior tibial artery angioplasties  AT predominant runoff to the foot  Posterior tibial was not visualized  Plan:  Patient with significant bilateral peripheral arterial disease with heel wounds L > R  He underwent right lower extremity angiogram with successful balloon angioplasty to the right SFA, popliteal and anterior tibial arteries  The posterior tibial could not be visualized  Star closure to the left femoral artery access  Previous left lower extremity intervention was performed 08/05/2020     - check postprocedure JAMAL tomorrow  - monitor serum creatinine and patient with chronic kidney disease (1 05 today)  - resume anticoagulation, Xarelto if no further procedures are planned  - continue with local wound care, Betadine paint to heal and offloading  - now that he is revascularized, consider we consult to podiatry (seen by   [facility])  - will d/w Dr Justine Lee Doctor    Stage 3 chronic kidney disease [facility])  Assessment & Plan  -baseline creatinine 1 4-1 8  -stable  Creat 1 05  -continue to monitor creatinine postprocedure  -Nephrology following   -continue to avoid nephrotoxic agents      * Pressure ulcer of ankle  Assessment & Plan  -L>R heel ulcer w/dry gangrene L heel  -heel pressure pressure off-loading, Betadine and monitoring    A-fib (HCC)  Assessment & Plan  -stable, rate controlled  -Chronically on Xarelto which should be restarted if no procedures are planned      Subjective:    Patient seen post-right lower extremity angiogram/ intervention performed this afternoon via left femoral artery access  He has no new complaints, but reports left foot pain and tenderness  He has no left groin discomfort which was closed with Star closure  He is lying flat in bed and comfortable  He denies chest pain or shortness of breath  Vitals:  /73 (BP Location: Left arm)   Pulse 62   Temp (!) 97 2 °F (36 2 °C) (Oral)   Resp 20   Ht 5' 6" (1 676 m)   Wt 80 kg (176 lb 5 9 oz)   SpO2 96%   BMI 28 47 kg/m²     I/Os:  I/O last 3 completed shifts:   In: 400 [P O :400]  Out: 4366 [Urine:3375]  I/O this shift:  In: 2800 [I V :2800]  Out: 750 [Urine:750]    Lab Results and Cultures:   Lab Results   Component Value Date    WBC 8 86 08/12/2020    HGB 10 8 (L) 08/12/2020    HCT 34 6 (L) 08/12/2020    MCV 90 08/12/2020     08/12/2020 Lab Results   Component Value Date    CALCIUM 8 6 08/12/2020     04/01/2018    K 4 8 08/12/2020    CO2 28 08/12/2020     08/12/2020    BUN 31 (H) 08/12/2020    CREATININE 1 05 08/12/2020     Lab Results   Component Value Date    INR 1 50 (H) 08/04/2020    INR 2 46 (H) 07/30/2020    INR 1 23 (H) 06/11/2020    PROTIME 18 4 (H) 08/04/2020    PROTIME 25 5 (H) 07/30/2020    PROTIME 15 5 (H) 06/11/2020        Blood Culture:   Lab Results   Component Value Date    BLOODCX No Growth After 5 Days   07/30/2020   ,   Urinalysis:   Lab Results   Component Value Date    COLORU Yellow 08/07/2020    CLARITYU Cloudy 08/07/2020    SPECGRAV 1 025 08/07/2020    PHUR 6 0 08/07/2020    PHUR 7 0 01/28/2019    LEUKOCYTESUR Moderate (A) 08/07/2020    NITRITE Positive (A) 08/07/2020    GLUCOSEU Negative 08/07/2020    KETONESU Negative 08/07/2020    BILIRUBINUR Negative 08/07/2020    BLOODU Moderate (A) 08/07/2020   ,   Urine Culture:   Lab Results   Component Value Date    URINECX >100,000 cfu/ml Escherichia coli ESBL (A) 07/30/2020    URINECX 5594-6863 cfu/ml Proteus mirabilis (A) 07/30/2020    URINECX 10,000-19,000 cfu/ml Enterococcus faecalis (A) 07/30/2020   ,   Wound Culure: No results found for: WOUNDCULT    Medications:  Current Facility-Administered Medications   Medication Dose Route Frequency    acetaminophen (TYLENOL) tablet 650 mg  650 mg Oral Q6H PRN    acetylcysteine (MUCOMYST) 200 mg/mL oral solution 1,200 mg  1,200 mg Oral BID    HYDROmorphone (DILAUDID) injection 0 5 mg  0 5 mg Intravenous Q4H PRN    insulin glargine (LANTUS) subcutaneous injection 5 Units 0 05 mL  5 Units Subcutaneous HS    insulin lispro (HumaLOG) 100 units/mL subcutaneous injection 1-5 Units  1-5 Units Subcutaneous HS    insulin lispro (HumaLOG) 100 units/mL subcutaneous injection 1-6 Units  1-6 Units Subcutaneous TID AC    nicotine (NICODERM CQ) 7 mg/24hr TD 24 hr patch 1 patch  1 patch Transdermal Daily    oxyCODONE-acetaminophen (PERCOCET) 5-325 mg per tablet 1 tablet  1 tablet Oral Q4H PRN    sodium chloride 0 9 % infusion  75 mL/hr Intravenous Continuous       Imaging:    RIGHT LE angiogram/intervention 08/12/2020    Impression: Stenoses of the right superficial femoral and popliteal artery were successfully dilated with a 4 x 40 mm balloon  Significant peripheral vascular disease involving the right foot including discontinuous peroneal artery and nonvisualization   of the posterior tibial artery  The anterior tibial artery which is the predominant runoff vessel to the right foot contained a high-grade stenosis at its origin which was successfully balloon dilated with a 2 5 x 30 mm balloon  VAS JAMAL 8/6/2020  FINDINGS:     Segment       Ri  Left                           P   P    Ant  Tibial   76  74    Post  Tibial  39  62    Ankle         76  74    Metatarsal    38  26    Great Toe     26  30            CONCLUSION:     Impression     RIGHT LOWER LIMB  Ankle/Brachial Index: 0 75, moderate claudication range, prior: 0 60  PPG/PVR Tracings are dampened  Metatarsal Pressure 38 mmHg  Great Toe Pressure: 26 mmHg, below the healing range  LEFT LOWER LIMB  Ankle/Brachial Index: 0 74, moderate claudication range, prior: 0 52  PPG/PVR Tracings are dampened  Metatarsal Pressure 26 mmHg  Great Toe Pressure: 30 mmHg, below the healing range  In comparison to the study on 7/312020, there is an increase in the JAMAL's  Bilaterally  Physical Exam:    General appearance: elderly male;  alert and oriented, in no acute distress  Skin: Skin color, texture, turgor normal  No rashes or lesions  Neurologic: Grossly normal  Head: Normocephalic, without obvious abnormality, atraumatic  Eyes: eomi  Neck: no JVD and supple, symmetrical, trachea midline  Back: symmetric, no curvature  ROM normal  No CVA tenderness    Lungs: overall clear to auscultation bilaterally; flat in bed, resp non-labored  Chest wall: no tenderness  Heart: irregularly irregular rhythm  Abdomen: soft, non-tender; bowel sounds normal; no masses,  no organomegaly  Extremities:  Marked bilateral chronic skin changes, left knee contracture, bilateral edema     Bilateral heel wounds, severe eschar on the left  Patient with bilateral heel bandages that of being dry and intact  He is also in heel protection buttocks  Left groin procedure site is benign  Bandage clean dry intact  Soft, nontender  No ecchymoses      Wound        R heel      L heel          Pulse exam:  Femoral: Right: 2+ Left: 2+  DP: Right: doppler signal and non-palpable Left: doppler signal and non-palpable  PT: Right: NO DOPPLER       Megan Salguero PA-C  8/12/2020  [facility]

## 2020-08-12 NOTE — PLAN OF CARE
Problem: Prexisting or High Potential for Compromised Skin Integrity  Goal: Skin integrity is maintained or improved  Description: INTERVENTIONS:  - Identify patients at risk for skin breakdown  - Assess and monitor skin integrity  - Assess and monitor nutrition and hydration status  - Monitor labs   - Assess for incontinence   - Turn and reposition patient  - Assist with mobility/ambulation  - Relieve pressure over bony prominences  - Avoid friction and shearing  - Provide appropriate hygiene as needed including keeping skin clean and dry  - Evaluate need for skin moisturizer/barrier cream  - Collaborate with interdisciplinary team   - Patient/family teaching  - Consider wound care consult   Outcome: Progressing     Problem: Potential for Falls  Goal: Patient will remain free of falls  Description: INTERVENTIONS:  - Assess patient frequently for physical needs  -  Identify cognitive and physical deficits and behaviors that affect risk of falls    -  [place] fall precautions as indicated by assessment   - Educate patient/family on patient safety including physical limitations  - Instruct patient to call for assistance with activity based on assessment  - Modify environment to reduce risk of injury  - Consider OT/PT consult to assist with strengthening/mobility  Outcome: Progressing     Problem: PAIN - ADULT  Goal: Verbalizes/displays adequate comfort level or baseline comfort level  Description: Interventions:  - Encourage patient to monitor pain and request assistance  - Assess pain using appropriate pain scale  - Administer analgesics based on type and severity of pain and evaluate response  - Implement non-pharmacological measures as appropriate and evaluate response  - Consider cultural and social influences on pain and pain management  - Notify physician/advanced practitioner if interventions unsuccessful or patient reports new pain  Outcome: Progressing     Problem: INFECTION - ADULT  Goal: Absence or prevention of progression during hospitalization  Description: INTERVENTIONS:  - Assess and monitor for signs and symptoms of infection  - Monitor lab/diagnostic results  - Monitor all insertion sites, i e  indwelling lines, tubes, and drains  - Monitor endotracheal if appropriate and nasal secretions for changes in amount and color  - Breaks appropriate cooling/warming therapies per order  - Administer medications as ordered  - Instruct and encourage patient and family to use good hand hygiene technique  - Identify and instruct in appropriate isolation precautions for identified infection/condition  Outcome: Progressing  Goal: Absence of fever/infection during neutropenic period  Description: INTERVENTIONS:  - Monitor WBC    Outcome: Progressing     Problem: SAFETY ADULT  Goal: Patient will remain free of falls  Description: INTERVENTIONS:  - Assess patient frequently for physical needs  -  Identify cognitive and physical deficits and behaviors that affect risk of falls    -  [place] fall precautions as indicated by assessment   - Educate patient/family on patient safety including physical limitations  - Instruct patient to call for assistance with activity based on assessment  - Modify environment to reduce risk of injury  - Consider OT/PT consult to assist with strengthening/mobility  Outcome: Progressing  Goal: Maintain or return to baseline ADL function  Description: INTERVENTIONS:  -  Assess patient's ability to carry out ADLs; assess patient's baseline for ADL function and identify physical deficits which impact ability to perform ADLs (bathing, care of mouth/teeth, toileting, grooming, dressing, etc )  - Assess/evaluate cause of self-care deficits   - Assess range of motion  - Assess patient's mobility; develop plan if impaired  - Assess patient's need for assistive devices and provide as appropriate  - Encourage maximum independence but intervene and supervise when necessary  - Involve family in performance of ADLs  - Assess for home care needs following discharge   - Consider OT consult to assist with ADL evaluation and planning for discharge  - Provide patient education as appropriate  Outcome: Progressing  Goal: Maintain or return mobility status to optimal level  Description: INTERVENTIONS:  - Assess patient's baseline mobility status (ambulation, transfers, stairs, etc )    - Identify cognitive and physical deficits and behaviors that affect mobility  - Identify mobility aids required to assist with transfers and/or ambulation (gait belt, sit-to-stand, lift, walker, cane, etc )  - [place] fall precautions as indicated by assessment  - Record patient progress and toleration of activity level on Mobility SBAR; progress patient to next Phase/Stage  - Instruct patient to call for assistance with activity based on assessment  - Consider rehabilitation consult to assist with strengthening/weightbearing, etc   Outcome: Progressing     Problem: DISCHARGE PLANNING  Goal: Discharge to home or other facility with appropriate resources  Description: INTERVENTIONS:  - Identify barriers to discharge w/patient and caregiver  - Arrange for needed discharge resources and transportation as appropriate  - Identify discharge learning needs (meds, wound care, etc )  - Arrange for interpretive services to assist at discharge as needed  - Refer to Case Management Department for coordinating discharge planning if the patient needs post-hospital services based on physician/advanced practitioner order or complex needs related to functional status, cognitive ability, or social support system  Outcome: Progressing     Problem: Knowledge Deficit  Goal: Patient/family/caregiver demonstrates understanding of disease process, treatment plan, medications, and discharge instructions  Description: Complete learning assessment and assess knowledge base    Interventions:  - Provide teaching at level of understanding  - Provide teaching via preferred learning methods  Outcome: Progressing     Problem: GENITOURINARY - ADULT  Goal: Maintains or returns to baseline urinary function  Description: INTERVENTIONS:  - Assess urinary function  - Encourage oral fluids to ensure adequate hydration if ordered  - Administer IV fluids as ordered to ensure adequate hydration  - Administer ordered medications as needed  - Offer frequent toileting  - Follow urinary retention protocol if ordered  Outcome: Progressing  Goal: Absence of urinary retention  Description: INTERVENTIONS:  - Assess patients ability to void and empty bladder  - Monitor I/O  - Bladder scan as needed  - Discuss with physician/AP medications to alleviate retention as needed  - Discuss catheterization for long term situations as appropriate  Outcome: Progressing  Goal: Urinary catheter remains patent  Description: INTERVENTIONS:  - Assess patency of urinary catheter  - If patient has a chronic mccoy, consider changing catheter if non-functioning  - Follow guidelines for intermittent irrigation of non-functioning urinary catheter  Outcome: Progressing     Problem: SKIN/TISSUE INTEGRITY - ADULT  Goal: Skin integrity remains intact  Description: INTERVENTIONS  - Identify patients at risk for skin breakdown  - Assess and monitor skin integrity  - Assess and monitor nutrition and hydration status  - Monitor labs (i e  albumin)  - Assess for incontinence   - Turn and reposition patient  - Assist with mobility/ambulation  - Relieve pressure over bony prominences  - Avoid friction and shearing  - Provide appropriate hygiene as needed including keeping skin clean and dry  - Evaluate need for skin moisturizer/barrier cream  - Collaborate with interdisciplinary team (i e  Nutrition, Rehabilitation, etc )   - Patient/family teaching  Outcome: Progressing  Goal: Incision(s), wounds(s) or drain site(s) healing without S/S of infection  Description: INTERVENTIONS  - Assess and document risk factors for skin impairment   - Assess and document dressing, incision, wound bed, drain sites and surrounding tissue  - Consider nutrition services referral as needed  - Oral mucous membranes remain intact  - Provide patient/ family education  Outcome: Progressing  Goal: Oral mucous membranes remain intact  Description: INTERVENTIONS  - Assess oral mucosa and hygiene practices  - Implement preventative oral hygiene regimen  - Implement oral medicated treatments as ordered  - Initiate Nutrition services referral as needed  Outcome: Progressing     Problem: Nutrition/Hydration-ADULT  Goal: Nutrient/Hydration intake appropriate for improving, restoring or maintaining nutritional needs  Description: Monitor and assess patient's nutrition/hydration status for malnutrition  Collaborate with interdisciplinary team and initiate plan and interventions as ordered  Monitor patient's weight and dietary intake as ordered or per policy  Utilize nutrition screening tool and intervene as necessary  Determine patient's food preferences and provide high-protein, high-caloric foods as appropriate       INTERVENTIONS:  - Monitor oral intake, urinary output, labs, and treatment plans  - Assess nutrition and hydration status and recommend course of action  - Evaluate amount of meals eaten  - Assist patient with eating if necessary   - Allow adequate time for meals  - Recommend/ encourage appropriate diets, oral nutritional supplements, and vitamin/mineral supplements  - Order, calculate, and assess calorie counts as needed  - Recommend, monitor, and adjust tube feedings based on assessed needs  - Assess need for intravenous fluids  - Provide nutrition/hydration education as appropriate  - Include patient/family/caregiver in decisions related to nutrition   Outcome: Progressing

## 2020-08-12 NOTE — ASSESSMENT & PLAN NOTE
80year-old minimally ambulatory male smoker w/chronic diastolic heart failure, type 2 DM, CKD 3 (baseline creatinine 1 4-1 8), Afib on Xarelto, COPD, L knee contracture, s/p bilateral ANDREA, recurrent UTI w/chronic indwelling Baxter catheter admitted w/pyuria and PAD w/bilateral heel pressure wounds, L >R  Diagnostics:  -LEAD 7/31:  diffuse right lower extremity disease without focal stenosis, R JAMAL 0 6/42/34 and left lower extremity with diffuse disease, no focal stenosis, decrease in velocities in SFA to popliteal artery suggestive of possible stenosis, L JAMAL 0 52/33/36  -Xray left foot 7/31: No evidence of fracture, osteomyelitis or other significant bony abnormality in the left foot  -Xray right foot 7/31: No evidence of osteomyelitis, fracture or other significant bony abnormality  -BC: neg x 5 days  -Angiogram 8/5/2020 L SFA PTA, TPT PTA, prox Peroneal PTA  Peroneal only in-line flow to the foot with short segment occlusion at origin of AT and remaining AT reconstituted by geniculate collaterals, PT chronically occluded & reconstituted distally  Robust collaterals  Peroneal runoff with delayed filling of PT/AT  -post intervention JAMAL right 0 75/38/26 and left 0 74/26/30 (prior right JAMAL 0 52)    - RIGHT LE angiogram/intervention 08/12/2020: R SFA, popliteal and anterior tibial artery angioplasties  AT predominant runoff to the foot  Posterior tibial was not visualized  Plan:  Patient with significant bilateral peripheral arterial disease with heel wounds L > R  He underwent right lower extremity angiogram with successful balloon angioplasty to the right SFA, popliteal and anterior tibial arteries  The posterior tibial could not be visualized  Star closure to the left femoral artery access    Previous left lower extremity intervention was performed 08/05/2020     - check postprocedure JAMAL tomorrow  - monitor serum creatinine and patient with chronic kidney disease (1 05 today)  - resume anticoagulation, Xarelto if no further procedures are planned  - continue with local wound care, Betadine paint to heal and offloading  - now that he is revascularized, consider we consult to podiatry (seen by Dr Cleo Hernadez)  - will d/w Dr Donnie Hernández Doctor

## 2020-08-12 NOTE — BRIEF OP NOTE (RAD/CATH)
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 (HCC)    6  Stage 3 chronic kidney disease (HCC)      Post-op Diagnosis:   1  UTI (urinary tract infection)    2  Urinary retention    3  PVD (peripheral vascular disease) ([facility] [address] )    4  Pressure ulcer of foot    5   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

## 2020-08-12 NOTE — ANESTHESIA PREPROCEDURE EVALUATION
Procedure:  IR ABDOMINAL ANGIOGRAPHY / INTERVENTION    Relevant Problems   ANESTHESIA (within normal limits)  GETA for angio ~ 1 week ago, no issues      CARDIO   (+) A-fib (HCC)   (+) Chronic diastolic congestive heart failure (HCC)   (+) HLD (hyperlipidemia)   (+) PVD (peripheral vascular disease) (HCC)      ENDO   (+) Type 2 diabetes mellitus, with long-term current use of insulin (HCC)      GI/HEPATIC (within normal limits)  Confirmed NPO appropriate   (-) Gastroesophageal reflux disease      /RENAL   (+) Stage 3 chronic kidney disease (HCC) (stage 3)      HEMATOLOGY   (+) Anemia      MUSCULOSKELETAL   (+) Arthritis      NEURO/PSYCH (within normal limits)      PULMONARY   (+) COPD without exacerbation (HCC) (chronic cough)      Other   (+) Diabetes (HCC)        Physical Exam    Airway    Mallampati score: III  TM Distance: >3 FB  Neck ROM: limited     Dental   Comment: edentulous,     Cardiovascular  Rate: normal,     Pulmonary  Decreased breath sounds, No wheezes,     Other Findings        Anesthesia Plan  ASA Score- 3     Anesthesia Type- general with ASA Monitors  Additional Monitors:   Airway Plan: ETT  Comment: I specifically discussed temporary suspension of DNAR/DNI status during intraoperative and immediate post-op period with both patient and daugther-- both are agreeable to, and in favor of, temporary suspension  I also specifically discussed risk of emergence delirium while obtaining informed consent          Plan Factors-Exercise tolerance (METS): <4 METS  Chart reviewed  EKG reviewed  Existing labs reviewed  Patient is not a current smoker  Induction- intravenous  Postoperative Plan- Plan for postoperative opioid use  Planned trial extubation    Informed Consent- Anesthetic plan and risks discussed with patient and daughter  I personally reviewed this patient with the CRNA  Discussed and agreed on the Anesthesia Plan with the CRNA           TTE 1/30/2019: SUMMARY     PROCEDURE INFORMATION:  This was a technically difficult study      LEFT VENTRICLE:  Systolic function was normal  Ejection fraction was estimated to be 60 %  Although no diagnostic regional wall motion abnormality was identified, this possibility cannot be completely excluded on the basis of this study  There was mild concentric hypertrophy  Transmitral flow pattern: atrial fibrillation      LEFT ATRIUM:  The atrium was mildly dilated      ATRIAL SEPTUM:  There was increased thickness of the septum, consistent with lipomatous hypertrophy      RIGHT ATRIUM:  The atrium was mildly dilated      MITRAL VALVE:  There was moderate annular calcification  There was trace regurgitation      Lab Results   Component Value Date    WBC 8 86 08/12/2020    HGB 10 8 (L) 08/12/2020    HCT 34 6 (L) 08/12/2020    MCV 90 08/12/2020     08/12/2020     Lab Results   Component Value Date    SODIUM 136 08/12/2020    K 4 8 08/12/2020     08/12/2020    CO2 28 08/12/2020    BUN 31 (H) 08/12/2020    CREATININE 1 05 08/12/2020    GLUC 123 08/12/2020    CALCIUM 8 6 08/12/2020     Lab Results   Component Value Date    ALT 49 08/07/2020    AST 69 (H) 08/07/2020    ALKPHOS 79 08/07/2020    BILITOT 0 8 03/29/2018     Lab Results   Component Value Date    INR 1 50 (H) 08/04/2020    INR 2 46 (H) 07/30/2020    INR 1 23 (H) 06/11/2020    PROTIME 18 4 (H) 08/04/2020    PROTIME 25 5 (H) 07/30/2020    PROTIME 15 5 (H) 06/11/2020     Lab Results   Component Value Date    HGBA1C 7 0 (H) 06/12/2020

## 2020-08-12 NOTE — ASSESSMENT & PLAN NOTE
-stable, rate controlled  -Chronically on Xarelto which should be restarted if no procedures are planned

## 2020-08-12 NOTE — PROGRESS NOTES
Patient arrived to IR for right lower extremity angiography and possible intervention    The procedure and risks were discussed with the patient's daughter  All questions were answered  Informed consent was obtained

## 2020-08-12 NOTE — PHYSICAL THERAPY NOTE
Physical Therapy Cancellation Note    Chart review performed  At this time, PT treatment session cancelled as pt unavailable, off the floor  PT will follow and provide PT interventions as appropriate      Ijeoma Zhou, PT, DPT

## 2020-08-12 NOTE — ASSESSMENT & PLAN NOTE
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

## 2020-08-13 ENCOUNTER — APPOINTMENT (INPATIENT)
Dept: ULTRASOUND IMAGING | Facility: HOSPITAL | Age: 85
DRG: 253 | End: 2020-08-13
Payer: MEDICARE

## 2020-08-13 LAB
ANION GAP SERPL CALCULATED.3IONS-SCNC: 10 MMOL/L (ref 4–13)
BASOPHILS # BLD AUTO: 0.01 THOUSANDS/ΜL (ref 0–0.1)
BASOPHILS NFR BLD AUTO: 0 % (ref 0–1)
BUN SERPL-MCNC: 36 MG/DL (ref 5–25)
CALCIUM SERPL-MCNC: 8.3 MG/DL (ref 8.3–10.1)
CHLORIDE SERPL-SCNC: 104 MMOL/L (ref 100–108)
CO2 SERPL-SCNC: 24 MMOL/L (ref 21–32)
CREAT SERPL-MCNC: 1.49 MG/DL (ref 0.6–1.3)
EOSINOPHIL # BLD AUTO: 0 THOUSAND/ΜL (ref 0–0.61)
EOSINOPHIL NFR BLD AUTO: 0 % (ref 0–6)
ERYTHROCYTE [DISTWIDTH] IN BLOOD BY AUTOMATED COUNT: 16.2 % (ref 11.6–15.1)
GFR SERPL CREATININE-BSD FRML MDRD: 42 ML/MIN/1.73SQ M
GLUCOSE SERPL-MCNC: 163 MG/DL (ref 65–140)
GLUCOSE SERPL-MCNC: 220 MG/DL (ref 65–140)
GLUCOSE SERPL-MCNC: 224 MG/DL (ref 65–140)
GLUCOSE SERPL-MCNC: 242 MG/DL (ref 65–140)
GLUCOSE SERPL-MCNC: 251 MG/DL (ref 65–140)
HCT VFR BLD AUTO: 32.7 % (ref 36.5–49.3)
HGB BLD-MCNC: 10.3 G/DL (ref 12–17)
IMM GRANULOCYTES # BLD AUTO: 0.08 THOUSAND/UL (ref 0–0.2)
IMM GRANULOCYTES NFR BLD AUTO: 1 % (ref 0–2)
LYMPHOCYTES # BLD AUTO: 0.76 THOUSANDS/ΜL (ref 0.6–4.47)
LYMPHOCYTES NFR BLD AUTO: 9 % (ref 14–44)
MCH RBC QN AUTO: 28.2 PG (ref 26.8–34.3)
MCHC RBC AUTO-ENTMCNC: 31.5 G/DL (ref 31.4–37.4)
MCV RBC AUTO: 90 FL (ref 82–98)
MONOCYTES # BLD AUTO: 0.34 THOUSAND/ΜL (ref 0.17–1.22)
MONOCYTES NFR BLD AUTO: 4 % (ref 4–12)
NEUTROPHILS # BLD AUTO: 7.65 THOUSANDS/ΜL (ref 1.85–7.62)
NEUTS SEG NFR BLD AUTO: 86 % (ref 43–75)
NRBC BLD AUTO-RTO: 0 /100 WBCS
PLATELET # BLD AUTO: 311 THOUSANDS/UL (ref 149–390)
PMV BLD AUTO: 10 FL (ref 8.9–12.7)
POTASSIUM SERPL-SCNC: 5.2 MMOL/L (ref 3.5–5.3)
RBC # BLD AUTO: 3.65 MILLION/UL (ref 3.88–5.62)
SODIUM SERPL-SCNC: 138 MMOL/L (ref 136–145)
WBC # BLD AUTO: 8.84 THOUSAND/UL (ref 4.31–10.16)

## 2020-08-13 PROCEDURE — 80048 BASIC METABOLIC PNL TOTAL CA: CPT | Performed by: NURSE PRACTITIONER

## 2020-08-13 PROCEDURE — 97112 NEUROMUSCULAR REEDUCATION: CPT

## 2020-08-13 PROCEDURE — 82948 REAGENT STRIP/BLOOD GLUCOSE: CPT

## 2020-08-13 PROCEDURE — 97530 THERAPEUTIC ACTIVITIES: CPT

## 2020-08-13 PROCEDURE — 99024 POSTOP FOLLOW-UP VISIT: CPT | Performed by: NURSE PRACTITIONER

## 2020-08-13 PROCEDURE — 85025 COMPLETE CBC W/AUTO DIFF WBC: CPT | Performed by: NURSE PRACTITIONER

## 2020-08-13 PROCEDURE — 93923 UPR/LXTR ART STDY 3+ LVLS: CPT

## 2020-08-13 PROCEDURE — 99232 SBSQ HOSP IP/OBS MODERATE 35: CPT | Performed by: NURSE PRACTITIONER

## 2020-08-13 PROCEDURE — 93922 UPR/L XTREMITY ART 2 LEVELS: CPT | Performed by: SURGERY

## 2020-08-13 PROCEDURE — 99232 SBSQ HOSP IP/OBS MODERATE 35: CPT | Performed by: INTERNAL MEDICINE

## 2020-08-13 RX ORDER — ACETAMINOPHEN 325 MG/1
650 TABLET ORAL EVERY 6 HOURS PRN
Qty: 30 TABLET | Refills: 0
Start: 2020-08-13

## 2020-08-13 RX ADMIN — INSULIN LISPRO 3 UNITS: 100 INJECTION, SOLUTION INTRAVENOUS; SUBCUTANEOUS at 18:08

## 2020-08-13 RX ADMIN — INSULIN GLARGINE 5 UNITS: 100 INJECTION, SOLUTION SUBCUTANEOUS at 23:25

## 2020-08-13 RX ADMIN — INSULIN LISPRO 1 UNITS: 100 INJECTION, SOLUTION INTRAVENOUS; SUBCUTANEOUS at 23:26

## 2020-08-13 RX ADMIN — HYDROMORPHONE HYDROCHLORIDE 0.5 MG: 1 INJECTION, SOLUTION INTRAMUSCULAR; INTRAVENOUS; SUBCUTANEOUS at 11:16

## 2020-08-13 RX ADMIN — OXYCODONE HYDROCHLORIDE AND ACETAMINOPHEN 1 TABLET: 5; 325 TABLET ORAL at 23:26

## 2020-08-13 RX ADMIN — INSULIN LISPRO 3 UNITS: 100 INJECTION, SOLUTION INTRAVENOUS; SUBCUTANEOUS at 11:00

## 2020-08-13 RX ADMIN — INSULIN LISPRO 2 UNITS: 100 INJECTION, SOLUTION INTRAVENOUS; SUBCUTANEOUS at 14:13

## 2020-08-13 NOTE — PROGRESS NOTES
NEPHROLOGY PROGRESS NOTE    Patient: Adan Arrington               Sex: male          DOA: 7/30/2020  5:23 PM   YOB: 1933        Age:  80 y o         LOS:  LOS: 14 days   8/13/2020    REASON FOR THE CONSULTATION:      Renal optimization prior to arteriogram     SUBJECTIVE     Patient seen and examined next to the bedside  Patient's eyes are closed though upon calling his name opens his eyes and responds to verbal stimuli  Underwent angiogram yesterday with successful angioplasty of several vessels  Noted renal parameters to have slightly worsened  CURRENT MEDICATIONS       Current Facility-Administered Medications:     acetaminophen (TYLENOL) tablet 650 mg, 650 mg, Oral, Q6H PRN, Ren Medina MD, 650 mg at 08/02/20 1114    HYDROmorphone (DILAUDID) injection 0 5 mg, 0 5 mg, Intravenous, Q4H PRN, Elana Holley MD    insulin glargine (LANTUS) subcutaneous injection 5 Units 0 05 mL, 5 Units, Subcutaneous, HS, Carla Ramos PA-C, 5 Units at 08/12/20 2214    insulin lispro (HumaLOG) 100 units/mL subcutaneous injection 1-5 Units, 1-5 Units, Subcutaneous, HS, Ren Medina MD, 4 Units at 08/12/20 2214    insulin lispro (HumaLOG) 100 units/mL subcutaneous injection 1-6 Units, 1-6 Units, Subcutaneous, TID AC, 1 Units at 08/12/20 1844 **AND** Fingerstick Glucose (POCT), , , TID AC, Ren Medina MD    nicotine (NICODERM CQ) 7 mg/24hr TD 24 hr patch 1 patch, 1 patch, Transdermal, Daily, Ren Medina MD, 1 patch at 08/12/20 0927    oxyCODONE-acetaminophen (PERCOCET) 5-325 mg per tablet 1 tablet, 1 tablet, Oral, Q4H PRN, Elana Holley MD, 1 tablet at 08/10/20 1023    sodium chloride 0 9 % infusion, 75 mL/hr, Intravenous, Continuous, Izabel Gabriel MD, Last Rate: 75 mL/hr at 08/12/20 1148    REVIEW OF SYSTEMS     Review of Systems   Constitutional: Negative  HENT: Negative  Eyes: Negative  Respiratory: Negative  Cardiovascular: Negative  Gastrointestinal: Negative      Endocrine: Negative  Genitourinary: Negative  Musculoskeletal: Negative  Skin: Negative  Allergic/Immunologic: Negative  Neurological: Negative  Hematological: Negative  All other systems reviewed and are negative  OBJECTIVE     Current Weight: Weight - Scale: 80 kg (176 lb 5 9 oz)  Vitals:    08/13/20 0712   BP: (!) 111/45   Pulse: 66   Resp: 19   Temp: (!) 97 3 °F (36 3 °C)   SpO2: 99%     Body mass index is 28 47 kg/m²  Intake/Output Summary (Last 24 hours) at 8/13/2020 0944  Last data filed at 8/12/2020 2120  Gross per 24 hour   Intake 3040 ml   Output 1150 ml   Net 1890 ml       PHYSICAL EXAMINATION     Physical Exam  HENT:      Head: Normocephalic and atraumatic  Eyes:      Pupils: Pupils are equal, round, and reactive to light  Neck:      Musculoskeletal: Neck supple  Vascular: No JVD  Cardiovascular:      Rate and Rhythm: Normal rate and regular rhythm  Heart sounds: Murmur present  No friction rub  Pulmonary:      Breath sounds: Normal breath sounds  Comments: Poor inspiratory effort  Abdominal:      General: Bowel sounds are normal  There is no distension  Palpations: Abdomen is soft  Tenderness: There is no abdominal tenderness  There is no rebound  Musculoskeletal:         General: No tenderness or edema  Skin:     General: Skin is dry  Findings: No rash  Neurological:      Mental Status: He is alert and oriented to person, place, and time     Psychiatric:         Mood and Affect: Mood and affect normal            LAB RESULTS     Results from last 7 days   Lab Units 08/13/20  0545 08/12/20  0450 08/11/20  0708 08/10/20  0647 08/09/20  0428 08/08/20  0503 08/08/20  0502 08/07/20  1756 08/07/20  1002   WBC Thousand/uL 8 84 8 86 8 63 13 35* 7 42  --  6 71  --  8 11   HEMOGLOBIN g/dL 10 3* 10 8* 11 0* 10 1* 10 3*  --  10 7*  --  11 0*   HEMATOCRIT % 32 7* 34 6* 34 6* 31 6* 33 2*  --  34 7*  --  35 8*   PLATELETS Thousands/uL 311 307 321 314 317  -- 310  --  340   POTASSIUM mmol/L 5 2 4 8 4 7 5 2 4 3 4 6  --  4 2 4 0   CHLORIDE mmol/L 104 102 103 103 103 105  --  106 106   CO2 mmol/L 24 28 29 27 29 28  --  31 32   BUN mg/dL 36* 31* 29* 27* 27* 29*  --  30* 32*   CREATININE mg/dL 1 49* 1 05 1 17 1 23 1 22 1 18  --  1 30 1 43*   EGFR ml/min/1 73sq m 42 64 56 53 53 56  --  49 44   CALCIUM mg/dL 8 3 8 6 8 8 8 4 8 4 8 6  --  8 9 8 6   MAGNESIUM mg/dL  --   --   --   --   --   --   --   --  1 8   PHOSPHORUS mg/dL  --   --   --   --   --  3 0  --   --   --            RADIOLOGY RESULTS      Results for orders placed during the hospital encounter of 07/30/20   XR chest 1 view portable    Narrative CHEST     INDICATION:   sepsis  COMPARISON:  6/11/2020    EXAM PERFORMED/VIEWS:  XR CHEST PORTABLE      FINDINGS:    Cardiomediastinal silhouette appears unremarkable  The lungs are clear  No pneumothorax or pleural effusion  Osseous structures appear within normal limits for patient age  Impression No acute cardiopulmonary disease  Workstation performed: [id number]       Results for orders placed during the hospital encounter of 06/11/20   XR chest 2 views    Narrative CHEST     INDICATION:   AMS  COMPARISON:  Radiograph 1/28/2019    EXAM PERFORMED/VIEWS:  XR CHEST PA & LATERAL      FINDINGS:    Heart shadow is enlarged but unchanged from prior exam     Small right pleural effusion  No pneumothorax or focal consolidation  No evidence of pulmonary edema  Osseous structures appear within normal limits for patient age  Impression Stable cardiomegaly  Small right pleural effusion  No evidence of pulmonary edema          Workstation performed: [id number]         ASSESSMENT/PLAN     42-year-old male with past medical history of chronic kidney disease stage 3, CHF with diastolic dysfunction hypertension, COPD, atrial fibrillation, peripheral vascular disease, ex-smoker, diabetes mellitus type 2, chronic indwelling Baxter catheter presented with pyuria  1  Chronic kidney disease stage 3:  Baseline serum creatinine 1 1-1 3   -current serum creatinine is 1 49 and worse compared to yesterday   -likely an effect of contrast nephropathy   -will continue with fluids though decrease the rate to 50 cc/hour   -will trend patient's creatinine with BMP in a m  2  Atherosclerosis of lower extremity with gangrene:  Underwent angiogram of right lower extremity with successful balloon angioplasty of the right superficial femoral artery, popliteal and anterior tibial artery  3  Hyperkalemia:  Mildly elevated potassium 5 2 noted   -patient with adequate urine output  4  CHF with diastolic dysfunction:  Currently off diuretics  Will monitor for volume overload  5  Atrial fibrillation:  Rate and rhythm control  Xarelto put on hold  6  Abnormal urinalysis:  Urinalysis with evidence of pyuria and urine culture growing ESBL  -recommendation was to hold antibiotics as patient has remained afebrile  7  Insulin-dependent diabetes mellitus:  Currently on short-acting insulin NovoLog 8 units t i d  As well as Lantus 5 units at bedtime  Blood sugar elevated this morning            Dave Grey MD  Nephrology  8/13/2020

## 2020-08-13 NOTE — ASSESSMENT & PLAN NOTE
· Currently rate controlled without medications  · A/C with Xarelto, which was resumed yesterday after the angiogram

## 2020-08-13 NOTE — ASSESSMENT & PLAN NOTE
Lab Results   Component Value Date    HGBA1C 7 0 (H) 06/12/2020       Recent Labs     08/12/20  1513 08/12/20  1558 08/12/20  2113 08/13/20  0559   POCGLU 166* 173* 338* 251*       Blood Sugar Average: Last 72 hrs:  (P) 741 [id number]   -continue to optimize BS for wound healing and prevent infection  -management per [facility]

## 2020-08-13 NOTE — ASSESSMENT & PLAN NOTE
· Baseline creatinine 1 4-1 8 on review  · Nephrology following for renal optimization prior to agram,  · Plan for start of gentle IV hydration and mucomyst prior  · Cr  Stable at 1 49 today, up from 1 05, likely secondary to post-procedural/angiogram contrast dye nephropathy    · Continue to monitor

## 2020-08-13 NOTE — PHYSICAL THERAPY NOTE
Physical TherapyTreatment Note    Patient's Name: Allison Monroe    Admitting Diagnosis  UTI (urinary tract infection) [N39 0]    Problem List  Patient Active Problem List   Diagnosis    Hydrocele    Paronychia of finger    [facility]    Abnormal urinalysis    Pressure injury of back, stage 2 ([facility] )    HLD (hyperlipidemia)    COPD without exacerbation ([facility] )    Cellulitis of buttock    A-fib ([place] [address] )    Chronic diastolic congestive heart failure ([facility] )    Diabetes ([facility] )    Vitamin D deficiency    PVD (peripheral vascular disease) ([place] [address] )    Toxic metabolic encephalopathy    Ambulatory dysfunction    Acute cystitis    Type 2 diabetes mellitus, with long-term current use of insulin ([facility])    Metabolic encephalopathy    Dry skin dermatitis    Urinary retention    Myalgia    Neuropathy    Speech abnormality    Positive urine culture    Balanitis    Pressure ulcer of ankle    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 ([place] [address] )    Anemia       Past Medical History  Past Medical History:   Diagnosis Date    Ambulatory dysfunction     Atrial fibrillation ([facility])     CHF [facility]congestive heart failure) ([facility])     Chronic kidney disease     COPD (chronic obstructive pulmonary disease) ([facility] )     Diabetes mellitus ([facility] )     Hyperlipidemia     Metabolic encephalopathy     Osteomyelitis ([place] [address] )        Past Surgical History  Past Surgical History:   Procedure Laterality Date    ABDOMINAL SURGERY      IR ABDOMINAL ANGIOGRAPHY / INTERVENTION  8/5/2020    IR ABDOMINAL ANGIOGRAPHY / INTERVENTION  8/12/2020    JOINT REPLACEMENT      b/l hips       Recent Imaging  IR abdominal angiography / intervention   Final Result by Christiane Corona DO (08/12 9159)   Impression: Stenoses of the right superficial femoral and popliteal artery were successfully dilated with a 4 x 40 mm balloon    Significant peripheral vascular disease involving the right foot including discontinuous peroneal artery and nonvisualization    of the posterior tibial artery  The anterior tibial artery which is the predominant runoff vessel to the right foot contained a high-grade stenosis at its origin which was successfully balloon dilated with a 2 5 x 30 mm balloon  Workstation performed: [id number]         VAS JAMAL & waveform analysis, multiple levels   Final Result by Luis Alberto Resendez MD (08/07 1947)      IR abdominal angiography / intervention   Final Result by Noreen Gaytan MD (08/06 1000)   Left lower extremity arteriography with SFA, popliteal and peroneal artery angioplasty as described above  Unsuccessful catheterization and recannulization of flush short segment occlusion of the anterior tibial artery  Workstation performed: [id number]         XR foot 3+ vw right   Final Result by Neisha Jurado MD (08/03 0920)      No evidence of osteomyelitis, fracture or other significant bony abnormality  Workstation performed: [id number]         XR foot 3+ vw left   Final Result by Neisha Jurado MD (08/03 1040)      No evidence of fracture, osteomyelitis or other significant bony abnormality in the left foot  Workstation performed: [id number]         VAS lower limb arterial duplex, complete bilateral   Final Result by Marsha Kaplan MD (08/01 1108)      XR chest 1 view portable   Final Result by Ny Sharp MD (07/31 8937)      No acute cardiopulmonary disease              Workstation performed: [id number]         VAS JAMAL & waveform analysis, multiple levels    (Results Pending)       Recent Vital Signs  Vitals:    08/12/20 1645 08/12/20 1715 08/12/20 2255 08/13/20 0712   BP: 141/70 136/73 137/64 (!) 111/45   BP Location:  Left arm     Pulse: 63 62 66 66   Resp: 22 20 19   Temp:   97 5 °F (36 4 °C) (!) 97 3 °F (36 3 °C)   TempSrc:       SpO2: 96% 96% 99% 99%   Weight:       Height:            08/13/20 1220   Pain Assessment   Pain Assessment Tool 0-10   Pain Score 5   Restrictions/Precautions Weight Bearing Precautions Per Order No   Other Precautions Contact/isolation; Chair Alarm; Bed Alarm; Fall Risk;Pain;Multiple lines   General   Chart Reviewed Yes   Response to Previous Treatment Patient with no complaints from previous session  Family/Caregiver Present No   Cognition   Overall Cognitive Status Impaired   Arousal/Participation Lethargic;Cooperative   Attention Attends with cues to redirect   Orientation Level Oriented to person;Oriented to place   Memory Decreased short term memory;Decreased recall of recent events;Decreased recall of precautions   Following Commands Follows one step commands without difficulty   Comments agreeable to sit out in chair   Subjective   Subjective I would like to get to the chair   Bed Mobility   Supine to Sit 2  Maximal assistance   Additional items Assist x 1; Increased time required;Verbal cues;LE management   Sit to Supine 2  Maximal assistance   Additional items Assist x 1; Increased time required;Verbal cues;LE management   Transfers   Sit pivot 2  Maximal assistance  (multiple lateral scoots from bed to drop arm recliner)   Additional items Assist x 1; Armrests; Increased time required;Verbal cues   Balance   Static Sitting Fair +   Dynamic Sitting Fair   Endurance Deficit   Endurance Deficit Yes   Endurance Deficit Description fatigue and deconditioning   Activity Tolerance   Activity Tolerance Patient limited by fatigue;Patient limited by pain   Nurse Made Aware spoke to RN   Assessment   Prognosis Poor   Problem List Decreased strength;Decreased range of motion;Decreased endurance; Impaired balance;Decreased mobility; Decreased coordination;Decreased cognition; Impaired judgement;Decreased safety awareness; Impaired sensation;Pain;Decreased skin integrity   Assessment Pt seen today for PT treatment  Found supine in bed lethargic but agreeable to therapy  He was able to tolerate sitting EOB for 10 min while completing wieght shifting and preparing for transfer   Max assist to EOB needing alot of VC throughout for sequencing due to decreased motor planning  Pt with R lean when first seated EOB able to correct and maintain upright with fair balance after 5 min  He was able to scoot to the right onto drop arm recliner  Pt left in recliner with all needs in reach and all questions answered  Barriers to Discharge Inaccessible home environment;Decreased caregiver support   Goals   Patient Goals to get to the chair   STG Expiration Date 08/17/20   PT Treatment Day 1   Plan   Treatment/Interventions Functional transfer training;LE strengthening/ROM; Therapeutic exercise;Cognitive reorientation; Endurance training;Patient/family training;Equipment eval/education; Bed mobility;Spoke to nursing;Spoke to case management;OT   Progress Slow progress, decreased activity tolerance   PT Frequency   (3-5x/wk)   Recommendation   PT Discharge Recommendation Post-Acute Rehabilitation Services   Equipment Recommended Wheelchair   PT - OK to Discharge Yes   Additional Comments Ok for rehab when medically cleared       Lele Bell PT, DPT

## 2020-08-13 NOTE — ASSESSMENT & PLAN NOTE
-baseline creatinine 1 4-1 8  -stable    Creat 1 49  -continue to monitor creatinine postprocedure  -Nephrology following   -continue to avoid nephrotoxic agents

## 2020-08-13 NOTE — ASSESSMENT & PLAN NOTE
Lab Results   Component Value Date    HGBA1C 7 0 (H) 06/12/2020       Recent Labs     08/12/20  1558 08/12/20  2113 08/13/20  0559 08/13/20  1151   POCGLU 173* 338* 251* 224*       Blood Sugar Average: Last 72 hrs:  (P) 845 [id number]   · Last A1c 7%,   · Decrease Lantus to 5 units QHS with scheduled humalog 8 units TID with SSI coverage  · QID glucose checks   · Consistent carb diet  · Monitor and adjust regimen as needed

## 2020-08-13 NOTE — DISCHARGE INSTRUCTIONS
A-fib (Atrial Fibrillation)   AMBULATORY CARE:   Atrial fibrillation (a-fib)  is an irregular heartbeat  It reduces your heart's ability to pump blood through your body  A-fib may come and go, or it may be a long-term condition  A-fib can cause life-threatening blood clots, stroke, or heart failure  It is important to treat and manage a-fib to help prevent these problems  Common signs and symptoms include the following:   · A heartbeat that races, pounds, or flutters    · Weakness, severe tiredness, or confusion    · Feeling lightheaded, sweaty, dizzy, or faint    · Shortness of breath or anxiety    · Chest pain or pressure  Call 911 for any of the following:   · You have any of the following signs of a heart attack:      ¨ Squeezing, pressure, or pain in your chest that lasts longer than 5 minutes or returns    ¨ Discomfort or pain in your back, neck, jaw, stomach, or arm     ¨ Trouble breathing    ¨ Nausea or vomiting    ¨ Lightheadedness or a sudden cold sweat, especially with chest pain or trouble breathing    · You have any of the following signs of a stroke:      ¨ Numbness or drooping on one side of your face     ¨ Weakness in an arm or leg    ¨ Confusion or difficulty speaking    ¨ Dizziness, a severe headache, or vision loss  Seek care immediately if:  You have any of the following signs of a blood clot:  · You feel lightheaded, are short of breath, and have chest pain  · You cough up blood  · You have swelling, redness, pain, or warmth in your arm or leg  Contact your cardiologist or healthcare provider if:   · Your target heart rate is not in the range it should be  · You have new or worsening swelling in your legs, feet, ankles, or abdomen  · You are short of breath, even at rest      · You have questions or concerns about your condition or care  Treatment for A-fib:  Conditions that cause a-fib, such as thyroid disease, will be treated   You may also need any of the following:  · Heart medicines  help control your heart rate and rhythm  You may need more than one medicine to treat your symptoms  · Antiplatelet and blood thinner medicines  help prevent blood clots  · Cardioversion  is a procedure to return your heart rate and rhythm to normal  It can be done using medicines or electric shock  · A-fib ablation  is a procedure that uses energy to burn a small area of heart tissue  This creates scar tissue and prevents electrical signals that cause a-fib  You may need this procedure more than once  Ask for more information on a-fib ablation  · A pacemaker  may be inserted into your heart  A pacemaker is a device that controls your heartbeat  A pacemaker may be inserted during an ablation procedure or surgery  Ask your healthcare provider for more information on pacemakers  · Surgery  may be needed if other procedures do not work  During surgery your healthcare provider will make cuts in the upper part of your heart  The provider will stitch the cuts together to create scar tissue  The scar tissue will prevent electrical signals that cause a-fib  Manage A-fib:   · Know your target heart rate  Learn how to take your pulse and monitor your heart rate  · Manage other health conditions  This includes high blood pressure, sleep apnea, thyroid disease, diabetes, and other heart conditions  Take medicine as directed and follow your treatment plan  · Limit or do not drink alcohol  Alcohol can make a-fib hard to manage  Ask your healthcare provider if it is safe for you to drink alcohol  A drink of alcohol is 12 ounces of beer, 5 ounces of wine, or 1½ ounces of liquor  · Do not smoke  Nicotine and other chemicals in cigarettes and cigars can cause heart and lung damage  Ask your healthcare provider for information if you currently smoke and need help to quit  E-cigarettes or smokeless tobacco still contain nicotine   Talk to your healthcare provider before you use these products  · Eat heart-healthy foods  Heart healthy foods will help keep your cholesterol low  These include fruits, vegetables, whole-grain breads, low-fat dairy products, beans, lean meats, and fish  Replace butter and margarine with heart-healthy oils such as olive oil and canola oil  · Maintain a healthy weight  Ask your healthcare provider how much you should weigh  Ask him to help you create a weight loss plan if you are overweight  · Exercise for 30 minutes  most days of the week  Ask your healthcare provider about the best exercise plan for you  Follow up with your cardiologist as directed: You will need regular blood tests and monitoring  Write down your questions so you remember to ask them during your visits  © 2017 [facility] Information is for End User's use only and may not be sold, redistributed or otherwise used for commercial purposes  All illustrations and images included in CareNotes® are the copyrighted property of [employer]  or [place]  The above information is an  only  It is not intended as medical advice for individual conditions or treatments  Talk to your doctor, nurse or pharmacist before following any medical regimen to see if it is safe and effective for you  Anemia   WHAT YOU NEED TO KNOW:   Anemia is a low number of red blood cells or a low amount of hemoglobin in your red blood cells  Hemoglobin is a protein that helps carry oxygen throughout your body  Red blood cells use iron to create hemoglobin  Anemia may develop if your body does not have enough iron  It may also develop if your body does not make enough red blood cells or they die faster than your body can make them  DISCHARGE INSTRUCTIONS:   Call 911 or have someone call 911 for any of the following:   · You lose consciousness  · You have severe chest pain    Seek care immediately if:   · You have dark or bloody bowel movements  Contact your healthcare provider if:   · Your symptoms are worse, even after treatment  · You have questions or concerns about your condition or care  Medicines:   · Iron or folic acid supplements  help increase your red blood cell and hemoglobin levels  · Vitamin B12 injections  may help boost your red blood cell level and decrease your symptoms  Ask your healthcare provider how to inject B12  · Take your medicine as directed  Contact your healthcare provider if you think your medicine is not helping or if you have side effects  Tell him of her if you are allergic to any medicine  Keep a list of the medicines, vitamins, and herbs you take  Include the amounts, and when and why you take them  Bring the list or the pill bottles to follow-up visits  Carry your medicine list with you in case of an emergency  Prevent anemia:  Eat healthy foods rich in iron and vitamin C  Nuts, meat, dark leafy green vegetables, and beans are high in iron and protein  Vitamin C helps your body absorb iron  Foods rich in vitamin C include oranges and other citrus fruits  Ask your healthcare provider for a list of other foods that are high in iron or vitamin C  Ask if you need to be on a special diet  Follow up with your healthcare provider as directed:  Write down your questions so you remember to ask them during your visits  © 2017 [facility] Information is for End User's use only and may not be sold, redistributed or otherwise used for commercial purposes  All illustrations and images included in CareNotes® are the copyrighted property of [employer]  or [employer] [place]  The above information is an  only  It is not intended as medical advice for individual conditions or treatments  Talk to your doctor, nurse or pharmacist before following any medical regimen to see if it is safe and effective for you      Chronic Kidney Disease   AMBULATORY CARE:   Chronic kidney disease (CKD)  is the gradual and permanent loss of kidney function  Normally, the kidneys remove fluid, chemicals, and waste from your blood  These wastes are turned into urine by your kidneys  CKD may worsen over time and lead to kidney failure  Common symptoms include the following:   · Changes in how often you need to urinate    · Swelling in your arms, legs, or feet    · Shortness of breath    · Fatigue or weakness    · Bad or bitter taste in your mouth    · Nausea, vomiting, or loss of appetite  Seek care immediately if:   · You are confused and very drowsy  · You have a seizure  · You have shortness of breath  Contact your healthcare provider if:   · You suddenly gain or lose more weight than your healthcare provider has told you is okay  · You have itchy skin or a rash  · You urinate more or less than you normally do  · You have blood in your urine  · You have nausea and repeated vomiting  · You have fatigue or muscle weakness  · You have hiccups that will not stop  · You have questions or concerns about your condition or care  Treatment for CKD:  Medicines may be given to decrease blood pressure and get rid of extra fluid  You may also receive medicine to manage health conditions that may occur with CKD  Dialysis is a treatment to remove chemicals and waste from your blood when your kidneys can no longer do this  Surgery may be needed to create an arteriovenous fistula (AVF) in your arm or insert a catheter into your abdomen so that you can receive dialysis  A kidney transplant may be done if your CKD becomes severe  Manage CKD:   · Maintain a healthy weight  Ask your healthcare provider how much you should weigh  Ask him to help you create a weight loss plan if you are overweight  · Exercise 30 to 60 minutes a day, 4 to 7 times a week, or as directed  Ask about the best exercise plan for you   Regular exercise can help you manage CKD, high blood pressure, and diabetes  · Follow your healthcare provider's advice about what to eat and drink  He may tell you to eat food low in sodium (salt), potassium, phosphorus, or protein  You may need to see a dietitian if you need help planning meals  Ask how much liquid to drink each day and which liquids are best for you  · Limit alcohol  Ask how much alcohol is safe for you to drink  A drink of alcohol is 12 ounces of beer, 5 ounces of wine, or 1½ ounces of liquor  · Do not smoke  Nicotine and other chemicals in cigarettes and cigars can cause lung and kidney damage  Ask your healthcare provider for information if you currently smoke and need help to quit  E-cigarettes or smokeless tobacco still contain nicotine  Talk to your healthcare provider before you use these products  · Ask your healthcare provider if you need vaccines  Infections such as pneumonia, influenza, and hepatitis can be more harmful or more likely to occur in a person who has CKD  Vaccines reduce your risk of infection with these viruses  Follow up with your healthcare provider as directed:  Write down your questions so you remember to ask them during your visits  © 2017 [address] [place]  Information is for End User's use only and may not be sold, redistributed or otherwise used for commercial purposes  All illustrations and images included in CareNotes® are the copyrighted property of [employer]  or [place]  The above information is an  only  It is not intended as medical advice for individual conditions or treatments  Talk to your doctor, nurse or pharmacist before following any medical regimen to see if it is safe and effective for you  Gangrene   WHAT YOU NEED TO KNOW:   Gangrene may be caused by conditions that stop blood flow, or a bacterial infection  Gangrene is a life-threatening condition that needs immediate treatment  Gangrene is treated with surgery to remove the dead tissue   In severe cases, part of your arm or leg may also be removed  DISCHARGE INSTRUCTIONS:   Seek care immediately if:   · Your symptoms return or you have new symptoms  Contact your healthcare provider if:   · You have questions or concerns about your condition or care  Medicines: You may need any of the following:  · Antibiotics  help treat a bacterial infection  · Acetaminophen  decreases pain and fever  It is available without a doctor's order  Ask how much to take and how often to take it  Follow directions  Read the labels of all other medicines you are using to see if they also contain acetaminophen, or ask your doctor or pharmacist  Acetaminophen can cause liver damage if not taken correctly  Do not use more than 4 grams (4,000 milligrams) total of acetaminophen in one day  · NSAIDs , such as ibuprofen, help decrease swelling, pain, and fever  This medicine is available with or without a doctor's order  NSAIDs can cause stomach bleeding or kidney problems in certain people  If you take blood thinner medicine, always ask your healthcare provider if NSAIDs are safe for you  Always read the medicine label and follow directions  · Take your medicine as directed  Contact your healthcare provider if you think your medicine is not helping or if you have side effects  Tell him of her if you are allergic to any medicine  Keep a list of the medicines, vitamins, and herbs you take  Include the amounts, and when and why you take them  Bring the list or the pill bottles to follow-up visits  Carry your medicine list with you in case of an emergency  Self-care:   · Elevate  your arm or leg above the level of your heart as often as you can  This will help decrease swelling and pain  Prop your arm or leg on pillows or blankets to keep it elevated comfortably  · Rest  as directed  Do not return to your usual activities until healthcare provider says it is okay  It may take several weeks for you to recover  · Eat a variety of healthy foods  Healthy foods will help you heal  Healthy foods include fruits, vegetables, whole-grain breads, low-fat dairy products, beans, lean meats, and fish  Ask if you need to be on a special diet  Care for your wound as directed:  Carefully wash around the wound with soap and water  Dry the area and put on new, clean bandages as directed  Change your bandages when they get wet or dirty  If your wound is left open, change the packing as directed  Always wash your hands before and after you touch your wound  Check your wound every day for redness, swelling, and pus  Prevent gangrene:   · Care for all wounds and incisions as directed  Always wash your hands before and after you touch your wound or incision  Use soap and water or an alcohol-based hand rub  Carefully wash around the wound or incision with soap and water  Dry the area and put on new, clean bandages as directed  Change your bandages when they get wet or dirty  Call your healthcare provider if your wound or incision becomes red, swollen, or drains pus  Early treatment can help prevent gangrene  · Manage other health conditions  This includes diabetes, peripheral artery disease, and problems with blood clotting  These conditions can increase your risk for wound infections  · Seek care immediately for symptoms of a blood clot or poor blood flow  Symptoms of a blood clot include an arm or leg that feels warm, tender, and painful  It may look swollen and red  Symptoms of poor blood flow include an arm or leg that is cold, pale, or numb  Follow up with your healthcare provider as directed: You may need to return for hyperbaric oxygen therapy  Write down your questions so you remember to ask them during your visits  © 2017 [facility] [place]  Information is for End User's use only and may not be sold, redistributed or otherwise used for commercial purposes   All illustrations and images included in CareNotes® are the copyrighted property of [employer]  or [place]  The above information is an  only  It is not intended as medical advice for individual conditions or treatments  Talk to your doctor, nurse or pharmacist before following any medical regimen to see if it is safe and effective for you  Foot Care for People with Diabetes   WHAT YOU NEED TO KNOW:   What do I need to know about foot care? · Foot care helps protect your feet and prevent foot ulcers or sores  Long-term high blood sugar levels can damage the blood vessels and nerves in your legs and feet  This damage makes it hard to feel pressure, pain, temperature, and touch  You may not be able to feel a cut or sore, or shoes that are too tight  Foot care is needed to prevent serious problems, such as an infection or amputation  · Diabetes may cause your toes to become crooked or curved under  These changes may affect the way you walk and can lead to increased pressure on your foot  The pressure can decrease blood flow to your feet  Lack of blood flow increases your risk for a foot ulcer  Do not ignore small problems, such as dry skin or small wounds  These can become life-threatening over time without proper care  How do I care for my feet? · Check your feet each day  Look at your whole foot, including the bottom, and between and under your toes  Check for wounds, corns, and calluses  Use a mirror to see the bottom of your feet  The skin on your feet may be shiny, tight, or darker than normal  Your feet may also be cold and pale  Feel your feet by running your hands along the tops, bottoms, sides, and between your toes  Redness, swelling, and warmth are signs of blood flow problems that can lead to a foot ulcer  Do not try to remove corns or calluses yourself  · Wash your feet each day with soap and warm water  Do not use hot water, because this can injure your foot   Dry your feet gently with a towel after you wash them  Dry between and under your toes  · Apply lotion or a moisturizer on your dry feet  Ask your healthcare provider what lotions are best to use  Do not put lotion or moisturizer between your toes  · Cut your toenails correctly  File or cut your toenails straight across  Use a soft brush to clean around your toenails  If your toenails are very thick, you may need to have a healthcare provider or specialist cut them  · Protect your feet  Do not walk barefoot or wear your shoes without socks  Check your shoes for rocks or other objects that can hurt your feet  Wear cotton socks to help keep your feet dry  Wear socks without toe seams, or wear them with the seams inside out  Change your socks each day  Do not wear socks that are dirty or damp  · Wear shoes that fit well  Wear shoes that do not rub against any area of your feet  Your shoes should be ½ to ¾ inch (1 to 2 centimeters) longer than your feet  Your shoes should also have extra space around the widest part of your feet  Walking or athletic shoes with laces or straps that adjust are best  Ask your healthcare provider for help to choose shoes that fit you best  Ask him if you need to wear an insert, orthotic, or bandage on your feet  · Go to your follow-up visits  Your healthcare provider will do a foot exam at least once a year  You may need a foot exam more often if you have nerve damage, foot deformities, or ulcers  He will check for nerve damage and how well you can feel your feet  He will check your shoes to see if they fit well  When should I contact my healthcare provider? · Your feet become numb, weak, or hard to move  · You have pus draining from a sore on your foot  · You have a wound on your foot that gets bigger, deeper, or does not heal      · You see blisters, cuts, scratches, calluses, or sores on your foot  · You have a fever, and your feet become red, warm, and swollen       · Your toenails become thick, curled, or yellow  · You find it hard to check your feet because your vision is poor  · You have questions or concerns about your condition or care  CARE AGREEMENT:   You have the right to help plan your care  Learn about your health condition and how it may be treated  Discuss treatment options with your caregivers to decide what care you want to receive  You always have the right to refuse treatment  The above information is an  only  It is not intended as medical advice for individual conditions or treatments  Talk to your doctor, nurse or pharmacist before following any medical regimen to see if it is safe and effective for you  © 2017 [facility] Information is for End User's use only and may not be sold, redistributed or otherwise used for commercial purposes  All illustrations and images included in CareNotes® are the copyrighted property of [employer]  or [employer]  ARTERIOGRAM    WHAT YOU SHOULD KNOW:   An angiogram is a procedure to look at arteries in your body  Arteries are the blood vessels that carry blood from your heart to your body  AFTER YOU LEAVE:     Self-care:   · Limit activity: Rest for the remainder of the day of your procedure  Have some one with you until the next morning  Keep your arm or leg straight as much as possible  Rest as much as possible, sitting lying or reclining  Walk only to go to the bathroom, to bed or to eat  If the angiogram catheter was put in your leg, use the stairs as little as possible  No driving  · Keep your wound clean and dry  You may shower 24 hours after your procedure  The bandage you have on should fall off in 2-3 days  If there is any drainage from the puncture site, you should put on a clean bandage  · Watch for bleeding and bruising: It is normal to have a bruise and soreness where the angiogram catheter went in    · Diet:   · You may resume your regular diet, Sips of flat soda will help with mild nausea  · Drink more liquids than usual for the next 24 hours      · IMMEDIATELY Contact Interventional Radiology at [phone] [place] PATIENTS: Contact Interventional Radiology at [phone]) Dilcia Castillo PATIENTS: Contact Interventional Radiology at [phone]) if any of the following occur:  · If your bruise gets larger or if you notice any active bleeding  APPLY DIRECT PRESSURE TO THE BLEEDING SITE  · If you notice increased swelling or have increased pain at the puncture site   · If you have any numbness or pain in the extremity of the puncture site   · If that extremity seems cold or pale      · You have fever greater than 101  · Persistent nausea or vomitting    Follow up with your primary healthcare provider  as directed: Write down your questions so you remember to ask them during your visits

## 2020-08-13 NOTE — ASSESSMENT & PLAN NOTE
The patient underwent right lower extremity angiogram with successful balloon angioplasty to the right SFA, popliteal and anterior tibial arteries  The posterior tibial could not be visualized  Star closure to the left femoral artery access  Previous left lower extremity intervention was performed 08/05/2020    Plan per vascular surgery:   - Order placed for postprocedure JAMAL today  - monitor serum creatinine and patient with chronic kidney disease (1 49 today, up from 1 05 yesterday, most likely from contrast for angiogram)  - continue on anticoagulation, Xarelto   - continue with local wound care, Betadine paint to heal and offloading    *-Patient and daughter have both expressed their wishes to not proceed with amputation and to try is many limb salvaging methods and procedures as possible  They would like to try and see if the revascularization of the lower limbs helps to heal the ulcers

## 2020-08-13 NOTE — ASSESSMENT & PLAN NOTE
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

## 2020-08-13 NOTE — PLAN OF CARE
Problem: Prexisting or High Potential for Compromised Skin Integrity  Goal: Skin integrity is maintained or improved  Description: INTERVENTIONS:  - Identify patients at risk for skin breakdown  - Assess and monitor skin integrity  - Assess and monitor nutrition and hydration status  - Monitor labs   - Assess for incontinence   - Turn and reposition patient  - Assist with mobility/ambulation  - Relieve pressure over bony prominences  - Avoid friction and shearing  - Provide appropriate hygiene as needed including keeping skin clean and dry  - Evaluate need for skin moisturizer/barrier cream  - Collaborate with interdisciplinary team   - Patient/family teaching  - Consider wound care consult   Outcome: Progressing     Problem: Potential for Falls  Goal: Patient will remain free of falls  Description: INTERVENTIONS:  - Assess patient frequently for physical needs  -  Identify cognitive and physical deficits and behaviors that affect risk of falls    -  [place] fall precautions as indicated by assessment   - Educate patient/family on patient safety including physical limitations  - Instruct patient to call for assistance with activity based on assessment  - Modify environment to reduce risk of injury  - Consider OT/PT consult to assist with strengthening/mobility  Outcome: Progressing     Problem: PAIN - ADULT  Goal: Verbalizes/displays adequate comfort level or baseline comfort level  Description: Interventions:  - Encourage patient to monitor pain and request assistance  - Assess pain using appropriate pain scale  - Administer analgesics based on type and severity of pain and evaluate response  - Implement non-pharmacological measures as appropriate and evaluate response  - Consider cultural and social influences on pain and pain management  - Notify physician/advanced practitioner if interventions unsuccessful or patient reports new pain  Outcome: Progressing     Problem: INFECTION - ADULT  Goal: Absence or prevention of progression during hospitalization  Description: INTERVENTIONS:  - Assess and monitor for signs and symptoms of infection  - Monitor lab/diagnostic results  - Monitor all insertion sites, i e  indwelling lines, tubes, and drains  - Monitor endotracheal if appropriate and nasal secretions for changes in amount and color  - [place] appropriate cooling/warming therapies per order  - Administer medications as ordered  - Instruct and encourage patient and family to use good hand hygiene technique  - Identify and instruct in appropriate isolation precautions for identified infection/condition  Outcome: Progressing  Goal: Absence of fever/infection during neutropenic period  Description: INTERVENTIONS:  - Monitor WBC    Outcome: Progressing     Problem: SAFETY ADULT  Goal: Patient will remain free of falls  Description: INTERVENTIONS:  - Assess patient frequently for physical needs  -  Identify cognitive and physical deficits and behaviors that affect risk of falls    -  [place] fall precautions as indicated by assessment   - Educate patient/family on patient safety including physical limitations  - Instruct patient to call for assistance with activity based on assessment  - Modify environment to reduce risk of injury  - Consider OT/PT consult to assist with strengthening/mobility  Outcome: Progressing  Goal: Maintain or return to baseline ADL function  Description: INTERVENTIONS:  -  Assess patient's ability to carry out ADLs; assess patient's baseline for ADL function and identify physical deficits which impact ability to perform ADLs (bathing, care of mouth/teeth, toileting, grooming, dressing, etc )  - Assess/evaluate cause of self-care deficits   - Assess range of motion  - Assess patient's mobility; develop plan if impaired  - Assess patient's need for assistive devices and provide as appropriate  - Encourage maximum independence but intervene and supervise when necessary  - Involve family in performance of ADLs  - Assess for home care needs following discharge   - Consider OT consult to assist with ADL evaluation and planning for discharge  - Provide patient education as appropriate  Outcome: Progressing  Goal: Maintain or return mobility status to optimal level  Description: INTERVENTIONS:  - Assess patient's baseline mobility status (ambulation, transfers, stairs, etc )    - Identify cognitive and physical deficits and behaviors that affect mobility  - Identify mobility aids required to assist with transfers and/or ambulation (gait belt, sit-to-stand, lift, walker, cane, etc )  - [place] fall precautions as indicated by assessment  - Record patient progress and toleration of activity level on Mobility SBAR; progress patient to next Phase/Stage  - Instruct patient to call for assistance with activity based on assessment  - Consider rehabilitation consult to assist with strengthening/weightbearing, etc   Outcome: Progressing     Problem: DISCHARGE PLANNING  Goal: Discharge to home or other facility with appropriate resources  Description: INTERVENTIONS:  - Identify barriers to discharge w/patient and caregiver  - Arrange for needed discharge resources and transportation as appropriate  - Identify discharge learning needs (meds, wound care, etc )  - Arrange for interpretive services to assist at discharge as needed  - Refer to Case Management Department for coordinating discharge planning if the patient needs post-hospital services based on physician/advanced practitioner order or complex needs related to functional status, cognitive ability, or social support system  Outcome: Progressing     Problem: Knowledge Deficit  Goal: Patient/family/caregiver demonstrates understanding of disease process, treatment plan, medications, and discharge instructions  Description: Complete learning assessment and assess knowledge base    Interventions:  - Provide teaching at level of understanding  - Provide teaching via preferred learning methods  Outcome: Progressing     Problem: GENITOURINARY - ADULT  Goal: Maintains or returns to baseline urinary function  Description: INTERVENTIONS:  - Assess urinary function  - Encourage oral fluids to ensure adequate hydration if ordered  - Administer IV fluids as ordered to ensure adequate hydration  - Administer ordered medications as needed  - Offer frequent toileting  - Follow urinary retention protocol if ordered  Outcome: Progressing  Goal: Absence of urinary retention  Description: INTERVENTIONS:  - Assess patients ability to void and empty bladder  - Monitor I/O  - Bladder scan as needed  - Discuss with physician/AP medications to alleviate retention as needed  - Discuss catheterization for long term situations as appropriate  Outcome: Progressing  Goal: Urinary catheter remains patent  Description: INTERVENTIONS:  - Assess patency of urinary catheter  - If patient has a chronic mccoy, consider changing catheter if non-functioning  - Follow guidelines for intermittent irrigation of non-functioning urinary catheter  Outcome: Progressing     Problem: SKIN/TISSUE INTEGRITY - ADULT  Goal: Skin integrity remains intact  Description: INTERVENTIONS  - Identify patients at risk for skin breakdown  - Assess and monitor skin integrity  - Assess and monitor nutrition and hydration status  - Monitor labs (i e  albumin)  - Assess for incontinence   - Turn and reposition patient  - Assist with mobility/ambulation  - Relieve pressure over bony prominences  - Avoid friction and shearing  - Provide appropriate hygiene as needed including keeping skin clean and dry  - Evaluate need for skin moisturizer/barrier cream  - Collaborate with interdisciplinary team (i e  Nutrition, Rehabilitation, etc )   - Patient/family teaching  Outcome: Progressing  Goal: Incision(s), wounds(s) or drain site(s) healing without S/S of infection  Description: INTERVENTIONS  - Assess and document risk factors for skin impairment   - Assess and document dressing, incision, wound bed, drain sites and surrounding tissue  - Consider nutrition services referral as needed  - Oral mucous membranes remain intact  - Provide patient/ family education  Outcome: Progressing  Goal: Oral mucous membranes remain intact  Description: INTERVENTIONS  - Assess oral mucosa and hygiene practices  - Implement preventative oral hygiene regimen  - Implement oral medicated treatments as ordered  - Initiate Nutrition services referral as needed  Outcome: Progressing     Problem: Nutrition/Hydration-ADULT  Goal: Nutrient/Hydration intake appropriate for improving, restoring or maintaining nutritional needs  Description: Monitor and assess patient's nutrition/hydration status for malnutrition  Collaborate with interdisciplinary team and initiate plan and interventions as ordered  Monitor patient's weight and dietary intake as ordered or per policy  Utilize nutrition screening tool and intervene as necessary  Determine patient's food preferences and provide high-protein, high-caloric foods as appropriate       INTERVENTIONS:  - Monitor oral intake, urinary output, labs, and treatment plans  - Assess nutrition and hydration status and recommend course of action  - Evaluate amount of meals eaten  - Assist patient with eating if necessary   - Allow adequate time for meals  - Recommend/ encourage appropriate diets, oral nutritional supplements, and vitamin/mineral supplements  - Order, calculate, and assess calorie counts as needed  - Recommend, monitor, and adjust tube feedings based on assessed needs  - Assess need for intravenous fluids  - Provide nutrition/hydration education as appropriate  - Include patient/family/caregiver in decisions related to nutrition   Outcome: Progressing

## 2020-08-13 NOTE — PLAN OF CARE
Problem: PHYSICAL THERAPY ADULT  Goal: Performs mobility at highest level of function for planned discharge setting  See evaluation for individualized goals  Description: Treatment/Interventions: LE strengthening/ROM, Therapeutic exercise, Endurance training, Patient/family training, Equipment eval/education, Bed mobility, Continued evaluation, Spoke to nursing, OT, Spoke to case management          See flowsheet documentation for full assessment, interventions and recommendations  Outcome: Progressing  Note: Prognosis: Poor  Problem List: Decreased strength, Decreased range of motion, Decreased endurance, Impaired balance, Decreased mobility, Decreased coordination, Decreased cognition, Impaired judgement, Decreased safety awareness, Impaired sensation, Pain, Decreased skin integrity  Assessment: Pt seen today for PT treatment  Found supine in bed lethargic but agreeable to therapy  He was able to tolerate sitting EOB for 10 min while completing wieght shifting and preparing for transfer  Max assist to EOB needing alot of VC throughout for sequencing due to decreased motor planning  Pt with R lean when first seated EOB able to correct and maintain upright with fair balance after 5 min  He was able to scoot to the right onto drop arm recliner  Pt left in recliner with all needs in reach and all questions answered  Barriers to Discharge: Inaccessible home environment, Decreased caregiver support     PT Discharge Recommendation: [id number]     PT - OK to Discharge: Yes    See flowsheet documentation for full assessment

## 2020-08-13 NOTE — PROGRESS NOTES
Progress Note - Haidee Schilder 1933, 80 y o  male MRN: [id number]    Unit/Bed#: -01 Encounter: [id number]    Primary Care Provider: Des Chisholm MD   Date and time admitted to hospital: 7/30/2020  5:23 PM    * Pressure ulcer of ankle  Assessment & Plan    He underwent right lower extremity angiogram, 8/12/2020, with successful balloon angioplasty to the right SFA, popliteal and anterior tibial arteries  The posterior tibial could not be visualized  Star closure to the left femoral artery access  Previous left lower extremity intervention was performed  Secondary to peripheral arterial disease, 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 (of which he already had one previous angiogram performed on 8/5/20  Arterial duplex showing occlusive disease of lower extremities bilaterally      - monitor serum creatinine and patient with chronic kidney disease (1 49 today)  - continue anticoagulation, Xarelto  - continue with local wound care, Betadine paint to heal and offloading as the ulcers are still black, gangrenous, and necrotic  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 [facility])  Assessment & Plan  The patient underwent right lower extremity angiogram with successful balloon angioplasty to the right SFA, popliteal and anterior tibial arteries  The posterior tibial could not be visualized  Star closure to the left femoral artery access  Previous left lower extremity intervention was performed 08/05/2020    Plan per vascular surgery:   - Order placed for postprocedure JAMAL today    - monitor serum creatinine and patient with chronic kidney disease (1 49 today, up from 1 05 yesterday, most likely from contrast for angiogram)  - continue on anticoagulation, Xarelto   - continue with local wound care, Betadine paint to heal and offloading    *-Patient and daughter have both expressed their wishes to not proceed with amputation and to try is many limb salvaging methods and procedures as possible  They would like to try and see if the revascularization of the lower limbs helps to heal the ulcers  Anemia  Assessment & Plan  Continue to monitor  Patient is stable with hemoglobin of 10 3    Diabetes [facility])  Assessment & Plan  Lab Results   Component Value Date    HGBA1C 7 0 (H) 06/12/2020       Recent Labs     08/12/20  1558 08/12/20  2113 08/13/20  0559 08/13/20  1151   POCGLU 173* 338* 251* 224*       Blood Sugar Average: Last 72 hrs:  (P) 813 [id number]   · Last A1c 7%,   · Decrease Lantus to 5 units QHS with scheduled humalog 8 units TID with SSI coverage  · QID glucose checks   · Consistent carb diet  · Monitor and adjust regimen as needed    Chronic diastolic congestive heart failure (HCC)  Assessment & Plan  Wt Readings from Last 3 Encounters:   07/30/20 80 kg (176 lb 5 9 oz)   07/14/20 85 1 kg (187 lb 9 6 oz)   07/07/20 85 1 kg (187 lb 9 6 oz)     · Previous provider discontinued Entresto secondary to low blood pressures on 8/3  · Last ECHO from 1/30/2019 showing EF 60%  · Currently euvolemic and normotensive     A-fib ([address] )  Assessment & Plan  · Currently rate controlled without medications  · A/C with Xarelto, which was resumed yesterday after the angiogram     Abnormal urinalysis  Assessment & Plan  · (+)UA on admission, urine culture growing ESBL  · ID signed off,  · Baxter catheter was replaced on 7/31  · Recommended holding additional antibiotics as patient has remained non-toxic and stable    Stage 3 chronic kidney disease ([address] )  Assessment & Plan  · Baseline creatinine 1 4-1 8 on review  · Nephrology following for renal optimization prior to agram,  · Plan for start of gentle IV hydration and mucomyst prior  · Cr  Stable at 1 49 today, up from 1 05, likely secondary to post-procedural/angiogram contrast dye nephropathy    · Continue to monitor      VTE Pharmacologic Prophylaxis:   Pharmacologic: Rivaroxaban (Xarelto)  Mechanical VTE Prophylaxis in Place: Yes    Patient Centered Rounds: I have performed bedside rounds with nursing staff today  Discussions with Specialists or Other Care Team Provider:  Vascular surgery, case management  Education and Discussions with Family / Patient:  Called patient's daughter discussed the possibility of getting discharged today or tomorrow  Discussed with her the plan for short-term rehab a follow-up with podiatry  Time Spent for Care: 30 minutes  More than 50% of total time spent on counseling and coordination of care as described above  Current Length of Stay: 14 day(s)    Current Patient Status: Inpatient   Certification Statement: The patient will continue to require additional inpatient hospital stay due to Continued monitoring of black gangrenous necrotic heel ulcers, as well as allowing time for placement in short-term rehab    Discharge Plan / Estimated Discharge Date:  2020    Code Status: Level 3 - DNAR and DNI    Subjective:   Patient presents today in bed resting comfortably  Still has difficulty speaking communicating, but states that he is in no acute pain, or distress  When assessing patient vascular surgery and nursing were both in the room  The wounds on both of his feet were checked  The ulcers still appear black, gangrenous, and necrotic  Discussed with vascular surgery of patient only has 1 artery going to each foot  Revascularization was successfully yesterday but the brought likely would of this helping to healed wounds is very low  Patient and family are against amputation at this point, the patient will follow-up with outpatient Podiatry  Objective:     Vitals:   Temp (24hrs), Av 3 °F (36 3 °C), Min:97 2 °F (36 2 °C), Max:97 5 °F (36 4 °C)    Temp:  [97 2 °F (36 2 °C)-97 5 °F (36 4 °C)] 97 3 °F (36 3 °C)  HR:  [61-67] 66  Resp:  [19-40] 19  BP: (111-162)/(45-73) 111/45  SpO2:  [94 %-100 %] 99 %  Body mass index is 28 47 kg/m²       Input and Output Summary (last 24 hours): Intake/Output Summary (Last 24 hours) at 8/13/2020 1456  Last data filed at 8/12/2020 2120  Gross per 24 hour   Intake 1140 ml   Output 850 ml   Net 290 ml       Physical Exam:     Physical Exam  Constitutional:       Appearance: He is well-developed and well-nourished  HENT:      Head: Normocephalic and atraumatic  Eyes:      Pupils: Pupils are equal, round, and reactive to light  Neck:      Musculoskeletal: Normal range of motion  Cardiovascular:      Rate and Rhythm: Regular rhythm  Heart sounds: Normal heart sounds  No murmur  No friction rub  No gallop  Pulmonary:      Effort: Pulmonary effort is normal  No respiratory distress  Breath sounds: Normal breath sounds  No stridor  No wheezing or rales  Abdominal:      General: Bowel sounds are normal       Palpations: Abdomen is soft  Musculoskeletal: Normal range of motion  Skin:     Comments: Black gangrenous necrotic ulcers on the heels of both feet   Neurological:      Mental Status: He is alert and oriented to person, place, and time  Psychiatric:         Mood and Affect: Mood and affect normal          Behavior: Behavior normal        Additional Data:     Labs:    Results from last 7 days   Lab Units 08/13/20  0545   WBC Thousand/uL 8 84   HEMOGLOBIN g/dL 10 3*   HEMATOCRIT % 32 7*   PLATELETS Thousands/uL 311   NEUTROS PCT % 86*   LYMPHS PCT % 9*   MONOS PCT % 4   EOS PCT % 0     Results from last 7 days   Lab Units 08/13/20  0545  08/07/20  1002   POTASSIUM mmol/L 5 2   < > 4 0   CHLORIDE mmol/L 104   < > 106   CO2 mmol/L 24   < > 32   BUN mg/dL 36*   < > 32*   CREATININE mg/dL 1 49*   < > 1 43*   CALCIUM mg/dL 8 3   < > 8 6   ALK PHOS U/L  --   --  79   ALT U/L  --   --  49   AST U/L  --   --  69*    < > = values in this interval not displayed  * I Have Reviewed All Lab Data Listed Above  * Additional Pertinent Lab Tests Reviewed:  [address] Admission Reviewed        Recent Cultures (last 7 days):           Last 24 Hours Medication List:   Current Facility-Administered Medications   Medication Dose Route Frequency Provider Last Rate    acetaminophen  650 mg Oral Q6H PRN Ren Medina MD      HYDROmorphone  0 5 mg Intravenous Q4H PRN MD Mavis Kramer insulin glargine  5 Units Subcutaneous HS Carla Ramos PA-C      insulin lispro  1-5 Units Subcutaneous HS Ren Medina MD      insulin lispro  1-6 Units Subcutaneous TID AC Ren Medina MD      nicotine  1 patch Transdermal Daily Simón Ramos MD      oxyCODONE-acetaminophen  1 tablet Oral Q4H PRN Aarti Sung MD      sodium chloride  50 mL/hr Intravenous Continuous Helen Gastelum MD 50 mL/hr (08/13/20 7134)        Today, Patient Was Seen By: FAREED [place]    ** Please Note: Dragon 360 Dictation voice to text software may have been used in the creation of this document   **

## 2020-08-13 NOTE — PROGRESS NOTES
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 [facility])  Assessment & Plan  80year-old minimally ambulatory male smoker w/chronic diastolic heart failure, type 2 DM, CKD 3 (baseline creatinine 1 4-1 8), Afib on Xarelto, COPD, L knee contracture, s/p bilateral ANDREA, recurrent UTI w/chronic indwelling Baxter catheter admitted w/pyuria and PAD w/bilateral heel pressure wounds, L >R  Diagnostics:  -LEAD 7/31:  diffuse right lower extremity disease without focal stenosis, R JAMAL 0 6/42/34 and left lower extremity with diffuse disease, no focal stenosis, decrease in velocities in SFA to popliteal artery suggestive of possible stenosis, L JAMAL 0 52/33/36  -Xray left foot 7/31: No evidence of fracture, osteomyelitis or other significant bony abnormality in the left foot  -Xray right foot 7/31: No evidence of osteomyelitis, fracture or other significant bony abnormality  -BC: neg x 5 days  -Angiogram 8/5/2020 L SFA PTA, TPT PTA, prox Peroneal PTA  Peroneal only in-line flow to the foot with short segment occlusion at origin of AT and remaining AT reconstituted by geniculate collaterals, PT chronically occluded & reconstituted distally  Robust collaterals  Peroneal runoff with delayed filling of PT/AT  -post intervention JAMAL right 0 75/38/26 and left 0 74/26/30 (prior right JAMAL 0 52)    - RIGHT LE angiogram/intervention 08/12/2020: R SFA, popliteal and anterior tibial artery angioplasties  AT predominant runoff to the foot  Posterior tibial was not visualized  Plan:  Patient with significant bilateral peripheral arterial disease with heel wounds L > R  He underwent right lower extremity angiogram with successful balloon angioplasty to the right SFA, popliteal and anterior tibial arteries  The posterior tibial could not be visualized  Star closure to the left femoral artery access, remains soft, no abnormal pulsatility or hematoma noted  Previous left lower extremity intervention was performed 08/05/2020     -postprocedure JAMAL pending  -monitor serum creatinine and patient with chronic kidney disease (1 49 today)  -resume anticoagulation, Xarelto if no further procedures are planned  -continue with local wound care, Betadine paint to heal and offloading  -now that he is revascularized, consider consult to podiatry (seen by Dr Nikita Dave)  -per [facility] team, daughter refusing Podiatry consult  -will d/w Dr Naren Hendricks  -post-procedure f/u OV in chart    Diabetes [facility])  Assessment & Plan  Lab Results   Component Value Date    HGBA1C 7 0 (H) 06/12/2020       Recent Labs     08/12/20  1513 08/12/20  1558 08/12/20  2113 08/13/20  0559   POCGLU 166* 173* 338* 251*       Blood Sugar Average: Last 72 hrs:  (P) 881 [id number]   -continue to optimize BS for wound healing and prevent infection  -management per SL    A-fib [facility])  Assessment & Plan  -stable, rate controlled  -Chronically on Xarelto which should be restarted if no procedures are planned    Stage 3 chronic kidney disease ([facility] [address] )  Assessment & Plan  -baseline creatinine 1 4-1 8  -stable  Creat 1 49  -continue to monitor creatinine postprocedure  -Nephrology following   -continue to avoid nephrotoxic agents      * Pressure ulcer of ankle  Assessment & Plan  -L>R heel ulcer w/dry gangrene L heel  -heel pressure pressure off-loading, Betadine and monitoring          Subjective:  Patient reports he is not feeling great  He is cooperative with exam   Continues with some pain to the feet  VSS  Vitals:  BP (!) 111/45   Pulse 66   Temp (!) 97 3 °F (36 3 °C)   Resp 19   Ht 5' 6" (1 676 m)   Wt 80 kg (176 lb 5 9 oz)   SpO2 99%   BMI 28 47 kg/m²     I/Os:  I/O last 3 completed shifts: In: 3040 [P O :240;  I V :2800]  Out: 4325 [Urine:4325]  No intake/output data recorded  Lab Results and Cultures:   Lab Results   Component Value Date    WBC 8 84 08/13/2020    HGB 10 3 (L) 08/13/2020    HCT 32 7 (L) 08/13/2020    MCV 90 08/13/2020     08/13/2020     Lab Results   Component Value Date    CALCIUM 8 3 08/13/2020     04/01/2018    K 5 2 08/13/2020    CO2 24 08/13/2020     08/13/2020    BUN 36 (H) 08/13/2020    CREATININE 1 49 (H) 08/13/2020     Lab Results   Component Value Date    INR 1 50 (H) 08/04/2020    INR 2 46 (H) 07/30/2020    INR 1 23 (H) 06/11/2020    PROTIME 18 4 (H) 08/04/2020    PROTIME 25 5 (H) 07/30/2020    PROTIME 15 5 (H) 06/11/2020        Blood Culture:   Lab Results   Component Value Date    BLOODCX No Growth After 5 Days   07/30/2020   ,   Urinalysis:   Lab Results   Component Value Date    COLORU Yellow 08/07/2020    CLARITYU Cloudy 08/07/2020    SPECGRAV 1 025 08/07/2020    PHUR 6 0 08/07/2020    PHUR 7 0 01/28/2019    LEUKOCYTESUR Moderate (A) 08/07/2020    NITRITE Positive (A) 08/07/2020    GLUCOSEU Negative 08/07/2020    KETONESU Negative 08/07/2020    BILIRUBINUR Negative 08/07/2020    BLOODU Moderate (A) 08/07/2020   ,   Urine Culture:   Lab Results   Component Value Date    URINECX >100,000 cfu/ml Escherichia coli ESBL (A) 07/30/2020    URINECX 6341-0845 cfu/ml Proteus mirabilis (A) 07/30/2020    URINECX 10,000-19,000 cfu/ml Enterococcus faecalis (A) 07/30/2020   ,   Wound Culure: No results found for: WOUNDCULT    Medications:  Current Facility-Administered Medications   Medication Dose Route Frequency    acetaminophen (TYLENOL) tablet 650 mg  650 mg Oral Q6H PRN    acetylcysteine (MUCOMYST) 200 mg/mL oral solution 1,200 mg  1,200 mg Oral BID    HYDROmorphone (DILAUDID) injection 0 5 mg  0 5 mg Intravenous Q4H PRN    insulin glargine (LANTUS) subcutaneous injection 5 Units 0 05 mL  5 Units Subcutaneous HS    insulin lispro (HumaLOG) 100 units/mL subcutaneous injection 1-5 Units  1-5 Units Subcutaneous HS  insulin lispro (HumaLOG) 100 units/mL subcutaneous injection 1-6 Units  1-6 Units Subcutaneous TID AC    nicotine (NICODERM CQ) 7 mg/24hr TD 24 hr patch 1 patch  1 patch Transdermal Daily    oxyCODONE-acetaminophen (PERCOCET) 5-325 mg per tablet 1 tablet  1 tablet Oral Q4H PRN    sodium chloride 0 9 % infusion  75 mL/hr Intravenous Continuous       Imaging:  Angiogram imaging reviewed 8/12/2020  Impression: Stenoses of the right superficial femoral and popliteal artery were successfully dilated with a 4 x 40 mm balloon  Significant peripheral vascular disease involving the right foot including discontinuous peroneal artery and nonvisualization   of the posterior tibial artery  The anterior tibial artery which is the predominant runoff vessel to the right foot contained a high-grade stenosis at its origin which was successfully balloon dilated with a 2 5 x 30 mm balloon  Physical Exam:    General appearance: alert, cooperative and mild distress  Skin: Skin color, texture, turgor normal  No rashes or lesions  Neurologic: Grossly normal  Neck: no adenopathy, no carotid bruit, no JVD, supple, symmetrical, trachea midline and thyroid not enlarged, symmetric, no tenderness/mass/nodules  Lungs: clear to auscultation bilaterally  Heart: regular rate and rhythm, S1, S2 normal, no murmur, click, rub or gallop  Abdomen: soft, non-tender; bowel sounds normal; no masses,  no organomegaly  Extremities: B/L LE are warm, dry skin with flaking to both legs, hyperpigmentation noted to both legs  The left heel with necrotic ulceration, malodor  Small eschar to the left lateral malleolar area  The right heel with small dry eschar/callus  No drainage noted from this wound or malodor  Doppler signals as below  Sensation intact, Motor at baseline intact      Wound/Incision:                      Pulse exam:  Radial: Right: 2+ Left[de-identified] 2+  Femoral: Right: 2+ Left: 2+  Popliteal: Right: non-palpable Left: non-palpable  DP: Right: doppler signal Left: doppler signal  PT: Right: non-palpable Left: non-palpable    RIGHT: DOPPLER Signal DP biphasic, high ankle PT  LEFT: DOPPLER Signal DP/PT mono to biphasic    FAREED Brown  8/13/2020  [facility]  [phone]

## 2020-08-13 NOTE — ASSESSMENT & PLAN NOTE
80year-old minimally ambulatory male smoker w/chronic diastolic heart failure, type 2 DM, CKD 3 (baseline creatinine 1 4-1 8), Afib on Xarelto, COPD, L knee contracture, s/p bilateral ANDREA, recurrent UTI w/chronic indwelling Baxter catheter admitted w/pyuria and PAD w/bilateral heel pressure wounds, L >R  Diagnostics:  -LEAD 7/31:  diffuse right lower extremity disease without focal stenosis, R JAMAL 0 6/42/34 and left lower extremity with diffuse disease, no focal stenosis, decrease in velocities in SFA to popliteal artery suggestive of possible stenosis, L JAMAL 0 52/33/36  -Xray left foot 7/31: No evidence of fracture, osteomyelitis or other significant bony abnormality in the left foot  -Xray right foot 7/31: No evidence of osteomyelitis, fracture or other significant bony abnormality  -BC: neg x 5 days  -Angiogram 8/5/2020 L SFA PTA, TPT PTA, prox Peroneal PTA  Peroneal only in-line flow to the foot with short segment occlusion at origin of AT and remaining AT reconstituted by geniculate collaterals, PT chronically occluded & reconstituted distally  Robust collaterals  Peroneal runoff with delayed filling of PT/AT  -post intervention JAMAL right 0 75/38/26 and left 0 74/26/30 (prior right JAMAL 0 52)    - RIGHT LE angiogram/intervention 08/12/2020: R SFA, popliteal and anterior tibial artery angioplasties  AT predominant runoff to the foot  Posterior tibial was not visualized  Plan:  Patient with significant bilateral peripheral arterial disease with heel wounds L > R  He underwent right lower extremity angiogram with successful balloon angioplasty to the right SFA, popliteal and anterior tibial arteries  The posterior tibial could not be visualized  Star closure to the left femoral artery access, remains soft, no abnormal pulsatility or hematoma noted    Previous left lower extremity intervention was performed 08/05/2020     -postprocedure JAMAL pending  -monitor serum creatinine and patient with chronic kidney disease (1 49 today)  -resume anticoagulation, Xarelto if no further procedures are planned  - continue with local wound care, Betadine paint to heal and offloading  -now that he is revascularized, consider we consult to podiatry (seen by Dr Josef Garcia)  -will d/w Dr Ania Antunez  -post-procedure f/u OV in chart

## 2020-08-14 LAB
ANION GAP SERPL CALCULATED.3IONS-SCNC: 5 MMOL/L (ref 4–13)
BASOPHILS # BLD AUTO: 0.06 THOUSANDS/ΜL (ref 0–0.1)
BASOPHILS NFR BLD AUTO: 1 % (ref 0–1)
BUN SERPL-MCNC: 42 MG/DL (ref 5–25)
CALCIUM SERPL-MCNC: 7.8 MG/DL (ref 8.3–10.1)
CHLORIDE SERPL-SCNC: 108 MMOL/L (ref 100–108)
CO2 SERPL-SCNC: 28 MMOL/L (ref 21–32)
CREAT SERPL-MCNC: 1.29 MG/DL (ref 0.6–1.3)
EOSINOPHIL # BLD AUTO: 0.09 THOUSAND/ΜL (ref 0–0.61)
EOSINOPHIL NFR BLD AUTO: 1 % (ref 0–6)
ERYTHROCYTE [DISTWIDTH] IN BLOOD BY AUTOMATED COUNT: 16.4 % (ref 11.6–15.1)
GFR SERPL CREATININE-BSD FRML MDRD: 50 ML/MIN/1.73SQ M
GLUCOSE SERPL-MCNC: 140 MG/DL (ref 65–140)
GLUCOSE SERPL-MCNC: 159 MG/DL (ref 65–140)
GLUCOSE SERPL-MCNC: 169 MG/DL (ref 65–140)
GLUCOSE SERPL-MCNC: 181 MG/DL (ref 65–140)
GLUCOSE SERPL-MCNC: 196 MG/DL (ref 65–140)
HCT VFR BLD AUTO: 29.5 % (ref 36.5–49.3)
HGB BLD-MCNC: 9.1 G/DL (ref 12–17)
IMM GRANULOCYTES # BLD AUTO: 0.08 THOUSAND/UL (ref 0–0.2)
IMM GRANULOCYTES NFR BLD AUTO: 1 % (ref 0–2)
LYMPHOCYTES # BLD AUTO: 1.72 THOUSANDS/ΜL (ref 0.6–4.47)
LYMPHOCYTES NFR BLD AUTO: 19 % (ref 14–44)
MCH RBC QN AUTO: 28.1 PG (ref 26.8–34.3)
MCHC RBC AUTO-ENTMCNC: 30.8 G/DL (ref 31.4–37.4)
MCV RBC AUTO: 91 FL (ref 82–98)
MONOCYTES # BLD AUTO: 0.66 THOUSAND/ΜL (ref 0.17–1.22)
MONOCYTES NFR BLD AUTO: 7 % (ref 4–12)
NEUTROPHILS # BLD AUTO: 6.3 THOUSANDS/ΜL (ref 1.85–7.62)
NEUTS SEG NFR BLD AUTO: 71 % (ref 43–75)
NRBC BLD AUTO-RTO: 0 /100 WBCS
PLATELET # BLD AUTO: 288 THOUSANDS/UL (ref 149–390)
PMV BLD AUTO: 10.1 FL (ref 8.9–12.7)
POTASSIUM SERPL-SCNC: 4.1 MMOL/L (ref 3.5–5.3)
RBC # BLD AUTO: 3.24 MILLION/UL (ref 3.88–5.62)
SODIUM SERPL-SCNC: 141 MMOL/L (ref 136–145)
WBC # BLD AUTO: 8.91 THOUSAND/UL (ref 4.31–10.16)

## 2020-08-14 PROCEDURE — 97530 THERAPEUTIC ACTIVITIES: CPT

## 2020-08-14 PROCEDURE — 99232 SBSQ HOSP IP/OBS MODERATE 35: CPT | Performed by: INTERNAL MEDICINE

## 2020-08-14 PROCEDURE — 97110 THERAPEUTIC EXERCISES: CPT

## 2020-08-14 PROCEDURE — 85025 COMPLETE CBC W/AUTO DIFF WBC: CPT | Performed by: NURSE PRACTITIONER

## 2020-08-14 PROCEDURE — 82948 REAGENT STRIP/BLOOD GLUCOSE: CPT

## 2020-08-14 PROCEDURE — 99232 SBSQ HOSP IP/OBS MODERATE 35: CPT | Performed by: PHYSICIAN ASSISTANT

## 2020-08-14 PROCEDURE — 99024 POSTOP FOLLOW-UP VISIT: CPT | Performed by: NURSE PRACTITIONER

## 2020-08-14 PROCEDURE — 97535 SELF CARE MNGMENT TRAINING: CPT

## 2020-08-14 PROCEDURE — 80048 BASIC METABOLIC PNL TOTAL CA: CPT | Performed by: NURSE PRACTITIONER

## 2020-08-14 RX ADMIN — INSULIN GLARGINE 5 UNITS: 100 INJECTION, SOLUTION SUBCUTANEOUS at 22:00

## 2020-08-14 RX ADMIN — OXYCODONE HYDROCHLORIDE AND ACETAMINOPHEN 1 TABLET: 5; 325 TABLET ORAL at 11:09

## 2020-08-14 RX ADMIN — INSULIN LISPRO 2 UNITS: 100 INJECTION, SOLUTION INTRAVENOUS; SUBCUTANEOUS at 13:05

## 2020-08-14 RX ADMIN — INSULIN LISPRO 1 UNITS: 100 INJECTION, SOLUTION INTRAVENOUS; SUBCUTANEOUS at 16:44

## 2020-08-14 RX ADMIN — INSULIN LISPRO 1 UNITS: 100 INJECTION, SOLUTION INTRAVENOUS; SUBCUTANEOUS at 22:01

## 2020-08-14 NOTE — ASSESSMENT & PLAN NOTE
· Currently rate controlled without medications  · A/C with Xarelto, however has been on hold for angiogram, never resumed   Therefore will continued to hold in anticipation for OR debridement per podiatry

## 2020-08-14 NOTE — PROGRESS NOTES
Progress Note - Matthew Garzon 1933, 80 y o  male MRN: [id number]    Unit/Bed#: -01 Encounter: [id number]    Primary Care Provider: Jesus Piña DO   Date and time admitted to hospital: 7/30/2020  5:23 PM      DOS: 8/14/2020    * Pressure ulcer of ankle  Assessment & Plan  · Pt underwent right lower extremity angiogram, 8/12/2020, with successful balloon angioplasty to the right SFA, popliteal and anterior tibial arteries  The posterior tibial could not be visualized  · Previous left lower extremity intervention was performed this hospitalization as well  · Secondary to PAD, pt with gangrene of the bilateral heels   · Arterial duplex showing occlusive disease of lower extremities bilaterally, improvement after angiogram noted  · Vascular surgery has now signed off  Pt and family are declining any amputation   · Wound care evaluated,  · Recommending podiatry re-evaluation for possible OR debridement early next week  · Discussed with vascular and podiatry, will hold Xarelto tomorrow in anticipation for OR debridement next week  Pt and family are in agreement    · Pt was not accepted to acute rehab, therefore when medically stable, discharge to home with [facility], family are going to get additional aides in the home as well    Anemia  Assessment & Plan  · Hgb 9 1 today, could be dilutional secondary to IVF hydration, now discontinued   · Baseline since admission has been around 10  · No evidence of acute bleeding noted   · Continue to monitor CBC    Diabetes [facility])  Assessment & Plan  Lab Results   Component Value Date    HGBA1C 7 0 (H) 06/12/2020       Recent Labs     08/13/20  1655 08/13/20  2042 08/14/20  0612 08/14/20  1111   POCGLU 242* 163* 140 196*       Blood Sugar Average: Last 72 hrs:  (P) 775 [id number]   · Last A1c 7%,   · Continue Lantus to 5 units QHS with SSI coverage  · QID glucose checks   · Consistent carb diet  · Monitor and adjust regimen as needed    Chronic diastolic congestive heart failure (HCC)  Assessment & Plan  Wt Readings from Last 3 Encounters:   07/30/20 80 kg (176 lb 5 9 oz)   07/14/20 85 1 kg (187 lb 9 6 oz)   07/07/20 85 1 kg (187 lb 9 6 oz)     · Previous provider discontinued Entresto secondary to low blood pressures on 8/3  · Last ECHO from 1/30/2019 showing EF 60%  · Currently euvolemic and normotensive     A-fib ([facility] [address] )  Assessment & Plan  · Currently rate controlled without medications  · A/C with Xarelto, however has been on hold for angiogram, never resumed  Therefore will continued to hold in anticipation for OR debridement per podiatry    Abnormal urinalysis  Assessment & Plan  · (+)UA on admission, urine culture growing ESBL  · ID signed off,  · Baxter catheter was replaced on 7/31  · Recommended holding additional antibiotics as patient has remained non-toxic and stable    Stage 3 chronic kidney disease ([facility] [address] )  Assessment & Plan  · Baseline creatinine 1 1-1 3 on review  · Nephrology following,  · Cr  Improved to 1 29 today, within baseline   · IVF now discontinued   · Continue to monitor BMP      VTE Pharmacologic Prophylaxis:   Pharmacologic: xarelto on hold secondary to plan for possible OR debridement per podiatry  Mechanical VTE Prophylaxis in Place: No    Patient Centered Rounds: I have evaluated patient without nursing staff present due to speaking to nurse outside patient's room, [address] with Specialists or Other Care Team Provider: Discussed with vascular, podiatry, RN, CM and reviewed previous notes     Education and Discussions with Family / Patient: Discussed with patient and patient's daughter at bedside regarding plan of care  Time Spent for Care: 30 minutes  More than 50% of total time spent on counseling and coordination of care as described above      Current Length of Stay: 15 day(s)    Current Patient Status: Inpatient   Certification Statement: The patient will continue to require additional inpatient hospital stay due to awaiting podiatry re-evaluation and potential OR debridement of gangrenous heels early next week    Discharge Plan: Not medically stable as above, pending podiatry re-eval    Code Status: Level 3 - DNAR and DNI      Subjective:   Pt reports that he feels fine today  Denies any chest pain, shortness of breath, abdominal pain  Does continue to complain of lower extremity pain  Pt's daughter and patient continue to be refusing any amputation, however they are in agreement to surgical debridement at this time if podiatry recommends  Objective:     Vitals:   Temp (24hrs), Av 5 °F (36 4 °C), Min:97 5 °F (36 4 °C), Max:97 5 °F (36 4 °C)    Temp:  [97 5 °F (36 4 °C)] 97 5 °F (36 4 °C)  HR:  [74] 74  Resp:  [20] 20  BP: (122)/(50) 122/50  SpO2:  [98 %] 98 %  Body mass index is 28 47 kg/m²  Input and Output Summary (last 24 hours): Intake/Output Summary (Last 24 hours) at 2020 1400  Last data filed at 2020 0420  Gross per 24 hour   Intake 480 ml   Output 1050 ml   Net -570 ml       Physical Exam:     Physical Exam  Vitals signs reviewed  Constitutional:       General: He is not in acute distress  Appearance: He is not diaphoretic  Comments: Pt is in no acute distress lying in his hospital bed resting comfortably  Baseline mentation per discussion with patient's daughter   HENT:      Head: Normocephalic and atraumatic  Eyes:      Conjunctiva/sclera: Conjunctivae normal       Pupils: Pupils are equal, round, and reactive to light  Cardiovascular:      Rate and Rhythm: Normal rate and regular rhythm  Heart sounds: No murmur  Pulmonary:      Effort: Pulmonary effort is normal  No respiratory distress  Breath sounds: Normal breath sounds  No stridor  No wheezing  Abdominal:      General: Bowel sounds are normal  There is no distension  Palpations: Abdomen is soft  Tenderness: There is no abdominal tenderness  There is no guarding     Musculoskeletal:         General: Edema (bilateral lower extremities with gangrenous heels) present  Skin:     General: Skin is warm and dry  Findings: No erythema  Neurological:      Mental Status: He is alert  Additional Data:     Labs:    Results from last 7 days   Lab Units 08/14/20  0447   WBC Thousand/uL 8 91   HEMOGLOBIN g/dL 9 1*   HEMATOCRIT % 29 5*   PLATELETS Thousands/uL 288   NEUTROS PCT % 71   LYMPHS PCT % 19   MONOS PCT % 7   EOS PCT % 1     Results from last 7 days   Lab Units 08/14/20  0447   POTASSIUM mmol/L 4 1   CHLORIDE mmol/L 108   CO2 mmol/L 28   BUN mg/dL 42*   CREATININE mg/dL 1 29   CALCIUM mg/dL 7 8*           * I Have Reviewed All Lab Data Listed Above  * Additional Pertinent Lab Tests Reviewed: All Labs Within Last 24 Hours Reviewed    Imaging:    Imaging Reports Reviewed Today Include: JAMAL  Imaging Personally Reviewed by Myself Includes:  None    Recent Cultures (last 7 days):           Last 24 Hours Medication List:   Current Facility-Administered Medications   Medication Dose Route Frequency Provider Last Rate    acetaminophen  650 mg Oral Q6H PRN Ren Medina MD      HYDROmorphone  0 5 mg Intravenous Q4H PRN Megan Rojas MD      insulin glargine  5 Units Subcutaneous HS Carla Ramos PA-C      insulin lispro  1-5 Units Subcutaneous HS Ren Medina MD      insulin lispro  1-6 Units Subcutaneous TID AC Ren Medina MD      nicotine  1 patch Transdermal Daily Trinity Florentino MD      oxyCODONE-acetaminophen  1 tablet Oral Q4H PRN Megan Rojas MD          Today, Patient Was Seen By: Hector Emerson PA-C    ** Please Note: Dictation voice to text software may have been used in the creation of this document   **

## 2020-08-14 NOTE — ASSESSMENT & PLAN NOTE
Lab Results   Component Value Date    HGBA1C 7 0 (H) 06/12/2020       Recent Labs     08/13/20  1655 08/13/20  2042 08/14/20  0612 08/14/20  1111   POCGLU 242* 163* 140 196*       Blood Sugar Average: Last 72 hrs:  (P) 976 [id number]   · Last A1c 7%,   · Continue Lantus to 5 units QHS with SSI coverage  · QID glucose checks   · Consistent carb diet  · Monitor and adjust regimen as needed

## 2020-08-14 NOTE — PLAN OF CARE
Problem: PHYSICAL THERAPY ADULT  Goal: Performs mobility at highest level of function for planned discharge setting  See evaluation for individualized goals  Description: Treatment/Interventions: LE strengthening/ROM, Therapeutic exercise, Endurance training, Patient/family training, Equipment eval/education, Bed mobility, Continued evaluation, Spoke to nursing, OT, Spoke to case management          See flowsheet documentation for full assessment, interventions and recommendations  Note: Prognosis: Poor  Problem List: Decreased strength, Decreased range of motion, Decreased endurance, Impaired balance, Decreased mobility, Decreased cognition, Impaired sensation, Decreased skin integrity  Assessment: Pt seen for PT treatment session this date with interventions consisting of Therapeutic exercise consisting of: AROM and AAROM 10 reps B LE in supine and HOB elevated position and therapeutic activity consisting of training: bed mobility  Pt agreeable to PT treatment session upon arrival, pt found supine in bed w/ HOB elevated, in no apparent distress  In comparison to previous session, pt with improvements in participation with TE, pt  declined OOB mobility  Max A x1 for rocio Rod Post session: bed alarm engaged, all needs in reach and RN notified of session findings/recommendations Continue to recommend post acute rehabiliation at time of d/c in order to maximize pt's functional independence and safety w/ mobility  Pt continues to be functioning below baseline level, and remains limited 2* factors listed above and including strength deficits, balance deficits reduced activity tolerance, decreased participation with functional mobility  PT will continue to see pt while here in order to address the deficits listed above and provide interventions consistent w/ POC in effort to achieve STGs    Barriers to Discharge: Inaccessible home environment, Decreased caregiver support     PT Discharge Recommendation: Post-Acute Rehabilitation Services     PT - OK to Discharge: Yes    See flowsheet documentation for full assessment

## 2020-08-14 NOTE — ASSESSMENT & PLAN NOTE
· Pt underwent right lower extremity angiogram, 8/12/2020, with successful balloon angioplasty to the right SFA, popliteal and anterior tibial arteries  The posterior tibial could not be visualized  · Previous left lower extremity intervention was performed this hospitalization as well  · Secondary to PAD, pt with gangrene of the bilateral heels   · Arterial duplex showing occlusive disease of lower extremities bilaterally, improvement after angiogram noted  · Vascular surgery has now signed off  Pt and family are declining any amputation   · Wound care evaluated,  · Recommending podiatry re-evaluation for possible OR debridement early next week  · Discussed with vascular and podiatry, will hold Xarelto tomorrow in anticipation for OR debridement next week  Pt and family are in agreement    · Pt was not accepted to acute rehab, therefore when medically stable, discharge to home with [facility], family are going to get additional aides in the home as well

## 2020-08-14 NOTE — ASSESSMENT & PLAN NOTE
80year-old minimally ambulatory male smoker w/chronic diastolic heart failure, type 2 DM, CKD 3 (baseline creatinine 1 4-1 8), Afib on Xarelto, COPD, L knee contracture, s/p bilateral ANDREA, recurrent UTI w/chronic indwelling Baxter catheter admitted w/pyuria and PAD w/bilateral heel pressure wounds, L >R  Diagnostics:  -LEAD 7/31:  diffuse right lower extremity disease without focal stenosis, R JAMAL 0 6/42/34 and left lower extremity with diffuse disease, no focal stenosis, decrease in velocities in SFA to popliteal artery suggestive of possible stenosis, L JAMAL 0 52/33/36  -Xray left foot 7/31: No evidence of fracture, osteomyelitis or other significant bony abnormality in the left foot  -Xray right foot 7/31: No evidence of osteomyelitis, fracture or other significant bony abnormality  -BC: neg x 5 days  -Angiogram 8/5/2020 L SFA PTA, TPT PTA, prox Peroneal PTA  Peroneal only in-line flow to the foot with short segment occlusion at origin of AT and remaining AT reconstituted by geniculate collaterals, PT chronically occluded & reconstituted distally  Robust collaterals  Peroneal runoff with delayed filling of PT/AT  -post intervention JAMAL right 0 75/38/26 and left 0 74/26/30 (prior right JAMAL 0 52)  - RIGHT LE angiogram/intervention 08/12/2020: R SFA, popliteal and anterior tibial artery angioplasties  AT predominant runoff to the foot  Posterior tibial was not visualized  Plan:  -Bilateral heel DTI, dry gangrene L>R w/ underlying PAD now s/p L SFA/peronal PTA 8/5 and R SFA/popliteal/AT angioplasty 8/12   -Post angio JAMAL done yesterday showed R JAMAL 1 0/91/65 and L JAMAL 0 96/104/62  -Maximized from a vascular standpoint   -monitor serum creatinine and patient with chronic kidney disease   Cr 1 29 today - below baseline    -resume anticoagulation on Xarelto for afib no further procedures are planned  - continue with local wound care, Betadine paint to heal and offloading  -Discussed with Dr Chula Rizvi  -Will sign off and follow up as an outpatient, appt on chart

## 2020-08-14 NOTE — PROGRESS NOTES
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)  Assessment & Plan  80year-old minimally ambulatory male smoker w/chronic diastolic heart failure, type 2 DM, CKD 3 (baseline creatinine 1 4-1 8), Afib on Xarelto, COPD, L knee contracture, s/p bilateral ANDREA, recurrent UTI w/chronic indwelling Baxter catheter admitted w/pyuria and PAD w/bilateral heel pressure wounds, L >R  Diagnostics:  -LEAD 7/31:  diffuse right lower extremity disease without focal stenosis, R JAMAL 0 6/42/34 and left lower extremity with diffuse disease, no focal stenosis, decrease in velocities in SFA to popliteal artery suggestive of possible stenosis, L JAMAL 0 52/33/36  -Xray left foot 7/31: No evidence of fracture, osteomyelitis or other significant bony abnormality in the left foot  -Xray right foot 7/31: No evidence of osteomyelitis, fracture or other significant bony abnormality  -BC: neg x 5 days  -Angiogram 8/5/2020 L SFA PTA, TPT PTA, prox Peroneal PTA  Peroneal only in-line flow to the foot with short segment occlusion at origin of AT and remaining AT reconstituted by geniculate collaterals, PT chronically occluded & reconstituted distally  Robust collaterals  Peroneal runoff with delayed filling of PT/AT  -post intervention JAMAL right 0 75/38/26 and left 0 74/26/30 (prior right JAMAL 0 52)  - RIGHT LE angiogram/intervention 08/12/2020: R SFA, popliteal and anterior tibial artery angioplasties  AT predominant runoff to the foot  Posterior tibial was not visualized      Plan:  -Bilateral heel DTI, dry gangrene L>R w/ underlying PAD now s/p L SFA/peronal PTA 8/5 and R SFA/popliteal/AT angioplasty 8/12   -Post angio JAMAL done yesterday showed R JAMAL 1 0/91/65 and L JAMAL 0 96/104/62  -Maximized from a vascular standpoint   -monitor serum creatinine and patient with chronic kidney disease  Cr 1 29 today - below baseline    -resume anticoagulation on Xarelto for afib no further procedures are planned  - continue with local wound care, Betadine paint to heal and offloading  -Discussed with Dr Susie Peralta  -Will sign off and follow up as an outpatient, appt on chart  * Pressure ulcer of ankle  Assessment & Plan  -L>R heel ulcer w/dry gangrene L heel  -heel pressure pressure off-loading, Betadine and monitoring    Stage 3 chronic kidney disease (HCC)  Assessment & Plan  -baseline creatinine 1 4-1 8  -stable  Creat 1 29  -continue to monitor creatinine postprocedure  -Nephrology following   -continue to avoid nephrotoxic agents      A-fib (HCC)  Assessment & Plan  -stable, rate controlled  -Chronically on Xarelto which should be restarted if no procedures are planned            Subjective:  Patient in bed  NAD  Sleeping  Arouses easily  Denies pain   VSS  Afebrile  Slight drift in Hgb 9 1/29 5    Vitals:  /50 (BP Location: Left arm)   Pulse 74   Temp 97 5 °F (36 4 °C) (Axillary)   Resp 20   Ht 5' 6" (1 676 m)   Wt 80 kg (176 lb 5 9 oz)   SpO2 98%   BMI 28 47 kg/m²     I/Os:  I/O last 3 completed shifts: In: 720 [P O :720]  Out: 1900 [Urine:1900]  No intake/output data recorded      Lab Results and Cultures:   Lab Results   Component Value Date    WBC 8 91 08/14/2020    HGB 9 1 (L) 08/14/2020    HCT 29 5 (L) 08/14/2020    MCV 91 08/14/2020     08/14/2020     Lab Results   Component Value Date    CALCIUM 7 8 (L) 08/14/2020     04/01/2018    K 4 1 08/14/2020    CO2 28 08/14/2020     08/14/2020    BUN 42 (H) 08/14/2020    CREATININE 1 29 08/14/2020     Lab Results   Component Value Date    INR 1 50 (H) 08/04/2020    INR 2 46 (H) 07/30/2020    INR 1 23 (H) 06/11/2020    PROTIME 18 4 (H) 08/04/2020    PROTIME 25 5 (H) 07/30/2020    PROTIME 15 5 (H) 06/11/2020        Blood Culture:   Lab Results   Component Value Date    BLOODCX No Growth After 5 Days   07/30/2020   ,   Urinalysis:   Lab Results   Component Value Date    COLORU Yellow 08/07/2020    CLARITYU Cloudy 08/07/2020    SPECGRAV 1 025 08/07/2020    PHUR 6 0 08/07/2020    PHUR 7 0 01/28/2019    LEUKOCYTESUR Moderate (A) 08/07/2020    NITRITE Positive (A) 08/07/2020    GLUCOSEU Negative 08/07/2020    KETONESU Negative 08/07/2020    BILIRUBINUR Negative 08/07/2020    BLOODU Moderate (A) 08/07/2020   ,   Urine Culture:   Lab Results   Component Value Date    URINECX >100,000 cfu/ml Escherichia coli ESBL (A) 07/30/2020    URINECX 4098-0626 cfu/ml Proteus mirabilis (A) 07/30/2020    URINECX 10,000-19,000 cfu/ml Enterococcus faecalis (A) 07/30/2020   ,   Wound Culure: No results found for: WOUNDCULT    Medications:  Current Facility-Administered Medications   Medication Dose Route Frequency    acetaminophen (TYLENOL) tablet 650 mg  650 mg Oral Q6H PRN    HYDROmorphone (DILAUDID) injection 0 5 mg  0 5 mg Intravenous Q4H PRN    insulin glargine (LANTUS) subcutaneous injection 5 Units 0 05 mL  5 Units Subcutaneous HS    insulin lispro (HumaLOG) 100 units/mL subcutaneous injection 1-5 Units  1-5 Units Subcutaneous HS    insulin lispro (HumaLOG) 100 units/mL subcutaneous injection 1-6 Units  1-6 Units Subcutaneous TID AC    nicotine (NICODERM CQ) 7 mg/24hr TD 24 hr patch 1 patch  1 patch Transdermal Daily    oxyCODONE-acetaminophen (PERCOCET) 5-325 mg per tablet 1 tablet  1 tablet Oral Q4H PRN       Imaging:  Post intervention JAMAL reviewed as noted above    Physical Exam:    General appearance: alert and oriented, in no acute distress  Skin: Skin color, texture, turgor normal  No rashes or lesions  Neurologic: Grossly normal  Head: Normocephalic, without obvious abnormality, atraumatic  Eyes: EOMI  Throat: dry oral mucosa  Lungs: clear to auscultation bilaterally  Chest wall: no tenderness  Heart: regularly irregular rhythm  Abdomen: soft, non-tender; bowel sounds normal; no masses,  no organomegaly  Extremities: Trace BLE swelling w/ chronic hyperpigmentations and dry skin with thickened skin plaques   Bilateral heel pressure ulcer     Wound/Incision:  Bilateral heel  dressing clean, dry, and intact    Pulse exam:  Femoral: Right: 2+ Left: 2+  Popliteal: Right: 1+ Left: 1+  DP: Right: doppler signal Left: doppler signal        FAREED Tan  8/14/2020  [facility]  [phone]

## 2020-08-14 NOTE — ASSESSMENT & PLAN NOTE
-baseline creatinine 1 4-1 8  -stable    Creat 1 29  -continue to monitor creatinine postprocedure  -Nephrology following   -continue to avoid nephrotoxic agents

## 2020-08-14 NOTE — PHYSICAL THERAPY NOTE
PT Treatment Note        08/14/20 5987   Pain Assessment   Pain Assessment Tool 0-10   Pain Score 7   Pain Location/Orientation Orientation: Bilateral;Location: Leg   Restrictions/Precautions   Weight Bearing Precautions Per Order No  (maintain NWB due to L heel pressure injury)   Braces or Orthoses Other (Comment)   Other Precautions Fall Risk;Pain;Contact/isolation;Cognitive; Chair Alarm; Bed Alarm  (lower leg skin breakdown)   General   Chart Reviewed Yes   Response to Previous Treatment Patient with no complaints from previous session  Family/Caregiver Present No   Cognition   Overall Cognitive Status Impaired   Arousal/Participation Alert; Responsive   Attention Attends with cues to redirect   Orientation Level Oriented to person;Oriented to place   Memory Decreased recall of recent events;Decreased short term memory   Following Commands Follows one step commands with increased time or repetition   Subjective   Subjective "I don't want to get out of bed  I don't want to sit up "   Bed Mobility   Rolling R 2  Maximal assistance   Additional items HOB elevated; Bedrails; Increased time required;Assist x 1   Rolling L 2  Maximal assistance   Additional items HOB elevated; Increased time required;Verbal cues; Assist x 1   Transfers   Sit to Stand Unable to assess  (deferred poor sitting balance/sitting tolerance)   Endurance Deficit   Endurance Deficit Yes   Endurance Deficit Description fatigue and deconditioning   Activity Tolerance   Activity Tolerance Patient limited by fatigue;Patient limited by pain   Nurse Made Aware RN ok to see; s/p session pt  elevated to sit forward   Exercises   Quad Sets Supine;AROM;10 reps;Bilateral   Hip Flexion AAROM;10 reps; Supine;Bilateral  (HOB elevated)   Hip Abduction AROM; Bilateral;Sitting;10 reps  (resisted)   Hip Adduction Sitting;AROM; Bilateral;10 reps   Assessment   Prognosis Poor   Problem List Decreased strength;Decreased range of motion;Decreased endurance; Impaired balance;Decreased mobility; Decreased cognition; Impaired sensation;Decreased skin integrity   Assessment Pt seen for PT treatment session this date with interventions consisting of Therapeutic exercise consisting of: AROM and AAROM 10 reps B LE in supine and HOB elevated position and therapeutic activity consisting of training: bed mobility  Pt agreeable to PT treatment session upon arrival, pt found supine in bed w/ HOB elevated, in no apparent distress  In comparison to previous session, pt with improvements in participation with TE, pt  declined OOB mobility  Max A x1 for rolling  Gerhardt Sep Post session: bed alarm engaged, all needs in reach and RN notified of session findings/recommendations Continue to recommend post acute rehabiliation at time of d/c in order to maximize pt's functional independence and safety w/ mobility  Pt continues to be functioning below baseline level, and remains limited 2* factors listed above and including strength deficits, balance deficits reduced activity tolerance, decreased participation with functional mobility  PT will continue to see pt while here in order to address the deficits listed above and provide interventions consistent w/ POC in effort to achieve STGs  Barriers to Discharge Inaccessible home environment;Decreased caregiver support   Goals   Patient Goals to get to the chair   STG Expiration Date 08/17/20   Short Term Goal #1 In 7-10 days: Increase bilateral LE strength 1/2 grade to facilitate independent mobility, Perform all bed mobility tasks with min A of 1 to decrease caregiver burden, Increase static and dynamic sitting balance 1 grade to decrease risk for falls and PT to see and establish goals for functional transfers when appropriate    PT Treatment Day 2   Plan   Treatment/Interventions Functional transfer training;LE strengthening/ROM; Therapeutic exercise; Endurance training;Patient/family training;Equipment eval/education; Bed mobility;Gait training;Spoke to nursing Progress Slow progress, decreased activity tolerance   PT Frequency   (3-5x/wk)   Recommendation   PT Discharge Recommendation Post-Acute Rehabilitation Services   Equipment Recommended Wheelchair   PT - OK to Discharge Yes   Additional Comments when medically cleared    Halle Strong, PT

## 2020-08-14 NOTE — PROGRESS NOTES
NEPHROLOGY PROGRESS NOTE    Patient: Pratima Castro               Sex: male          DOA: 7/30/2020  5:23 PM   YOB: 1933        Age:  80 y o         LOS:  LOS: 15 days   8/14/2020    REASON FOR THE CONSULTATION:      Renal optimization prior to arteriogram     SUBJECTIVE     Patient seen and examined next to the bedside  Awake and offers no complaints  Events of the past 24 hours noted  IV fluids appear to have been discontinued  CURRENT MEDICATIONS       Current Facility-Administered Medications:     acetaminophen (TYLENOL) tablet 650 mg, 650 mg, Oral, Q6H PRN, Ren Medina MD, 650 mg at 08/02/20 1114    HYDROmorphone (DILAUDID) injection 0 5 mg, 0 5 mg, Intravenous, Q4H PRN, Clyda Carrel, MD, 0 5 mg at 08/13/20 1116    insulin glargine (LANTUS) subcutaneous injection 5 Units 0 05 mL, 5 Units, Subcutaneous, HS, Carla Ramos PA-C, 5 Units at 08/13/20 2325    insulin lispro (HumaLOG) 100 units/mL subcutaneous injection 1-5 Units, 1-5 Units, Subcutaneous, HS, Ren Medina MD, 1 Units at 08/13/20 2326    insulin lispro (HumaLOG) 100 units/mL subcutaneous injection 1-6 Units, 1-6 Units, Subcutaneous, TID AC, 3 Units at 08/13/20 1808 **AND** Fingerstick Glucose (POCT), , , TID AC, Ren Medina MD    nicotine (NICODERM CQ) 7 mg/24hr TD 24 hr patch 1 patch, 1 patch, Transdermal, Daily, Ren Medina MD, 1 patch at 08/12/20 0927    oxyCODONE-acetaminophen (PERCOCET) 5-325 mg per tablet 1 tablet, 1 tablet, Oral, Q4H PRN, Clyda Carrel, MD, 1 tablet at 08/13/20 2326    sodium chloride 0 9 % infusion, 50 mL/hr, Intravenous, Continuous, Rush Lea MD, Stopped at 08/13/20 0521    REVIEW OF SYSTEMS     Review of Systems   Constitutional: Negative  HENT: Negative  Eyes: Negative  Respiratory: Negative  Cardiovascular: Negative  Gastrointestinal: Negative  Endocrine: Negative  Genitourinary: Negative  Musculoskeletal: Negative  Skin: Negative      Allergic/Immunologic: Negative  Neurological: Negative  Hematological: Negative  All other systems reviewed and are negative  OBJECTIVE     Current Weight: Weight - Scale: 80 kg (176 lb 5 9 oz)  Vitals:    08/13/20 2326   BP: 122/50   Pulse: 74   Resp: 20   Temp: 97 5 °F (36 4 °C)   SpO2: 98%     Body mass index is 28 47 kg/m²  Intake/Output Summary (Last 24 hours) at 8/14/2020 0935  Last data filed at 8/14/2020 0420  Gross per 24 hour   Intake 480 ml   Output 1050 ml   Net -570 ml       PHYSICAL EXAMINATION     Physical Exam  HENT:      Head: Normocephalic and atraumatic  Eyes:      Pupils: Pupils are equal, round, and reactive to light  Neck:      Musculoskeletal: Neck supple  Vascular: No JVD  Cardiovascular:      Rate and Rhythm: Normal rate and regular rhythm  Heart sounds: Murmur present  No friction rub  Pulmonary:      Effort: Pulmonary effort is normal       Breath sounds: Normal breath sounds  Abdominal:      General: Bowel sounds are normal  There is no distension  Palpations: Abdomen is soft  Tenderness: There is no abdominal tenderness  There is no rebound  Musculoskeletal:         General: No tenderness or edema  Skin:     General: Skin is dry  Findings: Erythema present  No rash  Neurological:      Mental Status: He is alert and oriented to person, place, and time     Psychiatric:         Mood and Affect: Mood and affect normal            LAB RESULTS     Results from last 7 days   Lab Units 08/14/20  0447 08/13/20  0545 08/12/20  0450 08/11/20  0708 08/10/20  0647 08/09/20  0428 08/08/20  0503 08/08/20  0502  08/07/20  1002   WBC Thousand/uL 8 91 8 84 8 86 8 63 13 35* 7 42  --  6 71  --  8 11   HEMOGLOBIN g/dL 9 1* 10 3* 10 8* 11 0* 10 1* 10 3*  --  10 7*  --  11 0*   HEMATOCRIT % 29 5* 32 7* 34 6* 34 6* 31 6* 33 2*  --  34 7*  --  35 8*   PLATELETS Thousands/uL 288 311 307 321 314 317  --  310  --  340   POTASSIUM mmol/L 4 1 5 2 4 8 4 7 5 2 4 3 4 6  --    < > 4 0 CHLORIDE mmol/L 108 104 102 103 103 103 105  --    < > 106   CO2 mmol/L 28 24 28 29 27 29 28  --    < > 32   BUN mg/dL 42* 36* 31* 29* 27* 27* 29*  --    < > 32*   CREATININE mg/dL 1 29 1 49* 1 05 1 17 1 23 1 22 1 18  --    < > 1 43*   EGFR ml/min/1 73sq m 50 42 64 56 53 53 56  --    < > 44   CALCIUM mg/dL 7 8* 8 3 8 6 8 8 8 4 8 4 8 6  --    < > 8 6   MAGNESIUM mg/dL  --   --   --   --   --   --   --   --   --  1 8   PHOSPHORUS mg/dL  --   --   --   --   --   --  3 0  --   --   --     < > = values in this interval not displayed  RADIOLOGY RESULTS      Results for orders placed during the hospital encounter of 07/30/20   XR chest 1 view portable    Narrative CHEST     INDICATION:   sepsis  COMPARISON:  6/11/2020    EXAM PERFORMED/VIEWS:  XR CHEST PORTABLE      FINDINGS:    Cardiomediastinal silhouette appears unremarkable  The lungs are clear  No pneumothorax or pleural effusion  Osseous structures appear within normal limits for patient age  Impression No acute cardiopulmonary disease  Workstation performed: [id number]       Results for orders placed during the hospital encounter of 06/11/20   XR chest 2 views    Narrative CHEST     INDICATION:   AMS  COMPARISON:  Radiograph 1/28/2019    EXAM PERFORMED/VIEWS:  XR CHEST PA & LATERAL      FINDINGS:    Heart shadow is enlarged but unchanged from prior exam     Small right pleural effusion  No pneumothorax or focal consolidation  No evidence of pulmonary edema  Osseous structures appear within normal limits for patient age  Impression Stable cardiomegaly  Small right pleural effusion  No evidence of pulmonary edema          Workstation performed: [id number]         ASSESSMENT/PLAN     51-year-old male with past medical history of chronic kidney disease stage 3, CHF with diastolic dysfunction hypertension, COPD, atrial fibrillation, peripheral vascular disease, ex-smoker, diabetes mellitus type 2, chronic indwelling Baxter catheter presented with pyuria  1  Chronic kidney disease stage 3:  Baseline serum creatinine 1 1-1 3   -current serum creatinine is 1 29 and improved compared to yesterday  -IV fluids were discontinued  -patient is within his baseline renal function  2  Atherosclerosis of lower extremity with gangrene:  Underwent angiogram of right lower extremity with successful balloon angioplasty of the right superficial femoral artery, popliteal and anterior tibial artery  3  Hyperkalemia:  Serum potassium is improved to 4 1 today  4  CHF with diastolic dysfunction:  Currently off diuretics  -can resume home diuretics upon discharge  5  Atrial fibrillation:  Rate and rhythm control  Xarelto put on hold  6  Abnormal urinalysis:  Urinalysis with evidence of pyuria and urine culture growing ESBL  -recommendation was to hold antibiotics as patient has remained afebrile  7  Insulin-dependent diabetes mellitus:  Currently on short-acting insulin NovoLog 8 units t i d  As well as Lantus 5 units at bedtime  Blood sugar elevated this morning  Will sign off at this time    Call as needed      Ijeoma Willard MD  Nephrology  8/14/2020

## 2020-08-14 NOTE — SOCIAL WORK
Per SLIM patient will require inpatient stay for debridement by podiatry  Patient's discharge plan is CM to schedule bls transport home w/[facility]  CM spoke to jenniferKeiko who states she has plans to hire care at home  Rasta Lewis requested Dr Deedee Gonzalez phone number and this cm gave office number  ANDRES informed JOCELYNN who will consult Dr Hubert Rojas  Treatment team informed

## 2020-08-14 NOTE — OCCUPATIONAL THERAPY NOTE
Occupational Therapy Treatment Note        Patient Name: Allison Monroe  Today's Date: 8/14/2020 08/14/20 4372   Lifestyle   [facility] OT participated in phone conversation with [place] and daughter prior to this session to confirm PLOF and home set up  Patient lives in a one story ranch style house with 5 MEL[address], daughter lives a few miles away, both her and her  provide assistance and supervision 24 hrs/ day  Daughter reported that the last time father was able to actively participate in ADLs, transfers and ambulation was approximately 3-4 months ago  Daughter reported patient able to walk ~ 10 feet with walker, with assistance  PTA patient was able to particiapte in UB ADLs, was dependent for LB  Patient was being transfereed to a Recliner on a daily basis, was w/ c bound for locomotion purposes  Reciprocal Relationships Supportive Family   Pain Assessment   Pain Assessment Tool 0-10   Pain Score Worst Possible Pain  (RN made aware)   Pain Location/Orientation Orientation: Left; Location: Leg  (ankle and lower leg)   Pain Onset/Description Onset: Ongoing   ADL   Eating Assistance 5  Supervision/Setup   Eating Deficit Setup; Other (Comment)  (tendency to cough frequently, will notify RN and ST   )   Grooming Assistance 4  Minimal Assistance   Grooming Deficit Setup;Verbal cueing; Increased time to complete; Other (Comment)  (verbal cues to engage in task)   UB Bathing Assistance 2  Maximal Assistance   UB Bathing Deficit Setup;Verbal cueing; Increased time to complete   LB Bathing Assistance 1  Total Assistance   LB Bathing Deficit Other (Comment)  (poor trunk mobility/ flexibility)   UB Dressing Assistance 3  Moderate Assistance   UB Dressing Deficit Setup;Verbal cueing;Supervision/safety   LB Dressing Assistance 1  Total Assistance   LB Dressing Deficit   (dependent)   Toileting Assistance  1  Total Assistance   Toileting Deficit   (dependent)   Bed Mobility   Rolling R 3  Moderate assistance Additional items Assist x 2;HOB elevated; Bedrails; Comment  (observed Nursing staff during wound care)   Rolling L 3  Moderate assistance   Additional items Assist x 2;HOB elevated; Increased time required;Verbal cues   Cognition   Overall Cognitive Status Impaired   Arousal/Participation Alert; Responsive; Cooperative   Attention Attends with cues to redirect   Orientation Level Oriented to person;Oriented to place   Memory Decreased recall of biographical information;Decreased short term memory   Following Commands Follows one step commands with increased time or repetition   Activity Tolerance   Activity Tolerance Patient limited by fatigue  (ref to wear O2 - O2 sats ranging 82- 85 during eating task)   Medical Staff Made Aware RN made aware of refusal of O2, coughing episodes with limited ability to clear throat, and reported pain level of 10 (left lower leg- ankle)   Assessment   Assessment Patient participated in Skilled OT session this date with interventions consisting of ADL re training with the use of correct body mechnaics, Energy Conservation techniques, Work simplification skills , safety awareness and fall prevention techniques, increase cardiovascular endurance , increase postural control and increase trunk control   Patient agreeable to OT treatment session, upon arrival patient was found supine in bed, alert and responsive   OT participated in phone conversation with ANDRES Mabry and daughter prior to this session to confirm PLOF and home set up  Patient lives in a one story ranch style house with 5 MELIDA, daughter lives a few miles away, both her and her  provide assistance and supervision 24 hrs/ day  Daughter reported that the last time father was able to actively participate in ADLs, transfers and ambulation was approximately 3-4 months ago  Daughter reported patient able to walk ~ 10 feet with walker, with assistance (about 3 months ago)   PTA patient was able to particiapte in UB ADLs, was dependent for LB  Patient was being transferred to a Recliner on a daily basis, was w/ c bound for locomotion purposes  Upon re assessment today, patient is able to feed self with set up assist and supervision, able to complete grooming task with min assist, requires mod to max assist for UB dressing and bathing, mod assist of 2 for bed mobility, and total assist for LB ADLs  Patient requiring frequent re direction, verbal cues for safety, verbal cues for correct technique, cognitive assistance to anticipate next step and frequent rest periods  Patient continues to be functioning below baseline level, occupational performance remains limited secondary to factors listed above and increased risk for falls and injury  From OT standpoint, recommendation at time of d/c would be Short Term Rehab  Patient to benefit from continued Occupational Therapy treatment while in the hospital to address deficits as defined above and maximize level of functional independence with ADLs and functional mobility  Plan   Treatment Interventions ADL retraining;Functional transfer training;UE strengthening/ROM; Endurance training;Cognitive reorientation;Patient/family training; Compensatory technique education;Continued evaluation; Energy conservation; Activityengagement   Goal Expiration Date 08/21/20   OT Treatment Day 3   OT Frequency 3-5x/wk   Recommendation   OT Discharge Recommendation Post-Acute Rehabilitation Services   Barthel Index   Feeding 5   Bathing 0   Grooming Score 0   Dressing Score 5   Bladder Score 0   Bowels Score 5   Toilet Use Score 0   Transfers (Bed/Chair) Score 5   Mobility (Level Surface) Score 0   Stairs Score 0   Barthel Index Score 20

## 2020-08-14 NOTE — WOUND OSTOMY CARE
Progress Note - Wound   Orvis Hillary 80 y o  male MRN: [id number]  Unit/Bed#: -01 Encounter: [id number]      Assessment:   Wound care to see patient for weekly follow up visit  Patient seen in bed, alert and oriented x 2  Seen with the primary RN  Patient is on p-500 mattress  Nutrition is following along  Patient is dependent for care - assist of 2 with turning for the assessment  Baxter cath in place  Incontinent of bowel - nursing reports patient was soiling the foam dressing too frequently and they have been using hydraguard cream - agree with this plan, concern for foam to hold too much moisture to the skin  Wedges in use for repositioning and b/l prevalon boots in use  Patient is being followed by vascular for revascularization of the lower extremities  Patient is s/p 2nd angiogram procedure and his latest ABIs in the chart are within the normal healing range  Podiatry should be re-consulted at this time for debridement  Patient with noted contracture of the L knee - flexion position       Skin to the b/l upper back remains intact with no redness, wounds or tenderness  Recommend to continue with foam dressing for prevention          2  R sacrum with non-evolving DTI - POA  Resolved on assessment as evidenced by intact dry blanchable hyperpigmented skin and adhered irregular dry scabbing  No open wounds/redness/tenderness  3  L upper buttock / lower back - non-evolving DTI - POA  Resolved on assessment as evidenced by intact dry blanchable hyperpigmented skin and adhered irregular dry scabbing  No open wounds/redness/tenderness  - the b/l buttocks and sacrum are otherwise intact with blanchable pink and blanchable hyperpigmented skin  The wounds and skin to the b/l buttocks/sacrum are non-tender          4  L lateral ankle with non-evolving DTI versus arterial wound- POA  Stable in appearance  This wound has the potential to evolve to a full thickness injury, stage 3 or 4   Wound is 100% intact dry non-blanchable purple/dark maroon colored skin  No open aspects  Analisa-wound is intact with blanchable pink and blanchable hyperpigmented skin  There is an area of well adhered dry scabbing superior to this wound in the photo - no open wound redness/drainage to this location  Areas are non-tender  Recommend to  continue with 3m no sting skin prep - okay is this area is covered in the dressing that will be applied to the L heel wound  Offload area        5  R posterior ankle/R distal leg (noted just superior to the bony prominence of the heel) - decreased measurements - one area remains- irregular shaped non-blanchable purple colored skin  Given location and appearance, suspect this is related to the patients underlying arterial disease  Cannot rule out pressure  Wound is 100% intact dry non-blanchable purple colored skin  No open aspects  Analisa-wound is intact , no redness  Area is non-tender  Recommend to apply 3m no sting skin prep and cover with foam dressing        6  L lateral heel - unstageable pressure injury versus arterial wound - POA  Wound bed is approx: 70% moist brown/black eschar that appears to be un-flo, 20% moist yellow tissue, 10% pink/red moist tissue  Edges fragile with maceration (maceration is slightly improved from last week)  Analisa-wound with blanchable redness and blanchable hyperpigmented skin  Wound is very painful to the patient  There is still a moderate amount of strong malodorous with thick yellow/serosanguineous drainage noted on the old dressing  Odor decreased only minimally after cleansing  Will recommend to continue with dressing to manage the drainage  Any debridement of the wound / softening of the eschar - defer to vascular/podiatry         7  R heel with resolving DTI versus arterial wound- POA  This wound has the potential to evolve to a full thickness injury, stage 3 or 4  Wound is now closed - presenting as intact dry very slow to blanchable purple colored skin   No open wound  Analisa-wound is intact with dry peeling skin, and blanchable hyperpigmented skin  Wound is not tender now  Recommend to use foam dressing to protect      Recommend strict offloading of the lower extremities -using prevalon boots and pillows  patient remains at high risk for skin breakdown due to bed-bound status       No induration, fluctuance, odor, warmth, redness, or purulence noted to the above noted wounds (except where noted above)  New dressings applied  Patient tolerated well- b/l heel wounds are tender  Primary nurse aware of plan of care  See flow sheets for more detailed assessment findings  Will follow along      Discussed assessment findings and plan with Michelene Boxer SLIM PA       Plan:   1  Apply b/l prevalon boots to patient, remove for skin care and skin assessments  2  Apply skin nourishing cream the entire skin daily for moisture  3  Turn and reposition patient every  2 hours   4  Elevate heels off of bed with pillows to offload pressure   5  Apply EHOB waffle cushion to chair when OOB, if able  6  Continue with p-500 mattress  7  Apply Allevyn foam to R upper back bony promience, amy w/P, peel foam check skin integrity q-shift  Change q5d   8  Cleanse L heel and L lateral ankle wounds with NSS and pat dry  Apply adaptic (oil emulsion) to the L heel wound and top with silver alginate  Apply 3m no sting skin prep to the DTI to the L lateral ankle  Cover the areas with ABD pad and secure the dressings with kerlex wrap  Change daily and PRN for soilage/dislodgement  Offload heel at all times  9  Apply hydraguard cream to the b/l buttocks and sacrum TID and PRN for prevention and protection  10  Cleanse right heel and R posterior ankle/distal leg wounds with NSS and pat dry  Apply 3m no sting to each of the wounds and cover with Allevyn foam dressings  Ensure the wound to the R posterior ankle/distal leg is covered with foam - can use small square bordered Allevyn foam dressing   Amy w/T, peel foam check skin integrity q-shift  Change every day and PRN for soilage/dislodgement  Offload heel at all times  11  Vascular is following along with patient  12  Re-consult podiatry for b/l lower extremity wounds as blood flow has been re-established  13  Wound care will follow along with patient weekly, please call with questions and concerns       Objective:    Vitals: Blood pressure 122/50, pulse 74, temperature 97 5 °F (36 4 °C), temperature source Axillary, resp  rate 20, height 5' 6" (1 676 m), weight 80 kg (176 lb 5 9 oz), SpO2 98 %  ,Body mass index is 28 47 kg/m²  Wound 07/31/20 Pressure Injury Heel Left (Active)   Wound Image   08/14/20 0936   Wound Description Black; Beefy red;Pink;Yellow;Slough 08/14/20 0936   Pressure Injury Stage U 08/14/20 0936   Analisa-wound Assessment Erythema;Fragile; Maceration 08/14/20 0936   Wound Length (cm) 7 cm 08/14/20 0936   Wound Width (cm) 7 cm 08/14/20 0936   Wound Depth (cm) 0 2 cm 08/14/20 0936   Wound Surface Area (cm^2) 49 cm^2 08/14/20 0936   Wound Volume (cm^3) 9 8 cm^3 08/14/20 0936   Calculated Wound Volume (cm^3) 9 8 cm^3 08/14/20 0936   Tunneling 0 cm 08/14/20 0936   Tunneling in depth located at 0 08/14/20 0936   Undermining 0 08/14/20 0936   Undermining is depth extending from 0 08/14/20 0936   Closure None 08/14/20 0936   Drainage Amount Moderate 08/14/20 0936   Drainage Description Yellow; Foul smelling;Serosanguineous 08/14/20 0936   Non-staged Wound Description Full thickness 08/14/20 0936   Treatments Cleansed;Irrigation with NSS;Site care;Elevated 08/14/20 0936   Dressing Calcium Alginate with Silver;ABD 08/14/20 0936   Wound packed? No 08/14/20 0936   Packing- # removed 0 08/14/20 0936   Packing- # inserted 0 08/14/20 0936   Dressing Changed New 08/14/20 0936   Patient Tolerance Tolerated well 08/14/20 0936   Dressing Status Clean;Dry; Intact 08/14/20 0936       Wound 07/31/20 Pressure Injury Heel Right (Active)   Wound Image    08/14/20 0941   Wound Description Dry; Intact; Light purple 08/14/20 0941   Pressure Injury Stage DTPI 08/14/20 0941   Analisa-wound Assessment Dry; Intact 08/14/20 0941   Wound Length (cm) 0 8 cm 08/14/20 0941   Wound Width (cm) 1 cm 08/14/20 0941   Wound Depth (cm) 0 cm 08/14/20 0941   Wound Surface Area (cm^2) 0 8 cm^2 08/14/20 0941   Wound Volume (cm^3) 0 cm^3 08/14/20 0941   Calculated Wound Volume (cm^3) 0 cm^3 08/14/20 0941   Tunneling 0 cm 08/14/20 0941   Tunneling in depth located at 0 08/14/20 0941   Undermining 0 08/14/20 0941   Undermining is depth extending from 0 08/14/20 0941   Closure None 08/14/20 0941   Drainage Amount None 08/14/20 0941   Drainage Description Serosanguineous 08/13/20 2022   Non-staged Wound Description Not applicable 36/48/92 3410   Treatments Cleansed;Irrigation with NSS;Site care;Elevated 08/14/20 0941   Dressing Foam, Silicon (eg  Allevyn, etc) 08/14/20 0941   Wound packed? No 08/14/20 0941   Packing- # removed 0 08/14/20 0941   Packing- # inserted 0 08/14/20 0941   Dressing Changed New 08/14/20 0941   Patient Tolerance Tolerated well 08/14/20 0941   Dressing Status Clean;Dry; Intact 08/14/20 0941       Wound 07/31/20 Pressure Injury Ankle Left;Lateral (Active)   Wound Image   08/14/20 0935   Wound Description Dry; Intact; Light purple 08/14/20 0935   Pressure Injury Stage DTPI 08/14/20 0935   Analisa-wound Assessment Dry; Intact; Hyperpigmented 08/14/20 0935   Wound Length (cm) 1 cm 08/14/20 0935   Wound Width (cm) 1 cm 08/14/20 0935   Wound Depth (cm) 0 cm 08/14/20 0935   Wound Surface Area (cm^2) 1 cm^2 08/14/20 0935   Wound Volume (cm^3) 0 cm^3 08/14/20 0935   Calculated Wound Volume (cm^3) 0 cm^3 08/14/20 0935   Tunneling 0 cm 08/14/20 0935   Tunneling in depth located at 0 08/14/20 0935   Undermining 0 08/14/20 0935   Undermining is depth extending from 0 08/14/20 0935   Closure None 08/14/20 0935   Drainage Amount None 08/14/20 0935   Non-staged Wound Description Not applicable 82/35/81 1801   Treatments Cleansed;Irrigation with NSS;Site care;Elevated 08/14/20 0935   Dressing Dry dressing 08/14/20 0935   Wound packed? No 08/14/20 0935   Packing- # removed 0 08/14/20 0935   Packing- # inserted 0 08/14/20 0935   Dressing Changed New 08/14/20 0935   Patient Tolerance Tolerated well 08/14/20 0935   Dressing Status Clean;Dry; Intact 08/14/20 0935   Wound 08/07/20 Arterial/ischemic ulcer Tibial Distal;Posterior;Right (Active)   Wound Image   08/14/20 0941   Wound Description Dry; Intact; Light purple 08/14/20 0941   Analisa-wound Assessment Dry; Intact 08/14/20 0941   Wound Length (cm) 2 cm 08/14/20 0941   Wound Width (cm) 4 cm 08/14/20 0941   Wound Depth (cm) 0 cm 08/14/20 0941   Wound Surface Area (cm^2) 8 cm^2 08/14/20 0941   Wound Volume (cm^3) 0 cm^3 08/14/20 0941   Calculated Wound Volume (cm^3) 0 cm^3 08/14/20 0941   Tunneling 0 cm 08/14/20 0941   Tunneling in depth located at 0 08/14/20 0941   Undermining 0 08/14/20 0941   Undermining is depth extending from 0 08/14/20 0941   Closure None 08/14/20 0941   Drainage Amount None 08/14/20 0941   Non-staged Wound Description Not applicable 94/27/10 7317   Treatments Cleansed;Irrigation with NSS;Site care;Elevated 08/14/20 0941   Dressing Foam, Silicon (eg  Allevyn, etc) 08/14/20 0941   Wound packed? No 08/14/20 0941   Packing- # removed 0 08/14/20 0941   Packing- # inserted 0 08/14/20 0941   Dressing Changed New 08/14/20 0941   Patient Tolerance Tolerated well 08/14/20 0941   Dressing Status Intact; Clean;Dry 08/14/20 0941     Recommendations written as orders  AVS updated    Jass Real RN BSN [facility]

## 2020-08-14 NOTE — ASSESSMENT & PLAN NOTE
· Hgb 9 1 today, could be dilutional secondary to IVF hydration, now discontinued   · Baseline since admission has been around 10  · No evidence of acute bleeding noted   · Continue to monitor CBC

## 2020-08-14 NOTE — ASSESSMENT & PLAN NOTE
· Baseline creatinine 1 1-1 3 on review  · Nephrology following,  · Cr   Improved to 1 29 today, within baseline   · IVF now discontinued   · Continue to monitor BMP

## 2020-08-14 NOTE — SOCIAL WORK
CM spoke to dtr-Keiko who states patient is never left home alone  Ender Brody states there are family in the home at all times  Currently waiting for acute rehab to notify of acceptance  Per belkys-Keiko If acute rehab does not accept patient  The discharge plan will be patient will be discharged home with [facility]  Treatment team informed

## 2020-08-15 LAB
ANION GAP SERPL CALCULATED.3IONS-SCNC: 5 MMOL/L (ref 4–13)
BASOPHILS # BLD AUTO: 0.05 THOUSANDS/ΜL (ref 0–0.1)
BASOPHILS NFR BLD AUTO: 1 % (ref 0–1)
BUN SERPL-MCNC: 35 MG/DL (ref 5–25)
CALCIUM SERPL-MCNC: 8 MG/DL (ref 8.3–10.1)
CHLORIDE SERPL-SCNC: 106 MMOL/L (ref 100–108)
CO2 SERPL-SCNC: 29 MMOL/L (ref 21–32)
CREAT SERPL-MCNC: 1.14 MG/DL (ref 0.6–1.3)
EOSINOPHIL # BLD AUTO: 0.18 THOUSAND/ΜL (ref 0–0.61)
EOSINOPHIL NFR BLD AUTO: 2 % (ref 0–6)
ERYTHROCYTE [DISTWIDTH] IN BLOOD BY AUTOMATED COUNT: 16.6 % (ref 11.6–15.1)
GFR SERPL CREATININE-BSD FRML MDRD: 58 ML/MIN/1.73SQ M
GLUCOSE SERPL-MCNC: 122 MG/DL (ref 65–140)
GLUCOSE SERPL-MCNC: 171 MG/DL (ref 65–140)
GLUCOSE SERPL-MCNC: 176 MG/DL (ref 65–140)
GLUCOSE SERPL-MCNC: 226 MG/DL (ref 65–140)
GLUCOSE SERPL-MCNC: 82 MG/DL (ref 65–140)
HCT VFR BLD AUTO: 31.2 % (ref 36.5–49.3)
HGB BLD-MCNC: 9.7 G/DL (ref 12–17)
IMM GRANULOCYTES # BLD AUTO: 0.07 THOUSAND/UL (ref 0–0.2)
IMM GRANULOCYTES NFR BLD AUTO: 1 % (ref 0–2)
LYMPHOCYTES # BLD AUTO: 1.7 THOUSANDS/ΜL (ref 0.6–4.47)
LYMPHOCYTES NFR BLD AUTO: 23 % (ref 14–44)
MCH RBC QN AUTO: 28.4 PG (ref 26.8–34.3)
MCHC RBC AUTO-ENTMCNC: 31.1 G/DL (ref 31.4–37.4)
MCV RBC AUTO: 91 FL (ref 82–98)
MONOCYTES # BLD AUTO: 0.51 THOUSAND/ΜL (ref 0.17–1.22)
MONOCYTES NFR BLD AUTO: 7 % (ref 4–12)
NEUTROPHILS # BLD AUTO: 5.02 THOUSANDS/ΜL (ref 1.85–7.62)
NEUTS SEG NFR BLD AUTO: 66 % (ref 43–75)
NRBC BLD AUTO-RTO: 0 /100 WBCS
PLATELET # BLD AUTO: 290 THOUSANDS/UL (ref 149–390)
PMV BLD AUTO: 9.8 FL (ref 8.9–12.7)
POTASSIUM SERPL-SCNC: 4.5 MMOL/L (ref 3.5–5.3)
RBC # BLD AUTO: 3.42 MILLION/UL (ref 3.88–5.62)
SODIUM SERPL-SCNC: 140 MMOL/L (ref 136–145)
WBC # BLD AUTO: 7.53 THOUSAND/UL (ref 4.31–10.16)

## 2020-08-15 PROCEDURE — 85025 COMPLETE CBC W/AUTO DIFF WBC: CPT | Performed by: NURSE PRACTITIONER

## 2020-08-15 PROCEDURE — 99232 SBSQ HOSP IP/OBS MODERATE 35: CPT | Performed by: PHYSICIAN ASSISTANT

## 2020-08-15 PROCEDURE — 80048 BASIC METABOLIC PNL TOTAL CA: CPT | Performed by: NURSE PRACTITIONER

## 2020-08-15 PROCEDURE — 82948 REAGENT STRIP/BLOOD GLUCOSE: CPT

## 2020-08-15 RX ADMIN — INSULIN LISPRO 2 UNITS: 100 INJECTION, SOLUTION INTRAVENOUS; SUBCUTANEOUS at 16:41

## 2020-08-15 RX ADMIN — INSULIN LISPRO 1 UNITS: 100 INJECTION, SOLUTION INTRAVENOUS; SUBCUTANEOUS at 16:41

## 2020-08-15 RX ADMIN — INSULIN LISPRO 2 UNITS: 100 INJECTION, SOLUTION INTRAVENOUS; SUBCUTANEOUS at 13:23

## 2020-08-15 RX ADMIN — INSULIN GLARGINE 5 UNITS: 100 INJECTION, SOLUTION SUBCUTANEOUS at 21:49

## 2020-08-15 NOTE — ASSESSMENT & PLAN NOTE
· Hgb 9 7 today, could be dilutional secondary to IVF hydration which is now discontinued   · Baseline since admission has been around 10  · No evidence of acute bleeding noted, stable   · Continue to monitor CBC

## 2020-08-15 NOTE — ASSESSMENT & PLAN NOTE
· Currently rate controlled without medications  · A/C with Xarelto, however was previously on hold for angiogram, never resumed  Therefore will continue to hold in anticipation for OR debridement per podiatry next week   Discussed with vascular surgery as well

## 2020-08-15 NOTE — PROGRESS NOTES
Progress Note - Karuna Mcclain 80 y o  male MRN: [id number]    Unit/Bed#: -01 Encounter: [id number]      Assessment:  1) Gangrenous ulcer to the left heel  2) Right heel ulcer  3) Right leg ulcer   4) PAD    Plan:  1) Plan for debridement only to remove nonviable tissue and decrease bacterial load  2) With severe PAD and status of wound, patient may have osteomyelitis to the heel and the wound will likely not heal  3) Plan for OR possible Monday/Tuesday will discuss with daughter  4) Patient refuses amputation  5) Nonweightbearing  6) Will follow     Subjective:   Patient is resting bedside NAD  Objective:     Vitals: Blood pressure 120/53, pulse 58, temperature 98 2 °F (36 8 °C), resp  rate 18, height 5' 6" (1 676 m), weight 80 kg (176 lb 5 9 oz), SpO2 95 %  ,Body mass index is 28 47 kg/m²  No intake or output data in the 24 hours ending 08/15/20 1225    Physical Exam:  No pedal hair, thin shiny skin  Contractures to lesser digits  No babinski  Sensation is intact, POP to all wounds     Invasive Devices     Drain            Urethral Catheter Non-latex 18 Fr  14 days            From Wound Care Note:          4  L lateral ankle with non-evolving DTI versus arterial wound- POA  Stable in appearance  This wound has the potential to evolve to a full thickness injury, stage 3 or 4  Wound is 100% intact dry non-blanchable purple/dark maroon colored skin  No open aspects  Analisa-wound is intact with blanchable pink and blanchable hyperpigmented skin  There is an area of well adhered dry scabbing superior to this wound in the photo - no open wound redness/drainage to this location  Areas are non-tender  Recommend to  continue with 3m no sting skin prep - okay is this area is covered in the dressing that will be applied to the L heel wound   Offload area        5  R posterior ankle/R distal leg (noted just superior to the bony prominence of the heel) - decreased measurements - one area remains- irregular shaped non-blanchable purple colored skin  Given location and appearance, suspect this is related to the patients underlying arterial disease  Cannot rule out pressure  Wound is 100% intact dry non-blanchable purple colored skin  No open aspects  Analisa-wound is intact , no redness  Area is non-tender   Recommend to apply 3m no sting skin prep and cover with foam dressing  6  L lateral heel - unstageable pressure injury versus arterial wound - POA  Wound bed is approx: 70% moist brown/black eschar that appears to be un-flo, 20% moist yellow tissue, 10% pink/red moist tissue  Edges fragile with maceration (maceration is slightly improved from last week)  Analisa-wound with blanchable redness and blanchable hyperpigmented skin  Wound is very painful to the patient  There is still a moderate amount of strong malodorous with thick yellow/serosanguineous drainage noted on the old dressing  Odor decreased only minimally after cleansing  Will recommend to continue with dressing to manage the drainage  Any debridement of the wound / softening of the eschar - defer to vascular/podiatry         7  R heel with resolving DTI versus arterial wound- POA  This wound has the potential to evolve to a full thickness injury, stage 3 or 4  Wound is now closed - presenting as intact dry very slow to blanchable purple colored skin  No open wound  Analisa-wound is intact with dry peeling skin, and blanchable hyperpigmented skin  Wound is not tender now  Recommend to use foam dressing to protect

## 2020-08-15 NOTE — PROGRESS NOTES
Progress Note - Amado Shipley 1933, 80 y o  male MRN: [id number]    Unit/Bed#: -01 Encounter: [id number]    Primary Care Provider: Jessa Mota DO   Date and time admitted to hospital: 7/30/2020  5:23 PM      DOS: 8/15/2020    * Pressure ulcer of ankle  Assessment & Plan  · Pt underwent right lower extremity angiogram, 8/12/2020, with successful balloon angioplasty to the right SFA, popliteal and anterior tibial arteries  The posterior tibial could not be visualized  · Previous left lower extremity intervention was performed this hospitalization as well  · Secondary to PAD, pt with gangrene of the bilateral heels   · Arterial duplex showing occlusive disease of lower extremities bilaterally, improvement after angiogram noted  · Vascular surgery has now signed off  Pt and family are declining any amputation   · Wound care evaluated,  · Recommending podiatry re-evaluation   · Podiatry evaluated,  · Plan for OR debridement to remove non-viable tissue likely Mon/Tues  · Non weight bearing  · Discussed with vascular and podiatry, will continue to hold Xarelto in anticipation for OR debridement next week  Pt and family are in agreement    · Pt was not accepted to acute rehab, therefore when medically stable, discharge to home with [facility], family are going to get additional aides in the home as well  · Did also place consult for palliative care for Monday per patient's daughter request due to guarded prognosis    Anemia  Assessment & Plan  · Hgb 9 7 today, could be dilutional secondary to IVF hydration which is now discontinued   · Baseline since admission has been around 10  · No evidence of acute bleeding noted, stable   · Continue to monitor CBC    Diabetes [facility])  Assessment & Plan  Lab Results   Component Value Date    HGBA1C 7 0 (H) 06/12/2020       Recent Labs     08/14/20  1621 08/14/20  2140 08/15/20  0601 08/15/20  1100   POCGLU 181* 169* 82 226*       Blood Sugar Average: Last 72 hrs:  (P) 307 [id number]   · Last A1c 7%, has been having lower glucose in the AM, therefore will d/c SSI coverage at night  · Continue Lantus to 5 units QHS  · Continue SSI coverage QAC and add scheduled humalog 2 units with meals, increase as needed  · QID glucose checks   · Consistent carb diet  · Monitor and adjust regimen as needed    Chronic diastolic congestive heart failure (HCC)  Assessment & Plan  Wt Readings from Last 3 Encounters:   07/30/20 80 kg (176 lb 5 9 oz)   07/14/20 85 1 kg (187 lb 9 6 oz)   07/07/20 85 1 kg (187 lb 9 6 oz)     · Previous provider discontinued Entresto secondary to low blood pressures on 8/3  · Last ECHO from 1/30/2019 showing EF 60%  · Currently euvolemic and normotensive     A-fib ([facility] [address] )  Assessment & Plan  · Currently rate controlled without medications  · A/C with Xarelto, however was previously on hold for angiogram, never resumed  Therefore will continue to hold in anticipation for OR debridement per podiatry next week  Discussed with vascular surgery as well    Abnormal urinalysis  Assessment & Plan  · (+)UA on admission, urine culture growing ESBL  · ID signed off,  · Baxter catheter was replaced on 7/31  · Recommended holding additional antibiotics as patient has remained non-toxic and stable    Stage 3 chronic kidney disease ([facility] [address] )  Assessment & Plan  · Baseline creatinine 1 1-1 3 on review  · Nephrology signed off,  · Cr   Improved to 1 14 today, within baseline   · IVF now discontinued   · Continue to monitor BMP    VTE Pharmacologic Prophylaxis:   Pharmacologic: xarelto on hold secondary to plan for OR debridement Mon/Tues  Mechanical VTE Prophylaxis in Place: No    Patient Centered Rounds: I have evaluated patient without nursing staff present due to speaking to nurse outside patient's room, [address] with Specialists or Other Care Team Provider: Discussed with podiatry, RN, CM and reviewed previous notes     Education and Discussions with Family / Patient: Discussed with patient at bedside regarding plan of care, will update patient's daughter Arnold Guerra over the phone as well per patient request    Time Spent for Care: 20 minutes  More than 50% of total time spent on counseling and coordination of care as described above  Current Length of Stay: 16 day(s)    Current Patient Status: Inpatient   Certification Statement: The patient will continue to require additional inpatient hospital stay due to plan for OR debridement of gangrenous heels Mon/Tu    Discharge Plan: Not medically stable as above, plan for OR debridement as above    Code Status: Level 3 - DNAR and DNI      Subjective:   Pt reports that he feels okay  Continues to have pain that is unchanged in the bilateral lower extremities  He denies any chest pain, shortness of breath, abdominal pain, nausea or vomiting  Reports that he is hungry  Objective:     Vitals:   Temp (24hrs), Av 9 °F (36 6 °C), Min:97 4 °F (36 3 °C), Max:98 2 °F (36 8 °C)    Temp:  [97 4 °F (36 3 °C)-98 2 °F (36 8 °C)] 98 2 °F (36 8 °C)  HR:  [56-69] 58  Resp:  [16-18] 18  BP: (120-131)/(49-53) 120/53  SpO2:  [94 %-96 %] 95 %  Body mass index is 28 47 kg/m²  Input and Output Summary (last 24 hours):     No intake or output data in the 24 hours ending 08/15/20 1302    Physical Exam:     Physical Exam  Vitals signs reviewed  Constitutional:       General: He is not in acute distress  Appearance: He is not diaphoretic  Comments: Pt is in no acute distress sitting in his hospital chair resting comfortably  Chronically ill appearing  HENT:      Head: Normocephalic and atraumatic  Eyes:      Conjunctiva/sclera: Conjunctivae normal       Pupils: Pupils are equal, round, and reactive to light  Cardiovascular:      Rate and Rhythm: Normal rate and regular rhythm  Pulmonary:      Effort: Pulmonary effort is normal  No respiratory distress  Breath sounds: Normal breath sounds  No stridor  No wheezing     Abdominal: General: Bowel sounds are normal  There is no distension  Palpations: Abdomen is soft  Tenderness: There is no abdominal tenderness  There is no guarding  Musculoskeletal:         General: Edema (bilateral lower extremities with gangrene to right heel with foul smell) present  Skin:     General: Skin is warm and dry  Findings: No erythema  Neurological:      Mental Status: He is alert  Comments: Alert and oriented x 2 to person and place  At baseline per discussion with patient's daughter         Additional Data:     Labs:    Results from last 7 days   Lab Units 08/15/20  0557   WBC Thousand/uL 7 53   HEMOGLOBIN g/dL 9 7*   HEMATOCRIT % 31 2*   PLATELETS Thousands/uL 290   NEUTROS PCT % 66   LYMPHS PCT % 23   MONOS PCT % 7   EOS PCT % 2     Results from last 7 days   Lab Units 08/15/20  0841   POTASSIUM mmol/L 4 5   CHLORIDE mmol/L 106   CO2 mmol/L 29   BUN mg/dL 35*   CREATININE mg/dL 1 14   CALCIUM mg/dL 8 0*           * I Have Reviewed All Lab Data Listed Above  * Additional Pertinent Lab Tests Reviewed:  All Labs Within Last 24 Hours Reviewed    Imaging:    Imaging Reports Reviewed Today Include: None  Imaging Personally Reviewed by Myself Includes:  None    Recent Cultures (last 7 days):           Last 24 Hours Medication List:   Current Facility-Administered Medications   Medication Dose Route Frequency Provider Last Rate    acetaminophen  650 mg Oral Q6H PRN Ren Medina MD      HYDROmorphone  0 5 mg Intravenous Q4H PRN Maxim Mitchell MD      insulin glargine  5 Units Subcutaneous HS Carla Ramos PA-C      insulin lispro  1-6 Units Subcutaneous TID AC Ren Medina MD      insulin lispro  2 Units Subcutaneous TID With Meals Anton Vargas PA-C      nicotine  1 patch Transdermal Daily Radha Couch MD      oxyCODONE-acetaminophen  1 tablet Oral Q4H PRN Maxim Mitchell MD          Today, Patient Was Seen By: Anton Vargas PA-C    ** Please Note: Dictation voice to text software may have been used in the creation of this document   **

## 2020-08-15 NOTE — ASSESSMENT & PLAN NOTE
· Pt underwent right lower extremity angiogram, 8/12/2020, with successful balloon angioplasty to the right SFA, popliteal and anterior tibial arteries  The posterior tibial could not be visualized  · Previous left lower extremity intervention was performed this hospitalization as well  · Secondary to PAD, pt with gangrene of the bilateral heels   · Arterial duplex showing occlusive disease of lower extremities bilaterally, improvement after angiogram noted  · Vascular surgery has now signed off  Pt and family are declining any amputation   · Wound care evaluated,  · Recommending podiatry re-evaluation   · Podiatry evaluated,  · Plan for OR debridement to remove non-viable tissue likely Mon/Tues  · Non weight bearing  · Discussed with vascular and podiatry, will continue to hold Xarelto in anticipation for OR debridement next week  Pt and family are in agreement    · Pt was not accepted to acute rehab, therefore when medically stable, discharge to home with [facility], family are going to get additional aides in the home as well

## 2020-08-15 NOTE — ASSESSMENT & PLAN NOTE
· Baseline creatinine 1 1-1 3 on review  · Nephrology signed off,  · Cr   Improved to 1 14 today, within baseline   · IVF now discontinued   · Continue to monitor BMP

## 2020-08-15 NOTE — ASSESSMENT & PLAN NOTE
Lab Results   Component Value Date    HGBA1C 7 0 (H) 06/12/2020       Recent Labs     08/14/20  1621 08/14/20  2140 08/15/20  0601 08/15/20  1100   POCGLU 181* 169* 82 226*       Blood Sugar Average: Last 72 hrs:  (P) 044 [id number]   · Last A1c 7%, has been having lower glucose in the AM, therefore will d/c SSI coverage at night  · Continue Lantus to 5 units QHS  · Continue SSI coverage QAC and add scheduled humalog 2 units with meals, increase as needed  · QID glucose checks   · Consistent carb diet  · Monitor and adjust regimen as needed

## 2020-08-16 LAB
ANION GAP SERPL CALCULATED.3IONS-SCNC: 4 MMOL/L (ref 4–13)
BASOPHILS # BLD AUTO: 0.04 THOUSANDS/ΜL (ref 0–0.1)
BASOPHILS NFR BLD AUTO: 1 % (ref 0–1)
BUN SERPL-MCNC: 29 MG/DL (ref 5–25)
CALCIUM SERPL-MCNC: 8.1 MG/DL (ref 8.3–10.1)
CHLORIDE SERPL-SCNC: 106 MMOL/L (ref 100–108)
CO2 SERPL-SCNC: 29 MMOL/L (ref 21–32)
CREAT SERPL-MCNC: 1.11 MG/DL (ref 0.6–1.3)
EOSINOPHIL # BLD AUTO: 0.14 THOUSAND/ΜL (ref 0–0.61)
EOSINOPHIL NFR BLD AUTO: 2 % (ref 0–6)
ERYTHROCYTE [DISTWIDTH] IN BLOOD BY AUTOMATED COUNT: 16.5 % (ref 11.6–15.1)
GFR SERPL CREATININE-BSD FRML MDRD: 60 ML/MIN/1.73SQ M
GLUCOSE SERPL-MCNC: 103 MG/DL (ref 65–140)
GLUCOSE SERPL-MCNC: 108 MG/DL (ref 65–140)
GLUCOSE SERPL-MCNC: 109 MG/DL (ref 65–140)
GLUCOSE SERPL-MCNC: 239 MG/DL (ref 65–140)
GLUCOSE SERPL-MCNC: 242 MG/DL (ref 65–140)
HCT VFR BLD AUTO: 30.9 % (ref 36.5–49.3)
HGB BLD-MCNC: 9.7 G/DL (ref 12–17)
IMM GRANULOCYTES # BLD AUTO: 0.07 THOUSAND/UL (ref 0–0.2)
IMM GRANULOCYTES NFR BLD AUTO: 1 % (ref 0–2)
LYMPHOCYTES # BLD AUTO: 1.55 THOUSANDS/ΜL (ref 0.6–4.47)
LYMPHOCYTES NFR BLD AUTO: 22 % (ref 14–44)
MCH RBC QN AUTO: 28.1 PG (ref 26.8–34.3)
MCHC RBC AUTO-ENTMCNC: 31.4 G/DL (ref 31.4–37.4)
MCV RBC AUTO: 90 FL (ref 82–98)
MONOCYTES # BLD AUTO: 0.51 THOUSAND/ΜL (ref 0.17–1.22)
MONOCYTES NFR BLD AUTO: 7 % (ref 4–12)
NEUTROPHILS # BLD AUTO: 4.72 THOUSANDS/ΜL (ref 1.85–7.62)
NEUTS SEG NFR BLD AUTO: 67 % (ref 43–75)
NRBC BLD AUTO-RTO: 0 /100 WBCS
PLATELET # BLD AUTO: 284 THOUSANDS/UL (ref 149–390)
PMV BLD AUTO: 10.1 FL (ref 8.9–12.7)
POTASSIUM SERPL-SCNC: 4.2 MMOL/L (ref 3.5–5.3)
RBC # BLD AUTO: 3.45 MILLION/UL (ref 3.88–5.62)
SODIUM SERPL-SCNC: 139 MMOL/L (ref 136–145)
WBC # BLD AUTO: 7.03 THOUSAND/UL (ref 4.31–10.16)

## 2020-08-16 PROCEDURE — 82948 REAGENT STRIP/BLOOD GLUCOSE: CPT

## 2020-08-16 PROCEDURE — 99232 SBSQ HOSP IP/OBS MODERATE 35: CPT | Performed by: PHYSICIAN ASSISTANT

## 2020-08-16 PROCEDURE — 85025 COMPLETE CBC W/AUTO DIFF WBC: CPT | Performed by: NURSE PRACTITIONER

## 2020-08-16 PROCEDURE — 80048 BASIC METABOLIC PNL TOTAL CA: CPT | Performed by: NURSE PRACTITIONER

## 2020-08-16 RX ADMIN — INSULIN LISPRO 2 UNITS: 100 INJECTION, SOLUTION INTRAVENOUS; SUBCUTANEOUS at 08:29

## 2020-08-16 RX ADMIN — INSULIN GLARGINE 5 UNITS: 100 INJECTION, SOLUTION SUBCUTANEOUS at 22:13

## 2020-08-16 RX ADMIN — INSULIN LISPRO 2 UNITS: 100 INJECTION, SOLUTION INTRAVENOUS; SUBCUTANEOUS at 12:49

## 2020-08-16 RX ADMIN — INSULIN LISPRO 3 UNITS: 100 INJECTION, SOLUTION INTRAVENOUS; SUBCUTANEOUS at 12:48

## 2020-08-16 NOTE — ASSESSMENT & PLAN NOTE
· Baseline creatinine 1 1-1 3 on review  · Nephrology signed off,  · Cr   Improved to 1 11 today, within baseline   · IVF now discontinued   · Continue to monitor BMP

## 2020-08-16 NOTE — ASSESSMENT & PLAN NOTE
· Pt underwent right lower extremity angiogram, 8/12/2020, with successful balloon angioplasty to the right SFA, popliteal and anterior tibial arteries  The posterior tibial could not be visualized  · Previous left lower extremity intervention was performed this hospitalization as well  · Secondary to PAD, pt with gangrene of the bilateral heels   · Arterial duplex showing occlusive disease of lower extremities bilaterally, improvement after angiogram noted  · Vascular surgery has now signed off  Pt and family are declining any amputation   · Wound care evaluated,  · Recommending podiatry re-evaluation   · Podiatry evaluated,  · Plan for OR debridement to remove non-viable tissue likely Mon/Tues  · Non weight bearing  · Discussed with vascular and podiatry, will continue to hold Xarelto in anticipation for OR debridement next week  Pt and family are in agreement  · Pt was not accepted to acute rehab  · Discussed with patient's daughter today as well, she is requesting palliative care consultation  Would recommend family meeting tomorrow to discuss patient's goals of care, would likely be hospice appropriate as well

## 2020-08-16 NOTE — ASSESSMENT & PLAN NOTE
Wt Readings from Last 3 Encounters:   07/30/20 80 kg (176 lb 5 9 oz)   08/16/20 81 kg (178 lb 9 2 oz)   07/14/20 85 1 kg (187 lb 9 6 oz)     · Previous provider discontinued Entresto secondary to low blood pressures on 8/3  · Last ECHO from 1/30/2019 showing EF 60%  · Currently euvolemic and normotensive

## 2020-08-16 NOTE — ASSESSMENT & PLAN NOTE
Lab Results   Component Value Date    HGBA1C 7 0 (H) 06/12/2020       Recent Labs     08/15/20  1631 08/15/20  2114 08/16/20  0607 08/16/20  1112   POCGLU 176* 171* 103 242*       Blood Sugar Average: Last 72 hrs:  (P) 183 [id number]   · Last A1c 7%, has been having lower glucose in the AM, SSI coverage at night discontinued  · Continue Lantus to 5 units QHS  · Continue SSI coverage QAC and add scheduled humalog 4 units with meals, increase as needed  · QID glucose checks   · Consistent carb diet  · Monitor and adjust regimen as needed

## 2020-08-16 NOTE — ASSESSMENT & PLAN NOTE
· Hgb 9 7 today, stable  · Baseline since admission has been around 10  · No evidence of acute bleeding noted, stable   · Continue to monitor CBC

## 2020-08-16 NOTE — PROGRESS NOTES
Progress Note - Prateek Stratton 1933, 80 y o  male MRN: [id number]    Unit/Bed#: -01 Encounter: [id number]    Primary Care Provider: Guero Zuniga DO   Date and time admitted to hospital: 7/30/2020  5:23 PM      DOS: 8/16/2020    * Pressure ulcer of ankle  Assessment & Plan  · Pt underwent right lower extremity angiogram, 8/12/2020, with successful balloon angioplasty to the right SFA, popliteal and anterior tibial arteries  The posterior tibial could not be visualized  · Previous left lower extremity intervention was performed this hospitalization as well  · Secondary to PAD, pt with gangrene of the bilateral heels   · Arterial duplex showing occlusive disease of lower extremities bilaterally, improvement after angiogram noted  · Vascular surgery has now signed off  Pt and family are declining any amputation   · Wound care evaluated,  · Recommending podiatry re-evaluation   · Podiatry evaluated,  · Plan for OR debridement to remove non-viable tissue likely Mon/Tues  · Non weight bearing  · Discussed with vascular and podiatry, will continue to hold Xarelto in anticipation for OR debridement next week  Pt and family are in agreement  · Pt was not accepted to acute rehab  · Discussed with patient's daughter today as well, she is requesting palliative care consultation  Would recommend family meeting tomorrow to discuss patient's goals of care, would likely be hospice appropriate as well      Anemia  Assessment & Plan  · Hgb 9 7 today, stable  · Baseline since admission has been around 10  · No evidence of acute bleeding noted, stable   · Continue to monitor CBC    Diabetes [facility])  Assessment & Plan  Lab Results   Component Value Date    HGBA1C 7 0 (H) 06/12/2020       Recent Labs     08/15/20  1631 08/15/20  2114 08/16/20  0607 08/16/20  1112   POCGLU 176* 171* 103 242*       Blood Sugar Average: Last 72 hrs:  (P) 183 [id number]   · Last A1c 7%, has been having lower glucose in the AM, SSI coverage at night discontinued  · Continue Lantus to 5 units QHS  · Continue SSI coverage QAC and add scheduled humalog 4 units with meals, increase as needed  · QID glucose checks   · Consistent carb diet  · Monitor and adjust regimen as needed    Chronic diastolic congestive heart failure (HCC)  Assessment & Plan  Wt Readings from Last 3 Encounters:   07/30/20 80 kg (176 lb 5 9 oz)   08/16/20 81 kg (178 lb 9 2 oz)   07/14/20 85 1 kg (187 lb 9 6 oz)     · Previous provider discontinued Entresto secondary to low blood pressures on 8/3  · Last ECHO from 1/30/2019 showing EF 60%  · Currently euvolemic and normotensive     A-fib ([facility] [address] )  Assessment & Plan  · Currently rate controlled without medications  · A/C with Xarelto, however was previously on hold for angiogram, never resumed  Therefore will continue to hold in anticipation for OR debridement per podiatry next week  Discussed with vascular surgery as well    Abnormal urinalysis  Assessment & Plan  · (+)UA on admission, urine culture growing ESBL  · ID signed off,  · Baxter catheter was replaced on 7/31  · Recommended holding additional antibiotics as patient has remained non-toxic and stable    Stage 3 chronic kidney disease ([facility] [address] )  Assessment & Plan  · Baseline creatinine 1 1-1 3 on review  · Nephrology signed off,  · Cr   Improved to 1 11 today, within baseline   · IVF now discontinued   · Continue to monitor BMP      VTE Pharmacologic Prophylaxis:   Pharmacologic: xarelto on hold secondary to possible OR debridement   Mechanical VTE Prophylaxis in Place: No    Patient Centered Rounds: I have evaluated patient without nursing staff present due to Speaking to nurse outside patient's room, [address] with Specialists or Other Care Team Provider:  Discussed with RN, cm and reviewed previous notes    Education and Discussions with Family / Patient:  Discussed with patient at bedside as well as patient's daughter over the phone    Time Spent for Care: 20 minutes  More than 50% of total time spent on counseling and coordination of care as described above  Current Length of Stay: 17 day(s)    Current Patient Status: Inpatient   Certification Statement: The patient will continue to require additional inpatient hospital stay due to Podiatry re-evaluation, possible OR debridement Monday or Tuesday, palliative care consultation and family meeting for goals of care discussion    Discharge Plan:  Not medically stable as above, likely not for at least another 48 hours    Code Status: Level 3 - DNAR and DNI      Subjective:   Patient reports that he continues to feel fine today  He denies any significant pain  Denies any abdominal pain, nausea or vomiting  Continues to have a good appetite and is hungry waiting for his lunch tray  Objective:     Vitals:   Temp (24hrs), Av 4 °F (36 9 °C), Min:98 1 °F (36 7 °C), Max:98 6 °F (37 °C)    Temp:  [98 1 °F (36 7 °C)-98 6 °F (37 °C)] 98 1 °F (36 7 °C)  HR:  [55-60] 55  Resp:  [16-18] 16  BP: (129-133)/(65-78) 133/65  SpO2:  [96 %] 96 %  Body mass index is 28 47 kg/m²  Input and Output Summary (last 24 hours): Intake/Output Summary (Last 24 hours) at 2020 1501  Last data filed at 8/15/2020 2043  Gross per 24 hour   Intake    Output 300 ml   Net -300 ml       Physical Exam:     Physical Exam  Vitals signs reviewed  Constitutional:       General: He is not in acute distress  Appearance: He is well-developed and well-nourished  He is not diaphoretic  Comments: Patient is in no acute distress lying in his hospital bed resting comfortably  Baseline dysarthria  HENT:      Head: Normocephalic and atraumatic  Eyes:      Conjunctiva/sclera: Conjunctivae normal       Pupils: Pupils are equal, round, and reactive to light  Cardiovascular:      Rate and Rhythm: Normal rate and regular rhythm  Pulses: Pulses are palpable     Pulmonary:      Effort: Pulmonary effort is normal  No respiratory distress  Breath sounds: Normal breath sounds  No stridor  No wheezing  Abdominal:      General: Bowel sounds are normal  There is no distension  Palpations: Abdomen is soft  Tenderness: There is no abdominal tenderness  There is no guarding  Musculoskeletal:         General: Edema (bilateral lower extremities with gangrene of the right heel) present  Skin:     General: Skin is warm and dry  Findings: No erythema  Neurological:      Mental Status: He is alert  Additional Data:     Labs:    Results from last 7 days   Lab Units 08/16/20  0605   WBC Thousand/uL 7 03   HEMOGLOBIN g/dL 9 7*   HEMATOCRIT % 30 9*   PLATELETS Thousands/uL 284   NEUTROS PCT % 67   LYMPHS PCT % 22   MONOS PCT % 7   EOS PCT % 2     Results from last 7 days   Lab Units 08/16/20  0605   POTASSIUM mmol/L 4 2   CHLORIDE mmol/L 106   CO2 mmol/L 29   BUN mg/dL 29*   CREATININE mg/dL 1 11   CALCIUM mg/dL 8 1*           * I Have Reviewed All Lab Data Listed Above  * Additional Pertinent Lab Tests Reviewed:  All Labs Within Last 24 Hours Reviewed    Imaging:    Imaging Reports Reviewed Today Include: None  Imaging Personally Reviewed by Myself Includes:  None    Recent Cultures (last 7 days):           Last 24 Hours Medication List:   Current Facility-Administered Medications   Medication Dose Route Frequency Provider Last Rate    acetaminophen  650 mg Oral Q6H PRN Ren Medina MD      HYDROmorphone  0 5 mg Intravenous Q4H PRN Bernabe Lockhart MD      insulin glargine  5 Units Subcutaneous HS Carla Ramos PA-C      insulin lispro  1-6 Units Subcutaneous TID AC Ren Medina MD      insulin lispro  4 Units Subcutaneous TID With Meals Manuela Mckeon PA-C      nicotine  1 patch Transdermal Daily Kamila Concepcion MD      oxyCODONE-acetaminophen  1 tablet Oral Q4H PRN Bernabe Lockhart MD          Today, Patient Was Seen By: Manuela Mckeon PA-C    ** Please Note: Dictation voice to text software may have been used in the creation of this document   **

## 2020-08-17 LAB
ANION GAP SERPL CALCULATED.3IONS-SCNC: 6 MMOL/L (ref 4–13)
BASOPHILS # BLD AUTO: 0.05 THOUSANDS/ΜL (ref 0–0.1)
BASOPHILS NFR BLD AUTO: 1 % (ref 0–1)
BUN SERPL-MCNC: 25 MG/DL (ref 5–25)
CALCIUM SERPL-MCNC: 8.3 MG/DL (ref 8.3–10.1)
CHLORIDE SERPL-SCNC: 104 MMOL/L (ref 100–108)
CO2 SERPL-SCNC: 28 MMOL/L (ref 21–32)
CREAT SERPL-MCNC: 1.1 MG/DL (ref 0.6–1.3)
EOSINOPHIL # BLD AUTO: 0.15 THOUSAND/ΜL (ref 0–0.61)
EOSINOPHIL NFR BLD AUTO: 2 % (ref 0–6)
ERYTHROCYTE [DISTWIDTH] IN BLOOD BY AUTOMATED COUNT: 16.6 % (ref 11.6–15.1)
GFR SERPL CREATININE-BSD FRML MDRD: 60 ML/MIN/1.73SQ M
GLUCOSE SERPL-MCNC: 173 MG/DL (ref 65–140)
GLUCOSE SERPL-MCNC: 235 MG/DL (ref 65–140)
GLUCOSE SERPL-MCNC: 84 MG/DL (ref 65–140)
GLUCOSE SERPL-MCNC: 96 MG/DL (ref 65–140)
GLUCOSE SERPL-MCNC: 97 MG/DL (ref 65–140)
HCT VFR BLD AUTO: 31.6 % (ref 36.5–49.3)
HGB BLD-MCNC: 10 G/DL (ref 12–17)
IMM GRANULOCYTES # BLD AUTO: 0.06 THOUSAND/UL (ref 0–0.2)
IMM GRANULOCYTES NFR BLD AUTO: 1 % (ref 0–2)
LYMPHOCYTES # BLD AUTO: 1.78 THOUSANDS/ΜL (ref 0.6–4.47)
LYMPHOCYTES NFR BLD AUTO: 22 % (ref 14–44)
MCH RBC QN AUTO: 28.2 PG (ref 26.8–34.3)
MCHC RBC AUTO-ENTMCNC: 31.6 G/DL (ref 31.4–37.4)
MCV RBC AUTO: 89 FL (ref 82–98)
MONOCYTES # BLD AUTO: 0.65 THOUSAND/ΜL (ref 0.17–1.22)
MONOCYTES NFR BLD AUTO: 8 % (ref 4–12)
NEUTROPHILS # BLD AUTO: 5.33 THOUSANDS/ΜL (ref 1.85–7.62)
NEUTS SEG NFR BLD AUTO: 66 % (ref 43–75)
NRBC BLD AUTO-RTO: 0 /100 WBCS
PLATELET # BLD AUTO: 267 THOUSANDS/UL (ref 149–390)
PMV BLD AUTO: 9.8 FL (ref 8.9–12.7)
POTASSIUM SERPL-SCNC: 4.4 MMOL/L (ref 3.5–5.3)
RBC # BLD AUTO: 3.54 MILLION/UL (ref 3.88–5.62)
SODIUM SERPL-SCNC: 138 MMOL/L (ref 136–145)
WBC # BLD AUTO: 8.02 THOUSAND/UL (ref 4.31–10.16)

## 2020-08-17 PROCEDURE — 85025 COMPLETE CBC W/AUTO DIFF WBC: CPT | Performed by: NURSE PRACTITIONER

## 2020-08-17 PROCEDURE — 99232 SBSQ HOSP IP/OBS MODERATE 35: CPT | Performed by: PHYSICIAN ASSISTANT

## 2020-08-17 PROCEDURE — 80048 BASIC METABOLIC PNL TOTAL CA: CPT | Performed by: NURSE PRACTITIONER

## 2020-08-17 PROCEDURE — 99222 1ST HOSP IP/OBS MODERATE 55: CPT | Performed by: INTERNAL MEDICINE

## 2020-08-17 PROCEDURE — 82948 REAGENT STRIP/BLOOD GLUCOSE: CPT

## 2020-08-17 RX ADMIN — INSULIN LISPRO 3 UNITS: 100 INJECTION, SOLUTION INTRAVENOUS; SUBCUTANEOUS at 13:35

## 2020-08-17 RX ADMIN — INSULIN LISPRO 1 UNITS: 100 INJECTION, SOLUTION INTRAVENOUS; SUBCUTANEOUS at 17:12

## 2020-08-17 RX ADMIN — OXYCODONE HYDROCHLORIDE AND ACETAMINOPHEN 1 TABLET: 5; 325 TABLET ORAL at 06:42

## 2020-08-17 RX ADMIN — INSULIN GLARGINE 5 UNITS: 100 INJECTION, SOLUTION SUBCUTANEOUS at 22:11

## 2020-08-17 RX ADMIN — NICOTINE 1 PATCH: 7 PATCH TRANSDERMAL at 09:13

## 2020-08-17 NOTE — SOCIAL WORK
CM spoke with Rep from [facility] awaiting eval and approval  CM department will continue to follow through discharge

## 2020-08-17 NOTE — ASSESSMENT & PLAN NOTE
· Baseline creatinine 1 1-1 3 on review  · Nephrology signed off,  · Cr   Improved to 1 10 today, within baseline   · IVF have been discontinued   · Continue to monitor BMP

## 2020-08-17 NOTE — PROGRESS NOTES
Progress Note - Matthew Duran 1933, 80 y o  male MRN: [id number]    Unit/Bed#: -01 Encounter: [id number]    Primary Care Provider: Jesus Piañ DO   Date and time admitted to hospital: 7/30/2020  5:23 PM      DOS: 8/17/2020    ADDENDUM: During family meeting with palliative care and patient's daughter, after discussion regarding goals of care, pt's daughter would like to transition patient to hospice services  [facility]  No indication for MRI foot or additional podiatry evaluation for debridement  Plan to discharge to [facility] on discharge  * Pressure ulcer of ankle  Assessment & Plan  · Pt underwent right lower extremity angiogram, 8/12/2020, with successful balloon angioplasty to the right SFA, popliteal and anterior tibial arteries  The posterior tibial could not be visualized  · Previous left lower extremity intervention was performed this hospitalization as well  · Secondary to PAD, pt with gangrene of the bilateral heels   · Arterial duplex showing occlusive disease of lower extremities bilaterally, improvement after angiogram noted  · Vascular surgery has now signed off  Pt and family are declining any amputation   · Wound care evaluated,  · Recommending podiatry re-evaluation   · Podiatry evaluated,  · Plan to obtain MRI to evaluate for underlying osteomyelitis, if none is detected then potential OR debridement pending family meeting today at [facility]  · Non weight bearing  · Discussed with vascular and podiatry, will continue to hold Xarelto in anticipation for possibility of OR debridement  Pt and family are in agreement    · Pt was not accepted to acute rehab  · Family meeting to be held today with palliative care and [facility] regarding goals of care and discharge planning    Anemia  Assessment & Plan  · Hgb 10 0 today, stable  · Baseline since admission has been around 10  · No evidence of acute bleeding noted, stable   · Continue to monitor CBC    Diabetes [facility])  Assessment & Plan  Lab Results   Component Value Date    HGBA1C 7 0 (H) 06/12/2020       Recent Labs     08/16/20  1112 08/16/20  1618 08/16/20  2125 08/17/20  0559   POCGLU 242* 108 239* 97       Blood Sugar Average: Last 72 hrs:  (P) 457 [id number]   · Last A1c 7%, has been having lower glucose in the AM, SSI coverage at night discontinued with improvement  · Continue Lantus to 5 units QHS  · Continue SSI coverage QAC and scheduled humalog 4 units with meals, increase as needed  · QID glucose checks   · Consistent carb diet  · Monitor and adjust regimen as needed    Chronic diastolic congestive heart failure (HCC)  Assessment & Plan  Wt Readings from Last 3 Encounters:   07/30/20 80 kg (176 lb 5 9 oz)   08/16/20 81 kg (178 lb 9 2 oz)   07/14/20 85 1 kg (187 lb 9 6 oz)     · Previous provider discontinued Entresto secondary to low blood pressures on 8/3  · Last ECHO from 1/30/2019 showing EF 60%  · Currently euvolemic and normotensive     A-fib ([facility] [address] )  Assessment & Plan  · Currently rate controlled without medications  · A/C with Xarelto, however was previously on hold for angiogram, never resumed  Therefore will continue to hold in anticipation for possible OR debridement    Abnormal urinalysis  Assessment & Plan  · (+)UA on admission, urine culture growing ESBL  · ID signed off,  · Baxter catheter was replaced on 7/31  · Recommended holding additional antibiotics as patient has remained non-toxic and stable    Stage 3 chronic kidney disease ([facility] [address] )  Assessment & Plan  · Baseline creatinine 1 1-1 3 on review  · Nephrology signed off,  · Cr   Improved to 1 10 today, within baseline   · IVF have been discontinued   · Continue to monitor BMP      VTE Pharmacologic Prophylaxis:   Pharmacologic: xarelto currently on hold pending possible OR debridement per podiatry   Mechanical VTE Prophylaxis in Place: No    Patient Centered Rounds: I have evaluated patient without nursing staff present due to Speaking to nurse outside patient's room, Zaina Rogers 1331 with Specialists or Other Care Team Provider:  Discussed with palliative Care, Podiatry, RN, cm and reviewed previous notes    Education and Discussions with Family / Patient:  Discussed with patient at bedside, plan for family meeting at 11:00 a m  With patient's daughter and palliative care    Time Spent for Care: 30 minutes  More than 50% of total time spent on counseling and coordination of care as described above  Current Length of Stay: 18 day(s)    Current Patient Status: Inpatient   Certification Statement: The patient will continue to require additional inpatient hospital stay due to Palliative care family meeting at 11:00 a m , MRI foot to rule out osteomyelitis per podiatry, possible OR debridement pending MRI results and family meeting consultation    Discharge Plan:  Not medically stable as above    Code Status: Level 3 - DNAR and DNI      Subjective:   Patient reports that he continues to feel well  He denies any chest pain, shortness of breath, abdominal pain, nausea or vomiting  Ate all of his breakfast this morning  Objective:     Vitals:   Temp (24hrs), Av 8 °F (36 6 °C), Min:97 3 °F (36 3 °C), Max:98 1 °F (36 7 °C)    Temp:  [97 3 °F (36 3 °C)-98 1 °F (36 7 °C)] 97 3 °F (36 3 °C)  HR:  [55-66] 58  Resp:  [16-18] 18  BP: (115-133)/(61-65) 115/64  SpO2:  [95 %-99 %] 97 %  Body mass index is 28 47 kg/m²  Input and Output Summary (last 24 hours): Intake/Output Summary (Last 24 hours) at 2020 1016  Last data filed at 2020 0713  Gross per 24 hour   Intake    Output 1550 ml   Net -1550 ml       Physical Exam:     Physical Exam  Vitals signs reviewed  Constitutional:       General: He is not in acute distress  Appearance: He is not diaphoretic  Comments: Patient is in no acute distress lying in his hospital bed resting comfortably  Appears chronically ill    Foul-smelling gangrenous heels   HENT: Head: Normocephalic and atraumatic  Eyes:      Conjunctiva/sclera: Conjunctivae normal       Pupils: Pupils are equal, round, and reactive to light  Cardiovascular:      Rate and Rhythm: Normal rate and regular rhythm  Pulmonary:      Effort: Pulmonary effort is normal  No respiratory distress  Breath sounds: Normal breath sounds  No stridor  No wheezing  Abdominal:      General: Bowel sounds are normal  There is no distension  Palpations: Abdomen is soft  Tenderness: There is no abdominal tenderness  There is no guarding  Musculoskeletal:         General: Edema (Bilateral lower extremities) present  Skin:     General: Skin is warm and dry  Findings: No erythema  Neurological:      Mental Status: He is alert  Additional Data:     Labs:    Results from last 7 days   Lab Units 08/17/20  0616   WBC Thousand/uL 8 02   HEMOGLOBIN g/dL 10 0*   HEMATOCRIT % 31 6*   PLATELETS Thousands/uL 267   NEUTROS PCT % 66   LYMPHS PCT % 22   MONOS PCT % 8   EOS PCT % 2     Results from last 7 days   Lab Units 08/17/20  0616   POTASSIUM mmol/L 4 4   CHLORIDE mmol/L 104   CO2 mmol/L 28   BUN mg/dL 25   CREATININE mg/dL 1 10   CALCIUM mg/dL 8 3           * I Have Reviewed All Lab Data Listed Above  * Additional Pertinent Lab Tests Reviewed:  All Labs Within Last 24 Hours Reviewed    Imaging:    Imaging Reports Reviewed Today Include: None  Imaging Personally Reviewed by Myself Includes:  None    Recent Cultures (last 7 days):           Last 24 Hours Medication List:   Current Facility-Administered Medications   Medication Dose Route Frequency Provider Last Rate    acetaminophen  650 mg Oral Q6H PRN Ren Medina MD      HYDROmorphone  0 5 mg Intravenous Q4H PRN Miquel Baez MD      insulin glargine  5 Units Subcutaneous HS Carla Ramos PA-C      insulin lispro  1-6 Units Subcutaneous TID AC Ren Medina MD      insulin lispro  4 Units Subcutaneous TID With Meals Evaristo Martin PA-C  nicotine  1 patch Transdermal Daily Zoraida Benitez MD      oxyCODONE-acetaminophen  1 tablet Oral Q4H PRN Benedict Hernandez MD          Today, Patient Was Seen By: Tigist Hobbs PA-C    ** Please Note: Dictation voice to text software may have been used in the creation of this document   **

## 2020-08-17 NOTE — SOCIAL WORK
CM informed by attending physician about needing a Care team meeting to discuss hospice vs continued care  Patient and family maybe interested in hospice services but, need more information  CM called patient daughter Carmenza Ray who was informed of having a meeting  Cm ask Keiko if 11am would be a good time to review medical plan  Carmenza Ray informed CM that she will be present at bedside and the team will meet to review plan at 11am  Care Team meeting scheduled at [address] [place] [address] department will follow through discharge

## 2020-08-17 NOTE — ASSESSMENT & PLAN NOTE
Lab Results   Component Value Date    HGBA1C 7 0 (H) 06/12/2020       Recent Labs     08/16/20  1112 08/16/20  1618 08/16/20  2125 08/17/20  0559   POCGLU 242* 108 239* 97       Blood Sugar Average: Last 72 hrs:  (P) 743 [id number]   · Last A1c 7%, has been having lower glucose in the AM, SSI coverage at night discontinued with improvement  · Continue Lantus to 5 units QHS  · Continue SSI coverage QAC and scheduled humalog 4 units with meals, increase as needed  · QID glucose checks   · Consistent carb diet  · Monitor and adjust regimen as needed

## 2020-08-17 NOTE — SOCIAL WORK
H and P, Facesheet, and pallative care note provided to Lidiaron Carlos at [facility]   department will follow through discharge

## 2020-08-17 NOTE — SOCIAL WORK
CM informed Post Care Team meeting that patient and family have opted to go with Home hospice and have requested [facility]  CM will send referral to [facility] in attempt to establish home hospice and discharge today  CM department will continue to follow through discharge

## 2020-08-17 NOTE — ASSESSMENT & PLAN NOTE
· Currently rate controlled without medications  · A/C with Xarelto, however was previously on hold for angiogram, never resumed   Therefore will continue to hold in anticipation for possible OR debridement

## 2020-08-17 NOTE — PHYSICIAN ADVISOR
Current patient class: Inpatient  The patient is currently on Hospital Day: 23      The patient was admitted to the hospital at 2144 on 7/30/20 for the following diagnosis:  UTI (urinary tract infection) [N39 [id number]]     CMS OUTLIER STAY REVIEW    After review of the relevant documentation, labs, vital signs and test results, the patient is appropriate for CONTINUED INPATIENT ADMISSION  The patient continues to remain hospitalized receiving acute medical care  The patient has surpassed the expected duration of stay, however given the clinical condition, need for further acute care management, the patient is appropriate to remain in an inpatient status  The patient still being actively managed, and does have unresolved medical issues requiring further hospitalization  This review is conducted at 20 days, to help satisfy the requirements for significant outlier stay review as per CMS  Given the current condition of this patient, the patient satisfies this review was determination for continued inpatient stay  Rationale is as follows: The patient has undergone vascular intervention to the right lower extremity, he has PA D with gangrene  Family meeting will occur today  Palliative care has seen the patient in consultation  plan is for possible MRI to evaluate underlying osteomyelitis  There may be OR debridement, depending on the goals of care meeting  Up to this point the patient and family wished not to proceed with amputation and instead focus on limb salvaging methods  The patient has required a prolonged hospital stay due to the gangrenous and necrotic ulcers  Hospice care is being explored      The patients vitals on arrival were   ED Triage Vitals   Temperature Pulse Respirations Blood Pressure SpO2   07/30/20 1729 07/30/20 1729 07/30/20 1729 07/30/20 1729 07/30/20 1729   (!) 97 3 °F (36 3 °C) 57 17 110/66 97 %      Temp Source Heart Rate Source Patient Position - Orthostatic VS BP Location FiO2 (%)   07/30/20 1729 07/30/20 1729 07/31/20 2148 07/30/20 1729 --   Oral Monitor Sitting Right arm       Pain Score       07/31/20 0450       No Pain           Past Medical History:   Diagnosis Date    Ambulatory dysfunction     Atrial fibrillation (HCC)     CHF (congestive heart failure) (HCC)     Chronic kidney disease     COPD (chronic obstructive pulmonary disease) (HCC)     Diabetes mellitus ([facility] [address] )     Hyperlipidemia     Metabolic encephalopathy     Osteomyelitis ([facility] [address] )      Past Surgical History:   Procedure Laterality Date    ABDOMINAL SURGERY      IR ABDOMINAL ANGIOGRAPHY / INTERVENTION  8/5/2020    IR ABDOMINAL ANGIOGRAPHY / INTERVENTION  8/12/2020    JOINT REPLACEMENT      b/l hips           Consults have been placed to:   IP CONSULT TO INFECTIOUS DISEASES  IP CONSULT TO CASE MANAGEMENT  IP CONSULT TO WOUND CARE PROVIDER  IP CONSULT TO PODIATRY  IP CONSULT TO VASCULAR SURGERY  IP CONSULT TO NEPHROLOGY  IP CONSULT TO PODIATRY  IP CONSULT TO PODIATRY  IP CONSULT TO PALLIATIVE CARE  IP CONSULT TO HOSPICE  IP CONSULT TO HOSPICE    Vitals:    08/16/20 1432 08/16/20 1500 08/16/20 2336 08/17/20 0713   BP: 133/65  132/61 115/64   BP Location:       Pulse: 55  66 58   Resp: 16  18 18   Temp: 98 1 °F (36 7 °C)  98 °F (36 7 °C) (!) 97 3 °F (36 3 °C)   TempSrc:    Oral   SpO2: 96% 99% 95% 97%   Weight:       Height:           Most recent labs:    Recent Labs     08/17/20  0616   WBC 8 02   HGB 10 0*   HCT 31 6*      K 4 4   CALCIUM 8 3   BUN 25   CREATININE 1 10       Scheduled Meds:  Current Facility-Administered Medications   Medication Dose Route Frequency Provider Last Rate    acetaminophen  650 mg Oral Q6H PRN Ren Medina MD      HYDROmorphone  0 5 mg Intravenous Q4H PRN Lillie Sun MD      insulin glargine  5 Units Subcutaneous HS Carla Ramos PA-C      insulin lispro  1-6 Units Subcutaneous TID AC Ren Medina MD      insulin lispro  4 Units Subcutaneous TID With Meals Lilly ENCISO JOLENE Ramos      nicotine  1 patch Transdermal Daily Luz Maria Verdugo MD      oxyCODONE-acetaminophen  1 tablet Oral Q4H PRN Xiao Zepeda MD       Continuous Infusions:   PRN Meds:   acetaminophen    HYDROmorphone    oxyCODONE-acetaminophen    Surgical procedures (if appropriate):

## 2020-08-17 NOTE — SOCIAL WORK
CM called [facility] at [phone] and they are reaching out to rep  Cm department will follow through discharge

## 2020-08-17 NOTE — ASSESSMENT & PLAN NOTE
· Hgb 10 0 today, stable  · Baseline since admission has been around 10  · No evidence of acute bleeding noted, stable   · Continue to monitor CBC

## 2020-08-17 NOTE — ASSESSMENT & PLAN NOTE
· Pt underwent right lower extremity angiogram, 8/12/2020, with successful balloon angioplasty to the right SFA, popliteal and anterior tibial arteries  The posterior tibial could not be visualized  · Previous left lower extremity intervention was performed this hospitalization as well  · Secondary to PAD, pt with gangrene of the bilateral heels   · Arterial duplex showing occlusive disease of lower extremities bilaterally, improvement after angiogram noted  · Vascular surgery has now signed off  Pt and family are declining any amputation   · Wound care evaluated,  · Recommending podiatry re-evaluation   · Podiatry evaluated,  · Plan to obtain MRI to evaluate for underlying osteomyelitis, if none is detected then potential OR debridement [facility]  · Non weight bearing  · Discussed with vascular and podiatry, will continue to hold Xarelto in anticipation for possibility of OR debridement  Pt and family are in agreement    · Pt was not accepted to acute rehab  · Family meeting to be held today with palliative care and [facility] regarding goals of care and discharge planning

## 2020-08-17 NOTE — CONSULTS
Consultation - Palliative and Supportive Care   Gregg Waterman 80 y o  male [id number]    Assessment:      Patient Active Problem List   Diagnosis    Hydrocele    Paronychia of finger    Stage 3 chronic [facility] disease ([facility] )    Abnormal urinalysis    [facility], [facility]    HLD (hyperlipidemia)    COPD without exacerbation ([facility] )    Cellulitis of buttock    A-fib ([facility] )    Chronic diastolic congestive heart failure ([place] [address] )    Diabetes ([place] [address] )    Vitamin D deficiency    PVD (peripheral vascular disease) ([place] [address] )    Toxic metabolic encephalopathy    Ambulatory dysfunction    Acute cystitis    Type 2 diabetes mellitus, with long-term current use of insulin ([facility])    Metabolic encephalopathy    Dry skin dermatitis    Urinary retention    Myalgia    Neuropathy    Speech abnormality    Positive urine culture    Balanitis    Pressure ulcer of ankle    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 ([facility])    Anemia   Severe Protein Calorie Malnutrition        Plan:  1  Symptom management -    - percocet 5-325mg PRN   - dilaudid 0 5mg PRN for breakthrough    2  Goals -  Goals of care conversation with patient's daughter  DIsucssed current treatment and introduced hospice cares  Answered questions regarding the different options  At this point she would like to pursue hospice cares at home and have no further intervention for patient's heel  Code Status: level 3   Decisional apparatus:  Patient is not competent on my exam today  If competence is lost, patient's substitute decision maker would default to daughter [place] 169  Advance Directive / Living Will / POLST:  None     I have reviewed the patient's controlled substance dispensing history in the Prescription Drug Monitoring Program in compliance with the [facility] regulations before prescribing any controlled substances  We appreciate the invitation to be involved in this patient's care  We will continue to follow  Please do not hesitate to reach our on call provider through our clinic answering service at  should you have acute symptom control concerns  Epifanio Villalta MD  Palliative and Supportive Care  Clinic/Answering Service: [phone]  You can find me on [employer]! IDENTIFICATION:  Inpatient consult to Palliative Care  Consult performed by: Epifanio Villalta MD  Consult ordered by: Benjamín Goode PA-C        Physician Requesting Consult: Niko Garcia MD  Reason for Consult / Principal Problem:  Goals of care  Hx and PE limited by:  Patient delirium    HISTORY OF PRESENT ILLNESS:       Cruz Rajput is a 80 y o  male with known cardiovascular disease who presents with worsening heel wound from home  Patient's daughter reports that since his stay in a nursing facility, he has lost 30 lb  He has developed a large necrotic-appearing heel wound  Patient's mentation has also worsened in that time period nevertheless patient has discussed going home multiple times previously with his daughter and and avoidance of hospitalizations  Patient's daughter request to speak with palliative Care to discuss goals of care and possible discharge home  Explored home situation and discussed hospice as above  The patient self is hard of hearing and confused, however he endorses left leg pain  He denies any shortness of breath      Review of Systems   Unable to perform ROS: mental status change       Past Medical History:   Diagnosis Date    Ambulatory dysfunction     Atrial fibrillation (HCC)     CHF (congestive heart failure) (HCC)     Chronic kidney disease     [facility]) [facility]     [facility]     Hyperlipidemia     Metabolic encephalopathy     [facility]omyelitis ([facility] )      Past Surgical History:   Procedure Laterality Date    ABDOMINAL SURGERY      IR ABDOMINAL ANGIOGRAPHY / INTERVENTION  8/5/2020    IR ABDOMINAL ANGIOGRAPHY / INTERVENTION 8/12/2020    JOINT REPLACEMENT      b/l hips     Social History     Socioeconomic History    Marital status:       Spouse name: Not on file    Number of children: Not on file    Years of education: Not on file    Highest education level: Not on file   Occupational History    Not on file   Social Needs    Financial resource strain: Not on file    Food insecurity     Worry: Not on file     Inability: Not on file    Transportation needs     Medical: Not on file     Non-medical: Not on file   Tobacco Use    Smoking status: Current Every Day Smoker     Packs/day: 1 00    Smokeless tobacco: Never Used   Substance and Sexual Activity    Alcohol use: Not Currently     Comment: occ beer    Drug use: No    Sexual activity: Not on file   Lifestyle    Physical activity     Days per week: Not on file     Minutes per session: Not on file    Stress: Not on file   Relationships    Social connections     Talks on phone: Not on file     Gets together: Not on file     Attends Restoration service: Not on file     Active member of club or organization: Not on file     Attends meetings of clubs or organizations: Not on file     Relationship status: Not on file    Intimate partner violence     Fear of current or ex partner: Not on file     Emotionally abused: Not on file     Physically abused: Not on file     Forced sexual activity: Not on file   Other Topics Concern    Not on file   Social History Narrative    Not on file     Family History   Family history unknown: Yes       MEDICATIONS / ALLERGIES:    all current active meds have been reviewed and current meds:   Current Facility-Administered Medications   Medication Dose Route Frequency    acetaminophen (TYLENOL) tablet 650 mg  650 mg Oral Q6H PRN    HYDROmorphone (DILAUDID) injection 0 5 mg  0 5 mg Intravenous Q4H PRN    insulin glargine (LANTUS) subcutaneous injection 5 Units 0 05 mL  5 Units Subcutaneous HS    insulin lispro (HumaLOG) 100 units/mL subcutaneous injection 1-6 Units  1-6 Units Subcutaneous TID AC    insulin lispro (HumaLOG) 100 units/mL subcutaneous injection 4 Units  4 Units Subcutaneous TID With Meals    nicotine (NICODERM CQ) 7 mg/24hr TD 24 hr patch 1 patch  1 patch Transdermal Daily    oxyCODONE-acetaminophen (PERCOCET) 5-325 mg per tablet 1 tablet  1 tablet Oral Q4H PRN       Allergies   Allergen Reactions    Aspirin GI Intolerance       OBJECTIVE:    Physical Exam  Physical Exam  Vitals signs and nursing note reviewed  Constitutional:       General: He is not in acute distress  Appearance: He is not diaphoretic  HENT:      Head: Atraumatic  Eyes:      General:         Right eye: No discharge  Left eye: No discharge  Cardiovascular:      Rate and Rhythm: Regular rhythm  Pulmonary:      Effort: Pulmonary effort is normal    Abdominal:      General: There is no distension  Palpations: Abdomen is soft  Musculoskeletal:         General: Deformity present  No edema  Skin:     General: Skin is dry  Neurological:      Mental Status: He is alert  Comments: Oriented to person   Psychiatric:         Mood and Affect: Mood and affect normal       Comments: Pleasantly confused         Lab Results:   I have personally reviewed pertinent labs  , CBC:   Lab Results   Component Value Date    WBC 8 02 08/17/2020    HGB 10 0 (L) 08/17/2020    HCT 31 6 (L) 08/17/2020    MCV 89 08/17/2020     08/17/2020    MCH 28 2 08/17/2020    MCHC 31 6 08/17/2020    RDW 16 6 (H) 08/17/2020    MPV 9 8 08/17/2020    NRBC 0 08/17/2020   , CMP:   Lab Results   Component Value Date    SODIUM 138 08/17/2020    K 4 4 08/17/2020     08/17/2020    CO2 28 08/17/2020    BUN 25 08/17/2020    CREATININE 1 10 08/17/2020    CALCIUM 8 3 08/17/2020    EGFR 60 08/17/2020   , BMP:  Lab Results   Component Value Date    SODIUM 138 08/17/2020    K 4 4 08/17/2020     08/17/2020    CO2 28 08/17/2020    BUN 25 08/17/2020    CREATININE 1 10 08/17/2020    GLUC 96 08/17/2020    CALCIUM 8 3 08/17/2020    AGAP 6 08/17/2020    EGFR 60 08/17/2020     Imaging Studies: I personally reviewed relevant reports  EKG, Pathology, and Other Studies: I personally reviewed relevant reports    Counseling / Coordination of Care    Total floor / unit time spent today 45+ minutes  Greater than 50% of total time was spent with the patient and / or family counseling and / or coordination of care (11:00-11:25)  A description of the counseling / coordination of care: chart review, patient assessment, med review, symptom mgmt, goals of care, end-of-life cares

## 2020-08-17 NOTE — PLAN OF CARE
Problem: Prexisting or High Potential for Compromised Skin Integrity  Goal: Skin integrity is maintained or improved  Description: INTERVENTIONS:  - Identify patients at risk for skin breakdown  - Assess and monitor skin integrity  - Assess and monitor nutrition and hydration status  - Monitor labs   - Assess for incontinence   - Turn and reposition patient  - Assist with mobility/ambulation  - Relieve pressure over bony prominences  - Avoid friction and shearing  - Provide appropriate hygiene as needed including keeping skin clean and dry  - Evaluate need for skin moisturizer/barrier cream  - Collaborate with interdisciplinary team   - Patient/family teaching  - Consider wound care consult   Outcome: Progressing     Problem: Potential for Falls  Goal: Patient will remain free of falls  Description: INTERVENTIONS:  - Assess patient frequently for physical needs  -  Identify cognitive and physical deficits and behaviors that affect risk of falls    -  [place] fall precautions as indicated by assessment   - Educate patient/family on patient safety including physical limitations  - Instruct patient to call for assistance with activity based on assessment  - Modify environment to reduce risk of injury  - Consider OT/PT consult to assist with strengthening/mobility  Outcome: Progressing     Problem: PAIN - ADULT  Goal: Verbalizes/displays adequate comfort level or baseline comfort level  Description: Interventions:  - Encourage patient to monitor pain and request assistance  - Assess pain using appropriate pain scale  - Administer analgesics based on type and severity of pain and evaluate response  - Implement non-pharmacological measures as appropriate and evaluate response  - Consider cultural and social influences on pain and pain management  - Notify physician/advanced practitioner if interventions unsuccessful or patient reports new pain  Outcome: Progressing     Problem: INFECTION - ADULT  Goal: Absence or prevention of progression during hospitalization  Description: INTERVENTIONS:  - Assess and monitor for signs and symptoms of infection  - Monitor lab/diagnostic results  - Monitor all insertion sites, i e  indwelling lines, tubes, and drains  - Monitor endotracheal if appropriate and nasal secretions for changes in amount and color  - [place] appropriate cooling/warming therapies per order  - Administer medications as ordered  - Instruct and encourage patient and family to use good hand hygiene technique  - Identify and instruct in appropriate isolation precautions for identified infection/condition  Outcome: Progressing  Goal: Absence of fever/infection during neutropenic period  Description: INTERVENTIONS:  - Monitor WBC    Outcome: Progressing     Problem: SAFETY ADULT  Goal: Patient will remain free of falls  Description: INTERVENTIONS:  - Assess patient frequently for physical needs  -  Identify cognitive and physical deficits and behaviors that affect risk of falls    -  [place] fall precautions as indicated by assessment   - Educate patient/family on patient safety including physical limitations  - Instruct patient to call for assistance with activity based on assessment  - Modify environment to reduce risk of injury  - Consider OT/PT consult to assist with strengthening/mobility  Outcome: Progressing  Goal: Maintain or return to baseline ADL function  Description: INTERVENTIONS:  -  Assess patient's ability to carry out ADLs; assess patient's baseline for ADL function and identify physical deficits which impact ability to perform ADLs (bathing, care of mouth/teeth, toileting, grooming, dressing, etc )  - Assess/evaluate cause of self-care deficits   - Assess range of motion  - Assess patient's mobility; develop plan if impaired  - Assess patient's need for assistive devices and provide as appropriate  - Encourage maximum independence but intervene and supervise when necessary  - Involve family in performance of ADLs  - Assess for home care needs following discharge   - Consider OT consult to assist with ADL evaluation and planning for discharge  - Provide patient education as appropriate  Outcome: Progressing  Goal: Maintain or return mobility status to optimal level  Description: INTERVENTIONS:  - Assess patient's baseline mobility status (ambulation, transfers, stairs, etc )    - Identify cognitive and physical deficits and behaviors that affect mobility  - Identify mobility aids required to assist with transfers and/or ambulation (gait belt, sit-to-stand, lift, walker, cane, etc )  - [place] fall precautions as indicated by assessment  - Record patient progress and toleration of activity level on Mobility SBAR; progress patient to next Phase/Stage  - Instruct patient to call for assistance with activity based on assessment  - Consider rehabilitation consult to assist with strengthening/weightbearing, etc   Outcome: Progressing     Problem: DISCHARGE PLANNING  Goal: Discharge to home or other facility with appropriate resources  Description: INTERVENTIONS:  - Identify barriers to discharge w/patient and caregiver  - Arrange for needed discharge resources and transportation as appropriate  - Identify discharge learning needs (meds, wound care, etc )  - Arrange for interpretive services to assist at discharge as needed  - Refer to Case Management Department for coordinating discharge planning if the patient needs post-hospital services based on physician/advanced practitioner order or complex needs related to functional status, cognitive ability, or social support system  Outcome: Progressing     Problem: Knowledge Deficit  Goal: Patient/family/caregiver demonstrates understanding of disease process, treatment plan, medications, and discharge instructions  Description: Complete learning assessment and assess knowledge base    Interventions:  - Provide teaching at level of understanding  - Provide teaching via preferred learning methods  Outcome: Progressing     Problem: GENITOURINARY - ADULT  Goal: Maintains or returns to baseline urinary function  Description: INTERVENTIONS:  - Assess urinary function  - Encourage oral fluids to ensure adequate hydration if ordered  - Administer IV fluids as ordered to ensure adequate hydration  - Administer ordered medications as needed  - Offer frequent toileting  - Follow urinary retention protocol if ordered  Outcome: Progressing  Goal: Absence of urinary retention  Description: INTERVENTIONS:  - Assess patients ability to void and empty bladder  - Monitor I/O  - Bladder scan as needed  - Discuss with physician/AP medications to alleviate retention as needed  - Discuss catheterization for long term situations as appropriate  Outcome: Progressing  Goal: Urinary catheter remains patent  Description: INTERVENTIONS:  - Assess patency of urinary catheter  - If patient has a chronic mccoy, consider changing catheter if non-functioning  - Follow guidelines for intermittent irrigation of non-functioning urinary catheter  Outcome: Progressing     Problem: SKIN/TISSUE INTEGRITY - ADULT  Goal: Skin integrity remains intact  Description: INTERVENTIONS  - Identify patients at risk for skin breakdown  - Assess and monitor skin integrity  - Assess and monitor nutrition and hydration status  - Monitor labs (i e  albumin)  - Assess for incontinence   - Turn and reposition patient  - Assist with mobility/ambulation  - Relieve pressure over bony prominences  - Avoid friction and shearing  - Provide appropriate hygiene as needed including keeping skin clean and dry  - Evaluate need for skin moisturizer/barrier cream  - Collaborate with interdisciplinary team (i e  Nutrition, Rehabilitation, etc )   - Patient/family teaching  Outcome: Progressing  Goal: Incision(s), wounds(s) or drain site(s) healing without S/S of infection  Description: INTERVENTIONS  - Assess and document risk factors for skin impairment   - Assess and document dressing, incision, wound bed, drain sites and surrounding tissue  - Consider nutrition services referral as needed  - Oral mucous membranes remain intact  - Provide patient/ family education  Outcome: Progressing  Goal: Oral mucous membranes remain intact  Description: INTERVENTIONS  - Assess oral mucosa and hygiene practices  - Implement preventative oral hygiene regimen  - Implement oral medicated treatments as ordered  - Initiate Nutrition services referral as needed  Outcome: Progressing     Problem: Nutrition/Hydration-ADULT  Goal: Nutrient/Hydration intake appropriate for improving, restoring or maintaining nutritional needs  Description: Monitor and assess patient's nutrition/hydration status for malnutrition  Collaborate with interdisciplinary team and initiate plan and interventions as ordered  Monitor patient's weight and dietary intake as ordered or per policy  Utilize nutrition screening tool and intervene as necessary  Determine patient's food preferences and provide high-protein, high-caloric foods as appropriate       INTERVENTIONS:  - Monitor oral intake, urinary output, labs, and treatment plans  - Assess nutrition and hydration status and recommend course of action  - Evaluate amount of meals eaten  - Assist patient with eating if necessary   - Allow adequate time for meals  - Recommend/ encourage appropriate diets, oral nutritional supplements, and vitamin/mineral supplements  - Order, calculate, and assess calorie counts as needed  - Recommend, monitor, and adjust tube feedings based on assessed needs  - Assess need for intravenous fluids  - Provide nutrition/hydration education as appropriate  - Include patient/family/caregiver in decisions related to nutrition   Outcome: Progressing

## 2020-08-18 VITALS
BODY MASS INDEX: 28.34 KG/M2 | RESPIRATION RATE: 18 BRPM | TEMPERATURE: 98.2 F | WEIGHT: 176.37 LBS | OXYGEN SATURATION: 96 % | HEART RATE: 58 BPM | DIASTOLIC BLOOD PRESSURE: 89 MMHG | SYSTOLIC BLOOD PRESSURE: 114 MMHG | HEIGHT: 66 IN

## 2020-08-18 LAB
GLUCOSE SERPL-MCNC: 112 MG/DL (ref 65–140)
GLUCOSE SERPL-MCNC: 203 MG/DL (ref 65–140)

## 2020-08-18 PROCEDURE — 82948 REAGENT STRIP/BLOOD GLUCOSE: CPT

## 2020-08-18 PROCEDURE — 99239 HOSP IP/OBS DSCHRG MGMT >30: CPT | Performed by: NURSE PRACTITIONER

## 2020-08-18 PROCEDURE — 99231 SBSQ HOSP IP/OBS SF/LOW 25: CPT | Performed by: INTERNAL MEDICINE

## 2020-08-18 RX ORDER — LORAZEPAM 2 MG/ML
1 CONCENTRATE ORAL EVERY 8 HOURS PRN
Qty: 30 ML | Refills: 0 | Status: SHIPPED | OUTPATIENT
Start: 2020-08-18 | End: 2020-09-07

## 2020-08-18 RX ORDER — MORPHINE SULFATE 100 MG/5ML
5 SOLUTION ORAL EVERY 2 HOUR PRN
Qty: 30 ML | Refills: 0 | Status: SHIPPED | OUTPATIENT
Start: 2020-08-18 | End: 2020-08-28

## 2020-08-18 RX ADMIN — OXYCODONE HYDROCHLORIDE AND ACETAMINOPHEN 1 TABLET: 5; 325 TABLET ORAL at 09:47

## 2020-08-18 RX ADMIN — INSULIN LISPRO 2 UNITS: 100 INJECTION, SOLUTION INTRAVENOUS; SUBCUTANEOUS at 12:20

## 2020-08-18 NOTE — SOCIAL WORK
Per SLIM patient is cleared for discharge today  CM spoke to Brittney who states she is prepared to have patient at home  Alvaro Hammond is informed of IMM and IMM is in the chart  ANDRES rossi for compassionate care who states dme is delivered and they are prepared to service patient at home  CM scheduled BLS transport  Patient has BLS transport with PMR for 2:30PM today to home  Medical necessity is in the chart  Treatment team is informed

## 2020-08-18 NOTE — PROGRESS NOTES
Progress note - Palliative and Supportive Care   Liana Doyle 80 y o  male [id number]    Assessment:      Patient Active Problem List   Diagnosis    Hydrocele    Paronychia of finger    [facility]    Abnormal urinalysis    Pressure injury of back, stage 2 ([place] [address] )    HLD (hyperlipidemia)    COPD without exacerbation ([place] [address] )    Cellulitis of buttock    A-fib ([place] [address] )    Chronic diastolic congestive heart failure ([place] [address] )    Diabetes ([place] [address] )    Vitamin D deficiency    PVD (peripheral vascular disease) ([place] [address] )    Toxic metabolic encephalopathy    Ambulatory dysfunction    Acute cystitis    Type 2 diabetes mellitus, with long-term current use of insulin [facility]    Metabolic encephalopathy    Dry skin dermatitis    Urinary retention    Myalgia    Neuropathy    Speech abnormality    Positive urine culture    Balanitis    Pressure ulcer of ankle    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 ([place] [address] )    Anemia         Plan:  1  Symptom management -   - D/C on hospice  - Script for roxanol and ativan provided  2  Goals - Home with hospice  Interval history:       Patient doing well this AM   Good appetite  Denies any pain  Denies any SOB  Seleta Fling to go home       MEDICATIONS / ALLERGIES:     all current active meds have been reviewed and current meds:   Current Facility-Administered Medications   Medication Dose Route Frequency    acetaminophen (TYLENOL) tablet 650 mg  650 mg Oral Q6H PRN    HYDROmorphone (DILAUDID) injection 0 5 mg  0 5 mg Intravenous Q4H PRN    insulin glargine (LANTUS) subcutaneous injection 5 Units 0 05 mL  5 Units Subcutaneous HS    insulin lispro (HumaLOG) 100 units/mL subcutaneous injection 1-6 Units  1-6 Units Subcutaneous TID AC    insulin lispro (HumaLOG) 100 units/mL subcutaneous injection 4 Units  4 Units Subcutaneous TID With Meals    nicotine (NICODERM CQ) 7 mg/24hr TD 24 hr patch 1 patch  1 patch Transdermal Daily    oxyCODONE-acetaminophen (PERCOCET) 5-325 mg per tablet 1 tablet  1 tablet Oral Q4H PRN       Allergies   Allergen Reactions    Aspirin GI Intolerance       OBJECTIVE:    Physical Exam  Physical Exam  Vitals signs and nursing note reviewed  Constitutional:       General: He is not in acute distress  Appearance: He is not diaphoretic  HENT:      Head: Normocephalic and atraumatic  Eyes:      General:         Right eye: No discharge  Left eye: No discharge  Cardiovascular:      Rate and Rhythm: Normal rate  Pulmonary:      Effort: No respiratory distress  Abdominal:      General: There is no distension  Musculoskeletal:         General: Edema present  Skin:     General: Skin is warm and dry  Neurological:      Mental Status: He is alert  Psychiatric:         Mood and Affect: Mood and affect normal          Behavior: Behavior normal          Lab Results: I have personally reviewed pertinent labs  , CBC: No results found for: WBC, HGB, HCT, MCV, PLT, ADJUSTEDWBC, MCH, MCHC, RDW, MPV, NRBC, CMP: No results found for: SODIUM, K, CL, CO2, ANIONGAP, BUN, CREATININE, GLUCOSE, CALCIUM, AST, ALT, ALKPHOS, PROT, BILITOT, EGFR, BMP:No results found for: SODIUM, K, CL, CO2, BUN, CREATININE, GLUC, GLUF, CALCIUM, AGAP, EGFR      Counseling / Coordination of Care    Total floor / unit time spent today 15+ minutes  Greater than 50% of total time was spent with the patient and / or family counseling and / or coordination of care   A description of the counseling / coordination of care: hospice discharge planning including provision of scripts and patient assessment, conversation with hospice team

## 2020-08-18 NOTE — ASSESSMENT & PLAN NOTE
· Hgb 10 0 yesterday, stable    · Baseline since admission has been around 10  · No evidence of acute bleeding noted, stable

## 2020-08-18 NOTE — ASSESSMENT & PLAN NOTE
· Currently rate controlled without medications  ·  Can restart Xarelto, due to patient not wanting to continue with OR debridement or amputation

## 2020-08-18 NOTE — ASSESSMENT & PLAN NOTE
Lab Results   Component Value Date    HGBA1C 7 0 (H) 06/12/2020       Recent Labs     08/17/20  1559 08/17/20  2137 08/18/20  0623 08/18/20  1121   POCGLU 173* 84 112 203*       Blood Sugar Average: Last 72 hrs:  (P) 943 [id number]   · Last A1c 7%, has been having lower glucose in the AM, SSI coverage at night discontinued with improvement  · Continue Lantus to 5 units QHS  · Continue SSI coverage QAC and scheduled humalog 4 units with meals, increase as needed  · QID glucose checks   · Consistent carb diet  · Monitor and adjust regimen as needed

## 2020-08-18 NOTE — ASSESSMENT & PLAN NOTE
· Baseline creatinine 1 1-1 3 on review  · Nephrology signed off,  · Cr   Improved to 1 10 yesterday, within baseline   · IVF have been discontinued

## 2020-08-18 NOTE — ASSESSMENT & PLAN NOTE
· Pt underwent right lower extremity angiogram, 8/12/2020, with successful balloon angioplasty to the right SFA, popliteal and anterior tibial arteries  The posterior tibial could not be visualized  · Previous left lower extremity intervention was performed this hospitalization as well  · Secondary to PAD, pt with gangrene of the bilateral heels   · Arterial duplex showing occlusive disease of lower extremities bilaterally, improvement after angiogram noted  · Vascular surgery has now signed off  Pt and family are declining any amputation and wound debridement  Continue nonweightbearing of the lower extremities bilaterally  · Family has agreed to discharge the patient home with home hospice care  All further interventions, operations, and procedures are being declined

## 2020-08-18 NOTE — PLAN OF CARE
Problem: Prexisting or High Potential for Compromised Skin Integrity  Goal: Skin integrity is maintained or improved  Description: INTERVENTIONS:  - Identify patients at risk for skin breakdown  - Assess and monitor skin integrity  - Assess and monitor nutrition and hydration status  - Monitor labs   - Assess for incontinence   - Turn and reposition patient  - Assist with mobility/ambulation  - Relieve pressure over bony prominences  - Avoid friction and shearing  - Provide appropriate hygiene as needed including keeping skin clean and dry  - Evaluate need for skin moisturizer/barrier cream  - Collaborate with interdisciplinary team   - Patient/family teaching  - Consider wound care consult   Outcome: Progressing     Problem: Potential for Falls  Goal: Patient will remain free of falls  Description: INTERVENTIONS:  - Assess patient frequently for physical needs  -  Identify cognitive and physical deficits and behaviors that affect risk of falls    -  [place] fall precautions as indicated by assessment   - Educate patient/family on patient safety including physical limitations  - Instruct patient to call for assistance with activity based on assessment  - Modify environment to reduce risk of injury  - Consider OT/PT consult to assist with strengthening/mobility  Outcome: Progressing     Problem: PAIN - ADULT  Goal: Verbalizes/displays adequate comfort level or baseline comfort level  Description: Interventions:  - Encourage patient to monitor pain and request assistance  - Assess pain using appropriate pain scale  - Administer analgesics based on type and severity of pain and evaluate response  - Implement non-pharmacological measures as appropriate and evaluate response  - Consider cultural and social influences on pain and pain management  - Notify physician/advanced practitioner if interventions unsuccessful or patient reports new pain  Outcome: Progressing     Problem: INFECTION - ADULT  Goal: Absence or prevention of progression during hospitalization  Description: INTERVENTIONS:  - Assess and monitor for signs and symptoms of infection  - Monitor lab/diagnostic results  - Monitor all insertion sites, i e  indwelling lines, tubes, and drains  - Monitor endotracheal if appropriate and nasal secretions for changes in amount and color  - [place] appropriate cooling/warming therapies per order  - Administer medications as ordered  - Instruct and encourage patient and family to use good hand hygiene technique  - Identify and instruct in appropriate isolation precautions for identified infection/condition  Outcome: Progressing  Goal: Absence of fever/infection during neutropenic period  Description: INTERVENTIONS:  - Monitor WBC    Outcome: Progressing     Problem: SAFETY ADULT  Goal: Patient will remain free of falls  Description: INTERVENTIONS:  - Assess patient frequently for physical needs  -  Identify cognitive and physical deficits and behaviors that affect risk of falls    -  [place] fall precautions as indicated by assessment   - Educate patient/family on patient safety including physical limitations  - Instruct patient to call for assistance with activity based on assessment  - Modify environment to reduce risk of injury  - Consider OT/PT consult to assist with strengthening/mobility  Outcome: Progressing  Goal: Maintain or return to baseline ADL function  Description: INTERVENTIONS:  -  Assess patient's ability to carry out ADLs; assess patient's baseline for ADL function and identify physical deficits which impact ability to perform ADLs (bathing, care of mouth/teeth, toileting, grooming, dressing, etc )  - Assess/evaluate cause of self-care deficits   - Assess range of motion  - Assess patient's mobility; develop plan if impaired  - Assess patient's need for assistive devices and provide as appropriate  - Encourage maximum independence but intervene and supervise when necessary  - Involve family in performance of ADLs  - Assess for home care needs following discharge   - Consider OT consult to assist with ADL evaluation and planning for discharge  - Provide patient education as appropriate  Outcome: Progressing  Goal: Maintain or return mobility status to optimal level  Description: INTERVENTIONS:  - Assess patient's baseline mobility status (ambulation, transfers, stairs, etc )    - Identify cognitive and physical deficits and behaviors that affect mobility  - Identify mobility aids required to assist with transfers and/or ambulation (gait belt, sit-to-stand, lift, walker, cane, etc )  - [place] fall precautions as indicated by assessment  - Record patient progress and toleration of activity level on Mobility SBAR; progress patient to next Phase/Stage  - Instruct patient to call for assistance with activity based on assessment  - Consider rehabilitation consult to assist with strengthening/weightbearing, etc   Outcome: Progressing     Problem: DISCHARGE PLANNING  Goal: Discharge to home or other facility with appropriate resources  Description: INTERVENTIONS:  - Identify barriers to discharge w/patient and caregiver  - Arrange for needed discharge resources and transportation as appropriate  - Identify discharge learning needs (meds, wound care, etc )  - Arrange for interpretive services to assist at discharge as needed  - Refer to Case Management Department for coordinating discharge planning if the patient needs post-hospital services based on physician/advanced practitioner order or complex needs related to functional status, cognitive ability, or social support system  Outcome: Progressing     Problem: Knowledge Deficit  Goal: Patient/family/caregiver demonstrates understanding of disease process, treatment plan, medications, and discharge instructions  Description: Complete learning assessment and assess knowledge base    Interventions:  - Provide teaching at level of understanding  - Provide teaching via preferred learning methods  Outcome: Progressing     Problem: GENITOURINARY - ADULT  Goal: Maintains or returns to baseline urinary function  Description: INTERVENTIONS:  - Assess urinary function  - Encourage oral fluids to ensure adequate hydration if ordered  - Administer IV fluids as ordered to ensure adequate hydration  - Administer ordered medications as needed  - Offer frequent toileting  - Follow urinary retention protocol if ordered  Outcome: Progressing  Goal: Absence of urinary retention  Description: INTERVENTIONS:  - Assess patients ability to void and empty bladder  - Monitor I/O  - Bladder scan as needed  - Discuss with physician/AP medications to alleviate retention as needed  - Discuss catheterization for long term situations as appropriate  Outcome: Progressing  Goal: Urinary catheter remains patent  Description: INTERVENTIONS:  - Assess patency of urinary catheter  - If patient has a chronic mccoy, consider changing catheter if non-functioning  - Follow guidelines for intermittent irrigation of non-functioning urinary catheter  Outcome: Progressing     Problem: SKIN/TISSUE INTEGRITY - ADULT  Goal: Skin integrity remains intact  Description: INTERVENTIONS  - Identify patients at risk for skin breakdown  - Assess and monitor skin integrity  - Assess and monitor nutrition and hydration status  - Monitor labs (i e  albumin)  - Assess for incontinence   - Turn and reposition patient  - Assist with mobility/ambulation  - Relieve pressure over bony prominences  - Avoid friction and shearing  - Provide appropriate hygiene as needed including keeping skin clean and dry  - Evaluate need for skin moisturizer/barrier cream  - Collaborate with interdisciplinary team (i e  Nutrition, Rehabilitation, etc )   - Patient/family teaching  Outcome: Progressing  Goal: Incision(s), wounds(s) or drain site(s) healing without S/S of infection  Description: INTERVENTIONS  - Assess and document risk factors for skin impairment   - Assess and document dressing, incision, wound bed, drain sites and surrounding tissue  - Consider nutrition services referral as needed  - Oral mucous membranes remain intact  - Provide patient/ family education  Outcome: Progressing  Goal: Oral mucous membranes remain intact  Description: INTERVENTIONS  - Assess oral mucosa and hygiene practices  - Implement preventative oral hygiene regimen  - Implement oral medicated treatments as ordered  - Initiate Nutrition services referral as needed  Outcome: Progressing     Problem: Nutrition/Hydration-ADULT  Goal: Nutrient/Hydration intake appropriate for improving, restoring or maintaining nutritional needs  Description: Monitor and assess patient's nutrition/hydration status for malnutrition  Collaborate with interdisciplinary team and initiate plan and interventions as ordered  Monitor patient's weight and dietary intake as ordered or per policy  Utilize nutrition screening tool and intervene as necessary  Determine patient's food preferences and provide high-protein, high-caloric foods as appropriate       INTERVENTIONS:  - Monitor oral intake, urinary output, labs, and treatment plans  - Assess nutrition and hydration status and recommend course of action  - Evaluate amount of meals eaten  - Assist patient with eating if necessary   - Allow adequate time for meals  - Recommend/ encourage appropriate diets, oral nutritional supplements, and vitamin/mineral supplements  - Order, calculate, and assess calorie counts as needed  - Recommend, monitor, and adjust tube feedings based on assessed needs  - Assess need for intravenous fluids  - Provide nutrition/hydration education as appropriate  - Include patient/family/caregiver in decisions related to nutrition   Outcome: Progressing

## 2020-08-18 NOTE — DISCHARGE SUMMARY
Discharge- Prateek Gross 1933, 80 y o  male MRN: [id number]    Unit/Bed#: -01 Encounter: [id number]    Primary Care Provider: Guero Zuniga DO   Date and time admitted to hospital: 7/30/2020  5:23 PM        * Pressure ulcer of ankle  Assessment & Plan  · Pt underwent right lower extremity angiogram, 8/12/2020, with successful balloon angioplasty to the right SFA, popliteal and anterior tibial arteries  The posterior tibial could not be visualized  · Previous left lower extremity intervention was performed this hospitalization as well  · Secondary to PAD, pt with gangrene of the bilateral heels   · Arterial duplex showing occlusive disease of lower extremities bilaterally, improvement after angiogram noted  · Vascular surgery has now signed off  Pt and family are declining any amputation and wound debridement  Continue nonweightbearing of the lower extremities bilaterally  · Family has agreed to discharge the patient home with home hospice care  All further interventions, operations, and procedures are being declined  Anemia  Assessment & Plan  · Hgb 10 0 yesterday, stable    · Baseline since admission has been around 10  · No evidence of acute bleeding noted, stable     Diabetes [facility])  Assessment & Plan  Lab Results   Component Value Date    HGBA1C 7 0 (H) 06/12/2020       Recent Labs     08/17/20  1559 08/17/20  2137 08/18/20  0623 08/18/20  1121   POCGLU 173* 84 112 203*       Blood Sugar Average: Last 72 hrs:  (P) 125 [id number]   · Last A1c 7%, has been having lower glucose in the AM, SSI coverage at night discontinued with improvement  · Continue Lantus to 5 units QHS  · Continue SSI coverage QAC and scheduled humalog 4 units with meals, increase as needed  · QID glucose checks   · Consistent carb diet  · Monitor and adjust regimen as needed    Chronic diastolic congestive heart failure (HCC)  Assessment & Plan  Wt Readings from Last 3 Encounters:   07/30/20 80 kg (176 lb 5 9 oz)   08/16/20 81 kg (178 lb 9 2 oz)   07/14/20 85 1 kg (187 lb 9 6 oz)     · Previous provider discontinued Entresto secondary to low blood pressures on 8/3  · Last ECHO from 1/30/2019 showing EF 60%  · Currently euvolemic and normotensive     A-fib (HCC)  Assessment & Plan  · Currently rate controlled without medications  ·  Can restart Xarelto, due to patient not wanting to continue with OR debridement or amputation    Abnormal urinalysis  Assessment & Plan  · (+)UA on admission, urine culture growing ESBL  · ID signed off,  · Baxter catheter was replaced on 7/31  · Recommended holding additional antibiotics as patient has remained non-toxic and stable    Stage 3 chronic kidney disease ([facility] [address] )  Assessment & Plan  · Baseline creatinine 1 1-1 3 on review  · Nephrology signed off,  · Cr   Improved to 1 10 yesterday, within baseline   · IVF have been discontinued          Discharging Physician / Practitioner: Nubia Coleman  PCP: Alexx Spaulding DO  Admission Date:   Admission Orders (From admission, onward)     Ordered        07/30/20 2144  Inpatient Admission  Once                   Discharge Date: 08/18/20    Resolved Problems  Date Reviewed: 8/18/2020    None          Consultations During Hospital Stay:   [address] TO CASE MANAGEMENT  IP CONSULT TO WOUND CARE PROVIDER  IP CONSULT TO PODIATRY  IP CONSULT TO VASCULAR SURGERY  IP CONSULT TO NEPHROLOGY  IP CONSULT TO PODIATRY  IP CONSULT TO PALLIATIVE CARE  · IP CONSULT TO HOSPICE    Procedures Performed:   · IR abdominal angiography (left lower extremity 08/05/2020 and right lower extremity-08/12/2020)  · X Ray of right foot  X Ray of left foot  · X ray chest    Significant Findings / Test Results:   Impression 8/12/20: Stenoses of the right superficial femoral and popliteal artery were successfully dilated with a 4 x 40 mm balloon   Significant peripheral vascular disease involving the right foot including discontinuous peroneal artery and nonvisualization   of the posterior tibial artery   The anterior tibial artery which is the predominant runoff vessel to the right foot contained a high-grade stenosis at its origin which was successfully balloon dilated with a 2 5 x 30 mm balloon  Impression 8/5/20:      Left lower extremity arteriography with SFA, popliteal and peroneal artery angioplasty as described above   Unsuccessful catheterization and recannulization of flush short segment occlusion of the anterior tibial artery  X ray left foot- negative for osteomyelitis  X ray right foot- negative for osteomyelitis  Chest  Xray- no significant findings    Incidental Findings:   · none    Test Results Pending at Discharge (will require follow up):   · none     Outpatient Tests Requested:  · none    Complications: none    Reason for Admission:  Patient was admitted for a positive urinalysis on July 30, 2020  Hospital Course:     Anna Bray is a 80 y o  male patient who originally presented to the hospital on 7/30/2020 due to a positive urinalysis  During the patient's hospital stay was noted that he had black gangrenous necrotic ulcers on both of his heels  Patient had interventional radiology perform angiograms of both legs and feet, which concluded that he had single-vessel runoff to both feet, and severely stenotic and occluded vessels throughout both extremity  Podiatry and vascular surgery were both consulted  Family was made aware of the severity of these ulcers and that most likely surgery would not help condition  Patient and family agreed to discontinuing all medical care terms of these ulcers, and necrotic tissue on his feet and opted to do in-home hospice care  Condition at Discharge: stable     Discharge Day Visit / Exam:     Subjective:  Patient presents today in a stable condition  He presents in no acute pain  Other review of systems able to be completed due to patient's mental status    Patient is aware that he is going home today  Vitals: Blood Pressure: 114/89 (08/18/20 0725)  Pulse: 58 (08/18/20 0725)  Temperature: 98 2 °F (36 8 °C) (08/18/20 0725)  Temp Source: Oral (08/17/20 0713)  Respirations: 18 (08/18/20 0725)  Height: 5' 6" (167 6 cm) (07/30/20 1729)  Weight - Scale: 80 kg (176 lb 5 9 oz) (07/30/20 1729)  SpO2: 96 % (08/18/20 0938)  Exam:   Physical Exam  Constitutional:       General: He is not in acute distress  Appearance: He is well-developed and well-nourished  HENT:      Head: Normocephalic and atraumatic  Eyes:      Pupils: Pupils are equal, round, and reactive to light  Neck:      Musculoskeletal: Normal range of motion and neck supple  Cardiovascular:      Rate and Rhythm: Regular rhythm  Pulses: Pulses are weak  Heart sounds: Normal heart sounds, S1 normal and S2 normal  No murmur  No friction rub  No gallop  Comments: Weak distal pulses in both feet  Pulmonary:      Effort: Pulmonary effort is normal  No respiratory distress  Breath sounds: Normal breath sounds  No wheezing or rales  Abdominal:      General: Bowel sounds are normal       Palpations: Abdomen is soft  Skin:     General: Skin is warm and dry  Comments: Black gangrenous ulcers are still located on heels bilaterally  Neurological:      Comments: Patient is quite lethargic on the bed   Psychiatric:         Mood and Affect: Mood and affect normal        Discussion with Family:  Discussed with daughter of patient plan for discharge  Coordinated with the daughter and case management palliative medicine consult and hospice treatment plan  Patient will be discharged with home hospice care  No further interventions will be sought  Discharge instructions/Information to patient and family:   Patient discharged with home hospice care instructions      Provisions for Follow-Up Care:  See after visit summary for information related to follow-up care and any pertinent home health orders  Disposition:     Other: Home with home hospice care    For Discharges to [facility] SNF:   · Not Applicable to this Patient - Not Applicable to this Patient    Planned Readmission:  None     Discharge Statement:  I spent 45 minutes discharging the patient  This time was spent on the day of discharge  I had direct contact with the patient on the day of discharge  Greater than 50% of the total time was spent examining patient, answering all patient questions, arranging and discussing plan of care with patient as well as directly providing post-discharge instructions  Additional time then spent on discharge activities  Discharge Medications:  See after visit summary for reconciled discharge medications provided to patient and family        ** Please Note: This note has been constructed using a voice recognition system **

## 2021-05-19 NOTE — ASSESSMENT & PLAN NOTE
Odilon Nguyen Patient Age: 58 [facility]/pharmacy #[id number] [address] AT CORNER OF [address] [address]  Phone: [phone] Fax: [phone]      CALL BACK INFO: DO NOT LEAVE A MESSAGE - contact patient directly  ROUTING: Patient's physician/staff        PCP: Angela Cruz MD         INS: Payor: [employer]UE [employer]UE [employer]ELD [place] / Plan: PPO [id number] / Product Type: PPO MISC   PATIENT ADDRESS:  [id number] [place] [address]   Chronic kidney disease   Creatinine 1 82 on admission 1 4 today  Will consult Nephrology for renal optimization in preparation for angiogram

## 2022-03-22 NOTE — ASSESSMENT & PLAN NOTE
Continue PT OT and speech therapy  Problem: Airway Clearance - Ineffective:  Goal: Ability to maintain a clear airway will improve  Description: Ability to maintain a clear airway will improve  Outcome: Met This Shift     Problem: Aspiration:  Goal: Absence of aspiration  Description: Absence of aspiration  Outcome: Met This Shift     Problem:  Bowel Function - Altered:  Goal: Bowel elimination is within specified parameters  Description: Bowel elimination is within specified parameters  Outcome: Met This Shift     Problem: Cardiac Output - Decreased:  Goal: Hemodynamic stability will improve  Description: Hemodynamic stability will improve  Outcome: Met This Shift     Problem: Pain:  Goal: Pain level will decrease  Description: Pain level will decrease  Outcome: Met This Shift     Problem: Skin Integrity - Impaired:  Goal: Will show no infection signs and symptoms  Description: Will show no infection signs and symptoms  Outcome: Met This Shift     Problem: Tissue Perfusion - Cardiopulmonary, Altered:  Goal: Absence of angina  Description: Absence of angina  Outcome: Met This Shift  Goal: Hemodynamic stability will improve  Description: Hemodynamic stability will improve  Outcome: Met This Shift     Problem: Skin Integrity:  Goal: Absence of new skin breakdown  Description: Absence of new skin breakdown  Outcome: Met This Shift     Problem: Falls - Risk of:  Goal: Will remain free from falls  Description: Will remain free from falls  Outcome: Met This Shift     Problem: Pain:  Goal: Pain level will decrease  Description: Pain level will decrease  Outcome: Met This Shift     Problem: Anxiety/Stress:  Goal: Level of anxiety will decrease  Description: Level of anxiety will decrease  Outcome: Ongoing     Problem: Fluid Volume - Imbalance:  Goal: Absence of imbalanced fluid volume signs and symptoms  Description: Absence of imbalanced fluid volume signs and symptoms  Outcome: Ongoing     Problem: Gas Exchange - Impaired:  Goal: Levels of oxygenation will improve  Description: Levels of oxygenation will improve  Outcome: Ongoing     Problem: Serum Glucose Level - Abnormal:  Goal: Ability to maintain appropriate glucose levels will improve to within specified parameters  Description: Ability to maintain appropriate glucose levels will improve to within specified parameters  Outcome: Ongoing     Problem: Mental Status - Impaired:  Goal: Mental status will be restored to baseline  Description: Mental status will be restored to baseline  Outcome: Not Met This Shift     Problem: Non-Violent Restraints  Goal: Removal from restraints as soon as assessed to be safe  Outcome: Completed  Goal: No harm/injury to patient while restraints in use  Outcome: Completed  Goal: Patient's dignity will be maintained  Outcome: Completed